# Patient Record
Sex: FEMALE | Race: WHITE | Employment: OTHER | URBAN - NONMETROPOLITAN AREA
[De-identification: names, ages, dates, MRNs, and addresses within clinical notes are randomized per-mention and may not be internally consistent; named-entity substitution may affect disease eponyms.]

---

## 2017-09-20 ENCOUNTER — HOSPITAL ENCOUNTER (OUTPATIENT)
Dept: MAMMOGRAPHY | Age: 62
Discharge: HOME | End: 2017-09-20

## 2017-09-20 DIAGNOSIS — Z12.31 VISIT FOR SCREENING MAMMOGRAM: ICD-10-CM

## 2018-01-23 ENCOUNTER — HOSPITAL ENCOUNTER (OUTPATIENT)
Dept: GENERAL RADIOLOGY | Age: 63
Discharge: HOME | End: 2018-01-23
Attending: INTERNAL MEDICINE | Admitting: INTERNAL MEDICINE

## 2018-01-23 ENCOUNTER — HOSPITAL ENCOUNTER (OUTPATIENT)
Dept: OTHER | Age: 63
Discharge: HOME | End: 2018-01-23
Attending: INTERNAL MEDICINE | Admitting: INTERNAL MEDICINE

## 2018-01-23 DIAGNOSIS — R05.9 COUGH: ICD-10-CM

## 2018-01-23 LAB
ALBUMIN SERPL-MCNC: 4.3 GM/DL (ref 3.4–5)
ALP BLD-CCNC: 85 IU/L (ref 40–129)
ALT SERPL-CCNC: 12 U/L (ref 10–40)
ANION GAP SERPL CALCULATED.3IONS-SCNC: 12 MMOL/L (ref 4–16)
AST SERPL-CCNC: 15 IU/L (ref 15–37)
BILIRUB SERPL-MCNC: 0.3 MG/DL (ref 0–1)
BUN BLDV-MCNC: 13 MG/DL (ref 6–23)
CALCIUM SERPL-MCNC: 9.8 MG/DL (ref 8.3–10.6)
CHLORIDE BLD-SCNC: 101 MMOL/L (ref 99–110)
CO2: 30 MMOL/L (ref 21–32)
CREAT SERPL-MCNC: 1 MG/DL (ref 0.6–1.1)
GFR AFRICAN AMERICAN: >60 ML/MIN/1.73M2
GFR NON-AFRICAN AMERICAN: 56 ML/MIN/1.73M2
GLUCOSE BLD-MCNC: 146 MG/DL (ref 70–99)
LACTATE DEHYDROGENASE: 85 IU/L (ref 120–246)
POTASSIUM SERPL-SCNC: 5.3 MMOL/L (ref 3.5–5.1)
SODIUM BLD-SCNC: 143 MMOL/L (ref 135–145)
TOTAL PROTEIN: 7.2 GM/DL (ref 6.4–8.2)

## 2018-12-05 ENCOUNTER — HOSPITAL ENCOUNTER (EMERGENCY)
Age: 63
Discharge: HOME | End: 2018-12-05
Attending: EMERGENCY MEDICINE
Payer: MEDICARE

## 2018-12-05 ENCOUNTER — APPOINTMENT (OUTPATIENT)
Dept: CT IMAGING | Age: 63
End: 2018-12-05
Payer: MEDICARE

## 2018-12-05 VITALS
SYSTOLIC BLOOD PRESSURE: 146 MMHG | HEIGHT: 63 IN | BODY MASS INDEX: 32.07 KG/M2 | TEMPERATURE: 98.2 F | HEART RATE: 104 BPM | DIASTOLIC BLOOD PRESSURE: 77 MMHG | RESPIRATION RATE: 14 BRPM | WEIGHT: 181 LBS | OXYGEN SATURATION: 99 %

## 2018-12-05 DIAGNOSIS — R51.9 ACUTE NONINTRACTABLE HEADACHE, UNSPECIFIED HEADACHE TYPE: Primary | ICD-10-CM

## 2018-12-05 PROCEDURE — 70450 CT HEAD/BRAIN W/O DYE: CPT

## 2018-12-05 PROCEDURE — 6370000000 HC RX 637 (ALT 250 FOR IP): Performed by: EMERGENCY MEDICINE

## 2018-12-05 PROCEDURE — 99284 EMERGENCY DEPT VISIT MOD MDM: CPT

## 2018-12-05 RX ORDER — BUTALBITAL, ACETAMINOPHEN AND CAFFEINE 50; 325; 40 MG/1; MG/1; MG/1
1 TABLET ORAL ONCE
Status: COMPLETED | OUTPATIENT
Start: 2018-12-05 | End: 2018-12-05

## 2018-12-05 RX ORDER — IBUPROFEN 800 MG/1
800 TABLET ORAL ONCE
Status: COMPLETED | OUTPATIENT
Start: 2018-12-05 | End: 2018-12-05

## 2018-12-05 RX ORDER — IBUPROFEN 800 MG/1
800 TABLET ORAL EVERY 6 HOURS PRN
Qty: 20 TABLET | Refills: 0 | Status: SHIPPED | OUTPATIENT
Start: 2018-12-05

## 2018-12-05 RX ORDER — BUTALBITAL, ACETAMINOPHEN AND CAFFEINE 50; 325; 40 MG/1; MG/1; MG/1
1 TABLET ORAL EVERY 4 HOURS PRN
Qty: 30 TABLET | Refills: 0 | Status: SHIPPED | OUTPATIENT
Start: 2018-12-05

## 2018-12-05 RX ADMIN — BUTALBITAL, ACETAMINOPHEN AND CAFFEINE 1 TABLET: 50; 325; 40 TABLET ORAL at 17:52

## 2018-12-05 RX ADMIN — IBUPROFEN 800 MG: 800 TABLET ORAL at 17:52

## 2018-12-05 ASSESSMENT — PAIN DESCRIPTION - LOCATION: LOCATION: HEAD

## 2018-12-05 ASSESSMENT — PAIN SCALES - GENERAL: PAINLEVEL_OUTOF10: 2

## 2018-12-05 ASSESSMENT — PAIN DESCRIPTION - PAIN TYPE: TYPE: ACUTE PAIN

## 2018-12-05 ASSESSMENT — PAIN DESCRIPTION - FREQUENCY: FREQUENCY: CONTINUOUS

## 2018-12-05 ASSESSMENT — PAIN DESCRIPTION - DESCRIPTORS: DESCRIPTORS: SHARP

## 2018-12-05 NOTE — ED PROVIDER NOTES
EXAMINATION:  CT OF THE HEAD WITHOUT CONTRAST  12/5/2018 6:03 pm    TECHNIQUE:  CT of the head was performed without the administration of intravenous  contrast. Dose modulation, iterative reconstruction, and/or weight based  adjustment of the mA/kV was utilized to reduce the radiation dose to as low  as reasonably achievable. COMPARISON:  None. HISTORY:  ORDERING SYSTEM PROVIDED HISTORY: HEADACHE  TECHNOLOGIST PROVIDED HISTORY:  Has a \"code stroke\" or \"stroke alert\" been called? ->No  Ordering Physician Provided Reason for Exam: headache  Acuity: Acute  Type of Exam: Initial  Additional signs and symptoms: Headache ( has had a headache off and on  for a week. States is in both of her temples and behind her eyes when  bending. States has been taking tylenol without relief. Denies fever or  chills, denies nausea or vomiting, denies blurred or double vision.)    FINDINGS:  BRAIN/VENTRICLES: There is no acute intracranial hemorrhage, mass effect or  midline shift.  No abnormal extra-axial fluid collection.  The gray-white  differentiation is maintained without evidence of an acute infarct.  There is  no evidence of hydrocephalus. ORBITS: The visualized portion of the orbits demonstrate no acute abnormality. SINUSES: The visualized paranasal sinuses and mastoid air cells demonstrate  no acute abnormality. SOFT TISSUES/SKULL:  No acute abnormality of the visualized skull or soft  tissues. [] Discussed with Radiologist:     [] The following radiograph was interpreted by myself in the absence of a radiologist:     EKG: (All EKG's are interpreted by myself in the absence of a cardiologist)      MDM:  Patient's vital signs are stable. Given a Fioricet and Motrin. Patient's vital signs are stable. Vital signs are stable. CT head does not show any acute findings. Nontoxic appearing. Feeling some improvement. We'll discharge with Fioricet and Motrin and follow-up PCP.     Clinical Impression:  1.  Acute nonintractable headache, unspecified headache type        Disposition Vitals:  [unfilled], [unfilled], [unfilled], [unfilled]    Disposition referral (if applicable):  Carolyn Ratliff, Mukesh Conley [facility] [address]  [phone]            Disposition medications (if applicable):  New Prescriptions    BUTALBITAL-ACETAMINOPHEN-CAFFEINE (FIORICET, ESGIC) -40 MG PER TABLET    Take 1 tablet by mouth every 4 hours as needed for Headaches    IBUPROFEN (IBU) 800 MG TABLET    Take 1 tablet by mouth every 6 hours as needed for Pain         (Please note that portions of this note may have been completed with a voice recognition program. Efforts were made to edit the dictations but occasionally words are mis-transcribed.)    MD Kimber Canales MD  12/05/18 0605

## 2019-02-18 ENCOUNTER — HOSPITAL ENCOUNTER (OUTPATIENT)
Age: 64
Setting detail: SPECIMEN
Discharge: HOME | End: 2019-02-18
Payer: MEDICARE

## 2019-02-18 LAB
ALBUMIN SERPL-MCNC: 4.1 GM/DL (ref 3.4–5)
ALP BLD-CCNC: 101 IU/L (ref 40–129)
ALT SERPL-CCNC: 12 U/L (ref 10–40)
ANION GAP SERPL CALCULATED.3IONS-SCNC: 12 MMOL/L (ref 4–16)
AST SERPL-CCNC: 13 IU/L (ref 15–37)
BILIRUB SERPL-MCNC: 0.1 MG/DL (ref 0–1)
BUN BLDV-MCNC: 15 MG/DL (ref 6–23)
CALCIUM SERPL-MCNC: 9.3 MG/DL (ref 8.3–10.6)
CHLORIDE BLD-SCNC: 101 MMOL/L (ref 99–110)
CO2: 29 MMOL/L (ref 21–32)
CREAT SERPL-MCNC: 0.8 MG/DL (ref 0.6–1.1)
GFR AFRICAN AMERICAN: >60 ML/MIN/1.73M2
GFR NON-AFRICAN AMERICAN: >60 ML/MIN/1.73M2
GLUCOSE BLD-MCNC: 165 MG/DL (ref 70–99)
LACTATE DEHYDROGENASE: 92 IU/L (ref 120–246)
POTASSIUM SERPL-SCNC: 4.9 MMOL/L (ref 3.5–5.1)
SODIUM BLD-SCNC: 142 MMOL/L (ref 135–145)
TOTAL PROTEIN: 6.9 GM/DL (ref 6.4–8.2)

## 2019-02-18 PROCEDURE — 83615 LACTATE (LD) (LDH) ENZYME: CPT

## 2019-02-18 PROCEDURE — 80053 COMPREHEN METABOLIC PANEL: CPT

## 2019-03-21 ENCOUNTER — HOSPITAL ENCOUNTER (OUTPATIENT)
Dept: MAMMOGRAPHY | Age: 64
Discharge: HOME | End: 2019-03-21
Payer: MEDICARE

## 2019-03-21 DIAGNOSIS — Z12.31 VISIT FOR SCREENING MAMMOGRAM: ICD-10-CM

## 2019-03-21 PROCEDURE — 77067 SCR MAMMO BI INCL CAD: CPT

## 2020-03-09 ENCOUNTER — HOSPITAL ENCOUNTER (OUTPATIENT)
Dept: INFUSION THERAPY | Age: 65
Discharge: HOME | End: 2020-03-09
Payer: MEDICARE

## 2020-03-09 LAB
ALBUMIN SERPL-MCNC: 4.4 GM/DL (ref 3.4–5)
ALP BLD-CCNC: 93 IU/L (ref 40–129)
ALT SERPL-CCNC: 11 U/L (ref 10–40)
ANION GAP SERPL CALCULATED.3IONS-SCNC: 15 MMOL/L (ref 4–16)
AST SERPL-CCNC: 14 IU/L (ref 15–37)
BASOPHILS ABSOLUTE: 0 K/CU MM
BASOPHILS RELATIVE PERCENT: 0.1 % (ref 0–1)
BILIRUB SERPL-MCNC: 0.2 MG/DL (ref 0–1)
BUN BLDV-MCNC: 8 MG/DL (ref 6–23)
CALCIUM SERPL-MCNC: 9.6 MG/DL (ref 8.3–10.6)
CHLORIDE BLD-SCNC: 97 MMOL/L (ref 99–110)
CO2: 27 MMOL/L (ref 21–32)
CREAT SERPL-MCNC: 0.8 MG/DL (ref 0.6–1.1)
DIFFERENTIAL TYPE: ABNORMAL
EOSINOPHILS ABSOLUTE: 0.1 K/CU MM
EOSINOPHILS RELATIVE PERCENT: 0.8 % (ref 0–3)
GFR AFRICAN AMERICAN: >60 ML/MIN/1.73M2
GFR NON-AFRICAN AMERICAN: >60 ML/MIN/1.73M2
GLUCOSE BLD-MCNC: 94 MG/DL (ref 70–99)
HCT VFR BLD CALC: 41.4 % (ref 37–47)
HEMOGLOBIN: 13.9 GM/DL (ref 12.5–16)
LACTATE DEHYDROGENASE: 126 IU/L (ref 120–246)
LYMPHOCYTES ABSOLUTE: 5.3 K/CU MM
LYMPHOCYTES RELATIVE PERCENT: 40 % (ref 24–44)
MCH RBC QN AUTO: 28.5 PG (ref 27–31)
MCHC RBC AUTO-ENTMCNC: 33.6 % (ref 32–36)
MCV RBC AUTO: 85 FL (ref 78–100)
MONOCYTES ABSOLUTE: 1.3 K/CU MM
MONOCYTES RELATIVE PERCENT: 9.5 % (ref 0–4)
PDW BLD-RTO: 14.1 % (ref 11.7–14.9)
PLATELET # BLD: 441 K/CU MM (ref 140–440)
PMV BLD AUTO: 10.9 FL (ref 7.5–11.1)
POTASSIUM SERPL-SCNC: 4.4 MMOL/L (ref 3.5–5.1)
RBC # BLD: 4.87 M/CU MM (ref 4.2–5.4)
SEGMENTED NEUTROPHILS ABSOLUTE COUNT: 6.6 K/CU MM
SEGMENTED NEUTROPHILS RELATIVE PERCENT: 49.6 % (ref 36–66)
SODIUM BLD-SCNC: 139 MMOL/L (ref 135–145)
TOTAL PROTEIN: 7.6 GM/DL (ref 6.4–8.2)
WBC # BLD: 13.2 K/CU MM (ref 4–10.5)

## 2020-03-09 PROCEDURE — 85025 COMPLETE CBC W/AUTO DIFF WBC: CPT

## 2020-03-09 PROCEDURE — 36415 COLL VENOUS BLD VENIPUNCTURE: CPT

## 2020-03-09 PROCEDURE — 83615 LACTATE (LD) (LDH) ENZYME: CPT

## 2020-03-09 PROCEDURE — 80053 COMPREHEN METABOLIC PANEL: CPT

## 2022-03-31 ENCOUNTER — HOSPITAL ENCOUNTER (OUTPATIENT)
Age: 67
Discharge: HOME | End: 2022-03-31
Payer: MEDICARE

## 2022-03-31 LAB — POTASSIUM SERPL-SCNC: 5.1 MMOL/L (ref 3.5–5.1)

## 2022-03-31 PROCEDURE — 84132 ASSAY OF SERUM POTASSIUM: CPT

## 2022-03-31 PROCEDURE — 36415 COLL VENOUS BLD VENIPUNCTURE: CPT

## 2022-11-07 NOTE — PROGRESS NOTES
Patient Name: Lu Edgar  Patient : 1955  Patient MRN: [id number]     Primary Oncologist: Ladonna Hoyt MD  Referring Provider: JAJA Campoverde     Date of Service: 2022      Chief Complaint:   Chief Complaint   Patient presents with    Follow-up     Patient Active Problem List:       Leukocytosis       Thrombocytosis    HPI:   Ms. Yogesh Smyth is a 60-year-old very pleasant female with medical history significant for hypertension, hyperlipidemia, diabetes mellitus, history of HPV infection, initially referred to me on 2014, for evaluation of mild leukocytosis. She was found to have mild leukocytosis on routine blood tests done on 2014, by her primary care physician. She is a chronic smoker and she smokes about 10 cigarettes a day since she was 15. Laboratory workup on 2014, showed slight elevation in white blood cell count (13). Her hemoglobin, hematocrit, platelet count and LDH were normal. There was no significant immunophenotypic abnormalities in flow cytometry. JAK2  and bcr/abl studies were negative. She has been followed periodically since then. She missed follow up with me since 2019 until 2022. On 2022, she was referred back to me since she was found to have new onset thrombocytosis. She has stable leukocytosis. She was noted to have thrombocytosis (platelet count 505) and leukocytosis (WBC 13.75) on 22. Repeat blood tests on 10/6/22 showed persistent thrombocytosis (platelet count 062) and leukocytosis (BKD76.24). Since she now presented with both leukocytosis and thrombocytosis, I recommend her to have repeat myeloproliferative neoplasm today, including flow cytometry, BCR-ABL1, JAK2 V617F, JAK2 exon 12-13, MPL and CALR. I will also request MARIKA, ESR and LDH. If she is found to have myeloproliferative neoplasm, I will consider to start cytoreductive therapy with hydroxyurea.  If above tests are negative, I will recommend for observation. In view of her smoking history, I offered screening lung CT scan before and she declined it at that time. Will discuss about it again on next office visit. She does not have any significant symptoms on todays visit. Past Medical History:  Significant for  1. Hypertension  2. Hyperlipidemia  3. Diabetes mellitus  4. History of positive vaginal HPV     Past Surgical History:  No pertinent past surgical history. Family History:  Significant for lung cancer in her father, throat cancer in her brother. No other family history of cancer disease. Social History:  She is a current smoker and she smokes about 10 cigarettes a day since she was 15. She denies alcohol drinking or illicit drug abuse. She is a  and currently living in [facility] and has four children. Allergies:  No known drug allergies. Oncology History    No history exists. Review of Systems: \"Per interval history; otherwise 10 point ROS is negative. \"  Her energy level is pretty good and her sleep is fine. She doesn't have fever, chills, night sweats, cough, shortness of breath, chest pain, hemoptysis or palpitations. Her bowels and bladder functions are normal. She doesn't have nausea, vomiting, abdominal pain, diarrhea, constipation, dysuria, loss of appetite or weight loss. She doesn't have neuropathy and she denies bleeding or clotting issues. She denies any pain in her body. No anxiety or depression. The rest of the systems are unremarkable.     Vital Signs:  /61 (Site: Right Upper Arm, Position: Sitting, Cuff Size: Large Adult)   Pulse 85   Temp 97.3 °F (36.3 °C) (Temporal)   Ht 5' 1\" (1.549 m)   Wt 177 lb (80.3 kg)   SpO2 96%   BMI 33.44 kg/m²     Physical Exam:  CONSTITUTIONAL: awake, alert, cooperative, no apparent distress   EYES: pupils equal, round and reactive to light, sclera clear, normal conjunctiva  ENT: Normocephalic, without obvious abnormality, atraumatic  NECK: supple, symmetrical, no jugular venous distension, no carotid bruits   HEMATOLOGIC/LYMPHATIC: no cervical, supraclavicular or axillary lymphadenopathy   LUNGS: VBS, no wheezes, no increased work of breathing, no rhonchi, clear to auscultation, no crackles,   CARDIOVASCULAR: regular rate and rhythm, normal S1 and S2, no murmur noted  ABDOMEN: normal bowel sounds x 4, soft, non-distended, non-tender, no masses palpated, no hepatosplenomegaly   MUSCULOSKELETAL: full range of motion noted, tone is normal  NEUROLOGIC: awake, alert, oriented to name, place and time. Motor skills grossly intact. SKIN: appears intact, normal skin color, normal texture, normal turgor, no jaundice.    EXTREMITIES: no LE edema, no leg swelling, no cyanosis, no clubbing,      Labs:  Hematology:  Lab Results   Component Value Date    WBC 16.1 (H) 11/08/2022    RBC 4.87 11/08/2022    HGB 13.6 11/08/2022    HCT 42.8 11/08/2022    MCV 87.9 11/08/2022    MCH 27.9 11/08/2022    MCHC 31.8 (L) 11/08/2022    RDW 15.1 (H) 11/08/2022     (H) 11/08/2022    MPV 10.1 11/08/2022    BANDSPCT 6 10/01/2014    SEGSPCT 44.8 11/08/2022    EOSRELPCT 1.5 11/08/2022    BASOPCT 0.1 11/08/2022    LYMPHOPCT 43.7 11/08/2022    MONOPCT 9.9 (H) 11/08/2022    BANDABS 0.78 10/01/2014    SEGSABS 7.2 11/08/2022    EOSABS 0.2 11/08/2022    BASOSABS 0.0 11/08/2022    LYMPHSABS 7.1 11/08/2022    MONOSABS 1.6 11/08/2022    DIFFTYPE AUTOMATED DIFFERENTIAL 11/08/2022    PLTM PLATELETS APPEAR NORMAL 10/01/2014     Lab Results   Component Value Date    ESR 16 11/08/2022     Chemistry:  Lab Results   Component Value Date     11/08/2022    K 5.2 (H) 11/08/2022     11/08/2022    CO2 29 11/08/2022    BUN 12 11/08/2022    CREATININE 0.8 11/08/2022    GLUCOSE 111 (H) 11/08/2022    CALCIUM 10.1 11/08/2022    PROT 6.7 11/08/2022    LABALBU 4.3 11/08/2022    BILITOT 0.2 11/08/2022    ALKPHOS 84 11/08/2022    AST 12 (L) 11/08/2022    ALT 10 11/08/2022 LABGLOM >60 11/08/2022    GFRAA >60 03/09/2020    POCGLU 200 (H) 09/26/2014     Lab Results   Component Value Date     (L) 11/08/2022     No components found for: LD  No results found for: TSHHS, T4FREE, FT3  Immunology:  Lab Results   Component Value Date    PROT 6.7 11/08/2022     No results found for: Georga Vivian, KLFLCR  No results found for: B2M  Coagulation Panel:  No results found for: PROTIME, INR, APTT, DDIMER  Anemia Panel:  No results found for: [id number], FOLATE  Tumor Markers:  No results found for: , CEA, , LABCA2, PSA  Observations:  PHQ-9 Total Score: 0 (11/8/2022  3:41 PM)      Assessment:  Leukocytosis   Thrombocytosis    Plan:   Ms. Umesh Iverson is a 60-year-old very pleasant female who has been followed for leukocytosis since October 1, 2014. Laboratory workup in October 1, 2014, including flow cytometry. JAK2  and bcr/abl studies were normal.     She has been followed periodically since then. She missed follow up with me since 2/18/2019 until 11/8/2022. On November 8, 2022, she was referred back to me since she was found to have new onset thrombocytosis. She has stable leukocytosis. She was noted to have thrombocytosis (platelet count 696) and leukocytosis (WBC 13.75) on 7/7/22. Repeat blood tests on 10/6/22 showed persistent thrombocytosis (platelet count 338) and leukocytosis (KOZ07.20). Since she now presented with both leukocytosis and thrombocytosis, I recommend her to have repeat myeloproliferative neoplasm today, including flow cytometry, BCR-ABL1, JAK2 V617F, JAK2 exon 12-13, MPL and CALR. I will also request MARIKA, ESR and LDH. If she is found to have myeloproliferative neoplasm, I will consider to start cytoreductive therapy with hydroxyurea. If above tests are negative, I will recommend for observation. In view of her smoking history, I offered screening lung CT scan before and she declined it at that time.  Will discuss about it again on next office visit. I answered all her questions and concerns for today. Thank you for allowing me to participate in the care of this very pleasant patient. Recent imaging and labs were reviewed and discussed with the patient.

## 2022-11-08 ENCOUNTER — OFFICE VISIT (OUTPATIENT)
Dept: ONCOLOGY | Age: 67
End: 2022-11-08
Payer: MEDICARE

## 2022-11-08 ENCOUNTER — HOSPITAL ENCOUNTER (OUTPATIENT)
Dept: INFUSION THERAPY | Age: 67
Discharge: HOME | End: 2022-11-08
Payer: MEDICARE

## 2022-11-08 VITALS
TEMPERATURE: 97.3 F | DIASTOLIC BLOOD PRESSURE: 61 MMHG | WEIGHT: 177 LBS | SYSTOLIC BLOOD PRESSURE: 130 MMHG | HEART RATE: 85 BPM | BODY MASS INDEX: 33.42 KG/M2 | OXYGEN SATURATION: 96 % | HEIGHT: 61 IN

## 2022-11-08 DIAGNOSIS — D72.828 OTHER ELEVATED WHITE BLOOD CELL (WBC) COUNT: ICD-10-CM

## 2022-11-08 DIAGNOSIS — D75.839 THROMBOCYTOSIS: ICD-10-CM

## 2022-11-08 PROBLEM — D72.829 LEUKOCYTOSIS: Status: ACTIVE | Noted: 2022-11-08

## 2022-11-08 LAB
ALBUMIN SERPL-MCNC: 4.3 GM/DL (ref 3.4–5)
ALP BLD-CCNC: 84 IU/L (ref 40–128)
ALT SERPL-CCNC: 10 U/L (ref 10–40)
ANION GAP SERPL CALCULATED.3IONS-SCNC: 11 MMOL/L (ref 4–16)
AST SERPL-CCNC: 12 IU/L (ref 15–37)
BASOPHILS ABSOLUTE: 0 K/CU MM
BASOPHILS RELATIVE PERCENT: 0.1 % (ref 0–1)
BILIRUB SERPL-MCNC: 0.2 MG/DL (ref 0–1)
BUN BLDV-MCNC: 12 MG/DL (ref 6–23)
CALCIUM SERPL-MCNC: 10.1 MG/DL (ref 8.3–10.6)
CHLORIDE BLD-SCNC: 100 MMOL/L (ref 99–110)
CO2: 29 MMOL/L (ref 21–32)
CREAT SERPL-MCNC: 0.8 MG/DL (ref 0.6–1.1)
DIFFERENTIAL TYPE: ABNORMAL
EOSINOPHILS ABSOLUTE: 0.2 K/CU MM
EOSINOPHILS RELATIVE PERCENT: 1.5 % (ref 0–3)
ERYTHROCYTE SEDIMENTATION RATE: 16 MM/HR (ref 0–30)
FERRITIN: 64 NG/ML (ref 15–150)
GFR SERPL CREATININE-BSD FRML MDRD: >60 ML/MIN/1.73M2
GLUCOSE BLD-MCNC: 111 MG/DL (ref 70–99)
HCT VFR BLD CALC: 42.8 % (ref 37–47)
HEMOGLOBIN: 13.6 GM/DL (ref 12.5–16)
LACTATE DEHYDROGENASE: 100 IU/L (ref 120–246)
LYMPHOCYTES ABSOLUTE: 7.1 K/CU MM
LYMPHOCYTES RELATIVE PERCENT: 43.7 % (ref 24–44)
MCH RBC QN AUTO: 27.9 PG (ref 27–31)
MCHC RBC AUTO-ENTMCNC: 31.8 % (ref 32–36)
MCV RBC AUTO: 87.9 FL (ref 78–100)
MONOCYTES ABSOLUTE: 1.6 K/CU MM
MONOCYTES RELATIVE PERCENT: 9.9 % (ref 0–4)
PDW BLD-RTO: 15.1 % (ref 11.7–14.9)
PLATELET # BLD: 651 K/CU MM (ref 140–440)
PMV BLD AUTO: 10.1 FL (ref 7.5–11.1)
POTASSIUM SERPL-SCNC: 5.2 MMOL/L (ref 3.5–5.1)
RBC # BLD: 4.87 M/CU MM (ref 4.2–5.4)
SEGMENTED NEUTROPHILS ABSOLUTE COUNT: 7.2 K/CU MM
SEGMENTED NEUTROPHILS RELATIVE PERCENT: 44.8 % (ref 36–66)
SODIUM BLD-SCNC: 140 MMOL/L (ref 135–145)
TOTAL PROTEIN: 6.7 GM/DL (ref 6.4–8.2)
WBC # BLD: 16.1 K/CU MM (ref 4–10.5)

## 2022-11-08 PROCEDURE — 80053 COMPREHEN METABOLIC PANEL: CPT

## 2022-11-08 PROCEDURE — 85652 RBC SED RATE AUTOMATED: CPT

## 2022-11-08 PROCEDURE — 85025 COMPLETE CBC W/AUTO DIFF WBC: CPT

## 2022-11-08 PROCEDURE — 36415 COLL VENOUS BLD VENIPUNCTURE: CPT

## 2022-11-08 PROCEDURE — 99204 OFFICE O/P NEW MOD 45 MIN: CPT | Performed by: INTERNAL MEDICINE

## 2022-11-08 PROCEDURE — 86038 ANTINUCLEAR ANTIBODIES: CPT

## 2022-11-08 PROCEDURE — 82728 ASSAY OF FERRITIN: CPT

## 2022-11-08 PROCEDURE — 83615 LACTATE (LD) (LDH) ENZYME: CPT

## 2022-11-08 PROCEDURE — 1123F ACP DISCUSS/DSCN MKR DOCD: CPT | Performed by: INTERNAL MEDICINE

## 2022-11-08 PROCEDURE — 99211 OFF/OP EST MAY X REQ PHY/QHP: CPT

## 2022-11-08 RX ORDER — ORAL SEMAGLUTIDE 3 MG/1
TABLET ORAL
COMMUNITY
Start: 2022-10-13

## 2022-11-08 RX ORDER — GLIMEPIRIDE 2 MG/1
TABLET ORAL
COMMUNITY
Start: 2022-10-25

## 2022-11-08 ASSESSMENT — PATIENT HEALTH QUESTIONNAIRE - PHQ9
SUM OF ALL RESPONSES TO PHQ QUESTIONS 1-9: 0
1. LITTLE INTEREST OR PLEASURE IN DOING THINGS: 0
SUM OF ALL RESPONSES TO PHQ9 QUESTIONS 1 & 2: 0
SUM OF ALL RESPONSES TO PHQ QUESTIONS 1-9: 0
2. FEELING DOWN, DEPRESSED OR HOPELESS: 0

## 2022-11-08 NOTE — PROGRESS NOTES
MA Rooming Questions  Patient: Marlene Bennett  MRN: [id number]    Date: 11/8/2022          5. Did the patient have a depression screening completed today?  Yes    PHQ-9 Total Score: 0 (11/8/2022  3:41 PM)       PHQ-9 Given to (if applicable):               PHQ-9 Score (if applicable):                     [] Positive     []  Negative              Does question #9 need addressed (if applicable)                     [] Yes    []  No               Calvin Sampson [facility]

## 2022-11-11 LAB — ANTI-NUCLEAR ANTIBODY (ANA): NORMAL

## 2022-12-11 PROBLEM — D72.829 LEUCOCYTOSIS: Status: ACTIVE | Noted: 2022-12-11

## 2022-12-11 NOTE — PROGRESS NOTES
Patient Name: Paresh Hernandez  Patient : 1955  Patient MRN: [id number]     Primary Oncologist: Gabrielle Reyes MD  Referring Provider: JAJA Luu     Date of Service: 2022      Chief Complaint:   Chief Complaint   Patient presents with    Follow-up    Results     LABS      Patient Active Problem List:       Leukocytosis       Thrombocytosis    HPI:   Ms. Puneet Diaz is a 59-year-old very pleasant female with medical history significant for hypertension, hyperlipidemia, diabetes mellitus, history of HPV infection, initially referred to me on 2014, for evaluation of mild leukocytosis. She was found to have mild leukocytosis on routine blood tests done on 2014, by her primary care physician. She is a chronic smoker and she smokes about 10 cigarettes a day since she was 15. Laboratory workup on 2014, showed slight elevation in white blood cell count (13). Her hemoglobin, hematocrit, platelet count and LDH were normal. There was no significant immunophenotypic abnormalities in flow cytometry. JAK2  and bcr/abl studies were negative. She has been followed periodically since then. She missed follow up with me since 2019 until 2022. Laboratory work ups done on 22 showed leukocytosis (WBC 16.1), thrombocytosis (platelet 419) and flow cytometry showed monoclonal B-cell population (~450 cells/cumm). The immunophenotype of the clonal cells is non-specific. DDX includes peripheral blood involvement by a B-cell lymphom and monoclonal B-cell lymphocytosis. The rests of the blood tests, including BCR-ABL1, JAK2 V617F, JAK2 exon 12-13, MPL, CALR, MARIKA, ESR and LDH, were within normal range. On 2022, she presented to me for follow up. She was referred back to me since she was found to have new onset thrombocytosis. She has stable leukocytosis.      Since she now presented with both leukocytosis and thrombocytosis, I repeated labs on 11/8/22. 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 Other labs were normal.     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 Further recommendations will be based on CT scan findings. In view of her smoking history, I offered screening lung CT scan before and she declined it at that time. She does not have any significant symptoms on todays visit. Past Medical History:  Significant for  1. Hypertension  2. Hyperlipidemia  3. Diabetes mellitus  4. History of positive vaginal HPV     Past Surgical History:  No pertinent past surgical history. Family History:  Significant for lung cancer in her father, throat cancer in her brother. No other family history of cancer disease. Social History:  She is a current smoker and she smokes about 10 cigarettes a day since she was 15. She denies alcohol drinking or illicit drug abuse. She is a  and currently living in [place] and has four children. Allergies:  No known drug allergies. Oncology History    No history exists. Review of Systems: \"Per interval history; otherwise 10 point ROS is negative. \"  Her energy level is okay and her sleep is fine. She doesn't have fever, chills, night sweats, cough, shortness of breath, chest pain, hemoptysis or palpitations. Her bowels and bladder functions are normal. She doesn't have nausea, vomiting, abdominal pain, diarrhea, constipation, dysuria, loss of appetite or weight loss. She doesn't have neuropathy and she denies bleeding or clotting issues. She doesn't have any pain in her body. No anxiety or depression. The rest of the systems are unremarkable.     Vital Signs:  /70 (Site: Right Upper Arm, Position: Sitting, Cuff Size: Large Adult)   Pulse 88   Temp 97.4 °F (36.3 °C) (Temporal)   Ht 5' 1\" (1.549 m)   Wt 177 lb 12.8 oz (80.6 kg)   SpO2 98%   BMI 33.60 kg/m²     Physical Exam:  CONSTITUTIONAL: awake, alert, cooperative, no apparent distress   EYES: pupils equal, round and reactive to light, sclera clear, normal conjunctiva  ENT: Normocephalic, without obvious abnormality, atraumatic  NECK: supple, symmetrical, no jugular venous distension, no carotid bruits   HEMATOLOGIC/LYMPHATIC: no cervical, supraclavicular or axillary lymphadenopathy   LUNGS: VBS, no wheezes, no increased work of breathing, no rhonchi, clear to auscultation, no crackles,   CARDIOVASCULAR: regular rate and rhythm, normal S1 and S2, no murmur noted  ABDOMEN: normal bowel sounds x 4, soft, non-distended, non-tender, no masses palpated, no hepatosplenomegaly   MUSCULOSKELETAL: full range of motion noted, tone is normal  NEUROLOGIC: awake, alert, oriented to name, place and time. Motor skills grossly intact. SKIN: appears intact, normal skin color, normal texture, normal turgor, no jaundice.    EXTREMITIES: no LE edema, no clubbing, no leg swelling, no cyanosis,      Labs:  Hematology:  Lab Results   Component Value Date    WBC 16.1 (H) 11/08/2022    RBC 4.87 11/08/2022    HGB 13.6 11/08/2022    HCT 42.8 11/08/2022    MCV 87.9 11/08/2022    MCH 27.9 11/08/2022    MCHC 31.8 (L) 11/08/2022    RDW 15.1 (H) 11/08/2022     (H) 11/08/2022    MPV 10.1 11/08/2022    BANDSPCT 6 10/01/2014    SEGSPCT 44.8 11/08/2022    EOSRELPCT 1.5 11/08/2022    BASOPCT 0.1 11/08/2022    LYMPHOPCT 43.7 11/08/2022    MONOPCT 9.9 (H) 11/08/2022    BANDABS 0.78 10/01/2014    SEGSABS 7.2 11/08/2022    EOSABS 0.2 11/08/2022    BASOSABS 0.0 11/08/2022    LYMPHSABS 7.1 11/08/2022    MONOSABS 1.6 11/08/2022    DIFFTYPE AUTOMATED DIFFERENTIAL 11/08/2022    PLTM PLATELETS APPEAR NORMAL 10/01/2014     Lab Results   Component Value Date    ESR 16 11/08/2022     Chemistry:  Lab Results   Component Value Date     11/08/2022    K 5.2 (H) 11/08/2022     11/08/2022    CO2 29 11/08/2022    BUN 12 11/08/2022    CREATININE 0.8 11/08/2022    GLUCOSE 111 (H) 11/08/2022    CALCIUM 10.1 11/08/2022    PROT 6.7 11/08/2022    LABALBU 4.3 11/08/2022    BILITOT 0.2 11/08/2022    ALKPHOS 84 11/08/2022    AST 12 (L) 11/08/2022    ALT 10 11/08/2022    LABGLOM >60 11/08/2022    GFRAA >60 03/09/2020    POCGLU 200 (H) 09/26/2014     Lab Results   Component Value Date     (L) 11/08/2022     No components found for: LD  No results found for: TSHHS, T4FREE, FT3  Immunology:  Lab Results   Component Value Date    PROT 6.7 11/08/2022     No results found for: Mark Kasper, KLFLCR  No results found for: B2M  Coagulation Panel:  No results found for: PROTIME, INR, APTT, DDIMER  Anemia Panel:  No results found for: [id number], FOLATE  Tumor Markers:  No results found for: , CEA, , LABCA2, PSA  Observations:  PHQ-9 Total Score: 0 (12/12/2022 11:19 AM)      Assessment:  Leukocytosis   Thrombocytosis    Plan:   Ms. Hilario Jaimes is a 26-year-old very pleasant female who has been followed for leukocytosis since October 1, 2014. Laboratory workup in October 1, 2014, including flow cytometry. JAK2  and bcr/abl studies were normal.     She has been followed periodically since then. She missed follow up with me since 2/18/2019 until 11/8/2022. Laboratory work ups done on 11/8/22 showed leukocytosis (WBC 16.1), thrombocytosis (platelet 697) and flow cytometry showed monoclonal B-cell population (~450 cells/cumm). The immunophenotype of the clonal cells is non-specific. DDX includes peripheral blood involvement by a B-cell lymphom and monoclonal B-cell lymphocytosis. The rests of the blood tests, including BCR-ABL1, JAK2 V617F, JAK2 exon 12-13, MPL, CALR, MARIKA, ESR and LDH, were within normal range. On December 12, 2022, she presented to me for follow up. She was referred back to me since she was found to have new onset thrombocytosis. She has stable leukocytosis. Since she now presented with both leukocytosis and thrombocytosis, I repeated labs on 11/8/22. 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 Other labs were normal.     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 Further recommendations will be based on CT scan findings. In view of her smoking history, I offered screening lung CT scan before and she declined it at that time. I answered all her questions and concerns for today. Recent imaging and labs were reviewed and discussed with the patient.

## 2022-12-12 ENCOUNTER — HOSPITAL ENCOUNTER (OUTPATIENT)
Dept: INFUSION THERAPY | Age: 67
Discharge: HOME | End: 2022-12-12
Payer: MEDICARE

## 2022-12-12 ENCOUNTER — OFFICE VISIT (OUTPATIENT)
Dept: ONCOLOGY | Age: 67
End: 2022-12-12
Payer: MEDICARE

## 2022-12-12 VITALS
DIASTOLIC BLOOD PRESSURE: 70 MMHG | BODY MASS INDEX: 33.57 KG/M2 | SYSTOLIC BLOOD PRESSURE: 119 MMHG | HEIGHT: 61 IN | HEART RATE: 88 BPM | TEMPERATURE: 97.4 F | WEIGHT: 177.8 LBS | OXYGEN SATURATION: 98 %

## 2022-12-12 DIAGNOSIS — D75.839 THROMBOCYTOSIS: Primary | ICD-10-CM

## 2022-12-12 DIAGNOSIS — D72.820 MONOCLONAL B-CELL LYMPHOCYTOSIS: ICD-10-CM

## 2022-12-12 DIAGNOSIS — D72.828 OTHER ELEVATED WHITE BLOOD CELL (WBC) COUNT: ICD-10-CM

## 2022-12-12 PROCEDURE — 99211 OFF/OP EST MAY X REQ PHY/QHP: CPT

## 2022-12-12 PROCEDURE — 1123F ACP DISCUSS/DSCN MKR DOCD: CPT | Performed by: INTERNAL MEDICINE

## 2022-12-12 PROCEDURE — 99214 OFFICE O/P EST MOD 30 MIN: CPT | Performed by: INTERNAL MEDICINE

## 2022-12-12 ASSESSMENT — PATIENT HEALTH QUESTIONNAIRE - PHQ9
SUM OF ALL RESPONSES TO PHQ QUESTIONS 1-9: 0
2. FEELING DOWN, DEPRESSED OR HOPELESS: 0
1. LITTLE INTEREST OR PLEASURE IN DOING THINGS: 0
SUM OF ALL RESPONSES TO PHQ QUESTIONS 1-9: 0
SUM OF ALL RESPONSES TO PHQ9 QUESTIONS 1 & 2: 0
SUM OF ALL RESPONSES TO PHQ QUESTIONS 1-9: 0
SUM OF ALL RESPONSES TO PHQ QUESTIONS 1-9: 0

## 2022-12-12 NOTE — PROGRESS NOTES
MA Rooming Questions  Patient: Jacobo Cline  MRN: [id number]    Date: 12/12/2022        1. Do you have any new issues?   no         2. Do you need any refills on medications?    no    3. Have you had any imaging done since your last visit? yes - LABS 11/8    4. Have you been hospitalized or seen in the emergency room since your last visit here?   no    5. Did the patient have a depression screening completed today?  Yes    PHQ-9 Total Score: 0 (12/12/2022 11:19 AM)       PHQ-9 Given to (if applicable):               PHQ-9 Score (if applicable):                     [] Positive     []  Negative              Does question #9 need addressed (if applicable)                     [] Yes    []  No               Olinda Payment, CMA

## 2022-12-13 ENCOUNTER — TELEPHONE (OUTPATIENT)
Dept: ONCOLOGY | Age: 67
End: 2022-12-13

## 2022-12-13 NOTE — TELEPHONE ENCOUNTER
Left message with CT scan time and prep to be done at [facility] on 12/27 arrival at 10 AM. Informed to call with any questions.

## 2022-12-26 NOTE — PROGRESS NOTES
Patient Name: Deep Hansen  Patient : 1955  Patient MRN: [id number]     Primary Oncologist: Adrien Hercules MD  Referring Provider: JAJA Mosquera     Date of Service: 1/3/2023      Chief Complaint:   Chief Complaint   Patient presents with    Follow-up       Patient Active Problem List:       Leukocytosis       Thrombocytosis    HPI:   Ms. Samy Mims is a 69-year-old very pleasant female with medical history significant for hypertension, hyperlipidemia, diabetes mellitus, history of HPV infection, initially referred to me on 2014, for evaluation of mild leukocytosis. She was found to have mild leukocytosis on routine blood tests done on 2014, by her primary care physician. She is a chronic smoker and she smokes about 10 cigarettes a day since she was 15. Laboratory workup on 2014, showed slight elevation in white blood cell count (13). Her hemoglobin, hematocrit, platelet count and LDH were normal. There was no significant immunophenotypic abnormalities in flow cytometry. JAK2  and bcr/abl studies were negative. She has been followed periodically since then. She missed follow up with me since 2019 until 2022. Laboratory work ups done on 22 showed leukocytosis (WBC 16.1), thrombocytosis (platelet 691) and flow cytometry showed monoclonal B-cell population (~450 cells/cumm). The immunophenotype of the clonal cells is non-specific. DDX includes peripheral blood involvement by a B-cell lymphom and monoclonal B-cell lymphocytosis. The rests of the blood tests, including BCR-ABL1, JAK2 V617F, JAK2 exon 12-13, MPL, CALR, MARIKA, ESR and LDH, were within normal range. CT scan of the neck, chest, abdomen and pelvis done on 2022 showed no significant pathology in her neck. No splenomegaly or overt lymphadenopathy. There are scattered mildly prominent left internal mammary, upper abdominal and left common iliac lymph nodes.   There is a 1.6 cm left upper lobe irregular cystic lesion with a peripheral 0.8 x 0.3 cm nodular component. Differential considerations include infectious/inflammatory process or neoplasm. Borderline endometrial thickening given postmenopausal status. Consider pelvic ultrasound for further evaluation. On January 3, 2022, she presented to me for follow up. She was referred back to me since she was found to have new onset thrombocytosis. She has stable leukocytosis. Since she now presented with both leukocytosis and thrombocytosis, I repeated labs on 11/8/22. 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 Other labs were normal.     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 CT scan was done on 12/27/22 and there is no overt lymphadenopathy or splenomegaly. I recommend for close observation of her monoclonal lymphocytosis. She doesn't have anemia. Also recommend to take aspirin 81 mg daily for thrombocytosis. For her left lung cystic lesion with solid component, I recommend to have repeat CT chest in 4 months. I will refer her to GYN for evaluation of her borderline endometrial thickening to r/o endometrial cancer. I will follow up with GYN's recommendations. I will plan to have repeat labs also in 4 months. She does not have any significant symptoms on todays visit. Past Medical History:  Significant for  1. Hypertension  2. Hyperlipidemia  3. Diabetes mellitus  4. History of positive vaginal HPV     Past Surgical History:  No pertinent past surgical history. Family History:  Significant for lung cancer in her father, throat cancer in her brother. No other family history of cancer disease. Social History:  She is a current smoker and she smokes about 10 cigarettes a day since she was 15.  She denies alcohol drinking or illicit drug abuse.  She is a  and currently living in [facility] and has four children. Allergies:  No known drug allergies. Oncology History    No history exists. Review of Systems: \"Per interval history; otherwise 10 point ROS is negative. \"  Her energy level is pretty good and her sleep is fine. She doesn't have fever, chills, night sweats, cough, shortness of breath, chest pain, hemoptysis or palpitations. Her bowels and bladder functions are normal. She doesn't have nausea, vomiting, abdominal pain, diarrhea, constipation, dysuria, loss of appetite or weight loss. She doesn't have neuropathy and she denies bleeding or clotting issues. She denies any pain in her body. No anxiety or depression. The rest of the systems are unremarkable. Vital Signs:  BP (!) 130/50 (Site: Right Lower Arm, Position: Sitting, Cuff Size: Medium Adult)   Pulse 86   Temp 96.9 °F (36.1 °C) (Infrared)   Ht 5' 1\" (1.549 m)   Wt 177 lb (80.3 kg)   SpO2 98%   BMI 33.44 kg/m²     Physical Exam:  CONSTITUTIONAL: awake, alert, cooperative, no apparent distress   EYES: pupils equal, round and reactive to light, sclera clear, normal conjunctiva  ENT: Normocephalic, without obvious abnormality, atraumatic  NECK: supple, symmetrical, no jugular venous distension, no carotid bruits   HEMATOLOGIC/LYMPHATIC: no cervical, supraclavicular or axillary lymphadenopathy   LUNGS: VBS, no wheezes, clear to auscultation, no crackles, no increased work of breathing, no rhonchi,   CARDIOVASCULAR: regular rate and rhythm, normal S1 and S2, no murmur noted  ABDOMEN: normal bowel sounds x 4, soft, non-distended, non-tender, no masses palpated, no hepatosplenomegaly   MUSCULOSKELETAL: full range of motion noted, tone is normal  NEUROLOGIC: awake, alert, oriented to name, place and time. Motor skills grossly intact. SKIN: appears intact, normal skin color, normal texture, normal turgor, no jaundice.    EXTREMITIES: no clubbing, no leg swelling, no LE edema, no cyanosis,      Labs:  Hematology:  Lab Results   Component Value Date    WBC 16.1 (H) 11/08/2022    RBC 4.87 11/08/2022    HGB 13.6 11/08/2022    HCT 42.8 11/08/2022    MCV 87.9 11/08/2022    MCH 27.9 11/08/2022    MCHC 31.8 (L) 11/08/2022    RDW 15.1 (H) 11/08/2022     (H) 11/08/2022    MPV 10.1 11/08/2022    BANDSPCT 6 10/01/2014    SEGSPCT 44.8 11/08/2022    EOSRELPCT 1.5 11/08/2022    BASOPCT 0.1 11/08/2022    LYMPHOPCT 43.7 11/08/2022    MONOPCT 9.9 (H) 11/08/2022    BANDABS 0.78 10/01/2014    SEGSABS 7.2 11/08/2022    EOSABS 0.2 11/08/2022    BASOSABS 0.0 11/08/2022    LYMPHSABS 7.1 11/08/2022    MONOSABS 1.6 11/08/2022    DIFFTYPE AUTOMATED DIFFERENTIAL 11/08/2022    PLTM PLATELETS APPEAR NORMAL 10/01/2014     Lab Results   Component Value Date    ESR 16 11/08/2022     Chemistry:  Lab Results   Component Value Date     11/08/2022    K 5.2 (H) 11/08/2022     11/08/2022    CO2 29 11/08/2022    BUN 12 11/08/2022    CREATININE 1.0 12/27/2022    GLUCOSE 111 (H) 11/08/2022    CALCIUM 10.1 11/08/2022    PROT 6.7 11/08/2022    LABALBU 4.3 11/08/2022    BILITOT 0.2 11/08/2022    ALKPHOS 84 11/08/2022    AST 12 (L) 11/08/2022    ALT 10 11/08/2022    LABGLOM >60 11/08/2022    GFRAA >60 03/09/2020    POCGLU 200 (H) 09/26/2014     Lab Results   Component Value Date     (L) 11/08/2022     No components found for: LD  No results found for: TSHHS, T4FREE, FT3  Immunology:  Lab Results   Component Value Date    PROT 6.7 11/08/2022     No results found for: AZAEL Aponte  No results found for: B2M  Coagulation Panel:  No results found for: PROTIME, INR, APTT, DDIMER  Anemia Panel:  No results found for: LELNIGFQ19, FOLATE  Tumor Markers:  No results found for: , CEA, , LABCA2, PSA  Observations:  No data recorded      Assessment:  Leukocytosis   Thrombocytosis    Plan:   Ms. Mattie Crowe is a 80-year-old very pleasant female who has been followed for leukocytosis since October 1, 2014. Laboratory workup in October 1, 2014, including flow cytometry. JAK2  and bcr/abl studies were normal.     She has been followed periodically since then. She missed follow up with me since 2/18/2019 until 11/8/2022. Laboratory work ups done on 11/8/22 showed leukocytosis (WBC 16.1), thrombocytosis (platelet 605) and flow cytometry showed monoclonal B-cell population (~450 cells/cumm). The immunophenotype of the clonal cells is non-specific. DDX includes peripheral blood involvement by a B-cell lymphom and monoclonal B-cell lymphocytosis. The rests of the blood tests, including BCR-ABL1, JAK2 V617F, JAK2 exon 12-13, MPL, CALR, MARIKA, ESR and LDH, were within normal range. CT scan of the neck, chest, abdomen and pelvis done on 12/27/2022 showed no significant pathology in her neck. No splenomegaly or overt lymphadenopathy. There are scattered mildly prominent left internal mammary, upper abdominal and left common iliac lymph nodes. There is a 1.6 cm left upper lobe irregular cystic lesion with a peripheral 0.8 x 0.3 cm nodular component. Differential considerations include infectious/inflammatory process or neoplasm. Borderline endometrial thickening given postmenopausal status. Consider pelvic ultrasound for further evaluation. On January 3, 2022, she presented to me for follow up. She was referred back to me since she was found to have new onset thrombocytosis. She has stable leukocytosis. Since she now presented with both leukocytosis and thrombocytosis, I repeated labs on 11/8/22. 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 Other labs were normal.     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 CT scan was done on 12/27/22 and there is no overt lymphadenopathy or splenomegaly.  I recommend for close observation of her monoclonal lymphocytosis. She doesn't have anemia. Also recommend to take aspirin 81 mg daily for thrombocytosis. For her left lung cystic lesion with solid component, I recommend to have repeat CT chest in 4 months. I will refer her to GYN for evaluation of her borderline endometrial thickening to r/o endometrial cancer. I will follow up with GYN's recommendations. I will plan to have repeat labs also in 4 months. I answered all her questions and concerns for today. Recent imaging and labs were reviewed and discussed with the patient.

## 2022-12-27 ENCOUNTER — HOSPITAL ENCOUNTER (OUTPATIENT)
Dept: CT IMAGING | Age: 67
Discharge: HOME | End: 2022-12-27
Payer: MEDICARE

## 2022-12-27 DIAGNOSIS — D72.820 MONOCLONAL B-CELL LYMPHOCYTOSIS: ICD-10-CM

## 2022-12-27 DIAGNOSIS — D75.839 THROMBOCYTOSIS: ICD-10-CM

## 2022-12-27 DIAGNOSIS — D72.828 OTHER ELEVATED WHITE BLOOD CELL (WBC) COUNT: ICD-10-CM

## 2022-12-27 LAB
EGFR, POC: 22 ML/MIN/1.73M2
EGFR, POC: >60 ML/MIN/1.73M2
POC CREATININE: 1 MG/DL (ref 0.6–1.1)
POC CREATININE: 2.4 MG/DL (ref 0.6–1.1)

## 2022-12-27 PROCEDURE — 71260 CT THORAX DX C+: CPT

## 2022-12-27 PROCEDURE — 70491 CT SOFT TISSUE NECK W/DYE: CPT

## 2022-12-27 PROCEDURE — 6360000004 HC RX CONTRAST MEDICATION: Performed by: INTERNAL MEDICINE

## 2022-12-27 PROCEDURE — 74177 CT ABD & PELVIS W/CONTRAST: CPT

## 2022-12-27 RX ADMIN — IOPAMIDOL 100 ML: 755 INJECTION, SOLUTION INTRAVENOUS at 11:33

## 2023-01-03 ENCOUNTER — OFFICE VISIT (OUTPATIENT)
Dept: ONCOLOGY | Age: 68
End: 2023-01-03
Payer: MEDICARE

## 2023-01-03 ENCOUNTER — HOSPITAL ENCOUNTER (OUTPATIENT)
Dept: INFUSION THERAPY | Age: 68
Discharge: HOME | End: 2023-01-03
Payer: MEDICAID

## 2023-01-03 VITALS
HEIGHT: 61 IN | HEART RATE: 86 BPM | WEIGHT: 177 LBS | SYSTOLIC BLOOD PRESSURE: 130 MMHG | TEMPERATURE: 96.9 F | BODY MASS INDEX: 33.42 KG/M2 | DIASTOLIC BLOOD PRESSURE: 50 MMHG | OXYGEN SATURATION: 98 %

## 2023-01-03 DIAGNOSIS — D72.828 OTHER ELEVATED WHITE BLOOD CELL (WBC) COUNT: Primary | ICD-10-CM

## 2023-01-03 DIAGNOSIS — R93.89 THICKENED ENDOMETRIUM: ICD-10-CM

## 2023-01-03 DIAGNOSIS — R91.1 LUNG NODULE, SOLITARY: ICD-10-CM

## 2023-01-03 PROCEDURE — 1123F ACP DISCUSS/DSCN MKR DOCD: CPT | Performed by: INTERNAL MEDICINE

## 2023-01-03 PROCEDURE — G8417 CALC BMI ABV UP PARAM F/U: HCPCS | Performed by: INTERNAL MEDICINE

## 2023-01-03 PROCEDURE — G8427 DOCREV CUR MEDS BY ELIG CLIN: HCPCS | Performed by: INTERNAL MEDICINE

## 2023-01-03 PROCEDURE — 4004F PT TOBACCO SCREEN RCVD TLK: CPT | Performed by: INTERNAL MEDICINE

## 2023-01-03 PROCEDURE — 3017F COLORECTAL CA SCREEN DOC REV: CPT | Performed by: INTERNAL MEDICINE

## 2023-01-03 PROCEDURE — 1090F PRES/ABSN URINE INCON ASSESS: CPT | Performed by: INTERNAL MEDICINE

## 2023-01-03 PROCEDURE — G8484 FLU IMMUNIZE NO ADMIN: HCPCS | Performed by: INTERNAL MEDICINE

## 2023-01-03 PROCEDURE — 99211 OFF/OP EST MAY X REQ PHY/QHP: CPT

## 2023-01-03 PROCEDURE — 99214 OFFICE O/P EST MOD 30 MIN: CPT | Performed by: INTERNAL MEDICINE

## 2023-01-03 PROCEDURE — G8400 PT W/DXA NO RESULTS DOC: HCPCS | Performed by: INTERNAL MEDICINE

## 2023-01-03 NOTE — PROGRESS NOTES
MA Rooming Questions  Patient: Alon Cruz  MRN: [id number]    Date: 1/3/2023        1. Do you have any new issues?   no         2. Do you need any refills on medications?    no    3. Have you had any imaging done since your last visit? Yes - CT scan 12/27    4. Have you been hospitalized or seen in the emergency room since your last visit here?   no    5. Did the patient have a depression screening completed today?  No    No data recorded     PHQ-9 Given to (if applicable):               PHQ-9 Score (if applicable):                     [] Positive     []  Negative              Does question #9 need addressed (if applicable)                     [] Yes    []  No               Saúl Caldwell CMA

## 2023-01-05 DIAGNOSIS — R93.89 THICKENED ENDOMETRIUM: Primary | ICD-10-CM

## 2023-01-05 DIAGNOSIS — D72.828 OTHER ELEVATED WHITE BLOOD CELL (WBC) COUNT: ICD-10-CM

## 2023-01-05 NOTE — PROGRESS NOTES
Claudia Richard MD does not accept patient's insurance. Patient agreeable to see Dr. Thompson Clifton. OBGYN referral placed per physician order.

## 2023-02-07 ENCOUNTER — INITIAL CONSULT (OUTPATIENT)
Dept: OBGYN | Age: 68
End: 2023-02-07
Payer: MEDICARE

## 2023-02-07 ENCOUNTER — HOSPITAL ENCOUNTER (OUTPATIENT)
Age: 68
Setting detail: SPECIMEN
Discharge: HOME | End: 2023-02-07
Payer: MEDICARE

## 2023-02-07 VITALS
BODY MASS INDEX: 33.23 KG/M2 | DIASTOLIC BLOOD PRESSURE: 73 MMHG | HEART RATE: 85 BPM | SYSTOLIC BLOOD PRESSURE: 123 MMHG | HEIGHT: 61 IN | WEIGHT: 176 LBS

## 2023-02-07 DIAGNOSIS — E66.9 OBESITY (BMI 30.0-34.9): ICD-10-CM

## 2023-02-07 DIAGNOSIS — Z13.820 ENCOUNTER FOR OSTEOPOROSIS SCREENING IN ASYMPTOMATIC POSTMENOPAUSAL PATIENT: ICD-10-CM

## 2023-02-07 DIAGNOSIS — Z78.0 ENCOUNTER FOR OSTEOPOROSIS SCREENING IN ASYMPTOMATIC POSTMENOPAUSAL PATIENT: ICD-10-CM

## 2023-02-07 DIAGNOSIS — R93.89 THICKENED ENDOMETRIUM: ICD-10-CM

## 2023-02-07 DIAGNOSIS — Z01.419 WOMEN'S ANNUAL ROUTINE GYNECOLOGICAL EXAMINATION: Primary | ICD-10-CM

## 2023-02-07 PROCEDURE — G8484 FLU IMMUNIZE NO ADMIN: HCPCS

## 2023-02-07 PROCEDURE — 87624 HPV HI-RISK TYP POOLED RSLT: CPT

## 2023-02-07 PROCEDURE — 88142 CYTOPATH C/V THIN LAYER: CPT

## 2023-02-07 PROCEDURE — 99387 INIT PM E/M NEW PAT 65+ YRS: CPT

## 2023-02-07 RX ORDER — FERROUS SULFATE 325(65) MG
325 TABLET ORAL
COMMUNITY

## 2023-02-07 RX ORDER — ASPIRIN 81 MG/1
81 TABLET ORAL DAILY
COMMUNITY

## 2023-02-07 SDOH — ECONOMIC STABILITY: FOOD INSECURITY: WITHIN THE PAST 12 MONTHS, YOU WORRIED THAT YOUR FOOD WOULD RUN OUT BEFORE YOU GOT MONEY TO BUY MORE.: NEVER TRUE

## 2023-02-07 SDOH — ECONOMIC STABILITY: FOOD INSECURITY: WITHIN THE PAST 12 MONTHS, THE FOOD YOU BOUGHT JUST DIDN'T LAST AND YOU DIDN'T HAVE MONEY TO GET MORE.: NEVER TRUE

## 2023-02-07 SDOH — ECONOMIC STABILITY: INCOME INSECURITY: HOW HARD IS IT FOR YOU TO PAY FOR THE VERY BASICS LIKE FOOD, HOUSING, MEDICAL CARE, AND HEATING?: NOT HARD AT ALL

## 2023-02-07 SDOH — ECONOMIC STABILITY: HOUSING INSECURITY
IN THE LAST 12 MONTHS, WAS THERE A TIME WHEN YOU DID NOT HAVE A STEADY PLACE TO SLEEP OR SLEPT IN A SHELTER (INCLUDING NOW)?: NO

## 2023-02-07 ASSESSMENT — PATIENT HEALTH QUESTIONNAIRE - PHQ9
SUM OF ALL RESPONSES TO PHQ QUESTIONS 1-9: 0
2. FEELING DOWN, DEPRESSED OR HOPELESS: 0
SUM OF ALL RESPONSES TO PHQ QUESTIONS 1-9: 0
SUM OF ALL RESPONSES TO PHQ QUESTIONS 1-9: 0
1. LITTLE INTEREST OR PLEASURE IN DOING THINGS: 0
SUM OF ALL RESPONSES TO PHQ QUESTIONS 1-9: 0
SUM OF ALL RESPONSES TO PHQ9 QUESTIONS 1 & 2: 0

## 2023-02-07 ASSESSMENT — ENCOUNTER SYMPTOMS
GASTROINTESTINAL NEGATIVE: 1
ABDOMINAL PAIN: 0
RESPIRATORY NEGATIVE: 1

## 2023-02-07 NOTE — PROGRESS NOTES
23    Claire Master  1955    Chief Complaint   Patient presents with    New Patient     Pt here for consult from Dr. Mireya Frias d/t thickened endometrium on c/t. Pt here for annual, is not sexually active, hrt-none, pap-unsure, mammo--neg, dexa-never, colonoscopy--normal.         The patient is a 79 y.o. female,  who presents for her annual exam.  She is menopausal.  She is not taking HRT. Maicol Pickering She is not sexually active. She reports no gynecological symptoms. Pt was referred due to thickened endometrium identified on CT, denies pelvic pain or bleeding    Pap smear history: Her last PAP smear was in unsure. Her results were normal.    Breast history: her most recent mammogram was in . The results were: Normal. Pt states she gets orders through PCP    Osteoporosis Status: she has not had a bone density scan    Colonoscopy Status: she had a colonoscopy in . The results were normal.    Past Medical History:   Diagnosis Date    Diabetes mellitus ([address]     Hyperlipidemia     Hypertension     Thickened endometrium     Type 2 diabetes mellitus without complication ([address]        No past surgical history on file.     Family History   Problem Relation Age of Onset    Cancer Father     Cancer Brother        Social History     Tobacco Use    Smoking status: Every Day     Packs/day: 1.00     Years: 40.00     Pack years: 40.00     Types: Cigarettes     Start date:     Smokeless tobacco: Never    Tobacco comments:     Down to half pack 22   Vaping Use    Vaping Use: Never used   Substance Use Topics    Alcohol use: Not Currently     Comment: yearly    Drug use: No       Current Outpatient Medications   Medication Sig Dispense Refill    ferrous sulfate (IRON 325) 325 (65 Fe) MG tablet Take 325 mg by mouth daily (with breakfast)      aspirin 81 MG EC tablet Take 81 mg by mouth daily      metFORMIN (GLUCOPHAGE) 1000 MG tablet       RYBELSUS 3 MG TABS       lisinopril (PRINIVIL;ZESTRIL) 2.5 MG tablet Take 2.5 mg by mouth daily. atorvastatin (LIPITOR) 20 MG tablet Take 20 mg by mouth daily. glipiZIDE (GLUCOTROL) 2.5 MG CR tablet Take 1 mg by mouth 2 times daily. (Patient not taking: Reported on 2023)       No current facility-administered medications for this visit. No Known Allergies        Immunization History   Administered Date(s) Administered    COVID-19, MODERNA BLUE border, Primary or Immunocompromised, (age 12y+), IM, 100 mcg/0.5mL 2021, 2021       Review of Systems   Constitutional: Negative. Negative for fatigue and fever. Respiratory: Negative. Gastrointestinal: Negative. Negative for abdominal pain. Endocrine: Negative. Genitourinary: Negative. Negative for dysuria, frequency, menstrual problem, pelvic pain, vaginal bleeding, vaginal discharge and vaginal pain. Musculoskeletal: Negative. Skin: Negative. Neurological: Negative. Negative for dizziness and headaches. Psychiatric/Behavioral: Negative. /73 (Site: Right Upper Arm, Position: Sitting, Cuff Size: Large Adult)   Pulse 85   Ht 5' 1\" (1.549 m)   Wt 176 lb (79.8 kg)   BMI 33.25 kg/m²     Physical Exam  Vitals and nursing note reviewed. Constitutional:       General: She is not in acute distress. Appearance: Normal appearance. She is obese. HENT:      Head: Normocephalic and atraumatic. Pulmonary:      Effort: Pulmonary effort is normal. No respiratory distress. Chest:   Breasts:     Right: Normal.      Left: Normal.   Abdominal:      Palpations: Abdomen is soft. Tenderness: There is no abdominal tenderness. Genitourinary:     General: Normal vulva. Exam position: Lithotomy position. Vagina: Normal.      Cervix: Normal.      Uterus: Normal.       Adnexa: Right adnexa normal and left adnexa normal.   Musculoskeletal:         General: Normal range of motion. Cervical back: Normal range of motion.    Lymphadenopathy:      Upper Body: Right upper body: No supraclavicular or axillary adenopathy. Left upper body: No supraclavicular or axillary adenopathy. Skin:     General: Skin is warm and dry. Findings: No rash. Neurological:      General: No focal deficit present. Mental Status: She is alert and oriented to person, place, and time. Psychiatric:         Mood and Affect: Mood normal.         Behavior: Behavior normal.         Thought Content: Thought content normal.     CT 12/12/23  1. No splenomegaly or overt lymphadenopathy. There are scattered mildly   prominent left internal mammary, upper abdominal, and left common iliac lymph   nodes as above. 2.  There is a 1.6 cm left upper lobe irregular cystic lesion with a   peripheral 0.8 x 0.3 cm nodular component. Differential considerations   include infectious/inflammatory process or neoplasm. Consider CT chest in   3-6 months to assess change. 3.  Borderline endometrial thickening given postmenopausal status. Consider   pelvic ultrasound for further evaluation. No results found for this visit on 02/07/23. Assessment and Plan   Diagnosis Orders   1. Women's annual routine gynecological examination  PAP SMEAR      2. Thickened endometrium  US NON OB TRANSVAGINAL      3. Encounter for osteoporosis screening in asymptomatic postmenopausal patient  DEXA BONE DENSITY AXIAL SKELETON      4. Obesity (BMI 30.0-34. 9)          Pap, dexa order, u/s next week with physician f/u for possible EMB    Return in about 1 week (around 2/14/2023) for u/s & physician follow-up, possible EMB.     David Marquez PA-C

## 2023-02-10 LAB
HPV HIGH RISK: NOT DETECTED
HPV, GENOTYPE 16: NOT DETECTED
HPV, GENOTYPE 18: NOT DETECTED

## 2023-02-23 ENCOUNTER — OFFICE VISIT (OUTPATIENT)
Dept: OBGYN | Age: 68
End: 2023-02-23
Payer: MEDICARE

## 2023-02-23 VITALS
DIASTOLIC BLOOD PRESSURE: 73 MMHG | HEIGHT: 61 IN | WEIGHT: 176 LBS | SYSTOLIC BLOOD PRESSURE: 124 MMHG | HEART RATE: 86 BPM | BODY MASS INDEX: 33.23 KG/M2

## 2023-02-23 DIAGNOSIS — R93.89 THICKENED ENDOMETRIUM: Primary | ICD-10-CM

## 2023-02-23 PROCEDURE — G8417 CALC BMI ABV UP PARAM F/U: HCPCS | Performed by: OBSTETRICS & GYNECOLOGY

## 2023-02-23 PROCEDURE — 3017F COLORECTAL CA SCREEN DOC REV: CPT | Performed by: OBSTETRICS & GYNECOLOGY

## 2023-02-23 PROCEDURE — 99203 OFFICE O/P NEW LOW 30 MIN: CPT | Performed by: OBSTETRICS & GYNECOLOGY

## 2023-02-23 PROCEDURE — 4004F PT TOBACCO SCREEN RCVD TLK: CPT | Performed by: OBSTETRICS & GYNECOLOGY

## 2023-02-23 PROCEDURE — 1123F ACP DISCUSS/DSCN MKR DOCD: CPT | Performed by: OBSTETRICS & GYNECOLOGY

## 2023-02-23 PROCEDURE — 1090F PRES/ABSN URINE INCON ASSESS: CPT | Performed by: OBSTETRICS & GYNECOLOGY

## 2023-02-23 PROCEDURE — G8484 FLU IMMUNIZE NO ADMIN: HCPCS | Performed by: OBSTETRICS & GYNECOLOGY

## 2023-02-23 PROCEDURE — G8427 DOCREV CUR MEDS BY ELIG CLIN: HCPCS | Performed by: OBSTETRICS & GYNECOLOGY

## 2023-02-23 PROCEDURE — G8400 PT W/DXA NO RESULTS DOC: HCPCS | Performed by: OBSTETRICS & GYNECOLOGY

## 2023-02-23 RX ORDER — MISOPROSTOL 200 UG/1
200 TABLET ORAL EVERY 6 HOURS
Qty: 4 TABLET | Refills: 0 | Status: SHIPPED | OUTPATIENT
Start: 2023-02-23 | End: 2023-02-24

## 2023-02-23 NOTE — PROGRESS NOTES
23    Diana Finney  1955    Chief Complaint   Patient presents with    Follow-up     Pt here to discuss u/s results. U/s per EP, possible EMB. Pt had ct 22 showing thickened endometrium order by Dr. Kandace Ashraf. No c/o today. Diana Finney is a 79 y.o. female who presents today for evaluation of endometrial thickening on ultrasound and CT scan. She denies any vaginal bleeding. See ct scan 23  See us 23  Pap 22 normal with negative HPV    Past Medical History:   Diagnosis Date    Diabetes mellitus ([facility] [address]     Hyperlipidemia     Hypertension     Thickened endometrium     Type 2 diabetes mellitus without complication ([facility] [address]        No past surgical history on file. Social History     Tobacco Use    Smoking status: Every Day     Packs/day: 1.00     Years: 40.00     Pack years: 40.00     Types: Cigarettes     Start date:     Smokeless tobacco: Never    Tobacco comments:     Down to half pack 22   Vaping Use    Vaping Use: Never used   Substance Use Topics    Alcohol use: Not Currently     Comment: yearly    Drug use: No       Family History   Problem Relation Age of Onset    Cancer Father     Cancer Brother        Current Outpatient Medications   Medication Sig Dispense Refill    miSOPROStol (CYTOTEC) 200 MCG tablet Take 1 tablet by mouth every 6 hours for 4 doses 4 tablet 0    ferrous sulfate (IRON 325) 325 (65 Fe) MG tablet Take 325 mg by mouth daily (with breakfast)      aspirin 81 MG EC tablet Take 81 mg by mouth daily      metFORMIN (GLUCOPHAGE) 1000 MG tablet       RYBELSUS 3 MG TABS       lisinopril (PRINIVIL;ZESTRIL) 2.5 MG tablet Take 2.5 mg by mouth daily. glipiZIDE (GLUCOTROL) 2.5 MG CR tablet Take 1 mg by mouth 2 times daily. (Patient not taking: Reported on 2023)      atorvastatin (LIPITOR) 20 MG tablet Take 20 mg by mouth daily. No current facility-administered medications for this visit.        No Known Allergies        Immunization History   Administered Date(s) Administered    COVID-19, MODERNA BLUE border, Primary or Immunocompromised, (age 12y+), IM, 100 mcg/0.5mL 03/31/2021, 04/28/2021       Review of Systems    /73 (Site: Right Upper Arm, Position: Sitting, Cuff Size: Large Adult)   Pulse 86   Ht 5' 1\" (1.549 m)   Wt 176 lb (79.8 kg)   BMI 33.25 kg/m²     Physical Exam    No results found for this visit on 02/23/23. ASSESSMENT AND PLAN   Diagnosis Orders   1. Thickened endometrium        See ct scan 12/27/23  See us 2/21/23  Pap 2/7/22 normal with negative HPV    Discussed thickened endometrium. Discussed that it is reassuring that she is not bleeding. We discussed three options:   Monitoring and evaluation if bleeding  Office EMB  Hysteroscopy and D&C  She desires hysteroscopy and D&C    Return in about 2 weeks (around 3/9/2023).     Paige Ye MD

## 2023-02-24 NOTE — PROGRESS NOTES
Uploaded documentation to [facility] for prior [facility] for [address]  Pending auth # [id number]

## 2023-03-10 ENCOUNTER — TELEPHONE (OUTPATIENT)
Dept: OBGYN | Age: 68
End: 2023-03-10

## 2023-03-13 ENCOUNTER — HOSPITAL ENCOUNTER (OUTPATIENT)
Dept: MAMMOGRAPHY | Age: 68
Discharge: HOME | End: 2023-03-13
Payer: MEDICARE

## 2023-03-13 DIAGNOSIS — Z78.0 ENCOUNTER FOR OSTEOPOROSIS SCREENING IN ASYMPTOMATIC POSTMENOPAUSAL PATIENT: ICD-10-CM

## 2023-03-13 DIAGNOSIS — Z13.820 ENCOUNTER FOR OSTEOPOROSIS SCREENING IN ASYMPTOMATIC POSTMENOPAUSAL PATIENT: ICD-10-CM

## 2023-03-13 PROCEDURE — 77080 DXA BONE DENSITY AXIAL: CPT

## 2023-03-21 ENCOUNTER — ANESTHESIA EVENT (OUTPATIENT)
Dept: OPERATING ROOM | Age: 68
End: 2023-03-21
Payer: MEDICARE

## 2023-03-21 ASSESSMENT — LIFESTYLE VARIABLES: SMOKING_STATUS: 1

## 2023-03-21 NOTE — ANESTHESIA PRE PROCEDURE
CO2 29 11/08/2022 03:58 PM    BUN 12 11/08/2022 03:58 PM    CREATININE 1.0 12/27/2022 11:15 AM    CREATININE 0.8 11/08/2022 03:58 PM    GFRAA >60 03/09/2020 02:58 PM    LABGLOM >60 11/08/2022 03:58 PM    GLUCOSE 111 11/08/2022 03:58 PM    PROT 6.7 11/08/2022 03:58 PM    CALCIUM 10.1 11/08/2022 03:58 PM    BILITOT 0.2 11/08/2022 03:58 PM    ALKPHOS 84 11/08/2022 03:58 PM    AST 12 11/08/2022 03:58 PM    ALT 10 11/08/2022 03:58 PM       POC Tests: No results for input(s): POCGLU, POCNA, POCK, POCCL, POCBUN, POCHEMO, POCHCT in the last 72 hours. Coags: No results found for: PROTIME, INR, APTT    HCG (If Applicable):   Lab Results   Component Value Date    PREGTESTUR NEGATIVE 10/20/2014        ABGs: No results found for: PHART, PO2ART, DDP2KRK, HUH3JCS, BEART, J0GWDMAC     Type & Screen (If Applicable):  No results found for: LABABO, LABRH    Drug/Infectious Status (If Applicable):  No results found for: HIV, HEPCAB    COVID-19 Screening (If Applicable): No results found for: COVID19        Anesthesia Evaluation  Patient summary reviewed  Airway: Mallampati: IV          Dental:    (+) edentulous      Pulmonary: breath sounds clear to auscultation  (+) current smoker                          ROS comment: Smoking Status: Every Day - 51 pack years   Cardiovascular:    (+) hypertension:, hyperlipidemia        Rhythm: regular             Beta Blocker:  Not on Beta Blocker         Neuro/Psych:               GI/Hepatic/Renal:   (+) morbid obesity          Endo/Other:    (+) DiabetesType II DM, , .                 Abdominal:   (+) obese,           Vascular: Other Findings:           Anesthesia Plan      general     ASA 3       Induction: intravenous. MIPS: Postoperative opioids intended. Anesthetic plan and risks discussed with patient. Plan discussed with CRNA.     Attending anesthesiologist reviewed and agrees with Preprocedure content                ARACELI Nair - CRNA   3/21/2023         Pre

## 2023-03-22 ENCOUNTER — ANESTHESIA (OUTPATIENT)
Dept: OPERATING ROOM | Age: 68
End: 2023-03-22
Payer: MEDICARE

## 2023-03-22 ENCOUNTER — HOSPITAL ENCOUNTER (OUTPATIENT)
Age: 68
Setting detail: OUTPATIENT SURGERY
Discharge: HOME | End: 2023-03-22
Attending: OBSTETRICS & GYNECOLOGY | Admitting: OBSTETRICS & GYNECOLOGY
Payer: MEDICARE

## 2023-03-22 VITALS
TEMPERATURE: 97 F | DIASTOLIC BLOOD PRESSURE: 48 MMHG | RESPIRATION RATE: 16 BRPM | HEART RATE: 74 BPM | BODY MASS INDEX: 32.66 KG/M2 | WEIGHT: 173 LBS | SYSTOLIC BLOOD PRESSURE: 109 MMHG | OXYGEN SATURATION: 94 % | HEIGHT: 61 IN

## 2023-03-22 DIAGNOSIS — G89.18 POST-OP PAIN: Primary | ICD-10-CM

## 2023-03-22 DIAGNOSIS — R93.89 THICKENED ENDOMETRIUM: ICD-10-CM

## 2023-03-22 PROBLEM — N95.0 PMB (POSTMENOPAUSAL BLEEDING): Status: ACTIVE | Noted: 2023-03-22

## 2023-03-22 LAB
BASOPHILS ABSOLUTE: 0.1 K/CU MM
BASOPHILS RELATIVE PERCENT: 0.6 % (ref 0–1)
DIFFERENTIAL TYPE: ABNORMAL
EOSINOPHILS ABSOLUTE: 0.2 K/CU MM
EOSINOPHILS RELATIVE PERCENT: 1.4 % (ref 0–3)
GLUCOSE BLD-MCNC: 112 MG/DL (ref 70–99)
HCT VFR BLD CALC: 40.7 % (ref 37–47)
HEMOGLOBIN: 13.4 GM/DL (ref 12.5–16)
IMMATURE NEUTROPHIL %: 0.4 % (ref 0–0.43)
LYMPHOCYTES ABSOLUTE: 5.7 K/CU MM
LYMPHOCYTES RELATIVE PERCENT: 38.5 % (ref 24–44)
MCH RBC QN AUTO: 28 PG (ref 27–31)
MCHC RBC AUTO-ENTMCNC: 32.9 % (ref 32–36)
MCV RBC AUTO: 85.1 FL (ref 78–100)
MONOCYTES ABSOLUTE: 1.3 K/CU MM
MONOCYTES RELATIVE PERCENT: 8.8 % (ref 0–4)
NUCLEATED RBC %: 0 %
PDW BLD-RTO: 13.3 % (ref 11.7–14.9)
PLATELET # BLD: 730 K/CU MM (ref 140–440)
PMV BLD AUTO: 10.4 FL (ref 7.5–11.1)
RBC # BLD: 4.78 M/CU MM (ref 4.2–5.4)
SEGMENTED NEUTROPHILS ABSOLUTE COUNT: 7.5 K/CU MM
SEGMENTED NEUTROPHILS RELATIVE PERCENT: 50.3 % (ref 36–66)
TOTAL IMMATURE NEUTOROPHIL: 0.06 K/CU MM
TOTAL NUCLEATED RBC: 0 K/CU MM
WBC # BLD: 14.8 K/CU MM (ref 4–10.5)

## 2023-03-22 PROCEDURE — 88305 TISSUE EXAM BY PATHOLOGIST: CPT | Performed by: PATHOLOGY

## 2023-03-22 PROCEDURE — 3700000001 HC ADD 15 MINUTES (ANESTHESIA): Performed by: OBSTETRICS & GYNECOLOGY

## 2023-03-22 PROCEDURE — 82962 GLUCOSE BLOOD TEST: CPT

## 2023-03-22 PROCEDURE — 85025 COMPLETE CBC W/AUTO DIFF WBC: CPT

## 2023-03-22 PROCEDURE — 7100000010 HC PHASE II RECOVERY - FIRST 15 MIN: Performed by: OBSTETRICS & GYNECOLOGY

## 2023-03-22 PROCEDURE — 7100000001 HC PACU RECOVERY - ADDTL 15 MIN: Performed by: OBSTETRICS & GYNECOLOGY

## 2023-03-22 PROCEDURE — 7100000011 HC PHASE II RECOVERY - ADDTL 15 MIN: Performed by: OBSTETRICS & GYNECOLOGY

## 2023-03-22 PROCEDURE — 3600000013 HC SURGERY LEVEL 3 ADDTL 15MIN: Performed by: OBSTETRICS & GYNECOLOGY

## 2023-03-22 PROCEDURE — 2500000003 HC RX 250 WO HCPCS: Performed by: NURSE ANESTHETIST, CERTIFIED REGISTERED

## 2023-03-22 PROCEDURE — 2580000003 HC RX 258: Performed by: ANESTHESIOLOGY

## 2023-03-22 PROCEDURE — 2709999900 HC NON-CHARGEABLE SUPPLY: Performed by: OBSTETRICS & GYNECOLOGY

## 2023-03-22 PROCEDURE — 3600000003 HC SURGERY LEVEL 3 BASE: Performed by: OBSTETRICS & GYNECOLOGY

## 2023-03-22 PROCEDURE — 3700000000 HC ANESTHESIA ATTENDED CARE: Performed by: OBSTETRICS & GYNECOLOGY

## 2023-03-22 PROCEDURE — 58558 HYSTEROSCOPY BIOPSY: CPT | Performed by: OBSTETRICS & GYNECOLOGY

## 2023-03-22 PROCEDURE — 7100000000 HC PACU RECOVERY - FIRST 15 MIN: Performed by: OBSTETRICS & GYNECOLOGY

## 2023-03-22 PROCEDURE — 6360000002 HC RX W HCPCS: Performed by: NURSE ANESTHETIST, CERTIFIED REGISTERED

## 2023-03-22 RX ORDER — LIDOCAINE HYDROCHLORIDE 20 MG/ML
INJECTION, SOLUTION EPIDURAL; INFILTRATION; INTRACAUDAL; PERINEURAL PRN
Status: DISCONTINUED | OUTPATIENT
Start: 2023-03-22 | End: 2023-03-22 | Stop reason: SDUPTHER

## 2023-03-22 RX ORDER — ONDANSETRON 2 MG/ML
INJECTION INTRAMUSCULAR; INTRAVENOUS PRN
Status: DISCONTINUED | OUTPATIENT
Start: 2023-03-22 | End: 2023-03-22 | Stop reason: SDUPTHER

## 2023-03-22 RX ORDER — DEXAMETHASONE SODIUM PHOSPHATE 4 MG/ML
INJECTION, SOLUTION INTRA-ARTICULAR; INTRALESIONAL; INTRAMUSCULAR; INTRAVENOUS; SOFT TISSUE PRN
Status: DISCONTINUED | OUTPATIENT
Start: 2023-03-22 | End: 2023-03-22 | Stop reason: SDUPTHER

## 2023-03-22 RX ORDER — PROPOFOL 10 MG/ML
INJECTION, EMULSION INTRAVENOUS PRN
Status: DISCONTINUED | OUTPATIENT
Start: 2023-03-22 | End: 2023-03-22 | Stop reason: SDUPTHER

## 2023-03-22 RX ORDER — SODIUM CHLORIDE, SODIUM LACTATE, POTASSIUM CHLORIDE, CALCIUM CHLORIDE 600; 310; 30; 20 MG/100ML; MG/100ML; MG/100ML; MG/100ML
INJECTION, SOLUTION INTRAVENOUS CONTINUOUS
Status: DISCONTINUED | OUTPATIENT
Start: 2023-03-22 | End: 2023-03-22 | Stop reason: HOSPADM

## 2023-03-22 RX ORDER — TRAMADOL HYDROCHLORIDE 50 MG/1
50 TABLET ORAL EVERY 6 HOURS PRN
Qty: 10 TABLET | Refills: 0 | Status: SHIPPED | OUTPATIENT
Start: 2023-03-22 | End: 2023-03-27

## 2023-03-22 RX ADMIN — ONDANSETRON 4 MG: 2 INJECTION INTRAMUSCULAR; INTRAVENOUS at 16:30

## 2023-03-22 RX ADMIN — PROPOFOL 140 MG: 10 INJECTION, EMULSION INTRAVENOUS at 16:30

## 2023-03-22 RX ADMIN — SODIUM CHLORIDE, POTASSIUM CHLORIDE, SODIUM LACTATE AND CALCIUM CHLORIDE: 600; 310; 30; 20 INJECTION, SOLUTION INTRAVENOUS at 14:47

## 2023-03-22 RX ADMIN — DEXAMETHASONE SODIUM PHOSPHATE 4 MG: 4 INJECTION, SOLUTION INTRAMUSCULAR; INTRAVENOUS at 16:30

## 2023-03-22 RX ADMIN — LIDOCAINE HYDROCHLORIDE 50 MG: 20 INJECTION, SOLUTION EPIDURAL; INFILTRATION; INTRACAUDAL; PERINEURAL at 16:30

## 2023-03-22 ASSESSMENT — PAIN DESCRIPTION - DESCRIPTORS: DESCRIPTORS: CRAMPING

## 2023-03-22 ASSESSMENT — PAIN - FUNCTIONAL ASSESSMENT: PAIN_FUNCTIONAL_ASSESSMENT: 0-10

## 2023-03-22 ASSESSMENT — PAIN SCALES - GENERAL
PAINLEVEL_OUTOF10: 2
PAINLEVEL_OUTOF10: 0

## 2023-03-22 ASSESSMENT — PAIN DESCRIPTION - LOCATION: LOCATION: ABDOMEN

## 2023-03-22 NOTE — DISCHARGE INSTRUCTIONS
doctor tells you that it is OK to do so. It is OK to walk as your body tolerates.     Medication:     You may resume all your usual medications after surgery, unless told otherwise. While you will be sent home with strong pain medications containing small amounts of narcotics, you may also take milder medications such as Naprosyn (Aleve), Acetaminophen (Tylenol), or Ibuprofen (Motrin) for your pain.   At first, take the narcotic pain medicines during the day, and use the milder medications to “take the edge off” in between.   Take your pain medication when you first begin to feel discomfort. Do not wait until your pain is intense to take your medications.    The narcotic pain medications may cause nausea or constipation. As you heal, you may find that you feel better without those medications. If Acetaminophen (Tylenol) or Ibuprofen (Motrin) relieves the pain, use these medications instead.       Vaginal Bleeding: It is normal to experience vaginal spotting and light discharge for up to a month after surgery, whether incision was abdominal or vaginal.       Return Visit: You will need to return to see your surgeon two weeks after the date of your surgery. At that time, your incision will be checked and any questions you may have will be answered (i.e. what more you can do physically; such as driving a car, exercise).     Returning to work:   This depends on the type of surgery you had and how quickly you recover. Your doctor will determine when it’s appropriate for you to return to work.   Leave surgical dressing/bandages on for 24 hours.   You may shower or bathe in the morning.   You may experience some shoulder pain (especially on the right) and chest pain. This is normal and caused by the gas injected into the abdomen. The gas is inserted to create a working and viewing space inside the abdomen during surgery.   No intercourse, douching or tampons for 7 days.   Expect some vaginal bleeding   Take pain medication  as directed. You may resume your normal daily activities as tolerated. Resume your normal diet. Try to avoid constipation by eating high fiber foods or adding a fiber supplement such as Metamucil, Fibercon, or Benefiber; especially while taking a narcotic pain medication.

## 2023-03-22 NOTE — OP NOTE
DATE OF PROCEDURE:                      3/22/2023    SURGEON:                                             Julianne Sr MD    PREOPERATIVE DIAGNOSIS:              Postmenopausal bleeding, thickened endometrial stripe    POSTOPERATIVE DIAGNOSIS:            Same    OPERATION:                                         Hysteroscopy and D&C. ANESTHESIA:                                        General.     ESTIMATED BLOOD LOSS:                 Minimal.     COMPLICATIONS:                                 None. SPECIMEN:         Endocervical curettings, endometrial curettings (minimal tissue consistent with hysteroscopic findings of atrophy)    INDICATION:        Postmenopausal bleeding    FINDINGS:         Normal ectocervix. Hysteroscopy reveals a normal endocervical canal.  Hysteroscopy reveals a normal endometrial cavity with atrophic appearing endometrium. There is no evidence of endometrial polyp and there is no evidence of submucosal myoma. There is no evidence of any type of an endometrial mass. PROCEDURE NOTE: With the patient in the supine position, general anesthesia was administered without any complications. The patient was then placed in dorsal lithotomy position. The perineum was scrubbed and draped in the usual fashion. A speculum was placed in the vagina. The anterior lip of the cervix was grabbed by single-tooth tenaculum. And endocervical curettage was performed. The cervical os was dilated with Rory dilators. A diagnostic hysteroscope was introduced into the uterine cavity, and using normal saline as distending medium, diagnostic hysteroscopy was carried out. The hysteroscope was then withdrawn. Next, sharp curettage of the endometrium was performed, and all tissues were submitted to pathology. The single-tooth tenaculum was removed from the patient's cervix. Inspection revealed excellent hemostasis. The procedure was then terminated. All instruments were removed.  The patient was placed in supine position, awakened from anesthesia and transferred to recovery room in good stable condition.      Torie Castellano MD  3/22/2023  5:02 PM

## 2023-03-22 NOTE — ANESTHESIA POSTPROCEDURE EVALUATION
Department of Anesthesiology  Postprocedure Note    Patient: Jumana Bull  MRN: [id number]  YOB: 1955  Date of evaluation: 3/22/2023      Procedure Summary     Date: 03/22/23 Room / Location: [address]    Anesthesia Start: 1629 Anesthesia Stop:     Procedure: DILATATION AND CURETTAGE HYSTEROSCOPY Diagnosis: Thickened endometrium      (Thickened endometrium [R93.89])    Surgeons: Ryan Andrade MD Responsible Provider: Eduardo Stevens MD    Anesthesia Type: general ASA Status: 3          Anesthesia Type: No value filed.     Hudson Phase I: Hudson Score: 10    Hudson Phase II:        Anesthesia Post Evaluation    Patient location during evaluation: PACU  Level of consciousness: awake  Pain score: 0  Airway patency: patent  Nausea & Vomiting: no nausea and no vomiting  Complications: no  Cardiovascular status: hemodynamically stable  Respiratory status: acceptable, nonlabored ventilation, room air and spontaneous ventilation  Hydration status: stable

## 2023-03-22 NOTE — PROGRESS NOTES
1654 Patient arrived to pacu, monitors placed and alarms on. Received report from joelne arciniega/gabbie CRNa. Patient awake and alert, denies any complaints. Consuelo pad in place, clean and dry. [address] to rest without complaints. 1 Transported to room and report given to Urmila Stokes at bedside.
1750:  Discharge instructions discussed with patient and spouse, both verbalized an understanding.
1805:  Pt discharged to home via wheelchair alert and oriented with 2/10 c/o abd cramping. Minimal bleeding present. Pt tolerating PO, VSS.
Patient notified of surgery time at [facility] 3/22/2023 1600 with arrival at 1400, understanding verbalized
Pt drank water prior to arrival at 12 and 1330, cream of wheat at 0645
Report given to jb LOUISE
picture ID,  insurance card, paperwork from the doctors office    (H & P, Consent, & card for implantable devices). 12. Take a shower with soap the morning of surgery. No clipping or shaving prior to surgery. Do not apply any make-up, deodorant, lotion, oil or powder after the morning shower. 15.  Enter thru the main entrance on the day of surgery.

## 2023-03-22 NOTE — H&P
true   Transportation Needs: Unknown    Lack of Transportation (Medical): Not on file    Lack of Transportation (Non-Medical): No   Physical Activity: Not on file   Stress: Not on file   Social Connections: Not on file   Intimate Partner Violence: Not on file   Housing Stability: Unknown    Unable to Pay for Housing in the Last Year: Not on file    Number of Places Lived in the Last Year: Not on file    Unstable Housing in the Last Year: No         MEDICATIONS:  Current Facility-Administered Medications   Medication Dose Route Frequency Provider Last Rate Last Admin    lactated ringers IV soln infusion   IntraVENous Continuous Jone Smith MD 50 mL/hr at 03/22/23 1447 New Bag at 03/22/23 1447           ALLERGIES:  Allergies as of 03/10/2023    (No Known Allergies)         REVIEW OF SYSTEMS:  General appearance:Appears healthy. Alert; in no acute distress. Pleasant. HEENT: Negative  CARDIOVASCULAR: Denies: chest pain, dyspnea on exertion, palpitations  RESPIRATORY: Denies: cough, shortness of breath, wheezing  GI: Denies: abdominal pain, flank pain, nausea, vomiting, diarrhea  : Denies: dysuria, frequency/urgency, hematuria, pelvic pain  MUSCULOSKELETAL: Denies: back pain, joint swelling, muscle pain  SKIN: Denies: rash, hives. HEMATOLOGIC: Denies Bleeding Disorder, Coagulopathy or Transfusion  NEUROLOGIC: Denies Migraines, Headaches, CVA, TIA  ENDOCRINE: Denies any Endocrinologic disorders      Blood pressure 133/60, pulse 88, temperature 97.2 °F (36.2 °C), temperature source Temporal, resp. rate 16, height 5' 1\" (1.549 m), weight 173 lb (78.5 kg), SpO2 99 %. General Appearance:  awake, alert, oriented, in no acute distress  Skin:  Skin color, texture, turgor normal. No rashes or lesions. Mouth/Throat:  Mucosa moist.  No lesions. Pharynx without erythema, edema or exudate. Neck:  neck- supple, no mass, non-tender  Lungs:  No chest wall tenderness.   Heart:  Heart regular rate and rhythm  Abdomen:  Soft, non-tender, normal bowel sounds.  No bruits, organomegaly or masses.  Extremities: Extremities warm to touch, pink, with no edema.  Musculoskeletal:  negative  Peripheral Pulses:  Normal  Neurologic:  negative      Diagnostics:  US NON OB TRANSVAGINAL    Result Date: 2/26/2023  See ultrasound report.  Images and report reviewed and I agree with comments and findings.  No changes or additions. Normal uterus and ovaries.   The endometrium does measure 9.6 mm and 9.2 mm.  This is thickened for a postmenopausal patient with vaginal bleeding.  The full ultrasound report is located in the media tab for further details. [id number].  Thickened endometrium      Labs:  Results for orders placed or performed during the hospital encounter of 03/22/23   CBC with Auto Differential   Result Value Ref Range    WBC 14.8 (H) 4.0 - 10.5 K/CU MM    RBC 4.78 4.2 - 5.4 M/CU MM    Hemoglobin 13.4 12.5 - 16.0 GM/DL    Hematocrit 40.7 37 - 47 %    MCV 85.1 78 - 100 FL    MCH 28.0 27 - 31 PG    MCHC 32.9 32.0 - 36.0 %    RDW 13.3 11.7 - 14.9 %    Platelets 730 (H) 140 - 440 K/CU MM    MPV 10.4 7.5 - 11.1 FL    Differential Type AUTOMATED DIFFERENTIAL     Segs Relative 50.3 36 - 66 %    Lymphocytes % 38.5 24 - 44 %    Monocytes % 8.8 (H) 0 - 4 %    Eosinophils % 1.4 0 - 3 %    Basophils % 0.6 0 - 1 %    Segs Absolute 7.5 K/CU MM    Lymphocytes Absolute 5.7 K/CU MM    Monocytes Absolute 1.3 K/CU MM    Eosinophils Absolute 0.2 K/CU MM    Basophils Absolute 0.1 K/CU MM    Nucleated RBC % 0.0 %    Total Nucleated RBC 0.0 K/CU MM    Total Immature Neutrophil 0.06 K/CU MM    Immature Neutrophil % 0.4 0 - 0.43 %   POCT Glucose   Result Value Ref Range    POC Glucose 112 (H) 70 - 99 MG/DL           ASSESSMENT:  Postmenopausal bleeding, thickened endometrium   Plan:  Hysteroscopy with dilation and curettage      Counseling:  The patient was counseled on all options both medical and surgical, conservative as well as definitive. She

## 2023-04-30 PROBLEM — R91.1 LUNG NODULE, SOLITARY: Status: ACTIVE | Noted: 2023-04-30

## 2023-05-03 ENCOUNTER — HOSPITAL ENCOUNTER (OUTPATIENT)
Dept: CT IMAGING | Age: 68
Discharge: HOME | End: 2023-05-03
Payer: MEDICARE

## 2023-05-03 DIAGNOSIS — D72.828 OTHER ELEVATED WHITE BLOOD CELL (WBC) COUNT: ICD-10-CM

## 2023-05-03 DIAGNOSIS — R91.1 LUNG NODULE, SOLITARY: ICD-10-CM

## 2023-05-03 LAB
EGFR, POC: >60 ML/MIN/1.73M2
POC CREATININE: 0.8 MG/DL (ref 0.6–1.1)

## 2023-05-03 PROCEDURE — 82565 ASSAY OF CREATININE: CPT

## 2023-05-03 PROCEDURE — 6360000004 HC RX CONTRAST MEDICATION: Performed by: INTERNAL MEDICINE

## 2023-05-03 PROCEDURE — 71260 CT THORAX DX C+: CPT

## 2023-05-03 RX ADMIN — IOPAMIDOL 100 ML: 755 INJECTION, SOLUTION INTRAVENOUS at 11:21

## 2023-05-04 ENCOUNTER — HOSPITAL ENCOUNTER (OUTPATIENT)
Dept: INFUSION THERAPY | Age: 68
Discharge: HOME | End: 2023-05-04
Payer: MEDICARE

## 2023-05-04 ENCOUNTER — OFFICE VISIT (OUTPATIENT)
Dept: ONCOLOGY | Age: 68
End: 2023-05-04
Payer: MEDICARE

## 2023-05-04 VITALS
HEIGHT: 61 IN | WEIGHT: 170.4 LBS | OXYGEN SATURATION: 96 % | DIASTOLIC BLOOD PRESSURE: 58 MMHG | SYSTOLIC BLOOD PRESSURE: 121 MMHG | BODY MASS INDEX: 32.17 KG/M2 | HEART RATE: 84 BPM | TEMPERATURE: 97.7 F | RESPIRATION RATE: 18 BRPM

## 2023-05-04 DIAGNOSIS — R93.89 THICKENED ENDOMETRIUM: ICD-10-CM

## 2023-05-04 DIAGNOSIS — D72.828 OTHER ELEVATED WHITE BLOOD CELL (WBC) COUNT: Primary | ICD-10-CM

## 2023-05-04 DIAGNOSIS — R91.1 LUNG NODULE, SOLITARY: ICD-10-CM

## 2023-05-04 DIAGNOSIS — D72.820 MONOCLONAL B-CELL LYMPHOCYTOSIS: ICD-10-CM

## 2023-05-04 DIAGNOSIS — D75.839 THROMBOCYTOSIS: ICD-10-CM

## 2023-05-04 PROCEDURE — 1123F ACP DISCUSS/DSCN MKR DOCD: CPT | Performed by: INTERNAL MEDICINE

## 2023-05-04 PROCEDURE — 99211 OFF/OP EST MAY X REQ PHY/QHP: CPT

## 2023-05-04 PROCEDURE — 4004F PT TOBACCO SCREEN RCVD TLK: CPT | Performed by: INTERNAL MEDICINE

## 2023-05-04 PROCEDURE — 1090F PRES/ABSN URINE INCON ASSESS: CPT | Performed by: INTERNAL MEDICINE

## 2023-05-04 PROCEDURE — G8399 PT W/DXA RESULTS DOCUMENT: HCPCS | Performed by: INTERNAL MEDICINE

## 2023-05-04 PROCEDURE — 3017F COLORECTAL CA SCREEN DOC REV: CPT | Performed by: INTERNAL MEDICINE

## 2023-05-04 PROCEDURE — G8417 CALC BMI ABV UP PARAM F/U: HCPCS | Performed by: INTERNAL MEDICINE

## 2023-05-04 PROCEDURE — G8427 DOCREV CUR MEDS BY ELIG CLIN: HCPCS | Performed by: INTERNAL MEDICINE

## 2023-05-04 PROCEDURE — 99213 OFFICE O/P EST LOW 20 MIN: CPT | Performed by: INTERNAL MEDICINE

## 2023-05-04 RX ORDER — EMPAGLIFLOZIN 10 MG/1
TABLET, FILM COATED ORAL
COMMUNITY
Start: 2023-04-27

## 2023-05-04 NOTE — PROGRESS NOTES
MA Rooming Questions  Patient: Jody Hu  MRN: [id number]    Date: 5/4/2023        1. Do you have any new issues?   no         2. Do you need any refills on medications?    no    3. Have you had any imaging done since your last visit? yes - CT    4. Have you been hospitalized or seen in the emergency room since your last visit here?   no    5. Did the patient have a depression screening completed today?  No    No data recorded     PHQ-9 Given to (if applicable):               PHQ-9 Score (if applicable):                     [] Positive     []  Negative              Does question #9 need addressed (if applicable)                     [] Yes    []  No               Aster Bernabe CMA

## 2023-07-11 ENCOUNTER — APPOINTMENT (OUTPATIENT)
Dept: CT IMAGING | Age: 68
End: 2023-07-11
Payer: MEDICARE

## 2023-07-11 ENCOUNTER — HOSPITAL ENCOUNTER (EMERGENCY)
Age: 68
Discharge: HOME | End: 2023-07-11
Attending: EMERGENCY MEDICINE
Payer: MEDICARE

## 2023-07-11 VITALS
DIASTOLIC BLOOD PRESSURE: 71 MMHG | HEART RATE: 73 BPM | BODY MASS INDEX: 32.31 KG/M2 | WEIGHT: 171 LBS | TEMPERATURE: 97.4 F | RESPIRATION RATE: 14 BRPM | OXYGEN SATURATION: 95 % | SYSTOLIC BLOOD PRESSURE: 146 MMHG

## 2023-07-11 DIAGNOSIS — R19.7 NAUSEA VOMITING AND DIARRHEA: Primary | ICD-10-CM

## 2023-07-11 DIAGNOSIS — R11.2 NAUSEA VOMITING AND DIARRHEA: Primary | ICD-10-CM

## 2023-07-11 LAB
ALBUMIN SERPL-MCNC: 4.5 GM/DL (ref 3.4–5)
ALP BLD-CCNC: 72 IU/L (ref 40–129)
ALT SERPL-CCNC: 9 U/L (ref 10–40)
ANION GAP SERPL CALCULATED.3IONS-SCNC: 13 MMOL/L (ref 4–16)
AST SERPL-CCNC: 14 IU/L (ref 15–37)
ATYPICAL LYMPHOCYTE ABSOLUTE COUNT: ABNORMAL
BILIRUB SERPL-MCNC: 0.2 MG/DL (ref 0–1)
BUN SERPL-MCNC: 16 MG/DL (ref 6–23)
CALCIUM SERPL-MCNC: 9.3 MG/DL (ref 8.3–10.6)
CHLORIDE BLD-SCNC: 104 MMOL/L (ref 99–110)
CO2: 24 MMOL/L (ref 21–32)
CREAT SERPL-MCNC: 0.8 MG/DL (ref 0.6–1.1)
DIFFERENTIAL TYPE: ABNORMAL
EOSINOPHILS ABSOLUTE: 0.4 K/CU MM
EOSINOPHILS RELATIVE PERCENT: 2 % (ref 0–3)
GFR SERPL CREATININE-BSD FRML MDRD: >60 ML/MIN/1.73M2
GLUCOSE SERPL-MCNC: 175 MG/DL (ref 70–99)
HCT VFR BLD CALC: 42.7 % (ref 37–47)
HEMOGLOBIN: 13.7 GM/DL (ref 12.5–16)
LIPASE: 24 IU/L (ref 13–60)
LYMPHOCYTES ABSOLUTE: 8.5 K/CU MM
LYMPHOCYTES RELATIVE PERCENT: 43 % (ref 24–44)
MCH RBC QN AUTO: 27.8 PG (ref 27–31)
MCHC RBC AUTO-ENTMCNC: 32.1 % (ref 32–36)
MCV RBC AUTO: 86.6 FL (ref 78–100)
MONOCYTES ABSOLUTE: 1.4 K/CU MM
MONOCYTES RELATIVE PERCENT: 7 % (ref 0–4)
PDW BLD-RTO: 14.2 % (ref 11.7–14.9)
PLATELET # BLD: 765 K/CU MM (ref 140–440)
PMV BLD AUTO: 10.2 FL (ref 7.5–11.1)
POTASSIUM SERPL-SCNC: 5.2 MMOL/L (ref 3.5–5.1)
RBC # BLD: 4.93 M/CU MM (ref 4.2–5.4)
RBC # BLD: ABNORMAL 10*6/UL
SEGMENTED NEUTROPHILS ABSOLUTE COUNT: 9.4 K/CU MM
SEGMENTED NEUTROPHILS RELATIVE PERCENT: 48 % (ref 36–66)
SODIUM BLD-SCNC: 141 MMOL/L (ref 135–145)
TARGET CELLS: ABNORMAL
TOTAL PROTEIN: 7.7 GM/DL (ref 6.4–8.2)
WBC # BLD: 19.7 K/CU MM (ref 4–10.5)

## 2023-07-11 PROCEDURE — 80053 COMPREHEN METABOLIC PANEL: CPT

## 2023-07-11 PROCEDURE — 6360000004 HC RX CONTRAST MEDICATION: Performed by: EMERGENCY MEDICINE

## 2023-07-11 PROCEDURE — 96375 TX/PRO/DX INJ NEW DRUG ADDON: CPT

## 2023-07-11 PROCEDURE — 96374 THER/PROPH/DIAG INJ IV PUSH: CPT

## 2023-07-11 PROCEDURE — 2580000003 HC RX 258: Performed by: EMERGENCY MEDICINE

## 2023-07-11 PROCEDURE — 85025 COMPLETE CBC W/AUTO DIFF WBC: CPT

## 2023-07-11 PROCEDURE — 6360000002 HC RX W HCPCS: Performed by: EMERGENCY MEDICINE

## 2023-07-11 PROCEDURE — 83690 ASSAY OF LIPASE: CPT

## 2023-07-11 PROCEDURE — 74177 CT ABD & PELVIS W/CONTRAST: CPT

## 2023-07-11 PROCEDURE — 99285 EMERGENCY DEPT VISIT HI MDM: CPT

## 2023-07-11 PROCEDURE — C9113 INJ PANTOPRAZOLE SODIUM, VIA: HCPCS | Performed by: EMERGENCY MEDICINE

## 2023-07-11 RX ORDER — DROPERIDOL 2.5 MG/ML
0.62 INJECTION, SOLUTION INTRAMUSCULAR; INTRAVENOUS ONCE
Status: COMPLETED | OUTPATIENT
Start: 2023-07-11 | End: 2023-07-11

## 2023-07-11 RX ORDER — PROMETHAZINE HYDROCHLORIDE 25 MG/1
25 TABLET ORAL EVERY 6 HOURS PRN
Qty: 12 TABLET | Refills: 0 | Status: SHIPPED | OUTPATIENT
Start: 2023-07-11 | End: 2023-07-14

## 2023-07-11 RX ORDER — PANTOPRAZOLE SODIUM 40 MG/10ML
40 INJECTION, POWDER, LYOPHILIZED, FOR SOLUTION INTRAVENOUS ONCE
Status: COMPLETED | OUTPATIENT
Start: 2023-07-11 | End: 2023-07-11

## 2023-07-11 RX ORDER — SODIUM CHLORIDE 9 MG/ML
INJECTION, SOLUTION INTRAVENOUS CONTINUOUS
Status: DISCONTINUED | OUTPATIENT
Start: 2023-07-11 | End: 2023-07-11 | Stop reason: HOSPADM

## 2023-07-11 RX ORDER — ONDANSETRON 4 MG/1
4 TABLET, ORALLY DISINTEGRATING ORAL 3 TIMES DAILY PRN
Qty: 21 TABLET | Refills: 0 | Status: SHIPPED | OUTPATIENT
Start: 2023-07-11

## 2023-07-11 RX ADMIN — DROPERIDOL 0.62 MG: 2.5 INJECTION, SOLUTION INTRAMUSCULAR; INTRAVENOUS at 01:46

## 2023-07-11 RX ADMIN — IOPAMIDOL 100 ML: 755 INJECTION, SOLUTION INTRAVENOUS at 02:44

## 2023-07-11 RX ADMIN — SODIUM CHLORIDE: 9 INJECTION, SOLUTION INTRAVENOUS at 02:09

## 2023-07-11 RX ADMIN — PANTOPRAZOLE SODIUM 40 MG: 40 INJECTION, POWDER, FOR SOLUTION INTRAVENOUS at 01:46

## 2023-07-11 ASSESSMENT — ENCOUNTER SYMPTOMS
DIARRHEA: 1
EYES NEGATIVE: 1
COUGH: 0
NAUSEA: 1
SORE THROAT: 0
ABDOMINAL PAIN: 1
RESPIRATORY NEGATIVE: 1

## 2023-07-11 ASSESSMENT — PAIN - FUNCTIONAL ASSESSMENT: PAIN_FUNCTIONAL_ASSESSMENT: NONE - DENIES PAIN

## 2023-07-11 NOTE — ED PROVIDER NOTES
Nausea & Vomiting  Severity:  Moderate  Duration:  2 hours  Timing:  Constant  Number of daily episodes:  5  Quality:  Stomach contents  Progression:  Unchanged  Chronicity:  New  Relieved by:  Nothing  Worsened by:  Food smell and ice chips  Ineffective treatments:  None tried  Associated symptoms: abdominal pain and diarrhea    Associated symptoms: no arthralgias, no chills, no cough, no fever, no headaches, no myalgias, no sore throat and no URI    Associated symptoms comment:  Patient awoke with waves of nausea. She had ate some [employer] pie that was 3days old. She was only one to have the pie. A few hours after eating it she had waves of nausea and cramping in her abdomen. Then she had vomiting and diarrhea  Abdominal pain:     Location:  Generalized    Quality: bloating and cramping    Diarrhea:     Quality:  Semi-solid    Severity:  Severe    Timing:  Constant    Progression:  Unchanged    Review of Systems   Constitutional: Negative. Negative for chills and fever. HENT: Negative. Negative for sore throat. Eyes: Negative. Respiratory: Negative. Negative for cough. Cardiovascular: Negative. Gastrointestinal:  Positive for abdominal pain, diarrhea and nausea. Genitourinary: Negative. Musculoskeletal: Negative. Negative for arthralgias and myalgias. Skin: Negative. Neurological: Negative. Negative for headaches. All other systems reviewed and are negative. Family History   Problem Relation Age of Onset    Cancer Father     Cancer Brother      Social History     Socioeconomic History    Marital status:       Spouse name: Not on file    Number of children: Not on file    Years of education: Not on file    Highest education level: Not on file   Occupational History    Not on file   Tobacco Use    Smoking status: Every Day     Packs/day: 1.00     Years: 51.00     Pack years: 51.00     Types: Cigarettes     Start date: 0    Smokeless tobacco: Never    Tobacco comments:

## 2023-07-11 NOTE — ED NOTES
Patient arrived per EMS, states she has been having nausea and vomiting with weakness, states was fine prior to bed.         Debara Rinne, RN  07/11/23 0942

## 2023-07-11 NOTE — ED NOTES
Pt discharged with instructions and pt stated understanding.   Pt walked out of the [address] April Conley RN  07/11/23 2823

## 2023-08-08 ENCOUNTER — HOSPITAL ENCOUNTER (OUTPATIENT)
Dept: INFUSION THERAPY | Age: 68
Discharge: HOME | End: 2023-08-08
Payer: MEDICARE

## 2023-08-08 ENCOUNTER — OFFICE VISIT (OUTPATIENT)
Dept: ONCOLOGY | Age: 68
End: 2023-08-08
Payer: MEDICARE

## 2023-08-08 VITALS
WEIGHT: 166 LBS | RESPIRATION RATE: 16 BRPM | HEIGHT: 61 IN | TEMPERATURE: 97.2 F | BODY MASS INDEX: 31.34 KG/M2 | DIASTOLIC BLOOD PRESSURE: 60 MMHG | SYSTOLIC BLOOD PRESSURE: 131 MMHG | HEART RATE: 97 BPM | OXYGEN SATURATION: 97 %

## 2023-08-08 DIAGNOSIS — D72.828 OTHER ELEVATED WHITE BLOOD CELL (WBC) COUNT: Primary | ICD-10-CM

## 2023-08-08 DIAGNOSIS — D75.839 THROMBOCYTOSIS: ICD-10-CM

## 2023-08-08 DIAGNOSIS — D72.820 MONOCLONAL B-CELL LYMPHOCYTOSIS: ICD-10-CM

## 2023-08-08 DIAGNOSIS — R93.89 THICKENED ENDOMETRIUM: ICD-10-CM

## 2023-08-08 DIAGNOSIS — R91.1 LUNG NODULE, SOLITARY: ICD-10-CM

## 2023-08-08 DIAGNOSIS — R71.8 OTHER ABNORMALITY OF RED BLOOD CELLS: ICD-10-CM

## 2023-08-08 PROCEDURE — 4004F PT TOBACCO SCREEN RCVD TLK: CPT | Performed by: INTERNAL MEDICINE

## 2023-08-08 PROCEDURE — G8427 DOCREV CUR MEDS BY ELIG CLIN: HCPCS | Performed by: INTERNAL MEDICINE

## 2023-08-08 PROCEDURE — G8417 CALC BMI ABV UP PARAM F/U: HCPCS | Performed by: INTERNAL MEDICINE

## 2023-08-08 PROCEDURE — 1090F PRES/ABSN URINE INCON ASSESS: CPT | Performed by: INTERNAL MEDICINE

## 2023-08-08 PROCEDURE — 1123F ACP DISCUSS/DSCN MKR DOCD: CPT | Performed by: INTERNAL MEDICINE

## 2023-08-08 PROCEDURE — G8399 PT W/DXA RESULTS DOCUMENT: HCPCS | Performed by: INTERNAL MEDICINE

## 2023-08-08 PROCEDURE — 99213 OFFICE O/P EST LOW 20 MIN: CPT | Performed by: INTERNAL MEDICINE

## 2023-08-08 PROCEDURE — 99211 OFF/OP EST MAY X REQ PHY/QHP: CPT

## 2023-08-08 PROCEDURE — 3017F COLORECTAL CA SCREEN DOC REV: CPT | Performed by: INTERNAL MEDICINE

## 2023-08-08 NOTE — PROGRESS NOTES
MA Rooming Questions  Patient: Stanly Goodell  MRN: [id number]    Date: 8/8/2023        1. Do you have any new issues?   no         2. Do you need any refills on medications?    no    3. Have you had any imaging done since your last visit? yes - ct abd/pelvis 7/11    4. Have you been hospitalized or seen in the emergency room since your last visit here?   yes - [address] [place] [address]    5. Did the patient have a depression screening completed today?  No    No data recorded     PHQ-9 Given to (if applicable):               PHQ-9 Score (if applicable):                     [] Positive     []  Negative              Does question #9 need addressed (if applicable)                     [] Yes    []  No               Abad Quintana MA
concerns for today. Recent imaging and labs were reviewed and discussed with the patient.

## 2023-08-09 ENCOUNTER — TELEPHONE (OUTPATIENT)
Dept: ONCOLOGY | Age: 68
End: 2023-08-09

## 2023-08-09 NOTE — TELEPHONE ENCOUNTER
Left message with CT scan time and prep for 9/5/23 [address] [place] [address] arrival at 9:30 AM. Informed to call with any questions.

## 2023-09-05 ENCOUNTER — HOSPITAL ENCOUNTER (OUTPATIENT)
Dept: CT IMAGING | Age: 68
Discharge: HOME | End: 2023-09-05
Attending: INTERNAL MEDICINE
Payer: MEDICARE

## 2023-09-05 ENCOUNTER — HOSPITAL ENCOUNTER (OUTPATIENT)
Age: 68
Discharge: HOME | End: 2023-09-05
Attending: INTERNAL MEDICINE
Payer: MEDICARE

## 2023-09-05 DIAGNOSIS — D72.820 MONOCLONAL B-CELL LYMPHOCYTOSIS: ICD-10-CM

## 2023-09-05 DIAGNOSIS — R93.89 THICKENED ENDOMETRIUM: ICD-10-CM

## 2023-09-05 DIAGNOSIS — R91.1 LUNG NODULE, SOLITARY: ICD-10-CM

## 2023-09-05 DIAGNOSIS — D72.828 OTHER ELEVATED WHITE BLOOD CELL (WBC) COUNT: ICD-10-CM

## 2023-09-05 DIAGNOSIS — D75.839 THROMBOCYTOSIS: ICD-10-CM

## 2023-09-05 LAB
ALBUMIN SERPL-MCNC: 3.9 GM/DL (ref 3.4–5)
ALP BLD-CCNC: 68 IU/L (ref 40–129)
ALT SERPL-CCNC: 7 U/L (ref 10–40)
ANION GAP SERPL CALCULATED.3IONS-SCNC: 9 MMOL/L (ref 4–16)
AST SERPL-CCNC: 8 IU/L (ref 15–37)
BILIRUB SERPL-MCNC: 0.2 MG/DL (ref 0–1)
BUN SERPL-MCNC: 23 MG/DL (ref 6–23)
CALCIUM SERPL-MCNC: 9.3 MG/DL (ref 8.3–10.6)
CHLORIDE BLD-SCNC: 103 MMOL/L (ref 99–110)
CO2: 30 MMOL/L (ref 21–32)
CREAT SERPL-MCNC: 0.9 MG/DL (ref 0.6–1.1)
DIFFERENTIAL TYPE: ABNORMAL
FERRITIN: 93 NG/ML (ref 15–150)
FOLATE SERPL-MCNC: 7.3 NG/ML (ref 3.1–17.5)
GFR SERPL CREATININE-BSD FRML MDRD: >60 ML/MIN/1.73M2
GLUCOSE SERPL-MCNC: 149 MG/DL (ref 70–99)
HCT VFR BLD CALC: 42.3 % (ref 37–47)
HEMOGLOBIN: 13.6 GM/DL (ref 12.5–16)
IRON: 69 UG/DL (ref 37–145)
LYMPHOCYTES ABSOLUTE: 8.6 K/CU MM
LYMPHOCYTES RELATIVE PERCENT: 41 % (ref 24–44)
MCH RBC QN AUTO: 28.7 PG (ref 27–31)
MCHC RBC AUTO-ENTMCNC: 32.2 % (ref 32–36)
MCV RBC AUTO: 89.2 FL (ref 78–100)
MONOCYTES ABSOLUTE: 0.6 K/CU MM
MONOCYTES RELATIVE PERCENT: 3 % (ref 0–4)
PCT TRANSFERRIN: 24 % (ref 10–44)
PDW BLD-RTO: 14.3 % (ref 11.7–14.9)
PLATELET # BLD: 797 K/CU MM (ref 140–440)
PLT MORPHOLOGY: ABNORMAL
PMV BLD AUTO: 10.5 FL (ref 7.5–11.1)
POTASSIUM SERPL-SCNC: 4.4 MMOL/L (ref 3.5–5.1)
RBC # BLD: 4.74 M/CU MM (ref 4.2–5.4)
RBC # BLD: ABNORMAL 10*6/UL
RETICULOCYTE COUNT PCT: 1.7 % (ref 0.2–2.2)
SEGMENTED NEUTROPHILS ABSOLUTE COUNT: 11.8 K/CU MM
SEGMENTED NEUTROPHILS RELATIVE PERCENT: 56 % (ref 36–66)
SODIUM BLD-SCNC: 142 MMOL/L (ref 135–145)
TOTAL IRON BINDING CAPACITY: 292 UG/DL (ref 250–450)
TOTAL PROTEIN: 6.9 GM/DL (ref 6.4–8.2)
UNSATURATED IRON BINDING CAPACITY: 223 UG/DL (ref 110–370)
VITAMIN B-12: 199.3 PG/ML (ref 211–911)
WBC # BLD: 21 K/CU MM (ref 4–10.5)

## 2023-09-05 PROCEDURE — 85007 BL SMEAR W/DIFF WBC COUNT: CPT

## 2023-09-05 PROCEDURE — 82607 VITAMIN B-12: CPT

## 2023-09-05 PROCEDURE — 80053 COMPREHEN METABOLIC PANEL: CPT

## 2023-09-05 PROCEDURE — 85045 AUTOMATED RETICULOCYTE COUNT: CPT

## 2023-09-05 PROCEDURE — 36415 COLL VENOUS BLD VENIPUNCTURE: CPT

## 2023-09-05 PROCEDURE — 71260 CT THORAX DX C+: CPT

## 2023-09-05 PROCEDURE — 85027 COMPLETE CBC AUTOMATED: CPT

## 2023-09-05 PROCEDURE — 6360000004 HC RX CONTRAST MEDICATION: Performed by: INTERNAL MEDICINE

## 2023-09-05 PROCEDURE — 83550 IRON BINDING TEST: CPT

## 2023-09-05 PROCEDURE — 82728 ASSAY OF FERRITIN: CPT

## 2023-09-05 PROCEDURE — 83540 ASSAY OF IRON: CPT

## 2023-09-05 PROCEDURE — 82746 ASSAY OF FOLIC ACID SERUM: CPT

## 2023-09-05 RX ADMIN — IOPAMIDOL 100 ML: 755 INJECTION, SOLUTION INTRAVENOUS at 10:59

## 2023-09-06 ENCOUNTER — CLINICAL DOCUMENTATION (OUTPATIENT)
Dept: ONCOLOGY | Age: 68
End: 2023-09-06

## 2023-09-06 RX ORDER — LANOLIN ALCOHOL/MO/W.PET/CERES
1000 CREAM (GRAM) TOPICAL DAILY
Qty: 30 TABLET | Refills: 3 | Status: SHIPPED | OUTPATIENT
Start: 2023-09-06

## 2023-09-06 NOTE — PROGRESS NOTES
Lab results from 09/05/2023 reviewed. Vit B12 low (199) and physician recommending that patient take Vit B12 1,000 mcg daily. RX e-scribed to [facility] in [facility] per physician order. Attempted to call patient @ [phone] to notify; however, no answer. VM left with this RN direct number should patient have any questions.

## 2023-09-12 ENCOUNTER — OFFICE VISIT (OUTPATIENT)
Dept: ONCOLOGY | Age: 68
End: 2023-09-12
Payer: MEDICARE

## 2023-09-12 ENCOUNTER — HOSPITAL ENCOUNTER (OUTPATIENT)
Dept: INFUSION THERAPY | Age: 68
Discharge: HOME | End: 2023-09-12
Payer: MEDICARE

## 2023-09-12 VITALS
BODY MASS INDEX: 32.13 KG/M2 | WEIGHT: 170.2 LBS | SYSTOLIC BLOOD PRESSURE: 113 MMHG | TEMPERATURE: 97.8 F | RESPIRATION RATE: 18 BRPM | HEART RATE: 72 BPM | DIASTOLIC BLOOD PRESSURE: 56 MMHG | OXYGEN SATURATION: 100 % | HEIGHT: 61 IN

## 2023-09-12 DIAGNOSIS — R91.1 LUNG NODULE, SOLITARY: Primary | ICD-10-CM

## 2023-09-12 PROCEDURE — 1090F PRES/ABSN URINE INCON ASSESS: CPT | Performed by: INTERNAL MEDICINE

## 2023-09-12 PROCEDURE — 99211 OFF/OP EST MAY X REQ PHY/QHP: CPT

## 2023-09-12 PROCEDURE — 99214 OFFICE O/P EST MOD 30 MIN: CPT | Performed by: INTERNAL MEDICINE

## 2023-09-12 PROCEDURE — 4004F PT TOBACCO SCREEN RCVD TLK: CPT | Performed by: INTERNAL MEDICINE

## 2023-09-12 PROCEDURE — 1123F ACP DISCUSS/DSCN MKR DOCD: CPT | Performed by: INTERNAL MEDICINE

## 2023-09-12 PROCEDURE — 3017F COLORECTAL CA SCREEN DOC REV: CPT | Performed by: INTERNAL MEDICINE

## 2023-09-12 PROCEDURE — G8427 DOCREV CUR MEDS BY ELIG CLIN: HCPCS | Performed by: INTERNAL MEDICINE

## 2023-09-12 PROCEDURE — G8399 PT W/DXA RESULTS DOCUMENT: HCPCS | Performed by: INTERNAL MEDICINE

## 2023-09-12 PROCEDURE — G8417 CALC BMI ABV UP PARAM F/U: HCPCS | Performed by: INTERNAL MEDICINE

## 2023-09-12 RX ORDER — PREDNISONE 10 MG/1
TABLET ORAL
COMMUNITY
Start: 2023-08-30

## 2023-09-12 RX ORDER — MECLIZINE HYDROCHLORIDE 25 MG/1
TABLET ORAL
COMMUNITY
Start: 2023-07-27

## 2023-09-12 RX ORDER — LORAZEPAM 0.5 MG/1
0.5 TABLET ORAL EVERY 8 HOURS PRN
Qty: 2 TABLET | Refills: 0 | Status: SHIPPED | OUTPATIENT
Start: 2023-09-12 | End: 2023-09-13

## 2023-09-12 ASSESSMENT — PATIENT HEALTH QUESTIONNAIRE - PHQ9
SUM OF ALL RESPONSES TO PHQ QUESTIONS 1-9: 0
2. FEELING DOWN, DEPRESSED OR HOPELESS: 0
SUM OF ALL RESPONSES TO PHQ QUESTIONS 1-9: 0
SUM OF ALL RESPONSES TO PHQ QUESTIONS 1-9: 0
SUM OF ALL RESPONSES TO PHQ9 QUESTIONS 1 & 2: 0
1. LITTLE INTEREST OR PLEASURE IN DOING THINGS: 0
SUM OF ALL RESPONSES TO PHQ QUESTIONS 1-9: 0

## 2023-09-12 NOTE — PROGRESS NOTES
Patient Name: Abner Enriquez  Patient : 1955  Patient MRN: [id number]     Primary Oncologist: Evan Hutchinson MD  Referring Provider: JAJA Mckoy     Date of Service: 2023      Chief Complaint:   Chief Complaint   Patient presents with    Follow-up     Patient Active Problem List:       Leukocytosis       Thrombocytosis    HPI:   Ms. Baldev Fuentes is a 59-year-old very pleasant female with medical history significant for hypertension, hyperlipidemia, diabetes mellitus, history of HPV infection, initially referred to me on 2014, for evaluation of mild leukocytosis. She was found to have mild leukocytosis on routine blood tests done on 2014, by her primary care physician. She is a chronic smoker and she smokes about 10 cigarettes a day since she was 15. Laboratory workup on 2014, showed slight elevation in white blood cell count (13). Her hemoglobin, hematocrit, platelet count and LDH were normal. There was no significant immunophenotypic abnormalities in flow cytometry. JAK2  and bcr/abl studies were negative. She has been followed periodically since then. She missed follow up with me since 2019 until 2022. Laboratory work ups done on 22 showed leukocytosis (WBC 16.1), thrombocytosis (platelet 317) and flow cytometry showed monoclonal B-cell population (~450 cells/cumm). The immunophenotype of the clonal cells is non-specific. DDX includes peripheral blood involvement by a B-cell lymphom and monoclonal B-cell lymphocytosis. The rests of the blood tests, including BCR-ABL1, JAK2 V617F, JAK2 exon 12-13, MPL, CALR, MARIKA, ESR and LDH, were within normal range. CT scan of the neck, chest, abdomen and pelvis done on 2022 showed no significant pathology in her neck. No splenomegaly or overt lymphadenopathy. There are scattered mildly prominent left internal mammary, upper abdominal and left common iliac lymph nodes.   There

## 2023-09-12 NOTE — PROGRESS NOTES
MA Rooming Questions  Patient: Ashok Montenegro  MRN: [id number]    Date: 9/12/2023        1. Do you have any new issues?   no         2. Do you need any refills on medications?    no    3. Have you had any imaging done since your last visit? yes - CT @ [facility] ,MRI- @[facility]-called and left VM requesting to be faxed @ 3:18PM.    4. Have you been hospitalized or seen in the emergency room since your last visit here?   no    5. Did the patient have a depression screening completed today?  Yes    No data recorded     PHQ-9 Given to (if applicable):               PHQ-9 Score (if applicable):                     [] Positive     []  Negative              Does question #9 need addressed (if applicable)                     [] Yes    []  No               Sharlene Lopez CMA

## 2023-09-22 ENCOUNTER — PROCEDURE VISIT (OUTPATIENT)
Dept: ONCOLOGY | Age: 68
End: 2023-09-22

## 2023-09-22 ENCOUNTER — HOSPITAL ENCOUNTER (OUTPATIENT)
Dept: INFUSION THERAPY | Age: 68
Discharge: HOME | End: 2023-09-22
Payer: MEDICARE

## 2023-09-22 VITALS
TEMPERATURE: 97.6 F | HEIGHT: 61 IN | OXYGEN SATURATION: 98 % | WEIGHT: 168.8 LBS | SYSTOLIC BLOOD PRESSURE: 133 MMHG | BODY MASS INDEX: 31.87 KG/M2 | HEART RATE: 80 BPM | RESPIRATION RATE: 18 BRPM | DIASTOLIC BLOOD PRESSURE: 67 MMHG

## 2023-09-22 DIAGNOSIS — D75.839 THROMBOCYTOSIS: ICD-10-CM

## 2023-09-22 DIAGNOSIS — D72.828 OTHER ELEVATED WHITE BLOOD CELL (WBC) COUNT: Primary | ICD-10-CM

## 2023-09-22 DIAGNOSIS — D72.820 MONOCLONAL B-CELL LYMPHOCYTOSIS: ICD-10-CM

## 2023-09-22 DIAGNOSIS — D72.828 OTHER ELEVATED WHITE BLOOD CELL (WBC) COUNT: ICD-10-CM

## 2023-09-22 LAB
BASOPHILS ABSOLUTE: 0 K/CU MM
BASOPHILS RELATIVE PERCENT: 0.2 % (ref 0–1)
DIFFERENTIAL TYPE: ABNORMAL
EOSINOPHILS ABSOLUTE: 0.2 K/CU MM
EOSINOPHILS RELATIVE PERCENT: 1.6 % (ref 0–3)
HCT VFR BLD CALC: 43.5 % (ref 37–47)
HEMOGLOBIN: 14.1 GM/DL (ref 12.5–16)
LYMPHOCYTES ABSOLUTE: 3.4 K/CU MM
LYMPHOCYTES RELATIVE PERCENT: 31.9 % (ref 24–44)
MCH RBC QN AUTO: 28.3 PG (ref 27–31)
MCHC RBC AUTO-ENTMCNC: 32.4 % (ref 32–36)
MCV RBC AUTO: 87.3 FL (ref 78–100)
MONOCYTES ABSOLUTE: 1.2 K/CU MM
MONOCYTES RELATIVE PERCENT: 11.3 % (ref 0–4)
PDW BLD-RTO: 15.2 % (ref 11.7–14.9)
PLATELET # BLD: 552 K/CU MM (ref 140–440)
PMV BLD AUTO: 10.8 FL (ref 7.5–11.1)
RBC # BLD: 4.98 M/CU MM (ref 4.2–5.4)
REASON FOR REJECTION: NORMAL
REJECTED TEST: NORMAL
RETICULOCYTE COUNT PCT: 1.7 % (ref 0.2–2.2)
SEGMENTED NEUTROPHILS ABSOLUTE COUNT: 5.8 K/CU MM
SEGMENTED NEUTROPHILS RELATIVE PERCENT: 55 % (ref 36–66)
WBC # BLD: 10.6 K/CU MM (ref 4–10.5)

## 2023-09-22 PROCEDURE — 36415 COLL VENOUS BLD VENIPUNCTURE: CPT

## 2023-09-22 PROCEDURE — 85045 AUTOMATED RETICULOCYTE COUNT: CPT

## 2023-09-22 PROCEDURE — 85025 COMPLETE CBC W/AUTO DIFF WBC: CPT

## 2023-09-22 NOTE — PROGRESS NOTES
Alexia Kothari  1955  [id number]      Primary Oncologist: Ellis Becerra MD  Referring Provider: Latoya Bui     9/22/2023     BONE MARROW BIOPSY & ASPIRATION    Consent:  Alexia Kothari voiced an understanding of the risks/benefits and the answers to their questions, then proceeded to sign and date the consent. A written consent was obtained from the patient and placed into the chart. Consent includes:  1. Explanation of the potential risks and benefits of this procedure, alternative methods of treatment, and risks despite precautions. 2. An understanding of the current diagnosis to make treatment decisions. 3. Administration of anesthetics as deemed advisable for the procedure. 4. An understanding that tissues and products may be removed and sent to pathology or disposed of by an appropriate source per practice standard. A time out was performed verifying the patient name, date of birth, procedure and location of the procedure. Procedure - BM Asp and Bx     Indication: Thrombocytosis     Anesthesia:   10 ml 1 % Lidocaine                           Patient position: Left lateral decubitus position     Bone Marrow Needle: Bone marrow aspiration needle and biopsy needle     Procedure Steps: The patient was placed in the left lateral decubitis position. The area was prepped and draped in a sterile fashion. Using an \"I\" needle a bone marrow aspirate was obtained from the right posterior superior iliac crest. Then using a bone marrow biopsy needle a bone marrow biopsy was obtained. A sterile bandage was applied. No significant bleeding. Sample sent for histology, flow cytometry, and cytogenetics. In addition sample was sent for [facility]. The patient is given the usual after care instructions. The patient will keep the area clearn and dry for 24 hours. Appropriate pain control instructions were discussed. The patient knows to call [phone] with any signs of bleeding, infection, or

## 2023-09-22 NOTE — PROGRESS NOTES
MA Bone Marrow Rooming Questions  Patient: Nathalie Allen  MRN: [id number]    Date: 9/22/2023        Time out procedure was completed prior to Bone Marrow Procedures. Patient verbalized understanding of post op instructions and copy of instructions sent home with patient. Patient was discharged home. 1. Do you have any new issues?   no         2. Do you need any refills on medications?    no    3. Have you had any imaging done since your last visit?   no    4. Have you been hospitalized or seen in the emergency room since your last visit here?   no    5. Did the patient have a depression screening completed today?  No    No data recorded     PHQ-9 Given to (if applicable):               PHQ-9 Score (if applicable):                     [] Positive     []  Negative              Does question #9 need addressed (if applicable)                     [] Yes    []  No               Jaylin Lara CMA

## 2023-10-05 ENCOUNTER — HOSPITAL ENCOUNTER (OUTPATIENT)
Dept: INFUSION THERAPY | Age: 68
Discharge: HOME | End: 2023-10-05
Payer: MEDICARE

## 2023-10-05 ENCOUNTER — OFFICE VISIT (OUTPATIENT)
Dept: ONCOLOGY | Age: 68
End: 2023-10-05
Payer: MEDICARE

## 2023-10-05 VITALS
WEIGHT: 169.2 LBS | RESPIRATION RATE: 18 BRPM | TEMPERATURE: 97.9 F | HEART RATE: 82 BPM | DIASTOLIC BLOOD PRESSURE: 58 MMHG | BODY MASS INDEX: 31.95 KG/M2 | HEIGHT: 61 IN | OXYGEN SATURATION: 97 % | SYSTOLIC BLOOD PRESSURE: 131 MMHG

## 2023-10-05 DIAGNOSIS — D75.839 THROMBOCYTOSIS: ICD-10-CM

## 2023-10-05 DIAGNOSIS — R91.1 LUNG NODULE, SOLITARY: ICD-10-CM

## 2023-10-05 DIAGNOSIS — D72.820 MONOCLONAL B-CELL LYMPHOCYTOSIS: ICD-10-CM

## 2023-10-05 DIAGNOSIS — D72.828 OTHER ELEVATED WHITE BLOOD CELL (WBC) COUNT: Primary | ICD-10-CM

## 2023-10-05 LAB
CALR MUTATION: NORMAL
CHROMOSOME, BONE MARROW: NORMAL
COMMENT: NORMAL
GDT REPLACEMENT: NORMAL
JAK2 EXONS 12-15 MUTATION: NORMAL
JAK2 P.V617F BLD/T QL: NORMAL
MPL 515 MUTATION: NORMAL

## 2023-10-05 PROCEDURE — 1123F ACP DISCUSS/DSCN MKR DOCD: CPT | Performed by: INTERNAL MEDICINE

## 2023-10-05 PROCEDURE — 99211 OFF/OP EST MAY X REQ PHY/QHP: CPT

## 2023-10-05 PROCEDURE — 99215 OFFICE O/P EST HI 40 MIN: CPT | Performed by: INTERNAL MEDICINE

## 2023-10-05 PROCEDURE — 1090F PRES/ABSN URINE INCON ASSESS: CPT | Performed by: INTERNAL MEDICINE

## 2023-10-05 PROCEDURE — G8417 CALC BMI ABV UP PARAM F/U: HCPCS | Performed by: INTERNAL MEDICINE

## 2023-10-05 PROCEDURE — G8399 PT W/DXA RESULTS DOCUMENT: HCPCS | Performed by: INTERNAL MEDICINE

## 2023-10-05 PROCEDURE — 3017F COLORECTAL CA SCREEN DOC REV: CPT | Performed by: INTERNAL MEDICINE

## 2023-10-05 PROCEDURE — G8484 FLU IMMUNIZE NO ADMIN: HCPCS | Performed by: INTERNAL MEDICINE

## 2023-10-05 PROCEDURE — 4004F PT TOBACCO SCREEN RCVD TLK: CPT | Performed by: INTERNAL MEDICINE

## 2023-10-05 PROCEDURE — G8427 DOCREV CUR MEDS BY ELIG CLIN: HCPCS | Performed by: INTERNAL MEDICINE

## 2023-10-05 RX ORDER — HYDROXYUREA 500 MG/1
500 CAPSULE ORAL DAILY
Qty: 30 CAPSULE | Refills: 3 | Status: SHIPPED | OUTPATIENT
Start: 2023-10-05 | End: 2023-11-04

## 2023-10-05 NOTE — PROGRESS NOTES
Patient Name: Joel Olivas  Patient : 1955  Patient MRN: [id number]     Primary Oncologist: Neo Baires MD  Referring Provider: Latoya Wells     Date of Service: 10/5/2023      Chief Complaint:   Chief Complaint   Patient presents with    Follow-up     Patient Active Problem List:       Leukocytosis       Thrombocytosis    HPI:   Ms. Porter Ho is a 79-year-old very pleasant female with medical history significant for hypertension, hyperlipidemia, diabetes mellitus, history of HPV infection, initially referred to me on 2014, for evaluation of mild leukocytosis. She was found to have mild leukocytosis on routine blood tests done on 2014, by her primary care physician. She is a chronic smoker and she smokes about 10 cigarettes a day since she was 15. Laboratory workup on 2014, showed slight elevation in white blood cell count (13). Her hemoglobin, hematocrit, platelet count and LDH were normal. There was no significant immunophenotypic abnormalities in flow cytometry. JAK2  and bcr/abl studies were negative. She has been followed periodically since then. She missed follow up with me since 2019 until 2022. Laboratory work ups done on 22 showed leukocytosis (WBC 16.1), thrombocytosis (platelet 091) and flow cytometry showed monoclonal B-cell population (~450 cells/cumm). The immunophenotype of the clonal cells is non-specific. DDX includes peripheral blood involvement by a B-cell lymphom and monoclonal B-cell lymphocytosis. The rests of the blood tests, including BCR-ABL1, JAK2 V617F, JAK2 exon 12-13, MPL, CALR, MARIKA, ESR and LDH, were within normal range. CT scan of the neck, chest, abdomen and pelvis done on 2022 showed no significant pathology in her neck. No splenomegaly or overt lymphadenopathy. There are scattered mildly prominent left internal mammary, upper abdominal and left common iliac lymph nodes.   There

## 2023-10-05 NOTE — PROGRESS NOTES
MA Rooming Questions  Patient: Nathalie Allen  MRN: [id number]    Date: 10/5/2023        1. Do you have any new issues?   no         2. Do you need any refills on medications?    no    3. Have you had any imaging done since your last visit?   no    4. Have you been hospitalized or seen in the emergency room since your last visit here?   no    5. Did the patient have a depression screening completed today?  No    No data recorded     PHQ-9 Given to (if applicable):               PHQ-9 Score (if applicable):                     [] Positive     []  Negative              Does question #9 need addressed (if applicable)                     [] Yes    []  No               Josefa Garcia MA

## 2023-11-28 RX ORDER — LANOLIN ALCOHOL/MO/W.PET/CERES
1000 CREAM (GRAM) TOPICAL DAILY
Qty: 30 TABLET | Refills: 3 | OUTPATIENT
Start: 2023-11-28

## 2023-11-30 ENCOUNTER — TELEPHONE (OUTPATIENT)
Dept: ONCOLOGY | Age: 68
End: 2023-11-30

## 2023-11-30 NOTE — TELEPHONE ENCOUNTER
Left message with CT scan time and prep to be done on 12/5/23 [address] [facility] arrival at 9 AM. Informed to all with any questions.

## 2023-12-05 ENCOUNTER — HOSPITAL ENCOUNTER (OUTPATIENT)
Dept: CT IMAGING | Age: 68
Discharge: HOME | End: 2023-12-05
Attending: INTERNAL MEDICINE
Payer: MEDICARE

## 2023-12-05 DIAGNOSIS — D72.820 MONOCLONAL B-CELL LYMPHOCYTOSIS: ICD-10-CM

## 2023-12-05 DIAGNOSIS — R91.1 LUNG NODULE, SOLITARY: ICD-10-CM

## 2023-12-05 DIAGNOSIS — D75.839 THROMBOCYTOSIS: ICD-10-CM

## 2023-12-05 DIAGNOSIS — D72.828 OTHER ELEVATED WHITE BLOOD CELL (WBC) COUNT: ICD-10-CM

## 2023-12-05 PROCEDURE — 71250 CT THORAX DX C-: CPT

## 2023-12-21 ENCOUNTER — HOSPITAL ENCOUNTER (OUTPATIENT)
Dept: INFUSION THERAPY | Age: 68
Discharge: HOME | End: 2023-12-21
Payer: MEDICARE

## 2023-12-21 DIAGNOSIS — R93.89 THICKENED ENDOMETRIUM: ICD-10-CM

## 2023-12-21 DIAGNOSIS — R71.8 OTHER ABNORMALITY OF RED BLOOD CELLS: ICD-10-CM

## 2023-12-21 DIAGNOSIS — R91.1 LUNG NODULE, SOLITARY: ICD-10-CM

## 2023-12-21 DIAGNOSIS — D72.828 OTHER ELEVATED WHITE BLOOD CELL (WBC) COUNT: ICD-10-CM

## 2023-12-21 DIAGNOSIS — D72.820 MONOCLONAL B-CELL LYMPHOCYTOSIS: ICD-10-CM

## 2023-12-21 DIAGNOSIS — D75.839 THROMBOCYTOSIS: ICD-10-CM

## 2023-12-21 LAB
ALBUMIN SERPL-MCNC: 4.4 GM/DL (ref 3.4–5)
ALP BLD-CCNC: 78 IU/L (ref 40–129)
ALT SERPL-CCNC: 7 U/L (ref 10–40)
ANION GAP SERPL CALCULATED.3IONS-SCNC: 16 MMOL/L (ref 7–16)
AST SERPL-CCNC: 12 IU/L (ref 15–37)
BASOPHILS ABSOLUTE: 0 K/CU MM
BASOPHILS RELATIVE PERCENT: 0.2 % (ref 0–1)
BILIRUB SERPL-MCNC: 0.3 MG/DL (ref 0–1)
BUN SERPL-MCNC: 12 MG/DL (ref 6–23)
CALCIUM SERPL-MCNC: 9.5 MG/DL (ref 8.3–10.6)
CHLORIDE BLD-SCNC: 98 MMOL/L (ref 99–110)
CO2: 24 MMOL/L (ref 21–32)
CREAT SERPL-MCNC: 0.8 MG/DL (ref 0.6–1.1)
DIFFERENTIAL TYPE: ABNORMAL
EOSINOPHILS ABSOLUTE: 0.1 K/CU MM
EOSINOPHILS RELATIVE PERCENT: 1.1 % (ref 0–3)
FERRITIN: 151 NG/ML (ref 15–150)
FOLATE SERPL-MCNC: 11.1 NG/ML (ref 3.1–17.5)
GFR SERPL CREATININE-BSD FRML MDRD: >60 ML/MIN/1.73M2
GLUCOSE SERPL-MCNC: 191 MG/DL (ref 70–99)
HCT VFR BLD CALC: 44 % (ref 37–47)
HEMOGLOBIN: 14.4 GM/DL (ref 12.5–16)
IRON: 81 UG/DL (ref 37–145)
LACTATE DEHYDROGENASE: 75 IU/L (ref 120–246)
LYMPHOCYTES ABSOLUTE: 4.4 K/CU MM
LYMPHOCYTES RELATIVE PERCENT: 49.7 % (ref 24–44)
MCH RBC QN AUTO: 32.4 PG (ref 27–31)
MCHC RBC AUTO-ENTMCNC: 32.7 % (ref 32–36)
MCV RBC AUTO: 98.9 FL (ref 78–100)
MONOCYTES ABSOLUTE: 0.7 K/CU MM
MONOCYTES RELATIVE PERCENT: 8.4 % (ref 0–4)
PCT TRANSFERRIN: 27 % (ref 10–44)
PDW BLD-RTO: 19.6 % (ref 11.7–14.9)
PLATELET # BLD: 362 K/CU MM (ref 140–440)
PMV BLD AUTO: 9.6 FL (ref 7.5–11.1)
POTASSIUM SERPL-SCNC: 4.5 MMOL/L (ref 3.5–5.1)
RBC # BLD: 4.45 M/CU MM (ref 4.2–5.4)
RETICULOCYTE COUNT PCT: 1.6 % (ref 0.2–2.2)
SEGMENTED NEUTROPHILS ABSOLUTE COUNT: 3.6 K/CU MM
SEGMENTED NEUTROPHILS RELATIVE PERCENT: 40.6 % (ref 36–66)
SODIUM BLD-SCNC: 138 MMOL/L (ref 135–145)
TOTAL IRON BINDING CAPACITY: 296 UG/DL (ref 250–450)
TOTAL PROTEIN: 7.3 GM/DL (ref 6.4–8.2)
UNSATURATED IRON BINDING CAPACITY: 215 UG/DL (ref 110–370)
VITAMIN B-12: 657.1 PG/ML (ref 211–911)
WBC # BLD: 8.8 K/CU MM (ref 4–10.5)

## 2023-12-21 PROCEDURE — 85025 COMPLETE CBC W/AUTO DIFF WBC: CPT

## 2023-12-21 PROCEDURE — 99211 OFF/OP EST MAY X REQ PHY/QHP: CPT

## 2023-12-21 PROCEDURE — 85045 AUTOMATED RETICULOCYTE COUNT: CPT

## 2023-12-21 PROCEDURE — 83540 ASSAY OF IRON: CPT

## 2023-12-21 PROCEDURE — 82728 ASSAY OF FERRITIN: CPT

## 2023-12-21 PROCEDURE — 36415 COLL VENOUS BLD VENIPUNCTURE: CPT

## 2023-12-21 PROCEDURE — 80053 COMPREHEN METABOLIC PANEL: CPT

## 2023-12-21 PROCEDURE — 82607 VITAMIN B-12: CPT

## 2023-12-21 PROCEDURE — 82746 ASSAY OF FOLIC ACID SERUM: CPT

## 2023-12-21 PROCEDURE — 83615 LACTATE (LD) (LDH) ENZYME: CPT

## 2023-12-21 PROCEDURE — 83550 IRON BINDING TEST: CPT

## 2023-12-28 ENCOUNTER — TELEPHONE (OUTPATIENT)
Dept: ONCOLOGY | Age: 68
End: 2023-12-28

## 2024-01-02 RX ORDER — LANOLIN ALCOHOL/MO/W.PET/CERES
1000 CREAM (GRAM) TOPICAL DAILY
Qty: 30 TABLET | Refills: 3 | Status: SHIPPED | OUTPATIENT
Start: 2024-01-02 | End: 2024-01-02 | Stop reason: SDUPTHER

## 2024-01-02 RX ORDER — LANOLIN ALCOHOL/MO/W.PET/CERES
1000 CREAM (GRAM) TOPICAL DAILY
Qty: 30 TABLET | Refills: 3 | Status: SHIPPED | OUTPATIENT
Start: 2024-01-02

## 2024-01-02 RX ORDER — HYDROXYUREA 500 MG/1
500 CAPSULE ORAL DAILY
Qty: 30 CAPSULE | Refills: 3 | Status: SHIPPED | OUTPATIENT
Start: 2024-01-02

## 2024-01-02 NOTE — TELEPHONE ENCOUNTER
Patient left message requesting a refill for b12  to be sent to pharmacy. Pending RX to Provider to be sent to pharmacy.

## 2024-01-13 ENCOUNTER — HOSPITAL ENCOUNTER (OUTPATIENT)
Dept: PET IMAGING | Age: 69
Discharge: HOME | End: 2024-01-13
Attending: INTERNAL MEDICINE
Payer: MEDICARE

## 2024-01-13 DIAGNOSIS — R91.1 LUNG NODULE, SOLITARY: ICD-10-CM

## 2024-01-13 DIAGNOSIS — D72.828 OTHER ELEVATED WHITE BLOOD CELL (WBC) COUNT: ICD-10-CM

## 2024-01-13 PROCEDURE — 2580000003 HC RX 258: Performed by: INTERNAL MEDICINE

## 2024-01-13 PROCEDURE — 3430000000 HC RX DIAGNOSTIC RADIOPHARMACEUTICAL: Performed by: INTERNAL MEDICINE

## 2024-01-13 PROCEDURE — 78815 PET IMAGE W/CT SKULL-THIGH: CPT

## 2024-01-13 PROCEDURE — A9609 HC RX DIAGNOSTIC RADIOPHARMACEUTICAL: HCPCS | Performed by: INTERNAL MEDICINE

## 2024-01-13 RX ORDER — FLUDEOXYGLUCOSE F 18 200 MCI/ML
15.26 INJECTION, SOLUTION INTRAVENOUS
Status: COMPLETED | OUTPATIENT
Start: 2024-01-13 | End: 2024-01-13

## 2024-01-13 RX ORDER — SODIUM CHLORIDE 0.9 % (FLUSH) 0.9 %
10 SYRINGE (ML) INJECTION PRN
Status: COMPLETED | OUTPATIENT
Start: 2024-01-13 | End: 2024-01-13

## 2024-01-13 RX ADMIN — FLUDEOXYGLUCOSE F 18 15.26 MILLICURIE: 200 INJECTION, SOLUTION INTRAVENOUS at 10:35

## 2024-01-13 RX ADMIN — SODIUM CHLORIDE, PRESERVATIVE FREE 10 ML: 5 INJECTION INTRAVENOUS at 10:35

## 2024-01-14 NOTE — PROGRESS NOTES
Patient Name: Lin Bates  Patient : 1955  Patient MRN: [id number]     Primary Oncologist: Burt Birch MD  Referring Provider: Dorothy Terrazas PA     Date of Service: 2024      Chief Complaint:   Chief Complaint   Patient presents with    Follow-up     Patient Active Problem List:       Leukocytosis       Thrombocytosis    HPI:   Ms. Bates is a 68-year-old very pleasant female with medical history significant for hypertension, hyperlipidemia, diabetes mellitus, history of HPV infection, initially referred to me on 2014, for evaluation of mild leukocytosis.       She was found to have mild leukocytosis on routine blood tests done on 2014, by her primary care physician. She is a chronic smoker and she smokes about 10 cigarettes a day since she was 14.     Laboratory workup on 2014, showed slight elevation in white blood cell count (13). Her hemoglobin, hematocrit, platelet count and LDH were normal. There was no significant immunophenotypic abnormalities in flow cytometry. JAK2  and bcr/abl studies were negative.     She has been followed periodically since then. She missed follow up with me since 2019 until 2022.     Laboratory work ups done on 22 showed leukocytosis (WBC 16.1), thrombocytosis (platelet 651) and flow cytometry showed monoclonal B-cell population (~450 cells/cumm). The immunophenotype of the clonal cells is non-specific. DDX includes peripheral blood involvement by a B-cell lymphoma and monoclonal B-cell lymphocytosis.     The rests of the blood tests, including BCR-ABL1, JAK2 V617F, JAK2 exon 12-13, MPL, CALR, MARIKA, ESR and LDH, were within normal range.      CT scan of the neck, chest, abdomen and pelvis done on 2022 showed no significant pathology in her neck.  No splenomegaly or overt lymphadenopathy.  There are scattered mildly prominent left internal mammary, upper abdominal and left common iliac lymph nodes.

## 2024-01-16 ENCOUNTER — OFFICE VISIT (OUTPATIENT)
Dept: ONCOLOGY | Age: 69
End: 2024-01-16
Payer: MEDICARE

## 2024-01-16 ENCOUNTER — HOSPITAL ENCOUNTER (OUTPATIENT)
Dept: INFUSION THERAPY | Age: 69
Discharge: HOME | End: 2024-01-16
Payer: MEDICARE

## 2024-01-16 VITALS
HEART RATE: 84 BPM | OXYGEN SATURATION: 94 % | SYSTOLIC BLOOD PRESSURE: 143 MMHG | TEMPERATURE: 97.2 F | RESPIRATION RATE: 18 BRPM | BODY MASS INDEX: 31.73 KG/M2 | HEIGHT: 60 IN | DIASTOLIC BLOOD PRESSURE: 60 MMHG | WEIGHT: 161.6 LBS

## 2024-01-16 DIAGNOSIS — D72.820 MONOCLONAL B-CELL LYMPHOCYTOSIS: ICD-10-CM

## 2024-01-16 DIAGNOSIS — R91.1 LUNG NODULE, SOLITARY: Primary | ICD-10-CM

## 2024-01-16 DIAGNOSIS — D75.839 THROMBOCYTOSIS: ICD-10-CM

## 2024-01-16 DIAGNOSIS — D72.828 OTHER ELEVATED WHITE BLOOD CELL (WBC) COUNT: ICD-10-CM

## 2024-01-16 PROCEDURE — 1090F PRES/ABSN URINE INCON ASSESS: CPT | Performed by: INTERNAL MEDICINE

## 2024-01-16 PROCEDURE — 99211 OFF/OP EST MAY X REQ PHY/QHP: CPT

## 2024-01-16 PROCEDURE — 3017F COLORECTAL CA SCREEN DOC REV: CPT | Performed by: INTERNAL MEDICINE

## 2024-01-16 PROCEDURE — G8399 PT W/DXA RESULTS DOCUMENT: HCPCS | Performed by: INTERNAL MEDICINE

## 2024-01-16 PROCEDURE — G8427 DOCREV CUR MEDS BY ELIG CLIN: HCPCS | Performed by: INTERNAL MEDICINE

## 2024-01-16 PROCEDURE — G8484 FLU IMMUNIZE NO ADMIN: HCPCS | Performed by: INTERNAL MEDICINE

## 2024-01-16 PROCEDURE — 1123F ACP DISCUSS/DSCN MKR DOCD: CPT | Performed by: INTERNAL MEDICINE

## 2024-01-16 PROCEDURE — 4004F PT TOBACCO SCREEN RCVD TLK: CPT | Performed by: INTERNAL MEDICINE

## 2024-01-16 PROCEDURE — G8417 CALC BMI ABV UP PARAM F/U: HCPCS | Performed by: INTERNAL MEDICINE

## 2024-01-16 PROCEDURE — 99214 OFFICE O/P EST MOD 30 MIN: CPT | Performed by: INTERNAL MEDICINE

## 2024-01-16 RX ORDER — LANOLIN ALCOHOL/MO/W.PET/CERES
1000 CREAM (GRAM) TOPICAL DAILY
Qty: 30 TABLET | Refills: 3 | Status: SHIPPED | OUTPATIENT
Start: 2024-01-16

## 2024-01-16 NOTE — PROGRESS NOTES
MA Rooming Questions  Patient: Lin Bates  MRN: [id number]    Date: 1/16/2024        1. Do you have any new issues?   no         2. Do you need any refills on medications?    yes - B 12    3. Have you had any imaging done since your last visit?   yes - pet scan 1/16    4. Have you been hospitalized or seen in the emergency room since your last visit here?   no    5. Did the patient have a depression screening completed today? No    No data recorded     PHQ-9 Given to (if applicable):               PHQ-9 Score (if applicable):                     [] Positive     []  Negative              Does question #9 need addressed (if applicable)                     [] Yes    []  No               Cristina Iglesias MA

## 2024-01-19 ENCOUNTER — TELEPHONE (OUTPATIENT)
Dept: ONCOLOGY | Age: 69
End: 2024-01-19

## 2024-01-19 NOTE — TELEPHONE ENCOUNTER
Patient called given time and prep for biopsy to be done on 1/30/24 [facility] arrival at 6:30 AM.

## 2024-01-22 NOTE — PROGRESS NOTES
IR Procedure at [facility]:  Spoke with patient and she will arrive at 0630 at [facility] on 1/30/2024 for her procedure at 0800. Also went over below instructions and told patient to take her medications as scheduled except for her aspirin, last dose will be on 1/24/2024.    NPO at Midnight    2.   Follow your directions as prescribed by the doctor for your procedure and medications.  3.   Consult your provider as to when to stop blood thinner  4.   Do not take any pain medication within 6 hours of your procedure  5.   Do not drink any alcoholic beverages or use any street drugs 24 hours before procedure.  6.   Please wear simple, loose fitting clothing to the hospital.  Do not bring valuables (money,             credit cards, checkbooks, etc.)     7.   If you  have a Living Will and Durable Power of  for Healthcare, please bring in a copy.  8.   Please bring picture ID,  insurance card, paperwork from the doctors office            (H & P, Consent,  & card for implantable devices).  9.   Report to the information desk on the ground floor.  10. Take a shower the night before or morning of your procedure, do not apply any lotion, oil or powder.  11. If you are going to be sedated for the procedure, you will need a responsible adult to drive you home.

## 2024-01-30 ENCOUNTER — HOSPITAL ENCOUNTER (OUTPATIENT)
Dept: CT IMAGING | Age: 69
Discharge: HOME | End: 2024-01-30
Payer: MEDICARE

## 2024-01-30 ENCOUNTER — HOSPITAL ENCOUNTER (OUTPATIENT)
Dept: GENERAL RADIOLOGY | Age: 69
Discharge: HOME | End: 2024-01-30
Payer: MEDICARE

## 2024-01-30 VITALS
DIASTOLIC BLOOD PRESSURE: 68 MMHG | TEMPERATURE: 98 F | HEART RATE: 79 BPM | RESPIRATION RATE: 19 BRPM | SYSTOLIC BLOOD PRESSURE: 118 MMHG | OXYGEN SATURATION: 97 %

## 2024-01-30 DIAGNOSIS — R91.1 LUNG NODULE: ICD-10-CM

## 2024-01-30 LAB
APTT: 30.6 SECONDS (ref 25.1–37.1)
HCT VFR BLD CALC: 41.1 % (ref 37–47)
HEMOGLOBIN: 13.6 GM/DL (ref 12.5–16)
INR BLD: 1 INDEX
MCH RBC QN AUTO: 34 PG (ref 27–31)
MCHC RBC AUTO-ENTMCNC: 33.1 % (ref 32–36)
MCV RBC AUTO: 102.8 FL (ref 78–100)
PDW BLD-RTO: 15.7 % (ref 11.7–14.9)
PLATELET # BLD: 414 K/CU MM (ref 140–440)
PMV BLD AUTO: 10.2 FL (ref 7.5–11.1)
PROTHROMBIN TIME: 12.8 SECONDS (ref 11.7–14.5)
RBC # BLD: 4 M/CU MM (ref 4.2–5.4)
WBC # BLD: 7.9 K/CU MM (ref 4–10.5)

## 2024-01-30 PROCEDURE — 85730 THROMBOPLASTIN TIME PARTIAL: CPT

## 2024-01-30 PROCEDURE — 88333 PATH CONSLTJ SURG CYTO XM 1: CPT

## 2024-01-30 PROCEDURE — 32408 CORE NDL BX LNG/MED PERQ: CPT

## 2024-01-30 PROCEDURE — 85610 PROTHROMBIN TIME: CPT

## 2024-01-30 PROCEDURE — 88305 TISSUE EXAM BY PATHOLOGIST: CPT

## 2024-01-30 PROCEDURE — 2500000003 HC RX 250 WO HCPCS: Performed by: RADIOLOGY

## 2024-01-30 PROCEDURE — 85027 COMPLETE CBC AUTOMATED: CPT

## 2024-01-30 PROCEDURE — 71045 X-RAY EXAM CHEST 1 VIEW: CPT

## 2024-01-30 PROCEDURE — 88342 IMHCHEM/IMCYTCHM 1ST ANTB: CPT

## 2024-01-30 PROCEDURE — 88341 IMHCHEM/IMCYTCHM EA ADD ANTB: CPT

## 2024-01-30 RX ORDER — LIDOCAINE HYDROCHLORIDE 10 MG/ML
INJECTION, SOLUTION EPIDURAL; INFILTRATION; INTRACAUDAL; PERINEURAL PRN
Status: COMPLETED | OUTPATIENT
Start: 2024-01-30 | End: 2024-01-30

## 2024-01-30 RX ORDER — SODIUM CHLORIDE 0.9 % (FLUSH) 0.9 %
10 SYRINGE (ML) INJECTION PRN
Status: DISCONTINUED | OUTPATIENT
Start: 2024-01-30 | End: 2024-01-31 | Stop reason: HOSPADM

## 2024-01-30 RX ADMIN — LIDOCAINE HYDROCHLORIDE 10 ML: 10 INJECTION, SOLUTION EPIDURAL; INFILTRATION; INTRACAUDAL; PERINEURAL at 09:09

## 2024-01-30 NOTE — BRIEF OP NOTE
Brief Postoperative Note      Patient: Lin Bates  YOB: 1955  MRN: [id number]    Date of Procedure: 1/30/2024    * No Diagnosis Codes entered *LEFT LUNG MASS    Post-Op Diagnosis: Same       * No procedures listed *CT GUIDED BIOPSY LEFT LUNG MASS    * No surgeons listed *ROBERT COUCH DO    Assistant:  * No surgical staff found *    Anesthesia: * No anesthesia type entered *LOCAL    Estimated Blood Loss (mL): Minimal    Complications: None    Specimens:   * No specimens in log *    Implants:  * No implants in log *      Drains: * No LDAs found *    Findings: 4, 20 GA CORES.  1, 19 GA ASPIRATION LEFT LUNG MASS COLLECTED AND SENT TO LAB ON SLIDES AND IN FORMALIN.  DRESSING APPLIED.        Electronically signed by Robert Couch DO on 1/30/2024 at 9:33 AM

## 2024-01-30 NOTE — PROGRESS NOTES
1040: Discharge instructions reviewed, verbalized understanding. Patient's uncle at bedside. X ray reviewed by Dr. Couch.  Ok to discharge.

## 2024-01-30 NOTE — PROGRESS NOTES
IR PREPROCEDURE NOTE    1/30/24    CT GUIDED BIOPSY OF LEFT LUNG MASS EXPLAINED TO PT INCLUDING RISKS, BENEFITS AND ALTERNATIVES AND PT ELECTS TO PROCEED WITH BIOPSY.    ALEXI JACINTO DO

## 2024-02-01 ENCOUNTER — HOSPITAL ENCOUNTER (OUTPATIENT)
Dept: RADIATION ONCOLOGY | Age: 69
Discharge: HOME | End: 2024-02-01
Payer: MEDICARE

## 2024-02-01 ENCOUNTER — HOSPITAL ENCOUNTER (OUTPATIENT)
Dept: INFUSION THERAPY | Age: 69
Discharge: HOME | End: 2024-02-01
Payer: MEDICARE

## 2024-02-01 ENCOUNTER — OFFICE VISIT (OUTPATIENT)
Dept: ONCOLOGY | Age: 69
End: 2024-02-01
Payer: MEDICARE

## 2024-02-01 ENCOUNTER — CLINICAL DOCUMENTATION (OUTPATIENT)
Dept: ONCOLOGY | Age: 69
End: 2024-02-01

## 2024-02-01 VITALS
BODY MASS INDEX: 30.98 KG/M2 | HEIGHT: 60 IN | TEMPERATURE: 97.1 F | SYSTOLIC BLOOD PRESSURE: 123 MMHG | OXYGEN SATURATION: 96 % | DIASTOLIC BLOOD PRESSURE: 58 MMHG | WEIGHT: 157.8 LBS | HEART RATE: 81 BPM

## 2024-02-01 DIAGNOSIS — R91.1 LUNG NODULE, SOLITARY: ICD-10-CM

## 2024-02-01 DIAGNOSIS — Z11.59 NEED FOR HEPATITIS B SCREENING TEST: ICD-10-CM

## 2024-02-01 DIAGNOSIS — Z11.59 NEED FOR HEPATITIS B SCREENING TEST: Primary | ICD-10-CM

## 2024-02-01 DIAGNOSIS — C34.32 SMALL CELL LUNG CANCER, LEFT LOWER LOBE (HCC): Primary | ICD-10-CM

## 2024-02-01 PROCEDURE — 1036F TOBACCO NON-USER: CPT | Performed by: INTERNAL MEDICINE

## 2024-02-01 PROCEDURE — G8484 FLU IMMUNIZE NO ADMIN: HCPCS | Performed by: INTERNAL MEDICINE

## 2024-02-01 PROCEDURE — 99215 OFFICE O/P EST HI 40 MIN: CPT | Performed by: INTERNAL MEDICINE

## 2024-02-01 PROCEDURE — G8427 DOCREV CUR MEDS BY ELIG CLIN: HCPCS | Performed by: INTERNAL MEDICINE

## 2024-02-01 PROCEDURE — 3017F COLORECTAL CA SCREEN DOC REV: CPT | Performed by: INTERNAL MEDICINE

## 2024-02-01 PROCEDURE — G8417 CALC BMI ABV UP PARAM F/U: HCPCS | Performed by: INTERNAL MEDICINE

## 2024-02-01 PROCEDURE — G8399 PT W/DXA RESULTS DOCUMENT: HCPCS | Performed by: INTERNAL MEDICINE

## 2024-02-01 PROCEDURE — 99213 OFFICE O/P EST LOW 20 MIN: CPT

## 2024-02-01 PROCEDURE — 1124F ACP DISCUSS-NO DSCNMKR DOCD: CPT | Performed by: INTERNAL MEDICINE

## 2024-02-01 PROCEDURE — 99205 OFFICE O/P NEW HI 60 MIN: CPT | Performed by: RADIOLOGY

## 2024-02-01 PROCEDURE — 1090F PRES/ABSN URINE INCON ASSESS: CPT | Performed by: INTERNAL MEDICINE

## 2024-02-01 RX ORDER — ONDANSETRON 2 MG/ML
8 INJECTION INTRAMUSCULAR; INTRAVENOUS
OUTPATIENT
Start: 2024-02-12

## 2024-02-01 RX ORDER — ACETAMINOPHEN 325 MG/1
650 TABLET ORAL
OUTPATIENT
Start: 2024-02-13

## 2024-02-01 RX ORDER — MEPERIDINE HYDROCHLORIDE 50 MG/ML
12.5 INJECTION INTRAMUSCULAR; INTRAVENOUS; SUBCUTANEOUS PRN
OUTPATIENT
Start: 2024-02-14

## 2024-02-01 RX ORDER — FAMOTIDINE 10 MG/ML
20 INJECTION, SOLUTION INTRAVENOUS
OUTPATIENT
Start: 2024-02-14

## 2024-02-01 RX ORDER — FAMOTIDINE 10 MG/ML
20 INJECTION, SOLUTION INTRAVENOUS
OUTPATIENT
Start: 2024-02-12

## 2024-02-01 RX ORDER — SODIUM CHLORIDE 9 MG/ML
5-250 INJECTION, SOLUTION INTRAVENOUS PRN
OUTPATIENT
Start: 2024-02-14

## 2024-02-01 RX ORDER — SODIUM CHLORIDE 9 MG/ML
5-250 INJECTION, SOLUTION INTRAVENOUS PRN
OUTPATIENT
Start: 2024-02-13

## 2024-02-01 RX ORDER — HEPARIN SODIUM (PORCINE) LOCK FLUSH IV SOLN 100 UNIT/ML 100 UNIT/ML
500 SOLUTION INTRAVENOUS PRN
OUTPATIENT
Start: 2024-02-13

## 2024-02-01 RX ORDER — SODIUM CHLORIDE 9 MG/ML
INJECTION, SOLUTION INTRAVENOUS CONTINUOUS
OUTPATIENT
Start: 2024-02-12

## 2024-02-01 RX ORDER — ALBUTEROL SULFATE 90 UG/1
4 AEROSOL, METERED RESPIRATORY (INHALATION) PRN
OUTPATIENT
Start: 2024-02-14

## 2024-02-01 RX ORDER — MEPERIDINE HYDROCHLORIDE 50 MG/ML
12.5 INJECTION INTRAMUSCULAR; INTRAVENOUS; SUBCUTANEOUS PRN
OUTPATIENT
Start: 2024-02-13

## 2024-02-01 RX ORDER — DIPHENHYDRAMINE HYDROCHLORIDE 50 MG/ML
50 INJECTION INTRAMUSCULAR; INTRAVENOUS
OUTPATIENT
Start: 2024-02-13

## 2024-02-01 RX ORDER — SODIUM CHLORIDE 9 MG/ML
5-250 INJECTION, SOLUTION INTRAVENOUS PRN
OUTPATIENT
Start: 2024-02-12

## 2024-02-01 RX ORDER — ALBUTEROL SULFATE 90 UG/1
4 AEROSOL, METERED RESPIRATORY (INHALATION) PRN
OUTPATIENT
Start: 2024-02-13

## 2024-02-01 RX ORDER — PALONOSETRON 0.05 MG/ML
0.25 INJECTION, SOLUTION INTRAVENOUS ONCE
OUTPATIENT
Start: 2024-02-12

## 2024-02-01 RX ORDER — HEPARIN SODIUM (PORCINE) LOCK FLUSH IV SOLN 100 UNIT/ML 100 UNIT/ML
500 SOLUTION INTRAVENOUS PRN
OUTPATIENT
Start: 2024-02-14

## 2024-02-01 RX ORDER — ACETAMINOPHEN 325 MG/1
650 TABLET ORAL
OUTPATIENT
Start: 2024-02-12

## 2024-02-01 RX ORDER — ALBUTEROL SULFATE 90 UG/1
4 AEROSOL, METERED RESPIRATORY (INHALATION) PRN
OUTPATIENT
Start: 2024-02-12

## 2024-02-01 RX ORDER — SODIUM CHLORIDE 0.9 % (FLUSH) 0.9 %
5-40 SYRINGE (ML) INJECTION PRN
OUTPATIENT
Start: 2024-02-14

## 2024-02-01 RX ORDER — SODIUM CHLORIDE 0.9 % (FLUSH) 0.9 %
5-40 SYRINGE (ML) INJECTION PRN
OUTPATIENT
Start: 2024-02-13

## 2024-02-01 RX ORDER — ONDANSETRON 2 MG/ML
8 INJECTION INTRAMUSCULAR; INTRAVENOUS
OUTPATIENT
Start: 2024-02-14

## 2024-02-01 RX ORDER — MEPERIDINE HYDROCHLORIDE 50 MG/ML
12.5 INJECTION INTRAMUSCULAR; INTRAVENOUS; SUBCUTANEOUS PRN
OUTPATIENT
Start: 2024-02-12

## 2024-02-01 RX ORDER — FAMOTIDINE 10 MG/ML
20 INJECTION, SOLUTION INTRAVENOUS
OUTPATIENT
Start: 2024-02-13

## 2024-02-01 RX ORDER — ACETAMINOPHEN 325 MG/1
650 TABLET ORAL
OUTPATIENT
Start: 2024-02-14

## 2024-02-01 RX ORDER — SODIUM CHLORIDE 9 MG/ML
INJECTION, SOLUTION INTRAVENOUS CONTINUOUS
OUTPATIENT
Start: 2024-02-14

## 2024-02-01 RX ORDER — DIPHENHYDRAMINE HYDROCHLORIDE 50 MG/ML
50 INJECTION INTRAMUSCULAR; INTRAVENOUS
OUTPATIENT
Start: 2024-02-14

## 2024-02-01 RX ORDER — EPINEPHRINE 1 MG/ML
0.3 INJECTION, SOLUTION, CONCENTRATE INTRAVENOUS PRN
OUTPATIENT
Start: 2024-02-13

## 2024-02-01 RX ORDER — HEPARIN SODIUM (PORCINE) LOCK FLUSH IV SOLN 100 UNIT/ML 100 UNIT/ML
500 SOLUTION INTRAVENOUS PRN
OUTPATIENT
Start: 2024-02-12

## 2024-02-01 RX ORDER — EPINEPHRINE 1 MG/ML
0.3 INJECTION, SOLUTION, CONCENTRATE INTRAVENOUS PRN
OUTPATIENT
Start: 2024-02-14

## 2024-02-01 RX ORDER — DIPHENHYDRAMINE HYDROCHLORIDE 50 MG/ML
50 INJECTION INTRAMUSCULAR; INTRAVENOUS
OUTPATIENT
Start: 2024-02-12

## 2024-02-01 RX ORDER — SODIUM CHLORIDE 0.9 % (FLUSH) 0.9 %
5-40 SYRINGE (ML) INJECTION PRN
OUTPATIENT
Start: 2024-02-12

## 2024-02-01 RX ORDER — SODIUM CHLORIDE 9 MG/ML
INJECTION, SOLUTION INTRAVENOUS CONTINUOUS
OUTPATIENT
Start: 2024-02-13

## 2024-02-01 RX ORDER — EPINEPHRINE 1 MG/ML
0.3 INJECTION, SOLUTION, CONCENTRATE INTRAVENOUS PRN
OUTPATIENT
Start: 2024-02-12

## 2024-02-01 RX ORDER — ONDANSETRON 2 MG/ML
8 INJECTION INTRAMUSCULAR; INTRAVENOUS
OUTPATIENT
Start: 2024-02-13

## 2024-02-01 NOTE — PROGRESS NOTES
Lin Bates  2/1/2024    CONSULT / LEFT LUNG    There were no vitals filed for this visit.          Wt Readings from Last 3 Encounters:   02/01/24 71.6 kg (157 lb 12.8 oz)   01/16/24 73.3 kg (161 lb 9.6 oz)   12/21/23 75.8 kg (167 lb)        Denies c/o pain  Denies Need for Intervention    Fall Risk:    Fall risk  Impaired/Unsteady Gait, Assist with Transfer/Ambulation, Provide Wheelchair PRN, Educate on Assistive Devices, and Re-Evaluate Weekly    Nutritional Alteration:  None  No Difficulties Swallowing  Nutritional Supplements Needed    Mediport: no    Pacemaker/ICD: no    Implants: no    Previous XRT: no    Assessment: Pt and spouse here to discuss radiation treatment options with Dr. Guo      Past Medical History:   Diagnosis Date    Hyperlipidemia     Hypertension     Thickened endometrium     Type 2 diabetes mellitus without complication (HCC)        Past Surgical History:   Procedure Laterality Date    CT NEEDLE BIOPSY LUNG PERCUTANEOUS  1/30/2024    CT NEEDLE BIOPSY LUNG PERCUTANEOUS 1/30/2024 [facility] CT SCAN    DENTAL SURGERY      1970's    DILATION AND CURETTAGE OF UTERUS N/A 3/22/2023    DILATATION AND CURETTAGE HYSTEROSCOPY performed by Zac Pretty MD at [facility] OR       No Known Allergies       Current Outpatient Medications:     vitamin B-12 (CYANOCOBALAMIN) 1000 MCG tablet, Take 1 tablet by mouth daily, Disp: 30 tablet, Rfl: 3    hydroxyurea (HYDREA) 500 MG chemo capsule, TAKE 1 CAPSULE BY MOUTH DAILY, Disp: 30 capsule, Rfl: 3    JARDIANCE 25 MG tablet, , Disp: , Rfl:     meclizine (ANTIVERT) 25 MG tablet, , Disp: , Rfl:     predniSONE (DELTASONE) 10 MG tablet, , Disp: , Rfl:     ferrous sulfate (IRON 325) 325 (65 Fe) MG tablet, Take 1 tablet by mouth daily (with breakfast), Disp: , Rfl:     aspirin 81 MG EC tablet, Take 1 tablet by mouth daily, Disp: , Rfl:     metFORMIN (GLUCOPHAGE) 1000 MG tablet, Take 1 tablet by mouth 2 times daily (with meals), Disp: , Rfl:     lisinopril

## 2024-02-01 NOTE — PROGRESS NOTES
MA Rooming Questions  Patient: Lin Bates  MRN: [id number]    Date: 2/1/2024        1. Do you have any new issues?   no         2. Do you need any refills on medications?    no    3. Have you had any imaging done since your last visit?   yes - biopsy    4. Have you been hospitalized or seen in the emergency room since your last visit here?   no    5. Did the patient have a depression screening completed today? No    No data recorded     PHQ-9 Given to (if applicable):               PHQ-9 Score (if applicable):                     [] Positive     []  Negative              Does question #9 need addressed (if applicable)                     [] Yes    []  No               Zara Glass CMA      
  EXTREMITIES: no clubbing, no leg swelling, no LE edema, no cyanosis,      Labs:  Hematology:  Lab Results   Component Value Date    WBC 7.9 01/30/2024    RBC 4.00 (L) 01/30/2024    HGB 13.6 01/30/2024    HCT 41.1 01/30/2024    .8 (H) 01/30/2024    MCH 34.0 (H) 01/30/2024    MCHC 33.1 01/30/2024    RDW 15.7 (H) 01/30/2024     01/30/2024    MPV 10.2 01/30/2024    BANDSPCT 6 10/01/2014    SEGSPCT 40.6 12/21/2023    EOSRELPCT 1.1 12/21/2023    BASOPCT 0.2 12/21/2023    LYMPHOPCT 49.7 (H) 12/21/2023    MONOPCT 8.4 (H) 12/21/2023    BANDABS 0.78 10/01/2014    SEGSABS 3.6 12/21/2023    EOSABS 0.1 12/21/2023    BASOSABS 0.0 12/21/2023    LYMPHSABS 4.4 12/21/2023    MONOSABS 0.7 12/21/2023    DIFFTYPE AUTOMATED DIFFERENTIAL 12/21/2023    PLTM LARGE PLATELETS OBSERVED 09/05/2023     Lab Results   Component Value Date    ESR 16 11/08/2022     Chemistry:  Lab Results   Component Value Date     12/21/2023    K 4.5 12/21/2023    CL 98 (L) 12/21/2023    CO2 24 12/21/2023    BUN 12 12/21/2023    CREATININE 0.8 12/21/2023    GLUCOSE 191 (H) 12/21/2023    CALCIUM 9.5 12/21/2023    PROT 7.3 12/21/2023    LABALBU 4.4 12/21/2023    BILITOT 0.3 12/21/2023    ALKPHOS 78 12/21/2023    AST 12 (L) 12/21/2023    ALT 7 (L) 12/21/2023    LABGLOM >60 12/21/2023    GFRAA >60 03/09/2020    POCGLU 112 (H) 03/22/2023     Lab Results   Component Value Date    LDH 75 (L) 12/21/2023     No results found for: \"LD\"  No results found for: \"TSHHS\", \"T4FREE\", \"FT3\"  Immunology:  Lab Results   Component Value Date    PROT 7.3 12/21/2023     No results found for: \"KAPPAUVOL\", \"LAMBDAUVOL\", \"KLFLCR\"  No results found for: \"B2M\"  Coagulation Panel:  Lab Results   Component Value Date    PROTIME 12.8 01/30/2024    INR 1.0 01/30/2024    APTT 30.6 01/30/2024     Anemia Panel:  Lab Results   Component Value Date    RTZBZSPZ64 657.1 12/21/2023    FOLATE 11.1 12/21/2023     Tumor Markers:  No results found for: \"\", \"CEA\", \"\", \"LABCA2\",

## 2024-02-01 NOTE — PLAN OF CARE
Radiation education and handouts given. Side effects and management reviewed with pt. All questions answered and pt voices understanding .     Pt scheduled for CT/SIM for radiation planning on Feb. 12, 2024 at 9:00 AM at [facility]. Pt to arrive at 8:45 AM and be NPO x 4 hours prior. Denies allergy to Iodine and no previous reaction to IV Dye. Pt's GFR pending lab draw for 1st cycle of chemo. Pt instructed and given written copy to hold Metformin x 48 hours after CT Scan- voices understanding.     Nursing Care Plan for Chest Radiation    Pain related to cancer diagnosis and treatment.    Interventions   Pain assessment.   Monitor pharmacological pain medication.   Teach relaxation and repositioning techniques.     Expected Outcome   Maintain patient's acceptable pain level or <5 on pain scale.       Knowledge deficit related to diagnosis and treatment plan.    Interventions   Assess patient's ability to comprehend diagnosis and treatment plan.   Provide educational materials and teaching regarding plan of care.   Provide emotional support and continued education.   Refer to psychosocial coordinator if further assistance needed.     Expected Outcome   Patient voices understanding of diagnosis and treatment plan.       Altered respiratory status related to diagnosis and treatment.    Interventions   Assess respiratory status, note changes.   Educate patient on symptoms to report to staff.   Refer to smoking cessation program if needed.     Expected Outcome   No respiratory complications, patient with SPO2 >90% or equal to baseline.       Altered skin integrity related to treatment.    Interventions   Evaluation of skin integrity at therapy site.   Advise patient on appropriate skin care.   Instruct patient on recommended lotions/ointments/creams/dressing changes to use on therapy site.     Expected Outcome   Minimize adverse skin reaction/infection at treatment site.       Altered nutritional status due to treatment

## 2024-02-01 NOTE — PROGRESS NOTES
Plan is to start chemo next week and add RT to C2 per med onc and rad onc physicians. Tx regimen will be Cisplatin (D1) + Etoposide (D1-3) Q21D. NN referral placed. Patient will be notified of appointment times for education/tx once approved. Lung Care Coordinator updated.

## 2024-02-01 NOTE — PROGRESS NOTES
Treatment plan for etoposide/cisplatin entered per Dr. Birch.    Shari Cummins, PharmD, [facility], BCPS  2/1/2024 4:04 PM

## 2024-02-01 NOTE — CONSULTS
CARDIAC: Regular rate and rhythm, without murmurs, clicks, or gallops   ABDOMEN: Normoactive bowels sounds are present.  Soft. Non-tender and non-distended.  No hepatosplenomegaly or masses are present.  EXTREMITIES: No cyanosis, clubbing or edema is present.  FROM  NEUROLOGIC: cn ii-xii intact grossly    IMPRESSION/PLAN:  Lin Bates is a 68 y.o. female with likely limited stage small cell lung cancer (MRI brain pending) who presents for discussion of radiotherapy treatment options.     As long as the MRI brain is clear I recommend definitive radiation therapy with concurrent chemotherapy.  We will likely start the radiation therapy on her second cycle of chemo.  The indications, risks, and benefits of treatment were discussed at length after all questions were answered she expressed understanding of her diagnosis, prognosis, and my recommendations.    The prescription will be 60Gy/30fx delivered via IMRT/VMAT with concurrent chemotherapy.     She signed consent, we will have her undergo CT simulation Mid February in order to have her ready to start treatment concurrent with her second cycle of chemotherapy. Discussed with Dr. Birch and will coordinate start date.     Electronically signed by Hunter Guo MD on 2/1/2024 at 4:08 PM

## 2024-02-02 DIAGNOSIS — C34.32 SMALL CELL LUNG CANCER, LEFT LOWER LOBE (HCC): Primary | ICD-10-CM

## 2024-02-07 ENCOUNTER — TELEPHONE (OUTPATIENT)
Dept: INFUSION THERAPY | Age: 69
End: 2024-02-07

## 2024-02-07 NOTE — TELEPHONE ENCOUNTER
Patient returned call regarding chemo ed and start date of tx, patient is scheduled for chemo ed on 2/12 @ 10am and tx on 2/13 @ 8:15am, advised them that 1 visitor allowed at time of education and day of their treatments, patient states understanding

## 2024-02-08 ENCOUNTER — TELEPHONE (OUTPATIENT)
Dept: RADIATION ONCOLOGY | Age: 69
End: 2024-02-08

## 2024-02-08 NOTE — TELEPHONE ENCOUNTER
Call placed to pt.  Pt to start radiation concurrent with 2nd cycle of chemo. 2nd cycle of chemo to begin on 3/5, pt's CT/SIM for radiation planning rescheduled to 2/22/2024 at 1:00 PM. Pt to arrive at 12:45 PM for IV contrast. Pt voices understanding of above, this RN's direct contact info given, advised to call with any questions or concerns.

## 2024-02-10 ENCOUNTER — HOSPITAL ENCOUNTER (OUTPATIENT)
Dept: MRI IMAGING | Age: 69
Discharge: HOME | End: 2024-02-10
Attending: INTERNAL MEDICINE
Payer: MEDICARE

## 2024-02-10 DIAGNOSIS — C34.32 SMALL CELL LUNG CANCER, LEFT LOWER LOBE (HCC): ICD-10-CM

## 2024-02-10 LAB
EGFR, POC: >60 ML/MIN/1.73M2
POC CREATININE: 0.5 MG/DL (ref 0.5–1.2)

## 2024-02-10 PROCEDURE — 82565 ASSAY OF CREATININE: CPT

## 2024-02-12 ENCOUNTER — HOSPITAL ENCOUNTER (OUTPATIENT)
Dept: INFUSION THERAPY | Age: 69
Discharge: HOME | End: 2024-02-12
Payer: MEDICARE

## 2024-02-12 ENCOUNTER — NURSE ONLY (OUTPATIENT)
Dept: ONCOLOGY | Age: 69
End: 2024-02-12
Payer: MEDICARE

## 2024-02-12 ENCOUNTER — APPOINTMENT (OUTPATIENT)
Dept: RADIATION ONCOLOGY | Age: 69
End: 2024-02-12
Payer: MEDICARE

## 2024-02-12 VITALS
HEIGHT: 60 IN | OXYGEN SATURATION: 98 % | WEIGHT: 158.6 LBS | SYSTOLIC BLOOD PRESSURE: 123 MMHG | DIASTOLIC BLOOD PRESSURE: 58 MMHG | BODY MASS INDEX: 31.14 KG/M2 | HEART RATE: 66 BPM | TEMPERATURE: 97.3 F

## 2024-02-12 DIAGNOSIS — Z11.59 NEED FOR HEPATITIS B SCREENING TEST: ICD-10-CM

## 2024-02-12 DIAGNOSIS — C34.32 SMALL CELL LUNG CANCER, LEFT LOWER LOBE (HCC): Primary | ICD-10-CM

## 2024-02-12 DIAGNOSIS — C34.32 SMALL CELL LUNG CANCER, LEFT LOWER LOBE (HCC): ICD-10-CM

## 2024-02-12 LAB
ALBUMIN SERPL-MCNC: 4.6 GM/DL (ref 3.4–5)
ALP BLD-CCNC: 65 IU/L (ref 40–129)
ALT SERPL-CCNC: 8 U/L (ref 10–40)
ANION GAP SERPL CALCULATED.3IONS-SCNC: 13 MMOL/L (ref 7–16)
AST SERPL-CCNC: 13 IU/L (ref 15–37)
BASOPHILS ABSOLUTE: 0 K/CU MM
BASOPHILS RELATIVE PERCENT: 0.3 % (ref 0–1)
BILIRUB SERPL-MCNC: 0.5 MG/DL (ref 0–1)
BUN SERPL-MCNC: 11 MG/DL (ref 6–23)
CALCIUM SERPL-MCNC: 9.9 MG/DL (ref 8.3–10.6)
CHLORIDE BLD-SCNC: 100 MMOL/L (ref 99–110)
CO2: 26 MMOL/L (ref 21–32)
CREAT SERPL-MCNC: 0.7 MG/DL (ref 0.6–1.1)
DIFFERENTIAL TYPE: ABNORMAL
EOSINOPHILS ABSOLUTE: 0 K/CU MM
EOSINOPHILS RELATIVE PERCENT: 0.7 % (ref 0–3)
GFR SERPL CREATININE-BSD FRML MDRD: >60 ML/MIN/1.73M2
GLUCOSE SERPL-MCNC: 144 MG/DL (ref 70–99)
HBV SURFACE AB SERPL IA-ACNC: <3.5 M[IU]/ML
HBV SURFACE AG SERPL QL IA: NON REACTIVE
HCT VFR BLD CALC: 44.3 % (ref 37–47)
HEMOGLOBIN: 14.4 GM/DL (ref 12.5–16)
LYMPHOCYTES ABSOLUTE: 2.4 K/CU MM
LYMPHOCYTES RELATIVE PERCENT: 40.5 % (ref 24–44)
MAGNESIUM: 2.2 MG/DL (ref 1.8–2.4)
MCH RBC QN AUTO: 34.6 PG (ref 27–31)
MCHC RBC AUTO-ENTMCNC: 32.5 % (ref 32–36)
MCV RBC AUTO: 106.5 FL (ref 78–100)
MONOCYTES ABSOLUTE: 0.7 K/CU MM
MONOCYTES RELATIVE PERCENT: 12.2 % (ref 0–4)
PDW BLD-RTO: 14.9 % (ref 11.7–14.9)
PHOSPHORUS: 3.6 MG/DL (ref 2.5–4.9)
PLATELET # BLD: 249 K/CU MM (ref 140–440)
PMV BLD AUTO: 9.9 FL (ref 7.5–11.1)
POTASSIUM SERPL-SCNC: 4.4 MMOL/L (ref 3.5–5.1)
RBC # BLD: 4.16 M/CU MM (ref 4.2–5.4)
SEGMENTED NEUTROPHILS ABSOLUTE COUNT: 2.7 K/CU MM
SEGMENTED NEUTROPHILS RELATIVE PERCENT: 46.3 % (ref 36–66)
SODIUM BLD-SCNC: 139 MMOL/L (ref 135–145)
TOTAL PROTEIN: 7.4 GM/DL (ref 6.4–8.2)
WBC # BLD: 5.9 K/CU MM (ref 4–10.5)

## 2024-02-12 PROCEDURE — 83735 ASSAY OF MAGNESIUM: CPT

## 2024-02-12 PROCEDURE — 99211 OFF/OP EST MAY X REQ PHY/QHP: CPT | Performed by: INTERNAL MEDICINE

## 2024-02-12 PROCEDURE — 85025 COMPLETE CBC W/AUTO DIFF WBC: CPT

## 2024-02-12 PROCEDURE — 86706 HEP B SURFACE ANTIBODY: CPT

## 2024-02-12 PROCEDURE — 84100 ASSAY OF PHOSPHORUS: CPT

## 2024-02-12 PROCEDURE — 87340 HEPATITIS B SURFACE AG IA: CPT

## 2024-02-12 PROCEDURE — 86704 HEP B CORE ANTIBODY TOTAL: CPT

## 2024-02-12 PROCEDURE — 99213 OFFICE O/P EST LOW 20 MIN: CPT

## 2024-02-12 PROCEDURE — 36415 COLL VENOUS BLD VENIPUNCTURE: CPT

## 2024-02-12 PROCEDURE — 80053 COMPREHEN METABOLIC PANEL: CPT

## 2024-02-12 RX ORDER — OLANZAPINE 5 MG/1
5 TABLET ORAL NIGHTLY
Qty: 16 TABLET | Refills: 0 | Status: SHIPPED | OUTPATIENT
Start: 2024-02-12

## 2024-02-12 RX ORDER — ONDANSETRON HYDROCHLORIDE 8 MG/1
8 TABLET, FILM COATED ORAL EVERY 8 HOURS PRN
Qty: 30 TABLET | Refills: 1 | Status: SHIPPED | OUTPATIENT
Start: 2024-02-12

## 2024-02-12 RX ORDER — DEXAMETHASONE 4 MG/1
4 TABLET ORAL SEE ADMIN INSTRUCTIONS
Qty: 4 TABLET | Refills: 0 | Status: SHIPPED | OUTPATIENT
Start: 2024-02-12

## 2024-02-12 NOTE — PROGRESS NOTES
Patient arrived with her  for treatment planning and chemotherapy education.  Discussed treatment plan, potential side effects, side effect prevention, and symptom management.  Reviewed chemotherapy education folder and drug monograph.  Patient's regimen will be Cisplatin and Etoposide, Cisplatin Day 1, Etoposide Day 1,2,3 Every 21 Days x4 Cycles.     Patient signed chemotherapy consent for Cisplatin and Etoposide  x 4 cycles. Copy of consent given to patient.    Reviewed chemotherapy schedule/calendar.     Rx for Zofran, Olanzapine and Dexamethasone sent to pharmacy by this RN as per Dr. Birch orders. Patient given calendar and written instructions for these medications and how/when to take.   Prescriptions given as follows:   Zofran 8 mg PRN for nausea  Olanzapine 5 mg  Dexamethasone- 4 mg PO daily for the day after the last day of  chemotherapy    Patient given on-call phone number for after office hours and weekends.     This RN direct contact information given to patient for patient to call with any questions/concerns..    CBC, CMP and other ordered pre-chemo labs drawn    Distress thermometer given to patient to fill out - this RN reviewed with patient. Patient deferred any referrals at this time.  Copy given to Psychosocial Coordinator.

## 2024-02-13 ENCOUNTER — HOSPITAL ENCOUNTER (OUTPATIENT)
Dept: INFUSION THERAPY | Age: 69
Discharge: HOME | End: 2024-02-13
Payer: MEDICARE

## 2024-02-13 VITALS
OXYGEN SATURATION: 96 % | DIASTOLIC BLOOD PRESSURE: 55 MMHG | SYSTOLIC BLOOD PRESSURE: 98 MMHG | WEIGHT: 159.6 LBS | TEMPERATURE: 97.3 F | HEART RATE: 74 BPM | BODY MASS INDEX: 31.17 KG/M2

## 2024-02-13 DIAGNOSIS — Z11.59 NEED FOR HEPATITIS B SCREENING TEST: Primary | ICD-10-CM

## 2024-02-13 DIAGNOSIS — C34.32 SMALL CELL LUNG CANCER, LEFT LOWER LOBE (HCC): ICD-10-CM

## 2024-02-13 PROCEDURE — 96413 CHEMO IV INFUSION 1 HR: CPT

## 2024-02-13 PROCEDURE — 6360000002 HC RX W HCPCS: Performed by: INTERNAL MEDICINE

## 2024-02-13 PROCEDURE — 96361 HYDRATE IV INFUSION ADD-ON: CPT

## 2024-02-13 PROCEDURE — 96367 TX/PROPH/DG ADDL SEQ IV INF: CPT

## 2024-02-13 PROCEDURE — 96375 TX/PRO/DX INJ NEW DRUG ADDON: CPT

## 2024-02-13 PROCEDURE — 96415 CHEMO IV INFUSION ADDL HR: CPT

## 2024-02-13 PROCEDURE — 2580000003 HC RX 258: Performed by: INTERNAL MEDICINE

## 2024-02-13 PROCEDURE — 96417 CHEMO IV INFUS EACH ADDL SEQ: CPT

## 2024-02-13 RX ORDER — PALONOSETRON 0.05 MG/ML
0.25 INJECTION, SOLUTION INTRAVENOUS ONCE
Status: COMPLETED | OUTPATIENT
Start: 2024-02-13 | End: 2024-02-13

## 2024-02-13 RX ORDER — SODIUM CHLORIDE 9 MG/ML
5-250 INJECTION, SOLUTION INTRAVENOUS PRN
Status: DISCONTINUED | OUTPATIENT
Start: 2024-02-13 | End: 2024-02-14 | Stop reason: HOSPADM

## 2024-02-13 RX ADMIN — POTASSIUM CHLORIDE: 2 INJECTION, SOLUTION, CONCENTRATE INTRAVENOUS at 08:40

## 2024-02-13 RX ADMIN — PALONOSETRON 0.25 MG: 0.25 INJECTION, SOLUTION INTRAVENOUS at 09:46

## 2024-02-13 RX ADMIN — POTASSIUM CHLORIDE: 2 INJECTION, SOLUTION, CONCENTRATE INTRAVENOUS at 12:04

## 2024-02-13 RX ADMIN — CISPLATIN 139 MG: 1 INJECTION INTRAVENOUS at 12:05

## 2024-02-13 RX ADMIN — SODIUM CHLORIDE 150 MG: 9 INJECTION, SOLUTION INTRAVENOUS at 09:53

## 2024-02-13 RX ADMIN — DEXAMETHASONE SODIUM PHOSPHATE 12 MG: 4 INJECTION INTRA-ARTICULAR; INTRALESIONAL; INTRAMUSCULAR; INTRAVENOUS; SOFT TISSUE at 10:31

## 2024-02-13 RX ADMIN — ETOPOSIDE 174 MG: 20 INJECTION INTRAVENOUS at 10:56

## 2024-02-13 NOTE — PROGRESS NOTES
02/13/24 1215   Encounter Summary   Encounter Overview/Reason  Initial Encounter   Service Provided For: Patient and family together   Referral/Consult From: [facility]   Support System Family members   Last Encounter  02/13/24  (PT doing first treatment of chemo, has lung cancer, PT has rodriguez in God, she has support, she wanted prayer for herself, friend and daughter. She was calm and coping well.)   Complexity of Encounter Low   Begin Time 1200   End Time  1217   Total Time Calculated 17 min   Spiritual/Emotional needs   Type Spiritual Support   Assessment/Intervention/Outcome   Assessment Calm;Concerns with suffering;Coping;Hopeful;Peaceful;Stress overload   Intervention Active listening;Discussed belief system/Islam practices/rodriguez;Discussed illness injury and it’s impact;Discussed meaning/purpose   Outcome Comfort;Coping;Encouraged;Expressed feelings, needs, and concerns;Expressed Gratitude;Peace   Plan and Referrals   Plan/Referrals Continue Support (comment)

## 2024-02-13 NOTE — PROGRESS NOTES
Pt here for new tx. With pre and post hydration. PIV placed with blood return noted. CBC CMP Mag Hepatitis panel reviewed from 2/12 and within defined limits. Pt has no concerns or issues to discuss at this time.     Pt instructed to inform us if she would develop any symptoms of SOB, dizziness, numbness tingling to bilateral feet and hands, falls, increased fatigue, decreased appetite, vision changes, rashes or any swelling. Pt verbalized understanding.     Education done by Sis PEOPLES on 2/12. Pt states she picked up prescriptions that was prescribed and understands how and when to take them.     Patient's status assessed and documented appropriately.  All labs and required results were also reviewed today.  Treatment parameters have been reviewed.  Today's treatment has been approved by the provider.      Treatment orders and medication sequencing (when applicable) was verified by 2 registered nurses.  The treatment plan was confirmed with the patient prior to administration, and the patient understands the need to report any treatment-related symptoms.    Prior to administration, when applicable, the following 8 elements of medication administration were reviewed with 2nd Registered Nurse prior to dosing: drug name, drug dose, infusion volume when prepared in a syringe, rate of administration, expiration dates and/or times, appearance and integrity of drug(s), and rate of pump for infusion.  The 5 rights of medication administration have been verified.      Pt tolerated tx without incident left ambulatory discharge instructions given

## 2024-02-14 ENCOUNTER — HOSPITAL ENCOUNTER (OUTPATIENT)
Dept: INFUSION THERAPY | Age: 69
Discharge: HOME | End: 2024-02-14
Payer: MEDICARE

## 2024-02-14 VITALS
HEART RATE: 73 BPM | OXYGEN SATURATION: 96 % | WEIGHT: 159 LBS | RESPIRATION RATE: 18 BRPM | BODY MASS INDEX: 31.22 KG/M2 | SYSTOLIC BLOOD PRESSURE: 123 MMHG | TEMPERATURE: 97.8 F | DIASTOLIC BLOOD PRESSURE: 60 MMHG | HEIGHT: 60 IN

## 2024-02-14 DIAGNOSIS — Z11.59 NEED FOR HEPATITIS B SCREENING TEST: Primary | ICD-10-CM

## 2024-02-14 DIAGNOSIS — C34.32 SMALL CELL LUNG CANCER, LEFT LOWER LOBE (HCC): ICD-10-CM

## 2024-02-14 LAB — HBV CORE AB SERPL QL IA: NEGATIVE

## 2024-02-14 PROCEDURE — 6360000002 HC RX W HCPCS: Performed by: INTERNAL MEDICINE

## 2024-02-14 PROCEDURE — 96375 TX/PRO/DX INJ NEW DRUG ADDON: CPT

## 2024-02-14 PROCEDURE — 2580000003 HC RX 258: Performed by: INTERNAL MEDICINE

## 2024-02-14 PROCEDURE — 96413 CHEMO IV INFUSION 1 HR: CPT

## 2024-02-14 RX ORDER — DEXAMETHASONE SODIUM PHOSPHATE 4 MG/ML
8 INJECTION, SOLUTION INTRA-ARTICULAR; INTRALESIONAL; INTRAMUSCULAR; INTRAVENOUS; SOFT TISSUE ONCE
Status: COMPLETED | OUTPATIENT
Start: 2024-02-14 | End: 2024-02-14

## 2024-02-14 RX ORDER — SODIUM CHLORIDE 0.9 % (FLUSH) 0.9 %
5-40 SYRINGE (ML) INJECTION PRN
Status: DISCONTINUED | OUTPATIENT
Start: 2024-02-14 | End: 2024-02-15 | Stop reason: HOSPADM

## 2024-02-14 RX ORDER — HEPARIN 100 UNIT/ML
500 SYRINGE INTRAVENOUS PRN
Status: DISCONTINUED | OUTPATIENT
Start: 2024-02-14 | End: 2024-02-15 | Stop reason: HOSPADM

## 2024-02-14 RX ORDER — SODIUM CHLORIDE 9 MG/ML
5-250 INJECTION, SOLUTION INTRAVENOUS PRN
Status: DISCONTINUED | OUTPATIENT
Start: 2024-02-14 | End: 2024-02-15 | Stop reason: HOSPADM

## 2024-02-14 RX ADMIN — ETOPOSIDE 174 MG: 20 INJECTION INTRAVENOUS at 11:46

## 2024-02-14 RX ADMIN — DEXAMETHASONE SODIUM PHOSPHATE 8 MG: 4 INJECTION, SOLUTION INTRAMUSCULAR; INTRAVENOUS at 11:32

## 2024-02-14 RX ADMIN — SODIUM CHLORIDE 20 ML/HR: 9 INJECTION, SOLUTION INTRAVENOUS at 11:32

## 2024-02-14 NOTE — PROGRESS NOTES
Pt here for day 2 Etoposide infusion. PIV placed with blood return noted. Pt states did well with first tx on 2/13. Pt has no concerns or issues to discuss at this time.     Patient's status assessed and documented appropriately.  All labs and required results were also reviewed today.  Treatment parameters have been reviewed.  Today's treatment has been approved by the provider.      Treatment orders and medication sequencing (when applicable) was verified by 2 registered nurses.  The treatment plan was confirmed with the patient prior to administration, and the patient understands the need to report any treatment-related symptoms.    Prior to administration, when applicable, the following 8 elements of medication administration were reviewed with 2nd Registered Nurse prior to dosing: drug name, drug dose, infusion volume when prepared in a syringe, rate of administration, expiration dates and/or times, appearance and integrity of drug(s), and rate of pump for infusion.  The 5 rights of medication administration have been verified.      Pt tolerated tx without incident left ambulatory discharge instructions given

## 2024-02-15 ENCOUNTER — HOSPITAL ENCOUNTER (OUTPATIENT)
Dept: INFUSION THERAPY | Age: 69
Discharge: HOME | End: 2024-02-15
Payer: MEDICARE

## 2024-02-15 VITALS
TEMPERATURE: 97.4 F | HEART RATE: 74 BPM | RESPIRATION RATE: 16 BRPM | HEIGHT: 60 IN | DIASTOLIC BLOOD PRESSURE: 67 MMHG | SYSTOLIC BLOOD PRESSURE: 153 MMHG | BODY MASS INDEX: 31.22 KG/M2 | WEIGHT: 159 LBS | OXYGEN SATURATION: 97 %

## 2024-02-15 DIAGNOSIS — C34.32 SMALL CELL LUNG CANCER, LEFT LOWER LOBE (HCC): ICD-10-CM

## 2024-02-15 DIAGNOSIS — Z11.59 NEED FOR HEPATITIS B SCREENING TEST: Primary | ICD-10-CM

## 2024-02-15 PROCEDURE — 96375 TX/PRO/DX INJ NEW DRUG ADDON: CPT

## 2024-02-15 PROCEDURE — 96413 CHEMO IV INFUSION 1 HR: CPT

## 2024-02-15 PROCEDURE — 6360000002 HC RX W HCPCS: Performed by: INTERNAL MEDICINE

## 2024-02-15 PROCEDURE — 2580000003 HC RX 258: Performed by: INTERNAL MEDICINE

## 2024-02-15 RX ORDER — DEXAMETHASONE SODIUM PHOSPHATE 4 MG/ML
8 INJECTION, SOLUTION INTRA-ARTICULAR; INTRALESIONAL; INTRAMUSCULAR; INTRAVENOUS; SOFT TISSUE ONCE
Status: COMPLETED | OUTPATIENT
Start: 2024-02-15 | End: 2024-02-15

## 2024-02-15 RX ORDER — SODIUM CHLORIDE 9 MG/ML
5-250 INJECTION, SOLUTION INTRAVENOUS PRN
Status: DISCONTINUED | OUTPATIENT
Start: 2024-02-15 | End: 2024-02-16 | Stop reason: HOSPADM

## 2024-02-15 RX ADMIN — SODIUM CHLORIDE 20 ML/HR: 9 INJECTION, SOLUTION INTRAVENOUS at 12:15

## 2024-02-15 RX ADMIN — DEXAMETHASONE SODIUM PHOSPHATE 8 MG: 4 INJECTION, SOLUTION INTRAMUSCULAR; INTRAVENOUS at 12:15

## 2024-02-15 RX ADMIN — ETOPOSIDE 174 MG: 20 INJECTION INTRAVENOUS at 12:19

## 2024-02-15 NOTE — PROGRESS NOTES
Pt here for day 3 Etoposide infusion. PIV placed by Mary LOUISE after multiple attempts by different RN's. Pt refusing to have port or PICC line placed. Pt instructed to drink plenty of water at home for hydration and prior to coming in for tx. Pt has no concerns or issues to discuss at this time.     Patient's status assessed and documented appropriately.  All labs and required results were also reviewed today.  Treatment parameters have been reviewed.  Today's treatment has been approved by the provider.        Treatment orders and medication sequencing (when applicable) was verified by 2 registered nurses.  The treatment plan was confirmed with the patient prior to administration, and the patient understands the need to report any treatment-related symptoms.    Prior to administration, when applicable, the following 8 elements of medication administration were reviewed with 2nd Registered Nurse prior to dosing: drug name, drug dose, infusion volume when prepared in a syringe, rate of administration, expiration dates and/or times, appearance and integrity of drug(s), and rate of pump for infusion.  The 5 rights of medication administration have been verified.      Pt tolerated tx without incident left ambulatory discharge instructions given

## 2024-02-19 NOTE — PROGRESS NOTES
Patient Name: Lin Bates  Patient : 1955  Patient MRN: [id number]     Primary Oncologist: Burt Birch MD  Referring Provider: Dorothy Terrazas PA     Date of Service: 2024      Chief Complaint:   Chief Complaint   Patient presents with    Follow-up     Patient Active Problem List:       Leukocytosis       Thrombocytosis    HPI:   Ms. Bates is a 68-year-old very pleasant female with medical history significant for hypertension, hyperlipidemia, diabetes mellitus, history of HPV infection, initially referred to me on 2014, for evaluation of mild leukocytosis.       She was found to have mild leukocytosis on routine blood tests done on 2014, by her primary care physician. She is a chronic smoker and she smokes about 10 cigarettes a day since she was 14.     Laboratory workup on 2014, showed slight elevation in white blood cell count (13). Her hemoglobin, hematocrit, platelet count and LDH were normal. There was no significant immunophenotypic abnormalities in flow cytometry. JAK2  and bcr/abl studies were negative.     She has been followed periodically since then. She missed follow up with me since 2019 until 2022.     Laboratory work ups done on 22 showed leukocytosis (WBC 16.1), thrombocytosis (platelet 651) and flow cytometry showed monoclonal B-cell population (~450 cells/cumm). The immunophenotype of the clonal cells is non-specific. DDX includes peripheral blood involvement by a B-cell lymphoma and monoclonal B-cell lymphocytosis.     The rests of the blood tests, including BCR-ABL1, JAK2 V617F, JAK2 exon 12-13, MPL, CALR, MARIKA, ESR and LDH, were within normal range.      CT scan of the neck, chest, abdomen and pelvis done on 2022 showed no significant pathology in her neck.  No splenomegaly or overt lymphadenopathy.  There are scattered mildly prominent left internal mammary, upper abdominal and left common iliac lymph nodes.

## 2024-02-22 ENCOUNTER — HOSPITAL ENCOUNTER (OUTPATIENT)
Dept: RADIATION ONCOLOGY | Age: 69
End: 2024-02-22
Payer: MEDICARE

## 2024-02-22 ENCOUNTER — OFFICE VISIT (OUTPATIENT)
Dept: ONCOLOGY | Age: 69
End: 2024-02-22
Payer: MEDICARE

## 2024-02-22 ENCOUNTER — HOSPITAL ENCOUNTER (OUTPATIENT)
Dept: INFUSION THERAPY | Age: 69
Discharge: HOME | End: 2024-02-22
Payer: MEDICARE

## 2024-02-22 VITALS
SYSTOLIC BLOOD PRESSURE: 129 MMHG | BODY MASS INDEX: 31.05 KG/M2 | TEMPERATURE: 97 F | DIASTOLIC BLOOD PRESSURE: 59 MMHG | HEIGHT: 60 IN | OXYGEN SATURATION: 98 % | HEART RATE: 76 BPM | RESPIRATION RATE: 16 BRPM

## 2024-02-22 DIAGNOSIS — C34.32 SMALL CELL LUNG CANCER, LEFT LOWER LOBE (HCC): Primary | ICD-10-CM

## 2024-02-22 DIAGNOSIS — C34.32 SMALL CELL LUNG CANCER, LEFT LOWER LOBE (HCC): ICD-10-CM

## 2024-02-22 LAB
ALBUMIN SERPL-MCNC: 4.2 GM/DL (ref 3.4–5)
ALP BLD-CCNC: 60 IU/L (ref 40–128)
ALT SERPL-CCNC: 11 U/L (ref 10–40)
ANION GAP SERPL CALCULATED.3IONS-SCNC: 14 MMOL/L (ref 7–16)
AST SERPL-CCNC: 13 IU/L (ref 15–37)
BASOPHILS ABSOLUTE: 0 K/CU MM
BASOPHILS RELATIVE PERCENT: 0 % (ref 0–1)
BILIRUB SERPL-MCNC: 0.6 MG/DL (ref 0–1)
BUN SERPL-MCNC: 14 MG/DL (ref 6–23)
CALCIUM SERPL-MCNC: 9.3 MG/DL (ref 8.3–10.6)
CHLORIDE BLD-SCNC: 99 MMOL/L (ref 99–110)
CO2: 23 MMOL/L (ref 21–32)
CREAT SERPL-MCNC: 0.5 MG/DL (ref 0.6–1.1)
DIFFERENTIAL TYPE: ABNORMAL
EOSINOPHILS ABSOLUTE: 0.1 K/CU MM
EOSINOPHILS RELATIVE PERCENT: 4.6 % (ref 0–3)
GFR SERPL CREATININE-BSD FRML MDRD: >60 ML/MIN/1.73M2
GLUCOSE SERPL-MCNC: 122 MG/DL (ref 70–99)
HCT VFR BLD CALC: 38.4 % (ref 37–47)
HEMOGLOBIN: 12.6 GM/DL (ref 12.5–16)
LYMPHOCYTES ABSOLUTE: 2.3 K/CU MM
LYMPHOCYTES RELATIVE PERCENT: 88.8 % (ref 24–44)
MCH RBC QN AUTO: 34.7 PG (ref 27–31)
MCHC RBC AUTO-ENTMCNC: 32.8 % (ref 32–36)
MCV RBC AUTO: 105.8 FL (ref 78–100)
MONOCYTES ABSOLUTE: 0 K/CU MM
MONOCYTES RELATIVE PERCENT: 0.8 % (ref 0–4)
PDW BLD-RTO: 13.4 % (ref 11.7–14.9)
PLATELET # BLD: ABNORMAL K/CU MM (ref 140–440)
PMV BLD AUTO: 11.4 FL (ref 7.5–11.1)
POTASSIUM SERPL-SCNC: 4.4 MMOL/L (ref 3.5–5.1)
RBC # BLD: 3.63 M/CU MM (ref 4.2–5.4)
SEGMENTED NEUTROPHILS ABSOLUTE COUNT: 0.2 K/CU MM
SEGMENTED NEUTROPHILS RELATIVE PERCENT: 5.8 % (ref 36–66)
SODIUM BLD-SCNC: 136 MMOL/L (ref 135–145)
TOTAL PROTEIN: 6.6 GM/DL (ref 6.4–8.2)
WBC # BLD: 2.6 K/CU MM (ref 4–10.5)

## 2024-02-22 PROCEDURE — 99214 OFFICE O/P EST MOD 30 MIN: CPT | Performed by: INTERNAL MEDICINE

## 2024-02-22 PROCEDURE — 6360000004 HC RX CONTRAST MEDICATION

## 2024-02-22 PROCEDURE — 77332 RADIATION TREATMENT AID(S): CPT | Performed by: RADIOLOGY

## 2024-02-22 PROCEDURE — G8417 CALC BMI ABV UP PARAM F/U: HCPCS | Performed by: INTERNAL MEDICINE

## 2024-02-22 PROCEDURE — 1124F ACP DISCUSS-NO DSCNMKR DOCD: CPT | Performed by: INTERNAL MEDICINE

## 2024-02-22 PROCEDURE — 36415 COLL VENOUS BLD VENIPUNCTURE: CPT

## 2024-02-22 PROCEDURE — G8427 DOCREV CUR MEDS BY ELIG CLIN: HCPCS | Performed by: INTERNAL MEDICINE

## 2024-02-22 PROCEDURE — 1090F PRES/ABSN URINE INCON ASSESS: CPT | Performed by: INTERNAL MEDICINE

## 2024-02-22 PROCEDURE — 77470 SPECIAL RADIATION TREATMENT: CPT | Performed by: RADIOLOGY

## 2024-02-22 PROCEDURE — 80053 COMPREHEN METABOLIC PANEL: CPT

## 2024-02-22 PROCEDURE — 3017F COLORECTAL CA SCREEN DOC REV: CPT | Performed by: INTERNAL MEDICINE

## 2024-02-22 PROCEDURE — 1036F TOBACCO NON-USER: CPT | Performed by: INTERNAL MEDICINE

## 2024-02-22 PROCEDURE — G8399 PT W/DXA RESULTS DOCUMENT: HCPCS | Performed by: INTERNAL MEDICINE

## 2024-02-22 PROCEDURE — 85025 COMPLETE CBC W/AUTO DIFF WBC: CPT

## 2024-02-22 PROCEDURE — 77334 RADIATION TREATMENT AID(S): CPT | Performed by: RADIOLOGY

## 2024-02-22 PROCEDURE — G8484 FLU IMMUNIZE NO ADMIN: HCPCS | Performed by: INTERNAL MEDICINE

## 2024-02-22 RX ORDER — SODIUM CHLORIDE 9 MG/ML
INJECTION, SOLUTION INTRAVENOUS ONCE
Status: DISCONTINUED | OUTPATIENT
Start: 2024-02-22 | End: 2024-02-23 | Stop reason: HOSPADM

## 2024-02-22 RX ORDER — SODIUM CHLORIDE 0.9 % (FLUSH) 0.9 %
5-40 SYRINGE (ML) INJECTION PRN
Status: DISCONTINUED | OUTPATIENT
Start: 2024-02-22 | End: 2024-02-23 | Stop reason: HOSPADM

## 2024-02-22 NOTE — PROGRESS NOTES
MA Rooming Questions  Patient: Lin Bates  MRN: [id number]    Date: 2/22/2024        1. Do you have any new issues?   no         2. Do you need any refills on medications?    no    3. Have you had any imaging done since your last visit?   no    4. Have you been hospitalized or seen in the emergency room since your last visit here?   no    5. Did the patient have a depression screening completed today? No    No data recorded     PHQ-9 Given to (if applicable):               PHQ-9 Score (if applicable):                     [] Positive     []  Negative              Does question #9 need addressed (if applicable)                     [] Yes    []  No               Cristina Iglesias MA

## 2024-03-01 DIAGNOSIS — C34.32 SMALL CELL LUNG CANCER, LEFT LOWER LOBE (HCC): ICD-10-CM

## 2024-03-01 DIAGNOSIS — Z11.59 NEED FOR HEPATITIS B SCREENING TEST: Primary | ICD-10-CM

## 2024-03-03 NOTE — PROGRESS NOTES
Patient Name: Lin Bates  Patient : 1955  Patient MRN: [id number]     Primary Oncologist: Burt Birch MD  Referring Provider: Dorothy Terrazas PA     Date of Service: 3/5/2024      Chief Complaint:   Chief Complaint   Patient presents with    Follow-up    Chemotherapy     Patient Active Problem List:       Leukocytosis       Thrombocytosis    HPI:   Ms. Bates is a 68-year-old very pleasant female with medical history significant for hypertension, hyperlipidemia, diabetes mellitus, history of HPV infection, initially referred to me on 2014, for evaluation of mild leukocytosis.       She was found to have mild leukocytosis on routine blood tests done on 2014, by her primary care physician. She is a chronic smoker and she smokes about 10 cigarettes a day since she was 14.     Laboratory workup on 2014, showed slight elevation in white blood cell count (13). Her hemoglobin, hematocrit, platelet count and LDH were normal. There was no significant immunophenotypic abnormalities in flow cytometry. JAK2  and bcr/abl studies were negative.     She has been followed periodically since then. She missed follow up with me since 2019 until 2022.     Laboratory work ups done on 22 showed leukocytosis (WBC 16.1), thrombocytosis (platelet 651) and flow cytometry showed monoclonal B-cell population (~450 cells/cumm). The immunophenotype of the clonal cells is non-specific. DDX includes peripheral blood involvement by a B-cell lymphoma and monoclonal B-cell lymphocytosis.     The rests of the blood tests, including BCR-ABL1, JAK2 V617F, JAK2 exon 12-13, MPL, CALR, MARIKA, ESR and LDH, were within normal range.      CT scan of the neck, chest, abdomen and pelvis done on 2022 showed no significant pathology in her neck.  No splenomegaly or overt lymphadenopathy.  There are scattered mildly prominent left internal mammary, upper abdominal and left common iliac

## 2024-03-04 ENCOUNTER — HOSPITAL ENCOUNTER (OUTPATIENT)
Dept: INFUSION THERAPY | Age: 69
Discharge: HOME | End: 2024-03-04
Payer: MEDICARE

## 2024-03-04 DIAGNOSIS — C34.32 SMALL CELL LUNG CANCER, LEFT LOWER LOBE (HCC): ICD-10-CM

## 2024-03-04 DIAGNOSIS — Z11.59 NEED FOR HEPATITIS B SCREENING TEST: ICD-10-CM

## 2024-03-04 LAB
ALBUMIN SERPL-MCNC: 4 GM/DL (ref 3.4–5)
ALP BLD-CCNC: 82 IU/L (ref 40–128)
ALT SERPL-CCNC: 11 U/L (ref 10–40)
ANION GAP SERPL CALCULATED.3IONS-SCNC: 12 MMOL/L (ref 7–16)
AST SERPL-CCNC: 11 IU/L (ref 15–37)
BASOPHILS ABSOLUTE: 0 K/CU MM
BASOPHILS RELATIVE PERCENT: 0.2 % (ref 0–1)
BILIRUB SERPL-MCNC: 0.2 MG/DL (ref 0–1)
BUN SERPL-MCNC: 14 MG/DL (ref 6–23)
CALCIUM SERPL-MCNC: 10 MG/DL (ref 8.3–10.6)
CHLORIDE BLD-SCNC: 98 MMOL/L (ref 99–110)
CO2: 27 MMOL/L (ref 21–32)
CREAT SERPL-MCNC: 0.7 MG/DL (ref 0.6–1.1)
DIFFERENTIAL TYPE: ABNORMAL
EOSINOPHILS ABSOLUTE: 0.1 K/CU MM
EOSINOPHILS RELATIVE PERCENT: 1 % (ref 0–3)
GFR SERPL CREATININE-BSD FRML MDRD: >60 ML/MIN/1.73M2
GLUCOSE SERPL-MCNC: 141 MG/DL (ref 70–99)
HCT VFR BLD CALC: 34.6 % (ref 37–47)
HEMOGLOBIN: 11.5 GM/DL (ref 12.5–16)
LYMPHOCYTES ABSOLUTE: 3.7 K/CU MM
LYMPHOCYTES RELATIVE PERCENT: 60.1 % (ref 24–44)
MAGNESIUM: 2.1 MG/DL (ref 1.8–2.4)
MCH RBC QN AUTO: 35.1 PG (ref 27–31)
MCHC RBC AUTO-ENTMCNC: 33.2 % (ref 32–36)
MCV RBC AUTO: 105.5 FL (ref 78–100)
MONOCYTES ABSOLUTE: 1.3 K/CU MM
MONOCYTES RELATIVE PERCENT: 21.3 % (ref 0–4)
PDW BLD-RTO: 12.6 % (ref 11.7–14.9)
PHOSPHORUS: 4 MG/DL (ref 2.5–4.9)
PLATELET # BLD: 348 K/CU MM (ref 140–440)
PMV BLD AUTO: 8.9 FL (ref 7.5–11.1)
POTASSIUM SERPL-SCNC: 5.2 MMOL/L (ref 3.5–5.1)
RBC # BLD: 3.28 M/CU MM (ref 4.2–5.4)
SEGMENTED NEUTROPHILS ABSOLUTE COUNT: 1.1 K/CU MM
SEGMENTED NEUTROPHILS RELATIVE PERCENT: 17.4 % (ref 36–66)
SODIUM BLD-SCNC: 137 MMOL/L (ref 135–145)
TOTAL PROTEIN: 7.3 GM/DL (ref 6.4–8.2)
WBC # BLD: 6.2 K/CU MM (ref 4–10.5)

## 2024-03-04 PROCEDURE — 80053 COMPREHEN METABOLIC PANEL: CPT

## 2024-03-04 PROCEDURE — 83735 ASSAY OF MAGNESIUM: CPT

## 2024-03-04 PROCEDURE — 36415 COLL VENOUS BLD VENIPUNCTURE: CPT

## 2024-03-04 PROCEDURE — 84100 ASSAY OF PHOSPHORUS: CPT

## 2024-03-04 PROCEDURE — 85025 COMPLETE CBC W/AUTO DIFF WBC: CPT

## 2024-03-05 ENCOUNTER — HOSPITAL ENCOUNTER (OUTPATIENT)
Dept: INFUSION THERAPY | Age: 69
Discharge: HOME | End: 2024-03-05
Payer: MEDICARE

## 2024-03-05 ENCOUNTER — OFFICE VISIT (OUTPATIENT)
Dept: ONCOLOGY | Age: 69
End: 2024-03-05
Payer: MEDICARE

## 2024-03-05 ENCOUNTER — HOSPITAL ENCOUNTER (OUTPATIENT)
Dept: RADIATION ONCOLOGY | Age: 69
Discharge: HOME | End: 2024-03-05
Payer: MEDICARE

## 2024-03-05 VITALS
HEART RATE: 70 BPM | TEMPERATURE: 97.3 F | OXYGEN SATURATION: 99 % | DIASTOLIC BLOOD PRESSURE: 60 MMHG | RESPIRATION RATE: 16 BRPM | HEIGHT: 60 IN | SYSTOLIC BLOOD PRESSURE: 133 MMHG | BODY MASS INDEX: 31.18 KG/M2 | WEIGHT: 158.8 LBS

## 2024-03-05 VITALS
RESPIRATION RATE: 16 BRPM | WEIGHT: 158.8 LBS | HEIGHT: 60 IN | DIASTOLIC BLOOD PRESSURE: 60 MMHG | TEMPERATURE: 97.3 F | BODY MASS INDEX: 31.18 KG/M2 | SYSTOLIC BLOOD PRESSURE: 133 MMHG | OXYGEN SATURATION: 99 % | HEART RATE: 70 BPM

## 2024-03-05 DIAGNOSIS — C34.32 SMALL CELL LUNG CANCER, LEFT LOWER LOBE (HCC): Primary | ICD-10-CM

## 2024-03-05 DIAGNOSIS — C34.32 SMALL CELL LUNG CANCER, LEFT LOWER LOBE (HCC): ICD-10-CM

## 2024-03-05 DIAGNOSIS — Z11.59 NEED FOR HEPATITIS B SCREENING TEST: Primary | ICD-10-CM

## 2024-03-05 LAB
BASOPHILS ABSOLUTE: 0 K/CU MM
BASOPHILS RELATIVE PERCENT: 0.2 % (ref 0–1)
DIFFERENTIAL TYPE: ABNORMAL
EOSINOPHILS ABSOLUTE: 0 K/CU MM
EOSINOPHILS RELATIVE PERCENT: 0.8 % (ref 0–3)
HCT VFR BLD CALC: 34.9 % (ref 37–47)
HEMOGLOBIN: 11.7 GM/DL (ref 12.5–16)
LYMPHOCYTES ABSOLUTE: 2.8 K/CU MM
LYMPHOCYTES RELATIVE PERCENT: 58.1 % (ref 24–44)
MCH RBC QN AUTO: 35 PG (ref 27–31)
MCHC RBC AUTO-ENTMCNC: 33.5 % (ref 32–36)
MCV RBC AUTO: 104.5 FL (ref 78–100)
MONOCYTES ABSOLUTE: 1.1 K/CU MM
MONOCYTES RELATIVE PERCENT: 23.3 % (ref 0–4)
PDW BLD-RTO: 12.8 % (ref 11.7–14.9)
PLATELET # BLD: 435 K/CU MM (ref 140–440)
PMV BLD AUTO: 9.1 FL (ref 7.5–11.1)
RBC # BLD: 3.34 M/CU MM (ref 4.2–5.4)
SEGMENTED NEUTROPHILS ABSOLUTE COUNT: 0.9 K/CU MM
SEGMENTED NEUTROPHILS RELATIVE PERCENT: 17.6 % (ref 36–66)
WBC # BLD: 4.9 K/CU MM (ref 4–10.5)

## 2024-03-05 PROCEDURE — 85025 COMPLETE CBC W/AUTO DIFF WBC: CPT

## 2024-03-05 PROCEDURE — G8399 PT W/DXA RESULTS DOCUMENT: HCPCS | Performed by: INTERNAL MEDICINE

## 2024-03-05 PROCEDURE — 3017F COLORECTAL CA SCREEN DOC REV: CPT | Performed by: INTERNAL MEDICINE

## 2024-03-05 PROCEDURE — G8417 CALC BMI ABV UP PARAM F/U: HCPCS | Performed by: INTERNAL MEDICINE

## 2024-03-05 PROCEDURE — 77386 HC NTSTY MODUL RAD TX DLVR CPLX: CPT | Performed by: RADIOLOGY

## 2024-03-05 PROCEDURE — 1124F ACP DISCUSS-NO DSCNMKR DOCD: CPT | Performed by: INTERNAL MEDICINE

## 2024-03-05 PROCEDURE — 1090F PRES/ABSN URINE INCON ASSESS: CPT | Performed by: INTERNAL MEDICINE

## 2024-03-05 PROCEDURE — 99214 OFFICE O/P EST MOD 30 MIN: CPT | Performed by: INTERNAL MEDICINE

## 2024-03-05 PROCEDURE — 1036F TOBACCO NON-USER: CPT | Performed by: INTERNAL MEDICINE

## 2024-03-05 PROCEDURE — G8427 DOCREV CUR MEDS BY ELIG CLIN: HCPCS | Performed by: INTERNAL MEDICINE

## 2024-03-05 PROCEDURE — G8484 FLU IMMUNIZE NO ADMIN: HCPCS | Performed by: INTERNAL MEDICINE

## 2024-03-05 RX ORDER — SODIUM CHLORIDE 9 MG/ML
INJECTION, SOLUTION INTRAVENOUS CONTINUOUS
OUTPATIENT
Start: 2024-03-14

## 2024-03-05 RX ORDER — ONDANSETRON 2 MG/ML
8 INJECTION INTRAMUSCULAR; INTRAVENOUS
OUTPATIENT
Start: 2024-03-12

## 2024-03-05 RX ORDER — FAMOTIDINE 10 MG/ML
20 INJECTION, SOLUTION INTRAVENOUS
OUTPATIENT
Start: 2024-03-13

## 2024-03-05 RX ORDER — DIPHENHYDRAMINE HYDROCHLORIDE 50 MG/ML
50 INJECTION INTRAMUSCULAR; INTRAVENOUS
OUTPATIENT
Start: 2024-03-12

## 2024-03-05 RX ORDER — SODIUM CHLORIDE 0.9 % (FLUSH) 0.9 %
5-40 SYRINGE (ML) INJECTION PRN
OUTPATIENT
Start: 2024-03-14

## 2024-03-05 RX ORDER — FAMOTIDINE 10 MG/ML
20 INJECTION, SOLUTION INTRAVENOUS
OUTPATIENT
Start: 2024-03-12

## 2024-03-05 RX ORDER — SODIUM CHLORIDE 9 MG/ML
5-250 INJECTION, SOLUTION INTRAVENOUS PRN
OUTPATIENT
Start: 2024-03-12

## 2024-03-05 RX ORDER — SODIUM CHLORIDE 0.9 % (FLUSH) 0.9 %
5-40 SYRINGE (ML) INJECTION PRN
OUTPATIENT
Start: 2024-03-12

## 2024-03-05 RX ORDER — SODIUM CHLORIDE 0.9 % (FLUSH) 0.9 %
5-40 SYRINGE (ML) INJECTION PRN
OUTPATIENT
Start: 2024-03-13

## 2024-03-05 RX ORDER — HEPARIN SODIUM (PORCINE) LOCK FLUSH IV SOLN 100 UNIT/ML 100 UNIT/ML
500 SOLUTION INTRAVENOUS PRN
OUTPATIENT
Start: 2024-03-13

## 2024-03-05 RX ORDER — SODIUM CHLORIDE 9 MG/ML
5-250 INJECTION, SOLUTION INTRAVENOUS PRN
OUTPATIENT
Start: 2024-03-14

## 2024-03-05 RX ORDER — DIPHENHYDRAMINE HYDROCHLORIDE 50 MG/ML
50 INJECTION INTRAMUSCULAR; INTRAVENOUS
OUTPATIENT
Start: 2024-03-13

## 2024-03-05 RX ORDER — EPINEPHRINE 1 MG/ML
0.3 INJECTION, SOLUTION, CONCENTRATE INTRAVENOUS PRN
OUTPATIENT
Start: 2024-03-14

## 2024-03-05 RX ORDER — SODIUM CHLORIDE 9 MG/ML
5-250 INJECTION, SOLUTION INTRAVENOUS PRN
OUTPATIENT
Start: 2024-03-13

## 2024-03-05 RX ORDER — ACETAMINOPHEN 325 MG/1
650 TABLET ORAL
OUTPATIENT
Start: 2024-03-14

## 2024-03-05 RX ORDER — EPINEPHRINE 1 MG/ML
0.3 INJECTION, SOLUTION, CONCENTRATE INTRAVENOUS PRN
OUTPATIENT
Start: 2024-03-12

## 2024-03-05 RX ORDER — HEPARIN SODIUM (PORCINE) LOCK FLUSH IV SOLN 100 UNIT/ML 100 UNIT/ML
500 SOLUTION INTRAVENOUS PRN
OUTPATIENT
Start: 2024-03-14

## 2024-03-05 RX ORDER — HEPARIN SODIUM (PORCINE) LOCK FLUSH IV SOLN 100 UNIT/ML 100 UNIT/ML
500 SOLUTION INTRAVENOUS PRN
OUTPATIENT
Start: 2024-03-12

## 2024-03-05 RX ORDER — ALBUTEROL SULFATE 90 UG/1
4 AEROSOL, METERED RESPIRATORY (INHALATION) PRN
OUTPATIENT
Start: 2024-03-14

## 2024-03-05 RX ORDER — ALBUTEROL SULFATE 90 UG/1
4 AEROSOL, METERED RESPIRATORY (INHALATION) PRN
OUTPATIENT
Start: 2024-03-13

## 2024-03-05 RX ORDER — SODIUM CHLORIDE 9 MG/ML
INJECTION, SOLUTION INTRAVENOUS CONTINUOUS
OUTPATIENT
Start: 2024-03-13

## 2024-03-05 RX ORDER — SODIUM CHLORIDE 9 MG/ML
INJECTION, SOLUTION INTRAVENOUS CONTINUOUS
OUTPATIENT
Start: 2024-03-12

## 2024-03-05 RX ORDER — ONDANSETRON 2 MG/ML
8 INJECTION INTRAMUSCULAR; INTRAVENOUS
OUTPATIENT
Start: 2024-03-13

## 2024-03-05 RX ORDER — EPINEPHRINE 1 MG/ML
0.3 INJECTION, SOLUTION, CONCENTRATE INTRAVENOUS PRN
OUTPATIENT
Start: 2024-03-13

## 2024-03-05 RX ORDER — MEPERIDINE HYDROCHLORIDE 50 MG/ML
12.5 INJECTION INTRAMUSCULAR; INTRAVENOUS; SUBCUTANEOUS PRN
OUTPATIENT
Start: 2024-03-13

## 2024-03-05 RX ORDER — MEPERIDINE HYDROCHLORIDE 50 MG/ML
12.5 INJECTION INTRAMUSCULAR; INTRAVENOUS; SUBCUTANEOUS PRN
OUTPATIENT
Start: 2024-03-12

## 2024-03-05 RX ORDER — ACETAMINOPHEN 325 MG/1
650 TABLET ORAL
OUTPATIENT
Start: 2024-03-12

## 2024-03-05 RX ORDER — FAMOTIDINE 10 MG/ML
20 INJECTION, SOLUTION INTRAVENOUS
OUTPATIENT
Start: 2024-03-14

## 2024-03-05 RX ORDER — DIPHENHYDRAMINE HYDROCHLORIDE 50 MG/ML
50 INJECTION INTRAMUSCULAR; INTRAVENOUS
OUTPATIENT
Start: 2024-03-14

## 2024-03-05 RX ORDER — ALBUTEROL SULFATE 90 UG/1
4 AEROSOL, METERED RESPIRATORY (INHALATION) PRN
OUTPATIENT
Start: 2024-03-12

## 2024-03-05 RX ORDER — ONDANSETRON 2 MG/ML
8 INJECTION INTRAMUSCULAR; INTRAVENOUS
OUTPATIENT
Start: 2024-03-14

## 2024-03-05 RX ORDER — MEPERIDINE HYDROCHLORIDE 50 MG/ML
12.5 INJECTION INTRAMUSCULAR; INTRAVENOUS; SUBCUTANEOUS PRN
OUTPATIENT
Start: 2024-03-14

## 2024-03-05 RX ORDER — ACETAMINOPHEN 325 MG/1
650 TABLET ORAL
OUTPATIENT
Start: 2024-03-13

## 2024-03-05 RX ORDER — PALONOSETRON 0.05 MG/ML
0.25 INJECTION, SOLUTION INTRAVENOUS ONCE
OUTPATIENT
Start: 2024-03-12

## 2024-03-05 NOTE — PROGRESS NOTES
MA Rooming Questions  Patient: Lin Bates  MRN: [id number]    Date: 3/5/2024        1. Do you have any new issues?   no         2. Do you need any refills on medications?    no    3. Have you had any imaging done since your last visit?   no    4. Have you been hospitalized or seen in the emergency room since your last visit here?   no    5. Did the patient have a depression screening completed today? No    No data recorded     PHQ-9 Given to (if applicable):               PHQ-9 Score (if applicable):                     [] Positive     []  Negative              Does question #9 need addressed (if applicable)                     [] Yes    []  No               Phuong Agudelo MA

## 2024-03-05 NOTE — PROGRESS NOTES
Arrived to treatment suite for OV and scheduled chemo infusion.  ANC 1.1, improved from last blood draw on 2/2/24.  PIV placed without difficulty, CBC redrawn.  WBC 4.9, ANC 0.9.  Pt denies recent cold, cough or fever.     Assessment complete, denies concerns or questions at this time.  To office visit with Dr. Birch. Returned from OV, plan to defer treatment today.  Will return next Tuesday for lab draw and treatment if parameters are met.  Potassium level addressed, no new orders received for that.  Plan to hold hydroxyurea until further notice.  Pt verbalized understanding.  PIV removed per protocol. Note provided to patient for handicap placard per request.  Pharmacy and [facility] notified of deferred treatment.  Mickie Guerrero RN

## 2024-03-05 NOTE — PROGRESS NOTES
Weekly Radiation Treatment Progress Note    DATE OF SERVICE: 3/5/2024     DIAGNOSIS:  small cell lung cancer limited stage    TREATMENT COURSE:   Oncology History   Small cell lung cancer, left lower lobe (HCC)   2/13/2024 -  Chemotherapy    OP CISplatin 80mg/m2 + etoposide 100mg/m2 Q21D  Plan Provider: Burt Birch MD  Treatment goal: Curative  Line of treatment: 1st Line           Site: L lung & LNs   Current Total Radiation Dose: 200 cGy  Fraction: 1/30    Pt doing well. Energy fairly good.  No skin itching/soreness. Breathing stable. No dysphagia/odynophagia  Just started    EXAM  Wt Readings from Last 3 Encounters:   03/05/24 72 kg (158 lb 12.8 oz)   03/05/24 72 kg (158 lb 12.8 oz)   02/15/24 72.1 kg (159 lb)     NAD      Setup images, chart, plan reviewed    A/P:   Tolerating RT well  Continue RT as planned      Electronically signed by Hunter Guo MD on 3/5/2024 at 2:51 PM

## 2024-03-06 ENCOUNTER — HOSPITAL ENCOUNTER (OUTPATIENT)
Dept: RADIATION ONCOLOGY | Age: 69
Discharge: HOME | End: 2024-03-06
Payer: MEDICARE

## 2024-03-06 PROCEDURE — 77386 HC NTSTY MODUL RAD TX DLVR CPLX: CPT | Performed by: RADIOLOGY

## 2024-03-07 ENCOUNTER — HOSPITAL ENCOUNTER (OUTPATIENT)
Dept: RADIATION ONCOLOGY | Age: 69
Discharge: HOME | End: 2024-03-07
Payer: MEDICARE

## 2024-03-07 PROCEDURE — 77386 HC NTSTY MODUL RAD TX DLVR CPLX: CPT | Performed by: RADIOLOGY

## 2024-03-08 ENCOUNTER — HOSPITAL ENCOUNTER (OUTPATIENT)
Dept: RADIATION ONCOLOGY | Age: 69
Discharge: HOME | End: 2024-03-08
Payer: MEDICARE

## 2024-03-08 PROCEDURE — 77386 HC NTSTY MODUL RAD TX DLVR CPLX: CPT | Performed by: RADIOLOGY

## 2024-03-11 ENCOUNTER — HOSPITAL ENCOUNTER (OUTPATIENT)
Dept: INFUSION THERAPY | Age: 69
Discharge: HOME | End: 2024-03-11
Payer: MEDICARE

## 2024-03-11 ENCOUNTER — HOSPITAL ENCOUNTER (OUTPATIENT)
Dept: RADIATION ONCOLOGY | Age: 69
Discharge: HOME | End: 2024-03-11
Payer: MEDICARE

## 2024-03-11 DIAGNOSIS — C34.32 SMALL CELL LUNG CANCER, LEFT LOWER LOBE (HCC): ICD-10-CM

## 2024-03-11 DIAGNOSIS — Z11.59 NEED FOR HEPATITIS B SCREENING TEST: ICD-10-CM

## 2024-03-11 LAB
ALBUMIN SERPL-MCNC: 3.8 GM/DL (ref 3.4–5)
ALP BLD-CCNC: 74 IU/L (ref 40–129)
ALT SERPL-CCNC: 7 U/L (ref 10–40)
ANION GAP SERPL CALCULATED.3IONS-SCNC: 12 MMOL/L (ref 7–16)
AST SERPL-CCNC: 11 IU/L (ref 15–37)
BASOPHILS ABSOLUTE: 0 K/CU MM
BASOPHILS RELATIVE PERCENT: 0.1 % (ref 0–1)
BILIRUB SERPL-MCNC: 0.1 MG/DL (ref 0–1)
BUN SERPL-MCNC: 12 MG/DL (ref 6–23)
CALCIUM SERPL-MCNC: 9 MG/DL (ref 8.3–10.6)
CHLORIDE BLD-SCNC: 102 MMOL/L (ref 99–110)
CO2: 25 MMOL/L (ref 21–32)
CREAT SERPL-MCNC: 0.7 MG/DL (ref 0.6–1.1)
DIFFERENTIAL TYPE: ABNORMAL
EOSINOPHILS ABSOLUTE: 0 K/CU MM
EOSINOPHILS RELATIVE PERCENT: 0.1 % (ref 0–3)
GFR SERPL CREATININE-BSD FRML MDRD: >60 ML/MIN/1.73M2
GLUCOSE SERPL-MCNC: 160 MG/DL (ref 70–99)
HCT VFR BLD CALC: 32.8 % (ref 37–47)
HEMOGLOBIN: 10.4 GM/DL (ref 12.5–16)
LYMPHOCYTES ABSOLUTE: 2.3 K/CU MM
LYMPHOCYTES RELATIVE PERCENT: 35.1 % (ref 24–44)
MAGNESIUM: 2 MG/DL (ref 1.8–2.4)
MCH RBC QN AUTO: 35.4 PG (ref 27–31)
MCHC RBC AUTO-ENTMCNC: 31.7 % (ref 32–36)
MCV RBC AUTO: 111.6 FL (ref 78–100)
MONOCYTES ABSOLUTE: 1.5 K/CU MM
MONOCYTES RELATIVE PERCENT: 22.5 % (ref 0–4)
PDW BLD-RTO: 14.2 % (ref 11.7–14.9)
PHOSPHORUS: 4.2 MG/DL (ref 2.5–4.9)
PLATELET # BLD: 412 K/CU MM (ref 140–440)
PMV BLD AUTO: 10.2 FL (ref 7.5–11.1)
POTASSIUM SERPL-SCNC: 5.1 MMOL/L (ref 3.5–5.1)
RBC # BLD: 2.94 M/CU MM (ref 4.2–5.4)
SEGMENTED NEUTROPHILS ABSOLUTE COUNT: 2.8 K/CU MM
SEGMENTED NEUTROPHILS RELATIVE PERCENT: 42.2 % (ref 36–66)
SODIUM BLD-SCNC: 139 MMOL/L (ref 135–145)
TOTAL PROTEIN: 6.5 GM/DL (ref 6.4–8.2)
WBC # BLD: 6.7 K/CU MM (ref 4–10.5)

## 2024-03-11 PROCEDURE — 77336 RADIATION PHYSICS CONSULT: CPT | Performed by: RADIOLOGY

## 2024-03-11 PROCEDURE — 77386 HC NTSTY MODUL RAD TX DLVR CPLX: CPT | Performed by: RADIOLOGY

## 2024-03-11 PROCEDURE — 83735 ASSAY OF MAGNESIUM: CPT

## 2024-03-11 PROCEDURE — 80053 COMPREHEN METABOLIC PANEL: CPT

## 2024-03-11 PROCEDURE — 85025 COMPLETE CBC W/AUTO DIFF WBC: CPT

## 2024-03-11 PROCEDURE — 84100 ASSAY OF PHOSPHORUS: CPT

## 2024-03-11 PROCEDURE — 36415 COLL VENOUS BLD VENIPUNCTURE: CPT

## 2024-03-12 ENCOUNTER — HOSPITAL ENCOUNTER (OUTPATIENT)
Dept: RADIATION ONCOLOGY | Age: 69
Discharge: HOME | End: 2024-03-12
Payer: MEDICARE

## 2024-03-12 ENCOUNTER — HOSPITAL ENCOUNTER (OUTPATIENT)
Dept: INFUSION THERAPY | Age: 69
Discharge: HOME | End: 2024-03-12
Payer: MEDICARE

## 2024-03-12 VITALS
OXYGEN SATURATION: 96 % | WEIGHT: 163 LBS | SYSTOLIC BLOOD PRESSURE: 114 MMHG | HEART RATE: 70 BPM | HEIGHT: 60 IN | DIASTOLIC BLOOD PRESSURE: 56 MMHG | BODY MASS INDEX: 32 KG/M2 | TEMPERATURE: 96.9 F

## 2024-03-12 VITALS — WEIGHT: 163 LBS | BODY MASS INDEX: 31.83 KG/M2

## 2024-03-12 DIAGNOSIS — C34.32 SMALL CELL LUNG CANCER, LEFT LOWER LOBE (HCC): ICD-10-CM

## 2024-03-12 DIAGNOSIS — Z11.59 NEED FOR HEPATITIS B SCREENING TEST: Primary | ICD-10-CM

## 2024-03-12 PROCEDURE — 77386 HC NTSTY MODUL RAD TX DLVR CPLX: CPT | Performed by: RADIOLOGY

## 2024-03-12 PROCEDURE — 96417 CHEMO IV INFUS EACH ADDL SEQ: CPT

## 2024-03-12 PROCEDURE — 96413 CHEMO IV INFUSION 1 HR: CPT

## 2024-03-12 PROCEDURE — 96367 TX/PROPH/DG ADDL SEQ IV INF: CPT

## 2024-03-12 PROCEDURE — 77014 CHG CT GUIDANCE RADIATION THERAPY FLDS PLACEMENT: CPT | Performed by: RADIOLOGY

## 2024-03-12 PROCEDURE — 96361 HYDRATE IV INFUSION ADD-ON: CPT

## 2024-03-12 PROCEDURE — 96415 CHEMO IV INFUSION ADDL HR: CPT

## 2024-03-12 PROCEDURE — 96375 TX/PRO/DX INJ NEW DRUG ADDON: CPT

## 2024-03-12 PROCEDURE — 2580000003 HC RX 258: Performed by: INTERNAL MEDICINE

## 2024-03-12 PROCEDURE — 6360000002 HC RX W HCPCS: Performed by: INTERNAL MEDICINE

## 2024-03-12 RX ORDER — PALONOSETRON 0.05 MG/ML
0.25 INJECTION, SOLUTION INTRAVENOUS ONCE
Status: COMPLETED | OUTPATIENT
Start: 2024-03-12 | End: 2024-03-12

## 2024-03-12 RX ORDER — SODIUM CHLORIDE 9 MG/ML
5-250 INJECTION, SOLUTION INTRAVENOUS PRN
Status: DISCONTINUED | OUTPATIENT
Start: 2024-03-12 | End: 2024-03-13 | Stop reason: HOSPADM

## 2024-03-12 RX ADMIN — POTASSIUM CHLORIDE: 2 INJECTION, SOLUTION, CONCENTRATE INTRAVENOUS at 09:22

## 2024-03-12 RX ADMIN — SODIUM CHLORIDE 150 MG: 9 INJECTION, SOLUTION INTRAVENOUS at 10:33

## 2024-03-12 RX ADMIN — ETOPOSIDE 174 MG: 20 INJECTION, SOLUTION INTRAVENOUS at 11:23

## 2024-03-12 RX ADMIN — PALONOSETRON 0.25 MG: 0.25 INJECTION, SOLUTION INTRAVENOUS at 10:29

## 2024-03-12 RX ADMIN — DEXAMETHASONE SODIUM PHOSPHATE 12 MG: 4 INJECTION INTRA-ARTICULAR; INTRALESIONAL; INTRAMUSCULAR; INTRAVENOUS; SOFT TISSUE at 10:59

## 2024-03-12 RX ADMIN — POTASSIUM CHLORIDE: 2 INJECTION, SOLUTION, CONCENTRATE INTRAVENOUS at 12:33

## 2024-03-12 RX ADMIN — CISPLATIN 139 MG: 1 INJECTION INTRAVENOUS at 12:35

## 2024-03-12 ASSESSMENT — PAIN SCALES - GENERAL: PAINLEVEL_OUTOF10: 0

## 2024-03-12 NOTE — PROGRESS NOTES
MOM CALLED ABOUT GREER LOOKING FOR ANY NOTES SHOWING HE HAS ADD SO HE CAN GET EXTRA TIME FOR TESTS AT [facility] PLEASE CALL MOM BACK   Pt here for tx.with pre and post hydration.  PIV placed with blood return noted. CBC CMP Mag reviewed from 3/11 and within defined limits. Pt has no concerns or issues to discuss at this time.     Patient's status assessed and documented appropriately.  All labs and required results were also reviewed today.  Treatment parameters have been reviewed.  Today's treatment has been approved by the provider.      Treatment orders and medication sequencing (when applicable) was verified by 2 registered nurses.  The treatment plan was confirmed with the patient prior to administration, and the patient understands the need to report any treatment-related symptoms.    Prior to administration, when applicable, the following 8 elements of medication administration were reviewed with 2nd Registered Nurse prior to dosing: drug name, drug dose, infusion volume when prepared in a syringe, rate of administration, expiration dates and/or times, appearance and integrity of drug(s), and rate of pump for infusion.  The 5 rights of medication administration have been verified.      Pt tolerated tx without incident left ambulatory discharge instructions given

## 2024-03-12 NOTE — PROGRESS NOTES
Weekly Radiation Treatment Progress Note    DATE OF SERVICE: 3/12/2024     DIAGNOSIS:  small cell lung cancer limited stage     TREATMENT COURSE:   Oncology History   Small cell lung cancer, left lower lobe (HCC)   2/13/2024 -  Chemotherapy    OP CISplatin 80mg/m2 + etoposide 100mg/m2 Q21D  Plan Provider: Burt Birch MD  Treatment goal: Curative  Line of treatment: 1st Line           Site: L lung   Current Total Radiation Dose: 1200 cGy  Fraction: 6/30    Pt doing well. Energy fairly good.  No skin itching/soreness. Breathing stable. No dysphagia/odynophagia  Chemo today. No new problems or issues.     EXAM  Wt Readings from Last 3 Encounters:   03/12/24 73.9 kg (163 lb)   03/05/24 72 kg (158 lb 12.8 oz)   03/05/24 72 kg (158 lb 12.8 oz)     NAD      Setup images, chart, plan reviewed    A/P:   Tolerating RT well  Continue RT as planned      Electronically signed by Hunter Guo MD on 3/12/2024 at 10:26 AM

## 2024-03-12 NOTE — PROGRESS NOTES
03/12/24 1209   Encounter Summary   Encounter Overview/Reason  Follow-up   Service Provided For: Patient and family together   Referral/Consult From: [facility]   Support System Family members   Last Encounter  03/12/24  (PT doing well, dealing with cancer and doing chemo, PT believes in God and wanted prayer, she has support with her boyfriend, calm and coping well.)   Complexity of Encounter Low   Begin Time 1200   End Time  1210   Total Time Calculated 10 min   Spiritual/Emotional needs   Type Spiritual Support   Assessment/Intervention/Outcome   Assessment Calm   Intervention Active listening;Discussed illness injury and it’s impact;Discussed meaning/purpose;Prayer (assurance of)/[place];Sustaining Presence/Ministry of presence   Outcome Acceptance;Coping;Encouraged;Expressed Gratitude;Peace   Plan and Referrals   Plan/Referrals Continue Support (comment)

## 2024-03-13 ENCOUNTER — HOSPITAL ENCOUNTER (OUTPATIENT)
Dept: INFUSION THERAPY | Age: 69
Discharge: HOME | End: 2024-03-13
Payer: MEDICARE

## 2024-03-13 ENCOUNTER — HOSPITAL ENCOUNTER (OUTPATIENT)
Dept: RADIATION ONCOLOGY | Age: 69
Discharge: HOME | End: 2024-03-13
Payer: MEDICARE

## 2024-03-13 VITALS
SYSTOLIC BLOOD PRESSURE: 111 MMHG | WEIGHT: 164.9 LBS | HEART RATE: 89 BPM | HEIGHT: 60 IN | OXYGEN SATURATION: 99 % | BODY MASS INDEX: 32.38 KG/M2 | DIASTOLIC BLOOD PRESSURE: 51 MMHG | TEMPERATURE: 97.8 F

## 2024-03-13 DIAGNOSIS — Z11.59 NEED FOR HEPATITIS B SCREENING TEST: Primary | ICD-10-CM

## 2024-03-13 DIAGNOSIS — C34.32 SMALL CELL LUNG CANCER, LEFT LOWER LOBE (HCC): ICD-10-CM

## 2024-03-13 PROCEDURE — 96413 CHEMO IV INFUSION 1 HR: CPT

## 2024-03-13 PROCEDURE — 96375 TX/PRO/DX INJ NEW DRUG ADDON: CPT

## 2024-03-13 PROCEDURE — 6360000002 HC RX W HCPCS: Performed by: INTERNAL MEDICINE

## 2024-03-13 PROCEDURE — 77386 HC NTSTY MODUL RAD TX DLVR CPLX: CPT | Performed by: RADIOLOGY

## 2024-03-13 PROCEDURE — 2580000003 HC RX 258: Performed by: INTERNAL MEDICINE

## 2024-03-13 RX ORDER — DEXAMETHASONE SODIUM PHOSPHATE 4 MG/ML
8 INJECTION, SOLUTION INTRA-ARTICULAR; INTRALESIONAL; INTRAMUSCULAR; INTRAVENOUS; SOFT TISSUE ONCE
Status: COMPLETED | OUTPATIENT
Start: 2024-03-13 | End: 2024-03-13

## 2024-03-13 RX ORDER — SODIUM CHLORIDE 9 MG/ML
5-250 INJECTION, SOLUTION INTRAVENOUS PRN
Status: DISCONTINUED | OUTPATIENT
Start: 2024-03-13 | End: 2024-03-14 | Stop reason: HOSPADM

## 2024-03-13 RX ADMIN — SODIUM CHLORIDE 174 MG: 0.9 INJECTION, SOLUTION INTRAVENOUS at 12:06

## 2024-03-13 RX ADMIN — SODIUM CHLORIDE 20 ML/HR: 9 INJECTION, SOLUTION INTRAVENOUS at 12:03

## 2024-03-13 RX ADMIN — DEXAMETHASONE SODIUM PHOSPHATE 8 MG: 4 INJECTION, SOLUTION INTRAMUSCULAR; INTRAVENOUS at 12:03

## 2024-03-13 NOTE — PROGRESS NOTES
Pt here for day 2 Etoposide infusion. PIV placed with blood return noted. Pt has no concerns or issues to discuss at this time.     Patient's status assessed and documented appropriately.  All labs and required results were also reviewed today.  Treatment parameters have been reviewed.  Today's treatment has been approved by the provider.        Treatment orders and medication sequencing (when applicable) was verified by 2 registered nurses.  The treatment plan was confirmed with the patient prior to administration, and the patient understands the need to report any treatment-related symptoms.    Prior to administration, when applicable, the following 8 elements of medication administration were reviewed with 2nd Registered Nurse prior to dosing: drug name, drug dose, infusion volume when prepared in a syringe, rate of administration, expiration dates and/or times, appearance and integrity of drug(s), and rate of pump for infusion.  The 5 rights of medication administration have been verified.      Pt tolerated tx without incident left ambulatory declined discharge instructions

## 2024-03-14 ENCOUNTER — HOSPITAL ENCOUNTER (OUTPATIENT)
Dept: INFUSION THERAPY | Age: 69
Discharge: HOME | End: 2024-03-14
Payer: MEDICARE

## 2024-03-14 ENCOUNTER — HOSPITAL ENCOUNTER (OUTPATIENT)
Dept: RADIATION ONCOLOGY | Age: 69
Discharge: HOME | End: 2024-03-14
Payer: MEDICARE

## 2024-03-14 VITALS
HEART RATE: 75 BPM | TEMPERATURE: 98.7 F | BODY MASS INDEX: 33.85 KG/M2 | DIASTOLIC BLOOD PRESSURE: 59 MMHG | OXYGEN SATURATION: 96 % | SYSTOLIC BLOOD PRESSURE: 132 MMHG | WEIGHT: 172.4 LBS | HEIGHT: 60 IN

## 2024-03-14 DIAGNOSIS — C34.32 SMALL CELL LUNG CANCER, LEFT LOWER LOBE (HCC): ICD-10-CM

## 2024-03-14 DIAGNOSIS — Z11.59 NEED FOR HEPATITIS B SCREENING TEST: Primary | ICD-10-CM

## 2024-03-14 PROCEDURE — 77386 HC NTSTY MODUL RAD TX DLVR CPLX: CPT | Performed by: RADIOLOGY

## 2024-03-14 PROCEDURE — 2580000003 HC RX 258: Performed by: INTERNAL MEDICINE

## 2024-03-14 PROCEDURE — 96375 TX/PRO/DX INJ NEW DRUG ADDON: CPT

## 2024-03-14 PROCEDURE — 96413 CHEMO IV INFUSION 1 HR: CPT

## 2024-03-14 PROCEDURE — 6360000002 HC RX W HCPCS: Performed by: INTERNAL MEDICINE

## 2024-03-14 RX ORDER — SODIUM CHLORIDE 9 MG/ML
5-250 INJECTION, SOLUTION INTRAVENOUS PRN
Status: DISCONTINUED | OUTPATIENT
Start: 2024-03-14 | End: 2024-03-15 | Stop reason: HOSPADM

## 2024-03-14 RX ORDER — DEXAMETHASONE SODIUM PHOSPHATE 4 MG/ML
8 INJECTION, SOLUTION INTRA-ARTICULAR; INTRALESIONAL; INTRAMUSCULAR; INTRAVENOUS; SOFT TISSUE ONCE
Status: COMPLETED | OUTPATIENT
Start: 2024-03-14 | End: 2024-03-14

## 2024-03-14 RX ADMIN — DEXAMETHASONE SODIUM PHOSPHATE 8 MG: 4 INJECTION, SOLUTION INTRAMUSCULAR; INTRAVENOUS at 11:47

## 2024-03-14 RX ADMIN — SODIUM CHLORIDE 20 ML/HR: 9 INJECTION, SOLUTION INTRAVENOUS at 11:47

## 2024-03-14 RX ADMIN — SODIUM CHLORIDE 174 MG: 0.9 INJECTION, SOLUTION INTRAVENOUS at 12:29

## 2024-03-14 NOTE — PROGRESS NOTES
Pt here for day 3 tx. PIV placed by Maria C LOUISE with blood return noted. Slight increase in weight pt states no SOB, no swelling noted by this RN to legs or abdomen. Pt has no concerns or issues to discuss at this time.     Patient's status assessed and documented appropriately.  All labs and required results were also reviewed today.  Treatment parameters have been reviewed.  Today's treatment has been approved by the provider.        Treatment orders and medication sequencing (when applicable) was verified by 2 registered nurses.  The treatment plan was confirmed with the patient prior to administration, and the patient understands the need to report any treatment-related symptoms.    Prior to administration, when applicable, the following 8 elements of medication administration were reviewed with 2nd Registered Nurse prior to dosing: drug name, drug dose, infusion volume when prepared in a syringe, rate of administration, expiration dates and/or times, appearance and integrity of drug(s), and rate of pump for infusion.  The 5 rights of medication administration have been verified.      Pt tolerated tx without incident left ambulatory discharge instructions given

## 2024-03-15 ENCOUNTER — HOSPITAL ENCOUNTER (OUTPATIENT)
Dept: RADIATION ONCOLOGY | Age: 69
Discharge: HOME | End: 2024-03-15
Payer: MEDICARE

## 2024-03-15 PROCEDURE — 77386 HC NTSTY MODUL RAD TX DLVR CPLX: CPT | Performed by: RADIOLOGY

## 2024-03-18 ENCOUNTER — HOSPITAL ENCOUNTER (OUTPATIENT)
Dept: RADIATION ONCOLOGY | Age: 69
Discharge: HOME | End: 2024-03-18
Payer: MEDICARE

## 2024-03-18 PROCEDURE — 77014 CHG CT GUIDANCE RADIATION THERAPY FLDS PLACEMENT: CPT | Performed by: RADIOLOGY

## 2024-03-18 PROCEDURE — 77386 HC NTSTY MODUL RAD TX DLVR CPLX: CPT | Performed by: RADIOLOGY

## 2024-03-18 PROCEDURE — 77336 RADIATION PHYSICS CONSULT: CPT | Performed by: RADIOLOGY

## 2024-03-19 ENCOUNTER — HOSPITAL ENCOUNTER (OUTPATIENT)
Dept: RADIATION ONCOLOGY | Age: 69
Discharge: HOME | End: 2024-03-19
Payer: MEDICARE

## 2024-03-19 VITALS — WEIGHT: 172 LBS | BODY MASS INDEX: 33.59 KG/M2

## 2024-03-19 PROCEDURE — 77014 CHG CT GUIDANCE RADIATION THERAPY FLDS PLACEMENT: CPT | Performed by: RADIOLOGY

## 2024-03-19 PROCEDURE — 77386 HC NTSTY MODUL RAD TX DLVR CPLX: CPT | Performed by: RADIOLOGY

## 2024-03-19 RX ORDER — SUCRALFATE ORAL 1 G/10ML
1 SUSPENSION ORAL
Qty: 414 ML | Refills: 3 | Status: SHIPPED | OUTPATIENT
Start: 2024-03-19

## 2024-03-19 RX ORDER — FAMOTIDINE 20 MG/1
20 TABLET, FILM COATED ORAL DAILY
Qty: 30 TABLET | Refills: 2 | Status: SHIPPED | OUTPATIENT
Start: 2024-03-19

## 2024-03-19 ASSESSMENT — PAIN SCALES - GENERAL: PAINLEVEL_OUTOF10: 0

## 2024-03-19 NOTE — PLAN OF CARE
Care plan reviewed.     _ Pt with c/o \"heartburn, frequent belching and trapping of gas- rx for carafate sent to pharmacy and pt advised to start OTC antiacid per Dr. Guo.    _ Pt with increased fatigue, tolerating ADLs with periods of rest.

## 2024-03-19 NOTE — PROGRESS NOTES
Weekly Radiation Treatment Progress Note    DATE OF SERVICE: 3/19/2024     DIAGNOSIS:   small cell lung cancer limited stage     TREATMENT COURSE:   Oncology History   Small cell lung cancer, left lower lobe (HCC)   2/13/2024 -  Chemotherapy    OP CISplatin 80mg/m2 + etoposide 100mg/m2 Q21D  Plan Provider: Burt Birch MD  Treatment goal: Curative  Line of treatment: 1st Line           Site: L lung   Current Total Radiation Dose: 2200 cGy  Fraction: 11/30    Pt doing well. Energy fairly good.  Some esophagititis. Tolerating chemo.       EXAM  Wt Readings from Last 3 Encounters:   03/14/24 78.2 kg (172 lb 6.4 oz)   03/13/24 74.8 kg (164 lb 14.4 oz)   03/12/24 73.9 kg (163 lb)     NAD    Setup images, chart, plan reviewed    A/P:   Tolerating RT well  Continue RT as planned  Rx pepcid and carafate      Electronically signed by Hunter Guo MD on 3/19/2024 at 2:46 PM

## 2024-03-20 ENCOUNTER — HOSPITAL ENCOUNTER (OUTPATIENT)
Dept: RADIATION ONCOLOGY | Age: 69
Discharge: HOME | End: 2024-03-20
Payer: MEDICARE

## 2024-03-20 PROCEDURE — 77386 HC NTSTY MODUL RAD TX DLVR CPLX: CPT | Performed by: RADIOLOGY

## 2024-03-21 ENCOUNTER — HOSPITAL ENCOUNTER (OUTPATIENT)
Dept: RADIATION ONCOLOGY | Age: 69
Discharge: HOME | End: 2024-03-21
Payer: MEDICARE

## 2024-03-21 PROCEDURE — 77386 HC NTSTY MODUL RAD TX DLVR CPLX: CPT | Performed by: RADIOLOGY

## 2024-03-22 ENCOUNTER — HOSPITAL ENCOUNTER (OUTPATIENT)
Dept: RADIATION ONCOLOGY | Age: 69
Discharge: HOME | End: 2024-03-22
Payer: MEDICARE

## 2024-03-22 PROCEDURE — 77386 HC NTSTY MODUL RAD TX DLVR CPLX: CPT | Performed by: RADIOLOGY

## 2024-03-25 ENCOUNTER — HOSPITAL ENCOUNTER (OUTPATIENT)
Dept: RADIATION ONCOLOGY | Age: 69
Discharge: HOME | End: 2024-03-25
Payer: MEDICARE

## 2024-03-25 PROCEDURE — 77014 CHG CT GUIDANCE RADIATION THERAPY FLDS PLACEMENT: CPT | Performed by: RADIOLOGY

## 2024-03-25 PROCEDURE — 77386 HC NTSTY MODUL RAD TX DLVR CPLX: CPT | Performed by: RADIOLOGY

## 2024-03-25 PROCEDURE — 77336 RADIATION PHYSICS CONSULT: CPT | Performed by: RADIOLOGY

## 2024-03-26 ENCOUNTER — OFFICE VISIT (OUTPATIENT)
Dept: ONCOLOGY | Age: 69
End: 2024-03-26
Payer: MEDICARE

## 2024-03-26 ENCOUNTER — HOSPITAL ENCOUNTER (OUTPATIENT)
Dept: INFUSION THERAPY | Age: 69
Discharge: HOME | End: 2024-03-26
Payer: MEDICARE

## 2024-03-26 ENCOUNTER — HOSPITAL ENCOUNTER (OUTPATIENT)
Dept: RADIATION ONCOLOGY | Age: 69
Discharge: HOME | End: 2024-03-26
Payer: MEDICARE

## 2024-03-26 VITALS
OXYGEN SATURATION: 96 % | BODY MASS INDEX: 33.59 KG/M2 | DIASTOLIC BLOOD PRESSURE: 51 MMHG | HEIGHT: 60 IN | HEART RATE: 100 BPM | RESPIRATION RATE: 16 BRPM | SYSTOLIC BLOOD PRESSURE: 104 MMHG | TEMPERATURE: 97.6 F

## 2024-03-26 VITALS — BODY MASS INDEX: 31.03 KG/M2 | WEIGHT: 158.9 LBS

## 2024-03-26 DIAGNOSIS — C34.32 SMALL CELL LUNG CANCER, LEFT LOWER LOBE (HCC): Primary | ICD-10-CM

## 2024-03-26 PROCEDURE — 99211 OFF/OP EST MAY X REQ PHY/QHP: CPT

## 2024-03-26 PROCEDURE — 77386 HC NTSTY MODUL RAD TX DLVR CPLX: CPT | Performed by: RADIOLOGY

## 2024-03-26 PROCEDURE — 3017F COLORECTAL CA SCREEN DOC REV: CPT | Performed by: INTERNAL MEDICINE

## 2024-03-26 PROCEDURE — 1124F ACP DISCUSS-NO DSCNMKR DOCD: CPT | Performed by: INTERNAL MEDICINE

## 2024-03-26 PROCEDURE — G8427 DOCREV CUR MEDS BY ELIG CLIN: HCPCS | Performed by: INTERNAL MEDICINE

## 2024-03-26 PROCEDURE — 1036F TOBACCO NON-USER: CPT | Performed by: INTERNAL MEDICINE

## 2024-03-26 PROCEDURE — 77014 CHG CT GUIDANCE RADIATION THERAPY FLDS PLACEMENT: CPT | Performed by: RADIOLOGY

## 2024-03-26 PROCEDURE — G8484 FLU IMMUNIZE NO ADMIN: HCPCS | Performed by: INTERNAL MEDICINE

## 2024-03-26 PROCEDURE — 1090F PRES/ABSN URINE INCON ASSESS: CPT | Performed by: INTERNAL MEDICINE

## 2024-03-26 PROCEDURE — 99214 OFFICE O/P EST MOD 30 MIN: CPT | Performed by: INTERNAL MEDICINE

## 2024-03-26 PROCEDURE — G8417 CALC BMI ABV UP PARAM F/U: HCPCS | Performed by: INTERNAL MEDICINE

## 2024-03-26 PROCEDURE — G8399 PT W/DXA RESULTS DOCUMENT: HCPCS | Performed by: INTERNAL MEDICINE

## 2024-03-26 ASSESSMENT — PAIN - FUNCTIONAL ASSESSMENT: PAIN_FUNCTIONAL_ASSESSMENT: PREVENTS OR INTERFERES SOME ACTIVE ACTIVITIES AND ADLS

## 2024-03-26 ASSESSMENT — PAIN DESCRIPTION - FREQUENCY: FREQUENCY: INTERMITTENT

## 2024-03-26 ASSESSMENT — PAIN SCALES - GENERAL: PAINLEVEL_OUTOF10: 4

## 2024-03-26 ASSESSMENT — PAIN DESCRIPTION - PAIN TYPE: TYPE: ACUTE PAIN

## 2024-03-26 ASSESSMENT — PAIN DESCRIPTION - DESCRIPTORS: DESCRIPTORS: BURNING;SORE

## 2024-03-26 NOTE — PROGRESS NOTES
Patient Name: Lin Bates  Patient : 1955  Patient MRN: [id number]     Primary Oncologist: Burt Birch MD  Referring Provider: Dorothy Terrazas PA     Date of Service: 3/26/2024      Chief Complaint:   Chief Complaint   Patient presents with    Follow-up     Patient Active Problem List:       Leukocytosis       Thrombocytosis    HPI:   Ms. Bates is a 68-year-old very pleasant female with medical history significant for hypertension, hyperlipidemia, diabetes mellitus, history of HPV infection, initially referred to me on 2014, for evaluation of mild leukocytosis.       She was found to have mild leukocytosis on routine blood tests done on 2014, by her primary care physician. She is a chronic smoker and she smokes about 10 cigarettes a day since she was 14.     Laboratory workup on 2014, showed slight elevation in white blood cell count (13). Her hemoglobin, hematocrit, platelet count and LDH were normal. There was no significant immunophenotypic abnormalities in flow cytometry. JAK2  and bcr/abl studies were negative.     She has been followed periodically since then. She missed follow up with me since 2019 until 2022.     Laboratory work ups done on 22 showed leukocytosis (WBC 16.1), thrombocytosis (platelet 651) and flow cytometry showed monoclonal B-cell population (~450 cells/cumm). The immunophenotype of the clonal cells is non-specific. DDX includes peripheral blood involvement by a B-cell lymphoma and monoclonal B-cell lymphocytosis.     The rests of the blood tests, including BCR-ABL1, JAK2 V617F, JAK2 exon 12-13, MPL, CALR, MARIKA, ESR and LDH, were within normal range.      CT scan of the neck, chest, abdomen and pelvis done on 2022 showed no significant pathology in her neck.  No splenomegaly or overt lymphadenopathy.  There are scattered mildly prominent left internal mammary, upper abdominal and left common iliac lymph nodes.

## 2024-03-26 NOTE — PLAN OF CARE
Care plan reviewed.      _ Pt with increased \"heartburn/indigestion\"- taking carafate and OTC anti acid but states not helping much. BMX sent to [facility] per Dr. Birch. Advised to eat smaller meals, stick with bland/softer foods and don't lay down right after eating. Weight down today, advised to supplement with nutritional shakes to increase calorie intake.     _ Pt with increased fatigue, tolerating ADLs with periods of rest.

## 2024-03-26 NOTE — PROGRESS NOTES
Weekly Radiation Treatment Progress Note    DATE OF SERVICE: 3/26/2024     DIAGNOSIS:  Cancer Staging   No matching staging information was found for the patient.     TREATMENT COURSE:   Oncology History   Small cell lung cancer, left lower lobe (HCC)   2/13/2024 -  Chemotherapy    OP CISplatin 80mg/m2 + etoposide 100mg/m2 Q21D  Plan Provider: Burt Birch MD  Treatment goal: Curative  Line of treatment: 1st Line           Site: Small Cell   Current Total Radiation Dose: *** cGy    Pt doing well. Energy fair.  No skin itching/soreness. Breathing stable. + dysphagia/odynophagia- on Carafate      EXAM  Wt Readings from Last 3 Encounters:   03/19/24 78 kg (172 lb)   03/14/24 78.2 kg (172 lb 6.4 oz)   03/13/24 74.8 kg (164 lb 14.4 oz)     NAD      Setup images, chart, plan reviewed    A/P:   Tolerating RT well  Continue RT as planned      Electronically signed by Josephine Carmen MD on 3/26/2024 at 3:04 PM

## 2024-03-26 NOTE — PROGRESS NOTES
Weekly Radiation Treatment Progress Note    DATE OF SERVICE: 3/26/2024     DIAGNOSIS:   Cancer Staging   No matching staging information was found for the patient.     TREATMENT COURSE:   Oncology History   Small cell lung cancer, left lower lobe (HCC)   2/13/2024 -  Chemotherapy    OP CISplatin 80mg/m2 + etoposide 100mg/m2 Q21D  Plan Provider: Burt Birch MD  Treatment goal: Curative  Line of treatment: 1st Line           Site: L Lung LNs  Current Total Radiation Dose: 32 Gy    Pt doing well. Energy fairly good.  No skin itching/soreness.  + Dysphagia/odynophagia, despite Carafate, she reports particularly in the lower esophagus.    EXAM  Wt Readings from Last 3 Encounters:   03/26/24 72.1 kg (158 lb 14.4 oz)   03/19/24 78 kg (172 lb)   03/14/24 78.2 kg (172 lb 6.4 oz)     NAD      Setup images, chart, plan reviewed    A/P:   Tolerating RT well  Continue RT as planned  Start BMX.  Increase p.o. intake.      Electronically signed by Josephine Carmen MD on 3/26/2024 at 3:38 PM

## 2024-03-26 NOTE — PROGRESS NOTES
MA Rooming Questions  Patient: Lin Bates  MRN: [id number]    Date: 3/26/2024        1. Do you have any new issues?   no         2. Do you need any refills on medications?    no    3. Have you had any imaging done since your last visit?   no    4. Have you been hospitalized or seen in the emergency room since your last visit here?   no    5. Did the patient have a depression screening completed today? No    No data recorded     PHQ-9 Given to (if applicable):               PHQ-9 Score (if applicable):                     [] Positive     []  Negative              Does question #9 need addressed (if applicable)                     [] Yes    []  No               Cristina Iglesias MA

## 2024-03-27 ENCOUNTER — HOSPITAL ENCOUNTER (OUTPATIENT)
Dept: RADIATION ONCOLOGY | Age: 69
Discharge: HOME | End: 2024-03-27
Payer: MEDICARE

## 2024-03-27 PROCEDURE — 77386 HC NTSTY MODUL RAD TX DLVR CPLX: CPT | Performed by: RADIOLOGY

## 2024-03-27 PROCEDURE — 77014 CHG CT GUIDANCE RADIATION THERAPY FLDS PLACEMENT: CPT | Performed by: RADIOLOGY

## 2024-03-28 ENCOUNTER — HOSPITAL ENCOUNTER (OUTPATIENT)
Dept: RADIATION ONCOLOGY | Age: 69
Discharge: HOME | End: 2024-03-28
Payer: MEDICARE

## 2024-03-28 PROCEDURE — 77014 CHG CT GUIDANCE RADIATION THERAPY FLDS PLACEMENT: CPT | Performed by: RADIOLOGY

## 2024-03-28 PROCEDURE — 77386 HC NTSTY MODUL RAD TX DLVR CPLX: CPT | Performed by: RADIOLOGY

## 2024-03-29 ENCOUNTER — HOSPITAL ENCOUNTER (OUTPATIENT)
Dept: RADIATION ONCOLOGY | Age: 69
Discharge: HOME | End: 2024-03-29
Payer: MEDICARE

## 2024-03-29 DIAGNOSIS — C34.32 SMALL CELL LUNG CANCER, LEFT LOWER LOBE (HCC): ICD-10-CM

## 2024-03-29 DIAGNOSIS — Z11.59 NEED FOR HEPATITIS B SCREENING TEST: Primary | ICD-10-CM

## 2024-04-01 ENCOUNTER — CLINICAL DOCUMENTATION (OUTPATIENT)
Dept: ONCOLOGY | Age: 69
End: 2024-04-01

## 2024-04-01 ENCOUNTER — HOSPITAL ENCOUNTER (OUTPATIENT)
Dept: INFUSION THERAPY | Age: 69
Discharge: HOME | End: 2024-04-01
Payer: MEDICARE

## 2024-04-01 ENCOUNTER — HOSPITAL ENCOUNTER (OUTPATIENT)
Dept: RADIATION ONCOLOGY | Age: 69
Discharge: HOME | End: 2024-04-01
Payer: MEDICARE

## 2024-04-01 DIAGNOSIS — Z11.59 NEED FOR HEPATITIS B SCREENING TEST: ICD-10-CM

## 2024-04-01 DIAGNOSIS — C34.32 SMALL CELL LUNG CANCER, LEFT LOWER LOBE (HCC): ICD-10-CM

## 2024-04-01 LAB
ALBUMIN SERPL-MCNC: 3.9 GM/DL (ref 3.4–5)
ALP BLD-CCNC: 69 IU/L (ref 40–128)
ALT SERPL-CCNC: 7 U/L (ref 10–40)
ANION GAP SERPL CALCULATED.3IONS-SCNC: 13 MMOL/L (ref 7–16)
AST SERPL-CCNC: 12 IU/L (ref 15–37)
BANDED NEUTROPHILS ABSOLUTE COUNT: 0.46 K/CU MM
BANDED NEUTROPHILS RELATIVE PERCENT: 7 % (ref 5–11)
BASOPHILS ABSOLUTE: 0.1 K/CU MM
BASOPHILS RELATIVE PERCENT: 1 % (ref 0–1)
BILIRUB SERPL-MCNC: 0.2 MG/DL (ref 0–1)
BUN SERPL-MCNC: 13 MG/DL (ref 6–23)
CALCIUM SERPL-MCNC: 9.2 MG/DL (ref 8.3–10.6)
CHLORIDE BLD-SCNC: 100 MMOL/L (ref 99–110)
CO2: 26 MMOL/L (ref 21–32)
CREAT SERPL-MCNC: 0.6 MG/DL (ref 0.6–1.1)
DIFFERENTIAL TYPE: ABNORMAL
EOSINOPHILS ABSOLUTE: 0.1 K/CU MM
EOSINOPHILS RELATIVE PERCENT: 1 % (ref 0–3)
GFR SERPL CREATININE-BSD FRML MDRD: >90 ML/MIN/1.73M2
GLUCOSE SERPL-MCNC: 127 MG/DL (ref 70–99)
HCT VFR BLD CALC: 26.3 % (ref 37–47)
HEMOGLOBIN: 9.3 GM/DL (ref 12.5–16)
LYMPHOCYTES ABSOLUTE: 0.8 K/CU MM
LYMPHOCYTES RELATIVE PERCENT: 13 % (ref 24–44)
MAGNESIUM: 1.8 MG/DL (ref 1.8–2.4)
MCH RBC QN AUTO: 39.6 PG (ref 27–31)
MCHC RBC AUTO-ENTMCNC: 35.4 % (ref 32–36)
MCV RBC AUTO: 111.9 FL (ref 78–100)
METAMYELOCYTES ABSOLUTE COUNT: 0.26 K/CU MM
METAMYELOCYTES PERCENT: 4 %
MONOCYTES ABSOLUTE: 0.5 K/CU MM
MONOCYTES RELATIVE PERCENT: 8 % (ref 0–4)
PDW BLD-RTO: 18.9 % (ref 11.7–14.9)
PHOSPHORUS: 3.6 MG/DL (ref 2.5–4.9)
PLATELET # BLD: 61 K/CU MM (ref 140–440)
PMV BLD AUTO: 10.4 FL (ref 7.5–11.1)
POTASSIUM SERPL-SCNC: 4.4 MMOL/L (ref 3.5–5.1)
RBC # BLD: 2.35 M/CU MM (ref 4.2–5.4)
RBC # BLD: ABNORMAL 10*6/UL
SEGMENTED NEUTROPHILS ABSOLUTE COUNT: 4.3 K/CU MM
SEGMENTED NEUTROPHILS RELATIVE PERCENT: 66 % (ref 36–66)
SODIUM BLD-SCNC: 139 MMOL/L (ref 135–145)
STOMATOCYTES: ABNORMAL
TOTAL PROTEIN: 7 GM/DL (ref 6.4–8.2)
WBC # BLD: 6.5 K/CU MM (ref 4–10.5)

## 2024-04-01 PROCEDURE — 77014 CHG CT GUIDANCE RADIATION THERAPY FLDS PLACEMENT: CPT | Performed by: RADIOLOGY

## 2024-04-01 PROCEDURE — 36415 COLL VENOUS BLD VENIPUNCTURE: CPT

## 2024-04-01 PROCEDURE — 77336 RADIATION PHYSICS CONSULT: CPT | Performed by: RADIOLOGY

## 2024-04-01 PROCEDURE — 85027 COMPLETE CBC AUTOMATED: CPT

## 2024-04-01 PROCEDURE — 84100 ASSAY OF PHOSPHORUS: CPT

## 2024-04-01 PROCEDURE — 80053 COMPREHEN METABOLIC PANEL: CPT

## 2024-04-01 PROCEDURE — 77386 HC NTSTY MODUL RAD TX DLVR CPLX: CPT | Performed by: RADIOLOGY

## 2024-04-01 PROCEDURE — 85007 BL SMEAR W/DIFF WBC COUNT: CPT

## 2024-04-01 PROCEDURE — 83735 ASSAY OF MAGNESIUM: CPT

## 2024-04-01 NOTE — PROGRESS NOTES
This nurse spoke to the pharmacist at [facility] and clarified the magic mouthwash prescription and the patient will need to swallow the medication instead of spitting it out. The pharmacist will update the prescription at this time.

## 2024-04-02 ENCOUNTER — HOSPITAL ENCOUNTER (OUTPATIENT)
Dept: RADIATION ONCOLOGY | Age: 69
Discharge: HOME | End: 2024-04-02
Payer: MEDICARE

## 2024-04-02 ENCOUNTER — HOSPITAL ENCOUNTER (OUTPATIENT)
Dept: INFUSION THERAPY | Age: 69
Discharge: HOME | End: 2024-04-02

## 2024-04-02 VITALS
BODY MASS INDEX: 31.18 KG/M2 | TEMPERATURE: 96.9 F | HEART RATE: 85 BPM | DIASTOLIC BLOOD PRESSURE: 57 MMHG | HEIGHT: 60 IN | OXYGEN SATURATION: 95 % | SYSTOLIC BLOOD PRESSURE: 118 MMHG | WEIGHT: 158.8 LBS

## 2024-04-02 DIAGNOSIS — C34.32 SMALL CELL LUNG CANCER, LEFT LOWER LOBE (HCC): Primary | ICD-10-CM

## 2024-04-02 DIAGNOSIS — Z11.59 NEED FOR HEPATITIS B SCREENING TEST: Primary | ICD-10-CM

## 2024-04-02 DIAGNOSIS — C34.32 SMALL CELL LUNG CANCER, LEFT LOWER LOBE (HCC): ICD-10-CM

## 2024-04-02 RX ORDER — SODIUM CHLORIDE 0.9 % (FLUSH) 0.9 %
5-40 SYRINGE (ML) INJECTION PRN
OUTPATIENT
Start: 2024-04-25

## 2024-04-02 RX ORDER — EPINEPHRINE 1 MG/ML
0.3 INJECTION, SOLUTION, CONCENTRATE INTRAVENOUS PRN
OUTPATIENT
Start: 2024-04-23

## 2024-04-02 RX ORDER — MEPERIDINE HYDROCHLORIDE 50 MG/ML
12.5 INJECTION INTRAMUSCULAR; INTRAVENOUS; SUBCUTANEOUS PRN
OUTPATIENT
Start: 2024-04-02

## 2024-04-02 RX ORDER — SODIUM CHLORIDE 9 MG/ML
5-250 INJECTION, SOLUTION INTRAVENOUS PRN
OUTPATIENT
Start: 2024-04-24

## 2024-04-02 RX ORDER — SODIUM CHLORIDE 9 MG/ML
5-250 INJECTION, SOLUTION INTRAVENOUS PRN
Status: CANCELLED | OUTPATIENT
Start: 2024-04-02

## 2024-04-02 RX ORDER — HEPARIN SODIUM (PORCINE) LOCK FLUSH IV SOLN 100 UNIT/ML 100 UNIT/ML
500 SOLUTION INTRAVENOUS PRN
OUTPATIENT
Start: 2024-04-04

## 2024-04-02 RX ORDER — SODIUM CHLORIDE 9 MG/ML
5-250 INJECTION, SOLUTION INTRAVENOUS PRN
OUTPATIENT
Start: 2024-04-23

## 2024-04-02 RX ORDER — ACETAMINOPHEN 325 MG/1
650 TABLET ORAL
OUTPATIENT
Start: 2024-04-04

## 2024-04-02 RX ORDER — ALBUTEROL SULFATE 90 UG/1
4 AEROSOL, METERED RESPIRATORY (INHALATION) PRN
OUTPATIENT
Start: 2024-04-24

## 2024-04-02 RX ORDER — SODIUM CHLORIDE 0.9 % (FLUSH) 0.9 %
5-40 SYRINGE (ML) INJECTION PRN
OUTPATIENT
Start: 2024-04-23

## 2024-04-02 RX ORDER — SODIUM CHLORIDE 0.9 % (FLUSH) 0.9 %
5-40 SYRINGE (ML) INJECTION PRN
Status: CANCELLED | OUTPATIENT
Start: 2024-04-02

## 2024-04-02 RX ORDER — SODIUM CHLORIDE 9 MG/ML
INJECTION, SOLUTION INTRAVENOUS CONTINUOUS
OUTPATIENT
Start: 2024-04-03

## 2024-04-02 RX ORDER — MEPERIDINE HYDROCHLORIDE 50 MG/ML
12.5 INJECTION INTRAMUSCULAR; INTRAVENOUS; SUBCUTANEOUS PRN
OUTPATIENT
Start: 2024-04-25

## 2024-04-02 RX ORDER — ALBUTEROL SULFATE 90 UG/1
4 AEROSOL, METERED RESPIRATORY (INHALATION) PRN
OUTPATIENT
Start: 2024-04-02

## 2024-04-02 RX ORDER — ONDANSETRON 2 MG/ML
8 INJECTION INTRAMUSCULAR; INTRAVENOUS
OUTPATIENT
Start: 2024-04-02

## 2024-04-02 RX ORDER — FAMOTIDINE 10 MG/ML
20 INJECTION, SOLUTION INTRAVENOUS
OUTPATIENT
Start: 2024-04-25

## 2024-04-02 RX ORDER — SODIUM CHLORIDE 9 MG/ML
5-250 INJECTION, SOLUTION INTRAVENOUS PRN
Status: DISCONTINUED | OUTPATIENT
Start: 2024-04-02 | End: 2024-04-03 | Stop reason: HOSPADM

## 2024-04-02 RX ORDER — HEPARIN SODIUM (PORCINE) LOCK FLUSH IV SOLN 100 UNIT/ML 100 UNIT/ML
500 SOLUTION INTRAVENOUS PRN
OUTPATIENT
Start: 2024-04-23

## 2024-04-02 RX ORDER — SODIUM CHLORIDE 9 MG/ML
5-250 INJECTION, SOLUTION INTRAVENOUS PRN
OUTPATIENT
Start: 2024-04-02

## 2024-04-02 RX ORDER — ACETAMINOPHEN 325 MG/1
650 TABLET ORAL
OUTPATIENT
Start: 2024-04-24

## 2024-04-02 RX ORDER — SODIUM CHLORIDE 0.9 % (FLUSH) 0.9 %
5-40 SYRINGE (ML) INJECTION PRN
OUTPATIENT
Start: 2024-04-03

## 2024-04-02 RX ORDER — ALBUTEROL SULFATE 90 UG/1
4 AEROSOL, METERED RESPIRATORY (INHALATION) PRN
OUTPATIENT
Start: 2024-04-25

## 2024-04-02 RX ORDER — HEPARIN SODIUM (PORCINE) LOCK FLUSH IV SOLN 100 UNIT/ML 100 UNIT/ML
500 SOLUTION INTRAVENOUS PRN
OUTPATIENT
Start: 2024-04-24

## 2024-04-02 RX ORDER — ONDANSETRON 2 MG/ML
8 INJECTION INTRAMUSCULAR; INTRAVENOUS
OUTPATIENT
Start: 2024-04-25

## 2024-04-02 RX ORDER — FAMOTIDINE 10 MG/ML
20 INJECTION, SOLUTION INTRAVENOUS
OUTPATIENT
Start: 2024-04-02

## 2024-04-02 RX ORDER — DIPHENHYDRAMINE HYDROCHLORIDE 50 MG/ML
50 INJECTION INTRAMUSCULAR; INTRAVENOUS
OUTPATIENT
Start: 2024-04-24

## 2024-04-02 RX ORDER — EPINEPHRINE 1 MG/ML
0.3 INJECTION, SOLUTION, CONCENTRATE INTRAVENOUS PRN
OUTPATIENT
Start: 2024-04-03

## 2024-04-02 RX ORDER — SODIUM CHLORIDE 9 MG/ML
INJECTION, SOLUTION INTRAVENOUS CONTINUOUS
OUTPATIENT
Start: 2024-04-04

## 2024-04-02 RX ORDER — MEPERIDINE HYDROCHLORIDE 50 MG/ML
12.5 INJECTION INTRAMUSCULAR; INTRAVENOUS; SUBCUTANEOUS PRN
OUTPATIENT
Start: 2024-04-23

## 2024-04-02 RX ORDER — FAMOTIDINE 10 MG/ML
20 INJECTION, SOLUTION INTRAVENOUS
OUTPATIENT
Start: 2024-04-03

## 2024-04-02 RX ORDER — FAMOTIDINE 10 MG/ML
20 INJECTION, SOLUTION INTRAVENOUS
OUTPATIENT
Start: 2024-04-23

## 2024-04-02 RX ORDER — SODIUM CHLORIDE 9 MG/ML
INJECTION, SOLUTION INTRAVENOUS CONTINUOUS
OUTPATIENT
Start: 2024-04-02

## 2024-04-02 RX ORDER — ALBUTEROL SULFATE 90 UG/1
4 AEROSOL, METERED RESPIRATORY (INHALATION) PRN
OUTPATIENT
Start: 2024-04-03

## 2024-04-02 RX ORDER — DIPHENHYDRAMINE HYDROCHLORIDE 50 MG/ML
50 INJECTION INTRAMUSCULAR; INTRAVENOUS
OUTPATIENT
Start: 2024-04-25

## 2024-04-02 RX ORDER — MEPERIDINE HYDROCHLORIDE 50 MG/ML
12.5 INJECTION INTRAMUSCULAR; INTRAVENOUS; SUBCUTANEOUS PRN
OUTPATIENT
Start: 2024-04-03

## 2024-04-02 RX ORDER — SODIUM CHLORIDE 9 MG/ML
5-250 INJECTION, SOLUTION INTRAVENOUS PRN
OUTPATIENT
Start: 2024-04-03

## 2024-04-02 RX ORDER — ACETAMINOPHEN 325 MG/1
650 TABLET ORAL
OUTPATIENT
Start: 2024-04-23

## 2024-04-02 RX ORDER — SODIUM CHLORIDE 9 MG/ML
5-250 INJECTION, SOLUTION INTRAVENOUS PRN
OUTPATIENT
Start: 2024-04-04

## 2024-04-02 RX ORDER — FAMOTIDINE 10 MG/ML
20 INJECTION, SOLUTION INTRAVENOUS
OUTPATIENT
Start: 2024-04-04

## 2024-04-02 RX ORDER — ALBUTEROL SULFATE 90 UG/1
4 AEROSOL, METERED RESPIRATORY (INHALATION) PRN
OUTPATIENT
Start: 2024-04-04

## 2024-04-02 RX ORDER — PALONOSETRON 0.05 MG/ML
0.25 INJECTION, SOLUTION INTRAVENOUS ONCE
Status: CANCELLED | OUTPATIENT
Start: 2024-04-02

## 2024-04-02 RX ORDER — HEPARIN SODIUM (PORCINE) LOCK FLUSH IV SOLN 100 UNIT/ML 100 UNIT/ML
500 SOLUTION INTRAVENOUS PRN
OUTPATIENT
Start: 2024-04-02

## 2024-04-02 RX ORDER — SODIUM CHLORIDE 0.9 % (FLUSH) 0.9 %
5-40 SYRINGE (ML) INJECTION PRN
OUTPATIENT
Start: 2024-04-24

## 2024-04-02 RX ORDER — MEPERIDINE HYDROCHLORIDE 50 MG/ML
12.5 INJECTION INTRAMUSCULAR; INTRAVENOUS; SUBCUTANEOUS PRN
OUTPATIENT
Start: 2024-04-24

## 2024-04-02 RX ORDER — SODIUM CHLORIDE 9 MG/ML
5-250 INJECTION, SOLUTION INTRAVENOUS PRN
OUTPATIENT
Start: 2024-04-25

## 2024-04-02 RX ORDER — DIPHENHYDRAMINE HYDROCHLORIDE 50 MG/ML
50 INJECTION INTRAMUSCULAR; INTRAVENOUS
OUTPATIENT
Start: 2024-04-03

## 2024-04-02 RX ORDER — ALBUTEROL SULFATE 90 UG/1
4 AEROSOL, METERED RESPIRATORY (INHALATION) PRN
OUTPATIENT
Start: 2024-04-23

## 2024-04-02 RX ORDER — SODIUM CHLORIDE 9 MG/ML
INJECTION, SOLUTION INTRAVENOUS CONTINUOUS
OUTPATIENT
Start: 2024-04-24

## 2024-04-02 RX ORDER — DIPHENHYDRAMINE HYDROCHLORIDE 50 MG/ML
50 INJECTION INTRAMUSCULAR; INTRAVENOUS
OUTPATIENT
Start: 2024-04-04

## 2024-04-02 RX ORDER — ACETAMINOPHEN 325 MG/1
650 TABLET ORAL
OUTPATIENT
Start: 2024-04-25

## 2024-04-02 RX ORDER — SODIUM CHLORIDE 0.9 % (FLUSH) 0.9 %
5-40 SYRINGE (ML) INJECTION PRN
Status: DISCONTINUED | OUTPATIENT
Start: 2024-04-02 | End: 2024-04-03 | Stop reason: HOSPADM

## 2024-04-02 RX ORDER — PALONOSETRON 0.05 MG/ML
0.25 INJECTION, SOLUTION INTRAVENOUS ONCE
OUTPATIENT
Start: 2024-04-23

## 2024-04-02 RX ORDER — EPINEPHRINE 1 MG/ML
0.3 INJECTION, SOLUTION, CONCENTRATE INTRAVENOUS PRN
OUTPATIENT
Start: 2024-04-04

## 2024-04-02 RX ORDER — PALONOSETRON 0.05 MG/ML
0.25 INJECTION, SOLUTION INTRAVENOUS ONCE
Status: DISCONTINUED | OUTPATIENT
Start: 2024-04-02 | End: 2024-04-02 | Stop reason: ALTCHOICE

## 2024-04-02 RX ORDER — ACETAMINOPHEN 325 MG/1
650 TABLET ORAL
OUTPATIENT
Start: 2024-04-02

## 2024-04-02 RX ORDER — ONDANSETRON 2 MG/ML
8 INJECTION INTRAMUSCULAR; INTRAVENOUS
OUTPATIENT
Start: 2024-04-03

## 2024-04-02 RX ORDER — EPINEPHRINE 1 MG/ML
0.3 INJECTION, SOLUTION, CONCENTRATE INTRAVENOUS PRN
OUTPATIENT
Start: 2024-04-02

## 2024-04-02 RX ORDER — DIPHENHYDRAMINE HYDROCHLORIDE 50 MG/ML
50 INJECTION INTRAMUSCULAR; INTRAVENOUS
OUTPATIENT
Start: 2024-04-23

## 2024-04-02 RX ORDER — ACETAMINOPHEN 325 MG/1
650 TABLET ORAL
OUTPATIENT
Start: 2024-04-03

## 2024-04-02 RX ORDER — FAMOTIDINE 10 MG/ML
20 INJECTION, SOLUTION INTRAVENOUS
OUTPATIENT
Start: 2024-04-24

## 2024-04-02 RX ORDER — HEPARIN SODIUM (PORCINE) LOCK FLUSH IV SOLN 100 UNIT/ML 100 UNIT/ML
500 SOLUTION INTRAVENOUS PRN
OUTPATIENT
Start: 2024-04-25

## 2024-04-02 RX ORDER — ONDANSETRON 2 MG/ML
8 INJECTION INTRAMUSCULAR; INTRAVENOUS
OUTPATIENT
Start: 2024-04-23

## 2024-04-02 RX ORDER — ONDANSETRON 2 MG/ML
8 INJECTION INTRAMUSCULAR; INTRAVENOUS
OUTPATIENT
Start: 2024-04-24

## 2024-04-02 RX ORDER — MEPERIDINE HYDROCHLORIDE 50 MG/ML
12.5 INJECTION INTRAMUSCULAR; INTRAVENOUS; SUBCUTANEOUS PRN
OUTPATIENT
Start: 2024-04-04

## 2024-04-02 RX ORDER — EPINEPHRINE 1 MG/ML
0.3 INJECTION, SOLUTION, CONCENTRATE INTRAVENOUS PRN
OUTPATIENT
Start: 2024-04-25

## 2024-04-02 RX ORDER — DIPHENHYDRAMINE HYDROCHLORIDE 50 MG/ML
50 INJECTION INTRAMUSCULAR; INTRAVENOUS
OUTPATIENT
Start: 2024-04-02

## 2024-04-02 RX ORDER — SODIUM CHLORIDE 9 MG/ML
INJECTION, SOLUTION INTRAVENOUS CONTINUOUS
OUTPATIENT
Start: 2024-04-23

## 2024-04-02 RX ORDER — EPINEPHRINE 1 MG/ML
0.3 INJECTION, SOLUTION, CONCENTRATE INTRAVENOUS PRN
OUTPATIENT
Start: 2024-04-24

## 2024-04-02 RX ORDER — HEPARIN SODIUM (PORCINE) LOCK FLUSH IV SOLN 100 UNIT/ML 100 UNIT/ML
500 SOLUTION INTRAVENOUS PRN
OUTPATIENT
Start: 2024-04-03

## 2024-04-02 RX ORDER — ONDANSETRON 2 MG/ML
8 INJECTION INTRAMUSCULAR; INTRAVENOUS
OUTPATIENT
Start: 2024-04-04

## 2024-04-02 RX ORDER — SODIUM CHLORIDE 9 MG/ML
INJECTION, SOLUTION INTRAVENOUS CONTINUOUS
OUTPATIENT
Start: 2024-04-25

## 2024-04-02 RX ORDER — SODIUM CHLORIDE 0.9 % (FLUSH) 0.9 %
5-40 SYRINGE (ML) INJECTION PRN
OUTPATIENT
Start: 2024-04-04

## 2024-04-02 NOTE — PROGRESS NOTES
Pt was delayed a week due to platelets at 61.   Pt has also not been eating well to due esophageal issues, which was discusses with radiation and medications ordered.  DANI Hernandez and Dr. Guo came to chairside to discuss medication that were ordered and how to take at home to help throat.

## 2024-04-03 ENCOUNTER — HOSPITAL ENCOUNTER (OUTPATIENT)
Dept: RADIATION ONCOLOGY | Age: 69
Discharge: HOME | End: 2024-04-03
Payer: MEDICARE

## 2024-04-03 PROCEDURE — 77386 HC NTSTY MODUL RAD TX DLVR CPLX: CPT | Performed by: RADIOLOGY

## 2024-04-04 ENCOUNTER — TELEPHONE (OUTPATIENT)
Dept: BARIATRICS/WEIGHT MGMT | Age: 69
End: 2024-04-04

## 2024-04-04 ENCOUNTER — HOSPITAL ENCOUNTER (OUTPATIENT)
Dept: RADIATION ONCOLOGY | Age: 69
Discharge: HOME | End: 2024-04-04
Payer: MEDICARE

## 2024-04-04 ENCOUNTER — OFFICE VISIT (OUTPATIENT)
Dept: BARIATRICS/WEIGHT MGMT | Age: 69
End: 2024-04-04
Payer: MEDICARE

## 2024-04-04 VITALS
HEIGHT: 60 IN | BODY MASS INDEX: 31.37 KG/M2 | DIASTOLIC BLOOD PRESSURE: 80 MMHG | WEIGHT: 159.8 LBS | HEART RATE: 74 BPM | SYSTOLIC BLOOD PRESSURE: 120 MMHG | OXYGEN SATURATION: 99 %

## 2024-04-04 DIAGNOSIS — C34.12 SMALL CELL LUNG CANCER, LEFT UPPER LOBE (HCC): Primary | ICD-10-CM

## 2024-04-04 PROCEDURE — 1090F PRES/ABSN URINE INCON ASSESS: CPT | Performed by: SURGERY

## 2024-04-04 PROCEDURE — 1124F ACP DISCUSS-NO DSCNMKR DOCD: CPT | Performed by: SURGERY

## 2024-04-04 PROCEDURE — G8417 CALC BMI ABV UP PARAM F/U: HCPCS | Performed by: SURGERY

## 2024-04-04 PROCEDURE — G8427 DOCREV CUR MEDS BY ELIG CLIN: HCPCS | Performed by: SURGERY

## 2024-04-04 PROCEDURE — 3017F COLORECTAL CA SCREEN DOC REV: CPT | Performed by: SURGERY

## 2024-04-04 PROCEDURE — 1036F TOBACCO NON-USER: CPT | Performed by: SURGERY

## 2024-04-04 PROCEDURE — G8399 PT W/DXA RESULTS DOCUMENT: HCPCS | Performed by: SURGERY

## 2024-04-04 PROCEDURE — 77386 HC NTSTY MODUL RAD TX DLVR CPLX: CPT | Performed by: RADIOLOGY

## 2024-04-04 PROCEDURE — 99203 OFFICE O/P NEW LOW 30 MIN: CPT | Performed by: SURGERY

## 2024-04-04 RX ORDER — SODIUM CHLORIDE, SODIUM LACTATE, POTASSIUM CHLORIDE, CALCIUM CHLORIDE 600; 310; 30; 20 MG/100ML; MG/100ML; MG/100ML; MG/100ML
INJECTION, SOLUTION INTRAVENOUS CONTINUOUS
OUTPATIENT
Start: 2024-04-04

## 2024-04-04 RX ORDER — SODIUM CHLORIDE 0.9 % (FLUSH) 0.9 %
5-40 SYRINGE (ML) INJECTION PRN
OUTPATIENT
Start: 2024-04-04

## 2024-04-04 RX ORDER — SODIUM CHLORIDE 9 MG/ML
INJECTION, SOLUTION INTRAVENOUS PRN
OUTPATIENT
Start: 2024-04-04

## 2024-04-04 RX ORDER — SODIUM CHLORIDE 0.9 % (FLUSH) 0.9 %
5-40 SYRINGE (ML) INJECTION EVERY 12 HOURS SCHEDULED
OUTPATIENT
Start: 2024-04-04

## 2024-04-04 ASSESSMENT — ENCOUNTER SYMPTOMS: COUGH: 1

## 2024-04-04 NOTE — TELEPHONE ENCOUNTER
SPOKE TO Lin Bates REGARDING SURGERY (Mediport Placement) SCHEDULED @ [facility]. NOTIFIED OF DATES, TIMES AND LOCATION    PAT - PHONE ASSESSMENT  SURGERY - 4/10/24 @ 09:30 AM, 08:00 AM arrival  P/O -  4/22/24 at 2:00 PM with [place] OCONNELL AFTER MIDNIGHT  Prep: N/A  HOLD BLOOD THINNERS - Not on thinners  Gave dates/times in person after today's office visit

## 2024-04-04 NOTE — PROGRESS NOTES
Chief Complaint   Patient presents with    Surgical Consult     Port placement          SUBJECTIVE:  HPI: Patient is here with complaints of:    Referral from Dr. Birch for mediport placement.     Pt with LORI lung small cell CA.    Pt undergoing chemo and RT.       I have reviewed the patient's(pertinent information to this visit) medical history, family history(scanned in  the [employer] under \"patient questioner\"), social history and review of systems with the patient today in the office.            Past Surgical History:   Procedure Laterality Date    CT NEEDLE BIOPSY LUNG PERCUTANEOUS  2024    CT NEEDLE BIOPSY LUNG PERCUTANEOUS 2024 [facility] CT SCAN    DENTAL SURGERY      's    DILATION AND CURETTAGE OF UTERUS N/A 3/22/2023    DILATATION AND CURETTAGE HYSTEROSCOPY performed by Zac Pretty MD at [facility] OR     Past Medical History:   Diagnosis Date    Hyperlipidemia     Hypertension     Thickened endometrium     Type 2 diabetes mellitus without complication (HCC)      Family History   Problem Relation Age of Onset    Cancer Father     Cancer Brother      Social History     Socioeconomic History    Marital status:      Spouse name: Not on file    Number of children: Not on file    Years of education: Not on file    Highest education level: Not on file   Occupational History    Not on file   Tobacco Use    Smoking status: Former     Current packs/day: 0.00     Average packs/day: 1 pack/day for 52.1 years (52.1 ttl pk-yrs)     Types: Cigarettes     Start date:      Quit date: 2024     Years since quittin.1    Smokeless tobacco: Never    Tobacco comments:     Down to half pack 22   Vaping Use    Vaping Use: Never used   Substance and Sexual Activity    Alcohol use: Not Currently     Comment: yearly    Drug use: No    Sexual activity: Not Currently   Other Topics Concern    Not on file   Social History Narrative    Not on file     Social Determinants of Health     Financial

## 2024-04-05 ENCOUNTER — HOSPITAL ENCOUNTER (OUTPATIENT)
Dept: RADIATION ONCOLOGY | Age: 69
Discharge: HOME | End: 2024-04-05
Payer: MEDICARE

## 2024-04-05 PROCEDURE — 77386 HC NTSTY MODUL RAD TX DLVR CPLX: CPT | Performed by: RADIOLOGY

## 2024-04-05 NOTE — PROGRESS NOTES
.Surgery @ [facility] on 4/10/24 you will be called 4/9/24 with times    NOTHING TO EAT OR DRINK AFTER MIDNIGHT DAY OF SURGERY    1. Enter thru the hospital main entrance on day of surgery, check in at the Information Desk. If you arrive prior to 6:00am, enter thru the ER entrance.    2. Follow the directions as prescribed by the doctor for your procedure and medications.         Morning of surgery take: no medications          Hold Glipizide, Jardiance, Metformin 4/9/2024         Stop vitamins, supplements and NSAIDS:  today     3. Check with your Doctor regarding stopping blood thinners and follow their instructions.    4. Do not smoke, vape or use chewing tobacco morning of surgery. Do not drink any alcoholic beverages 24 hours prior to surgery.       This includes NA Beer. No street drugs 7 days prior to surgery.    5. If you have dentures, contacts of glasses they will be removed before going to the OR; please bring a case.    6. Please bring picture ID, insurance card, paperwork from the doctor’s office (H & P, Consent, & card for implantable devices).    7. Take a shower with an antibacterial soap the night before surgery and the morning of surgery. Do not put anything on your skin      After your morning shower.    8. You will need a responsible adult to drive you home and check on you after surgery.

## 2024-04-08 ENCOUNTER — APPOINTMENT (OUTPATIENT)
Dept: RADIATION ONCOLOGY | Age: 69
End: 2024-04-08
Payer: MEDICARE

## 2024-04-08 ENCOUNTER — HOSPITAL ENCOUNTER (OUTPATIENT)
Dept: INFUSION THERAPY | Age: 69
Discharge: HOME | End: 2024-04-08
Payer: MEDICARE

## 2024-04-08 DIAGNOSIS — C34.32 SMALL CELL LUNG CANCER, LEFT LOWER LOBE (HCC): Primary | ICD-10-CM

## 2024-04-08 DIAGNOSIS — C34.32 SMALL CELL LUNG CANCER, LEFT LOWER LOBE (HCC): ICD-10-CM

## 2024-04-08 LAB
ALBUMIN SERPL-MCNC: 4 GM/DL (ref 3.4–5)
ALP BLD-CCNC: 59 IU/L (ref 40–128)
ALT SERPL-CCNC: 8 U/L (ref 10–40)
ANION GAP SERPL CALCULATED.3IONS-SCNC: 13 MMOL/L (ref 7–16)
AST SERPL-CCNC: 12 IU/L (ref 15–37)
BASOPHILS ABSOLUTE: 0 K/CU MM
BASOPHILS RELATIVE PERCENT: 0.2 % (ref 0–1)
BILIRUB SERPL-MCNC: 0.3 MG/DL (ref 0–1)
BUN SERPL-MCNC: 7 MG/DL (ref 6–23)
CALCIUM SERPL-MCNC: 9.2 MG/DL (ref 8.3–10.6)
CHLORIDE BLD-SCNC: 99 MMOL/L (ref 99–110)
CO2: 27 MMOL/L (ref 21–32)
CREAT SERPL-MCNC: 0.7 MG/DL (ref 0.6–1.1)
DIFFERENTIAL TYPE: ABNORMAL
EOSINOPHILS ABSOLUTE: 0.1 K/CU MM
EOSINOPHILS RELATIVE PERCENT: 1.1 % (ref 0–3)
GFR SERPL CREATININE-BSD FRML MDRD: >90 ML/MIN/1.73M2
GLUCOSE SERPL-MCNC: 139 MG/DL (ref 70–99)
HCT VFR BLD CALC: 31.8 % (ref 37–47)
HEMOGLOBIN: 10.1 GM/DL (ref 12.5–16)
LYMPHOCYTES ABSOLUTE: 0.7 K/CU MM
LYMPHOCYTES RELATIVE PERCENT: 15.8 % (ref 24–44)
MAGNESIUM: 2.1 MG/DL (ref 1.8–2.4)
MCH RBC QN AUTO: 34.8 PG (ref 27–31)
MCHC RBC AUTO-ENTMCNC: 31.8 % (ref 32–36)
MCV RBC AUTO: 109.7 FL (ref 78–100)
MONOCYTES ABSOLUTE: 1.1 K/CU MM
MONOCYTES RELATIVE PERCENT: 22.7 % (ref 0–4)
PDW BLD-RTO: 14.5 % (ref 11.7–14.9)
PHOSPHORUS: 3.9 MG/DL (ref 2.5–4.9)
PLATELET # BLD: 581 K/CU MM (ref 140–440)
PMV BLD AUTO: 9.5 FL (ref 7.5–11.1)
POTASSIUM SERPL-SCNC: 4.5 MMOL/L (ref 3.5–5.1)
RBC # BLD: 2.9 M/CU MM (ref 4.2–5.4)
SEGMENTED NEUTROPHILS ABSOLUTE COUNT: 2.8 K/CU MM
SEGMENTED NEUTROPHILS RELATIVE PERCENT: 60.2 % (ref 36–66)
SODIUM BLD-SCNC: 139 MMOL/L (ref 135–145)
TOTAL PROTEIN: 7.3 GM/DL (ref 6.4–8.2)
WBC # BLD: 4.6 K/CU MM (ref 4–10.5)

## 2024-04-08 PROCEDURE — 80053 COMPREHEN METABOLIC PANEL: CPT

## 2024-04-08 PROCEDURE — 84100 ASSAY OF PHOSPHORUS: CPT

## 2024-04-08 PROCEDURE — 85025 COMPLETE CBC W/AUTO DIFF WBC: CPT

## 2024-04-08 PROCEDURE — 83735 ASSAY OF MAGNESIUM: CPT

## 2024-04-08 PROCEDURE — 36415 COLL VENOUS BLD VENIPUNCTURE: CPT

## 2024-04-09 ENCOUNTER — ANESTHESIA EVENT (OUTPATIENT)
Dept: OPERATING ROOM | Age: 69
End: 2024-04-09
Payer: MEDICARE

## 2024-04-09 ENCOUNTER — HOSPITAL ENCOUNTER (OUTPATIENT)
Dept: RADIATION ONCOLOGY | Age: 69
Discharge: HOME | End: 2024-04-09
Payer: MEDICARE

## 2024-04-09 ENCOUNTER — HOSPITAL ENCOUNTER (OUTPATIENT)
Dept: INFUSION THERAPY | Age: 69
Discharge: HOME | End: 2024-04-09
Payer: MEDICARE

## 2024-04-09 VITALS
HEIGHT: 60 IN | HEART RATE: 88 BPM | TEMPERATURE: 97.9 F | OXYGEN SATURATION: 98 % | SYSTOLIC BLOOD PRESSURE: 125 MMHG | DIASTOLIC BLOOD PRESSURE: 57 MMHG | BODY MASS INDEX: 30.94 KG/M2 | WEIGHT: 157.6 LBS

## 2024-04-09 DIAGNOSIS — C34.32 SMALL CELL LUNG CANCER, LEFT LOWER LOBE (HCC): ICD-10-CM

## 2024-04-09 DIAGNOSIS — Z11.59 NEED FOR HEPATITIS B SCREENING TEST: Primary | ICD-10-CM

## 2024-04-09 PROCEDURE — 96415 CHEMO IV INFUSION ADDL HR: CPT

## 2024-04-09 PROCEDURE — 96361 HYDRATE IV INFUSION ADD-ON: CPT

## 2024-04-09 PROCEDURE — 96375 TX/PRO/DX INJ NEW DRUG ADDON: CPT

## 2024-04-09 PROCEDURE — 6360000002 HC RX W HCPCS: Performed by: INTERNAL MEDICINE

## 2024-04-09 PROCEDURE — 96367 TX/PROPH/DG ADDL SEQ IV INF: CPT

## 2024-04-09 PROCEDURE — 2580000003 HC RX 258: Performed by: INTERNAL MEDICINE

## 2024-04-09 PROCEDURE — 96360 HYDRATION IV INFUSION INIT: CPT

## 2024-04-09 PROCEDURE — 96413 CHEMO IV INFUSION 1 HR: CPT

## 2024-04-09 RX ORDER — SODIUM CHLORIDE 9 MG/ML
5-250 INJECTION, SOLUTION INTRAVENOUS PRN
Status: DISCONTINUED | OUTPATIENT
Start: 2024-04-09 | End: 2024-04-10 | Stop reason: HOSPADM

## 2024-04-09 RX ORDER — SODIUM CHLORIDE 0.9 % (FLUSH) 0.9 %
5-40 SYRINGE (ML) INJECTION PRN
Status: CANCELLED | OUTPATIENT
Start: 2024-04-09

## 2024-04-09 RX ORDER — PALONOSETRON 0.05 MG/ML
0.25 INJECTION, SOLUTION INTRAVENOUS ONCE
Status: COMPLETED | OUTPATIENT
Start: 2024-04-09 | End: 2024-04-09

## 2024-04-09 RX ADMIN — ETOPOSIDE 174 MG: 20 INJECTION, SOLUTION INTRAVENOUS at 11:58

## 2024-04-09 RX ADMIN — SODIUM CHLORIDE 150 MG: 9 INJECTION, SOLUTION INTRAVENOUS at 11:27

## 2024-04-09 RX ADMIN — DEXAMETHASONE SODIUM PHOSPHATE 12 MG: 4 INJECTION, SOLUTION INTRAMUSCULAR; INTRAVENOUS at 10:54

## 2024-04-09 RX ADMIN — CISPLATIN 139 MG: 1 INJECTION INTRAVENOUS at 13:10

## 2024-04-09 RX ADMIN — POTASSIUM CHLORIDE: 2 INJECTION, SOLUTION, CONCENTRATE INTRAVENOUS at 09:43

## 2024-04-09 RX ADMIN — POTASSIUM CHLORIDE: 2 INJECTION, SOLUTION, CONCENTRATE INTRAVENOUS at 13:11

## 2024-04-09 RX ADMIN — PALONOSETRON 0.25 MG: 0.25 INJECTION, SOLUTION INTRAVENOUS at 11:27

## 2024-04-09 RX ADMIN — SODIUM CHLORIDE 20 ML/HR: 9 INJECTION, SOLUTION INTRAVENOUS at 10:53

## 2024-04-09 ASSESSMENT — LIFESTYLE VARIABLES: SMOKING_STATUS: 0

## 2024-04-09 NOTE — PROGRESS NOTES
Pt here for tx.with pre and post hydration.  PIV placed with blood return noted. CBC, CMP, Mag reviewed from 4/9/24,  within defined limits. Pt has no concerns or issues to discuss at this time.     Mediport accessed per protocol.  Positive blood return noted.  Flushed and locked with normal saline.  Treatment plan approved, released and completed as ordered.  Pt tolerated well.  PIV removed per protocol. Pt is scheduled for port placement tomorrow morning at hospital.  Pt will return after placement for chemo infusion tomorrow and Thursday. AVS provided.  Discharge ambulatory in stable condition. Mickie Guerrero RN    Patient's status assessed and documented appropriately.  All labs and required results were also reviewed today.  Treatment parameters have been reviewed.  Today's treatment has been approved by the provider.       Treatment orders and medication sequencing (when applicable) was verified by 2 registered nurses.  The treatment plan was confirmed with the patient prior to administration, and the patient understands the need to report any treatment-related symptoms.     Prior to administration, when applicable, the following 8 elements of medication administration were reviewed with 2nd Registered Nurse prior to dosing: drug name, drug dose, infusion volume when prepared in a syringe, rate of administration, expiration dates and/or times, appearance and integrity of drug(s), and rate of pump for infusion.  The 5 rights of medication administration have been verified.       Pt tolerated tx without incident left ambulatory discharge instructions given

## 2024-04-09 NOTE — PROGRESS NOTES
Left VM notifying patient surgery time at [facility] 4/10/2024 0930 arrival 0730, NPO instructions and DOS meds reviewed

## 2024-04-10 ENCOUNTER — ANESTHESIA (OUTPATIENT)
Dept: OPERATING ROOM | Age: 69
End: 2024-04-10
Payer: MEDICARE

## 2024-04-10 ENCOUNTER — APPOINTMENT (OUTPATIENT)
Dept: GENERAL RADIOLOGY | Age: 69
End: 2024-04-10
Attending: SURGERY
Payer: MEDICARE

## 2024-04-10 ENCOUNTER — HOSPITAL ENCOUNTER (OUTPATIENT)
Dept: INFUSION THERAPY | Age: 69
Discharge: HOME | End: 2024-04-10
Payer: MEDICARE

## 2024-04-10 ENCOUNTER — HOSPITAL ENCOUNTER (OUTPATIENT)
Age: 69
Setting detail: OUTPATIENT SURGERY
Discharge: HOME | End: 2024-04-10
Attending: SURGERY | Admitting: SURGERY
Payer: MEDICARE

## 2024-04-10 ENCOUNTER — HOSPITAL ENCOUNTER (OUTPATIENT)
Dept: RADIATION ONCOLOGY | Age: 69
Discharge: HOME | End: 2024-04-10
Payer: MEDICARE

## 2024-04-10 VITALS
RESPIRATION RATE: 16 BRPM | DIASTOLIC BLOOD PRESSURE: 66 MMHG | HEIGHT: 60 IN | HEART RATE: 73 BPM | WEIGHT: 159 LBS | TEMPERATURE: 97.6 F | SYSTOLIC BLOOD PRESSURE: 111 MMHG | BODY MASS INDEX: 31.22 KG/M2 | OXYGEN SATURATION: 95 %

## 2024-04-10 VITALS
DIASTOLIC BLOOD PRESSURE: 58 MMHG | TEMPERATURE: 97.8 F | OXYGEN SATURATION: 97 % | HEART RATE: 72 BPM | SYSTOLIC BLOOD PRESSURE: 117 MMHG

## 2024-04-10 DIAGNOSIS — C34.32 SMALL CELL LUNG CANCER, LEFT LOWER LOBE (HCC): ICD-10-CM

## 2024-04-10 DIAGNOSIS — Z11.59 NEED FOR HEPATITIS B SCREENING TEST: Primary | ICD-10-CM

## 2024-04-10 DIAGNOSIS — C34.32 SMALL CELL LUNG CANCER, LEFT LOWER LOBE (HCC): Primary | ICD-10-CM

## 2024-04-10 LAB — GLUCOSE BLD-MCNC: 190 MG/DL (ref 70–99)

## 2024-04-10 PROCEDURE — 2580000003 HC RX 258: Performed by: INTERNAL MEDICINE

## 2024-04-10 PROCEDURE — C1788 PORT, INDWELLING, IMP: HCPCS | Performed by: SURGERY

## 2024-04-10 PROCEDURE — 7100000011 HC PHASE II RECOVERY - ADDTL 15 MIN: Performed by: SURGERY

## 2024-04-10 PROCEDURE — 6360000002 HC RX W HCPCS: Performed by: SURGERY

## 2024-04-10 PROCEDURE — 3700000001 HC ADD 15 MINUTES (ANESTHESIA): Performed by: SURGERY

## 2024-04-10 PROCEDURE — 3600000003 HC SURGERY LEVEL 3 BASE: Performed by: SURGERY

## 2024-04-10 PROCEDURE — 2500000003 HC RX 250 WO HCPCS: Performed by: SURGERY

## 2024-04-10 PROCEDURE — 76000 FLUOROSCOPY <1 HR PHYS/QHP: CPT

## 2024-04-10 PROCEDURE — 6360000002 HC RX W HCPCS: Performed by: NURSE ANESTHETIST, CERTIFIED REGISTERED

## 2024-04-10 PROCEDURE — 77386 HC NTSTY MODUL RAD TX DLVR CPLX: CPT | Performed by: RADIOLOGY

## 2024-04-10 PROCEDURE — 77014 CHG CT GUIDANCE RADIATION THERAPY FLDS PLACEMENT: CPT | Performed by: RADIOLOGY

## 2024-04-10 PROCEDURE — 96375 TX/PRO/DX INJ NEW DRUG ADDON: CPT

## 2024-04-10 PROCEDURE — 2709999900 HC NON-CHARGEABLE SUPPLY: Performed by: SURGERY

## 2024-04-10 PROCEDURE — 96365 THER/PROPH/DIAG IV INF INIT: CPT

## 2024-04-10 PROCEDURE — 36561 INSERT TUNNELED CV CATH: CPT | Performed by: SURGERY

## 2024-04-10 PROCEDURE — 2580000003 HC RX 258: Performed by: SURGERY

## 2024-04-10 PROCEDURE — 7100000010 HC PHASE II RECOVERY - FIRST 15 MIN: Performed by: SURGERY

## 2024-04-10 PROCEDURE — 71045 X-RAY EXAM CHEST 1 VIEW: CPT

## 2024-04-10 PROCEDURE — 77336 RADIATION PHYSICS CONSULT: CPT | Performed by: RADIOLOGY

## 2024-04-10 PROCEDURE — 3700000000 HC ANESTHESIA ATTENDED CARE: Performed by: SURGERY

## 2024-04-10 PROCEDURE — 96413 CHEMO IV INFUSION 1 HR: CPT

## 2024-04-10 PROCEDURE — 6360000002 HC RX W HCPCS: Performed by: INTERNAL MEDICINE

## 2024-04-10 PROCEDURE — 3600000013 HC SURGERY LEVEL 3 ADDTL 15MIN: Performed by: SURGERY

## 2024-04-10 PROCEDURE — 82962 GLUCOSE BLOOD TEST: CPT

## 2024-04-10 DEVICE — PORT INFUS SLIM W/ 8FR ATTCH POLYUR SGL LUMN VEN CATH LO: Type: IMPLANTABLE DEVICE | Site: CHEST | Status: FUNCTIONAL

## 2024-04-10 RX ORDER — AMOXICILLIN 250 MG
2 CAPSULE ORAL DAILY
Qty: 28 TABLET | Refills: 0 | Status: ON HOLD | OUTPATIENT
Start: 2024-04-10 | End: 2024-04-24

## 2024-04-10 RX ORDER — FENTANYL CITRATE 50 UG/ML
INJECTION, SOLUTION INTRAMUSCULAR; INTRAVENOUS PRN
Status: DISCONTINUED | OUTPATIENT
Start: 2024-04-10 | End: 2024-04-10 | Stop reason: SDUPTHER

## 2024-04-10 RX ORDER — DEXAMETHASONE SODIUM PHOSPHATE 4 MG/ML
8 INJECTION, SOLUTION INTRA-ARTICULAR; INTRALESIONAL; INTRAMUSCULAR; INTRAVENOUS; SOFT TISSUE ONCE
Status: COMPLETED | OUTPATIENT
Start: 2024-04-10 | End: 2024-04-10

## 2024-04-10 RX ORDER — PROPOFOL 10 MG/ML
INJECTION, EMULSION INTRAVENOUS PRN
Status: DISCONTINUED | OUTPATIENT
Start: 2024-04-10 | End: 2024-04-10 | Stop reason: SDUPTHER

## 2024-04-10 RX ORDER — ONDANSETRON 4 MG/1
4 TABLET, FILM COATED ORAL DAILY PRN
Qty: 20 TABLET | Refills: 3 | Status: ON HOLD | OUTPATIENT
Start: 2024-04-10

## 2024-04-10 RX ORDER — MIDAZOLAM HYDROCHLORIDE 1 MG/ML
INJECTION INTRAMUSCULAR; INTRAVENOUS PRN
Status: DISCONTINUED | OUTPATIENT
Start: 2024-04-10 | End: 2024-04-10 | Stop reason: SDUPTHER

## 2024-04-10 RX ORDER — LIDOCAINE HYDROCHLORIDE 10 MG/ML
INJECTION, SOLUTION INFILTRATION; PERINEURAL
Status: COMPLETED | OUTPATIENT
Start: 2024-04-10 | End: 2024-04-10

## 2024-04-10 RX ORDER — LIDOCAINE HYDROCHLORIDE 20 MG/ML
INJECTION, SOLUTION INTRAVENOUS PRN
Status: DISCONTINUED | OUTPATIENT
Start: 2024-04-10 | End: 2024-04-10 | Stop reason: SDUPTHER

## 2024-04-10 RX ORDER — SODIUM CHLORIDE 9 MG/ML
INJECTION, SOLUTION INTRAVENOUS PRN
Status: DISCONTINUED | OUTPATIENT
Start: 2024-04-10 | End: 2024-04-10 | Stop reason: HOSPADM

## 2024-04-10 RX ORDER — ONDANSETRON 2 MG/ML
INJECTION INTRAMUSCULAR; INTRAVENOUS PRN
Status: DISCONTINUED | OUTPATIENT
Start: 2024-04-10 | End: 2024-04-10 | Stop reason: SDUPTHER

## 2024-04-10 RX ORDER — SODIUM CHLORIDE 0.9 % (FLUSH) 0.9 %
5-40 SYRINGE (ML) INJECTION PRN
Status: DISCONTINUED | OUTPATIENT
Start: 2024-04-10 | End: 2024-04-10 | Stop reason: HOSPADM

## 2024-04-10 RX ORDER — HYDROCODONE BITARTRATE AND ACETAMINOPHEN 5; 325 MG/1; MG/1
1 TABLET ORAL EVERY 6 HOURS PRN
Qty: 5 TABLET | Refills: 0 | Status: SHIPPED | OUTPATIENT
Start: 2024-04-10 | End: 2024-04-13

## 2024-04-10 RX ORDER — SODIUM CHLORIDE 0.9 % (FLUSH) 0.9 %
5-40 SYRINGE (ML) INJECTION PRN
Status: DISCONTINUED | OUTPATIENT
Start: 2024-04-10 | End: 2024-04-11 | Stop reason: HOSPADM

## 2024-04-10 RX ORDER — SODIUM CHLORIDE 0.9 % (FLUSH) 0.9 %
5-40 SYRINGE (ML) INJECTION EVERY 12 HOURS SCHEDULED
Status: DISCONTINUED | OUTPATIENT
Start: 2024-04-10 | End: 2024-04-10 | Stop reason: HOSPADM

## 2024-04-10 RX ORDER — SODIUM CHLORIDE 9 MG/ML
5-250 INJECTION, SOLUTION INTRAVENOUS PRN
Status: DISCONTINUED | OUTPATIENT
Start: 2024-04-10 | End: 2024-04-11 | Stop reason: HOSPADM

## 2024-04-10 RX ORDER — PHENYLEPHRINE HCL IN 0.9% NACL 1 MG/10 ML
SYRINGE (ML) INTRAVENOUS PRN
Status: DISCONTINUED | OUTPATIENT
Start: 2024-04-10 | End: 2024-04-10 | Stop reason: SDUPTHER

## 2024-04-10 RX ORDER — DEXAMETHASONE SODIUM PHOSPHATE 4 MG/ML
INJECTION, SOLUTION INTRA-ARTICULAR; INTRALESIONAL; INTRAMUSCULAR; INTRAVENOUS; SOFT TISSUE PRN
Status: DISCONTINUED | OUTPATIENT
Start: 2024-04-10 | End: 2024-04-10 | Stop reason: SDUPTHER

## 2024-04-10 RX ORDER — SODIUM CHLORIDE, SODIUM LACTATE, POTASSIUM CHLORIDE, CALCIUM CHLORIDE 600; 310; 30; 20 MG/100ML; MG/100ML; MG/100ML; MG/100ML
INJECTION, SOLUTION INTRAVENOUS CONTINUOUS
Status: DISCONTINUED | OUTPATIENT
Start: 2024-04-10 | End: 2024-04-10 | Stop reason: HOSPADM

## 2024-04-10 RX ADMIN — DEXAMETHASONE SODIUM PHOSPHATE 8 MG: 4 INJECTION, SOLUTION INTRAMUSCULAR; INTRAVENOUS at 13:38

## 2024-04-10 RX ADMIN — DEXAMETHASONE SODIUM PHOSPHATE 4 MG: 4 INJECTION, SOLUTION INTRAMUSCULAR; INTRAVENOUS at 10:15

## 2024-04-10 RX ADMIN — CEFAZOLIN 2000 MG: 2 INJECTION, POWDER, FOR SOLUTION INTRAMUSCULAR; INTRAVENOUS at 10:07

## 2024-04-10 RX ADMIN — MIDAZOLAM 2 MG: 1 INJECTION INTRAMUSCULAR; INTRAVENOUS at 10:07

## 2024-04-10 RX ADMIN — ETOPOSIDE 174 MG: 20 INJECTION, SOLUTION INTRAVENOUS at 13:47

## 2024-04-10 RX ADMIN — Medication 100 MCG: at 10:35

## 2024-04-10 RX ADMIN — ONDANSETRON 4 MG: 2 INJECTION INTRAMUSCULAR; INTRAVENOUS at 10:15

## 2024-04-10 RX ADMIN — PROPOFOL 25 MG: 10 INJECTION, EMULSION INTRAVENOUS at 10:15

## 2024-04-10 RX ADMIN — PROPOFOL 25 MG: 10 INJECTION, EMULSION INTRAVENOUS at 10:22

## 2024-04-10 RX ADMIN — SODIUM CHLORIDE 20 ML/HR: 9 INJECTION, SOLUTION INTRAVENOUS at 13:34

## 2024-04-10 RX ADMIN — Medication 100 MCG: at 10:29

## 2024-04-10 RX ADMIN — SODIUM CHLORIDE, POTASSIUM CHLORIDE, SODIUM LACTATE AND CALCIUM CHLORIDE: 600; 310; 30; 20 INJECTION, SOLUTION INTRAVENOUS at 07:50

## 2024-04-10 RX ADMIN — LIDOCAINE HYDROCHLORIDE 50 MG: 20 INJECTION, SOLUTION INTRAVENOUS at 10:09

## 2024-04-10 RX ADMIN — FENTANYL CITRATE 100 MCG: 50 INJECTION, SOLUTION INTRAMUSCULAR; INTRAVENOUS at 10:09

## 2024-04-10 ASSESSMENT — PAIN - FUNCTIONAL ASSESSMENT: PAIN_FUNCTIONAL_ASSESSMENT: 0-10

## 2024-04-10 ASSESSMENT — LIFESTYLE VARIABLES: SMOKING_STATUS: 0

## 2024-04-10 ASSESSMENT — PAIN SCALES - GENERAL
PAINLEVEL_OUTOF10: 0
PAINLEVEL_OUTOF10: 0

## 2024-04-10 NOTE — OP NOTE
Procedure Note:    Patient ID:  Lin Bates  [id number]  68 y.o.  1955    Pre-operative Diagnosis: Need for mediport for planned chemotherapy    Post-operative Diagnosis: Same    Procedure: Mediport placement under C-arm guidance in right subclavian vein    Surgeon:  Brian Humphries II, MD    Assistant: Zuly Rosa CNP. The skilled assistance of the CNP was necessary for the successful completion of this case. She was essential for the proper positioning, manipulation of instruments, proper exposure, manipulation of tissue, and wound closure.    Anesthesia:  MAC/ Local    Estimated Blood Loss:  Less than 10 mL           Total IV Fluids: Per anesthesia mL           Complications:  None; patient tolerated the procedure well.           Disposition: PACU - hemodynamically stable.           Condition: stable    Indications: As above    Procedure Details : The patient was seen again in the pre-operative area. The risks, benefits, complications, treatment options, and expected outcomes were discussed with the patient. The patient and/or family concurred with the proposed plan, giving informed consent.      The patient was transported to the operating room and transferred onto the operating room table in supine position with a shoulder roll placed underneath the back. The patient was secured to the operating room table in various locations with all pressure points well-padded.    The chest was prepped and draped in the usual sterile fashion.     A time-out was held with all members of the operating room team present and in agreement.     Appropriate IV antibiotics were administered prior to the start of the case.      The patient was placed in trendelenburg and 1% lidocaine was infiltrated in the infraclavicular region.    Using a 16-gauge needle, the right subclavian vein was accessed easily. A guidewire was threaded and the wire was visualized with use of C-arm. It was directed appropriately toward the heart.

## 2024-04-10 NOTE — ANESTHESIA PRE PROCEDURE
Department of Anesthesiology  Preprocedure Note       Name:  Lin Bates   Age:  68 y.o.  :  1955                                          MRN:  [id number]         Date:  4/10/2024      Surgeon: Surgeon(s):  Brian Humphries II, MD    Procedure: Procedure(s):  RIGHT SUBCLAVIAN MEDIPORT INSERTION    Medications prior to admission:   Prior to Admission medications    Medication Sig Start Date End Date Taking? Authorizing Provider   ondansetron (ZOFRAN) 4 MG tablet Take 1 tablet by mouth daily as needed for Nausea or Vomiting 4/10/24  Yes Brian Humphries II, MD   senna-docusate (PERICOLACE) 8.6-50 MG per tablet Take 2 tablets by mouth daily for 14 days 4/10/24 4/24/24 Yes Brian Humphries II, MD   HYDROcodone-acetaminophen (NORCO) 5-325 MG per tablet Take 1 tablet by mouth every 6 hours as needed for Pain for up to 3 days. Intended supply: 3 days. Take lowest dose possible to manage pain Max Daily Amount: 4 tablets 4/10/24 4/13/24 Yes Brian Humphries II, MD   Magic Mouthwash (MIRACLE MOUTHWASH) Equal parts of Benadryl, Maalox, 2% Viscous Xylocaine and Dexamethasone.  Take 5 mL 4 times daily.  Patient not taking: Reported on 2024 3/26/24   Burt Birch MD   famotidine (PEPCID) 20 MG tablet Take 1 tablet by mouth Daily  Patient not taking: Reported on 2024 3/19/24   Hunter Guo MD   sucralfate (CARAFATE) 1 GM/10ML suspension Take 10 mLs by mouth 4 times daily (before meals and nightly)  Patient not taking: Reported on 2024 3/19/24   Hunter Guo MD   dexAMETHasone (DECADRON) 4 MG tablet Take 1 tablet by mouth See Admin Instructions for 4 doses Take one tablet in the AM  the day after the last day of chemotherapy each cycle. 24   Burt Birch MD   OLANZapine (ZYPREXA) 5 MG tablet Take 1 tablet by mouth nightly Take one tablet at bedtime for four nights starting the night before chemotherapy. 24   Eyal, Burt Min, MD   ondansetron (ZOFRAN) 8 MG tablet Take 1 tablet by mouth every 8 hours 
)                          Glenn Vogt, APRN - CRNA   4/9/2024

## 2024-04-10 NOTE — H&P
History and Physical Update    Original H&P done in office on 4/4/24 (less than 30 days ago).    Pt reports the following changes in health since being seen last:    None    Vitals:    04/10/24 0735   BP: (!) 143/62   Pulse: (!) 106   Resp: 18   Temp: 98.3 °F (36.8 °C)   SpO2: 96%       Alert and oriented x 3, no apparent distress at rest  Atraumatic, normocephalic.  EOMI.  Breathing unlabored.  RRR.  Soft, non-tender, non-distended.  Moves all extremities.   Warm, dry.         69 y/o F with need for mediport    -Consent obtained in office.  -Abx ordered in office.  -Reviewed expected pre-operative, operative, and post-operative courses.  -Answered questions to patient's satisfaction.   -Reviewed risks, benefits, alternative to procedure.   -Proceed as scheduled.        Brian Humphries II, MD

## 2024-04-10 NOTE — PROGRESS NOTES
Arrived to treatment suite from hospital for day 2 of scheduled Etoposide infusion.  Mediport placed this morning, ready to be accessed.  Xray confirmed placement.      Mediport accessed per protocol.  Positive blood return noted.  Treatment plan approved, released and completed as ordered.  Pt tolerated well.  Mediport remained accessed for treatment tomorrow.  AVS declined.  Discharge ambulatory in stable condition. Mickie Guerrero RN

## 2024-04-10 NOTE — ANESTHESIA POSTPROCEDURE EVALUATION
Department of Anesthesiology  Postprocedure Note    Patient: Lin Bates  MRN: [id number]  YOB: 1955  Date of evaluation: 4/10/2024    Procedure Summary       Date: 04/10/24 Room / Location: [address]    Anesthesia Start: 1007 Anesthesia Stop: 1053    Procedure: RIGHT SUBCLAVIAN MEDIPORT INSERTION (Chest) Diagnosis:       Small cell lung cancer, left lower lobe (HCC)      (Small cell lung cancer, left lower lobe (HCC) [C34.32])    Surgeons: Brian Humphries II, MD Responsible Provider: Salo Edwards MD    Anesthesia Type: MAC ASA Status: 3            Anesthesia Type: No value filed.    Hudson Phase I: Hudson Score: 10    Hudson Phase II:      Anesthesia Post Evaluation    Patient location during evaluation: bedside  Patient participation: complete - patient participated  Level of consciousness: awake and alert  Pain score: 0  Airway patency: patent  Nausea & Vomiting: no nausea and no vomiting  Cardiovascular status: blood pressure returned to baseline  Respiratory status: acceptable  Hydration status: euvolemic  Pain management: adequate    No notable events documented.

## 2024-04-10 NOTE — DISCHARGE INSTRUCTIONS
will be made in the skin to insert the port.   The port can be used after it has been inserted.   INCISION SITE CARE   The incision site may have small adhesive strips on it. This helps keep the incision site closed. Sometimes, no adhesive strips are placed. Instead of adhesive strips, a special kind of surgical glue is used to keep the incision closed.   If adhesive strips were placed on the incision sites, do not take them off. They will fall off on their own.   The incision site may be sore for 1 to 2 days. Pain medicine can help.   Do not get the incision site wet. Bathe or shower as directed by your caregiver.   The incision site should heal in 5 to 7 days. A small scar may form after the incision has healed.   ACCESSING THE PORT   Special steps must be taken to access the port:   Before the port is accessed, a numbing cream can be placed on the skin. This helps numb the skin over the port site.   A sterile technique is used to access the port.   The port is accessed with a needle. Only \"non-coring\" port needles should be used to access the port. Once the port is accessed, a blood return should be checked. This helps ensure the port is in the vein and is not clogged (clotted).   If your caregiver believes your port should remain accessed, a clear (transparent) bandage will be placed over the needle site. The bandage and needle will need to be changed every week or as directed by your caregiver.   Keep the bandage covering the needle clean and dry. Do not get it wet. Follow your caregiver's instructions on how to take a shower or bath when the port is accessed.   If your port does not need to stay accessed, no bandage is needed over the port.   FLUSHING THE PORT   Flushing the port keeps it from getting clogged. How often the port is flushed depends on:   If a constant infusion is running. If a constant infusion is running, the port may not need to be flushed.   If intermittent medicines are given.   If the port  is not being used.   For intermittent medicines:   The port will need to be flushed:   After medicines have been given.   After blood has been drawn.   As part of routine maintenance.   A port is normally flushed with:   Normal saline.   Heparin.   Follow your caregiver's advice on how often, how much, and the type of flush to use on your port.   IMPORTANT PORT INFORMATION   Tell your caregiver if you are allergic to heparin.   After your port is placed, you will get a 's information card. The card has information about your port. Keep this card with you at all times.   There are many types of ports available. Know what kind of port you have.   In case of an emergency, it may be helpful to wear a medical alert bracelet. This can help alert health care workers that you have a port.   The port can stay in for as long as your caregiver believes it is necessary.   When it is time for the port to come out, surgery will be done to remove it. The surgery will be similar to how the port was put in.   If you are in the hospital or clinic:   Your port will be taken care of and flushed by a nurse.   If you are at home:   A home health care nurse may give medicines and take care of the port.   You or a family member can get special training and directions for giving medicine and taking care of the port at home.   SEEK IMMEDIATE MEDICAL CARE IF:   Your port does not flush or you are unable to get a blood return.   New drainage or pus is coming from the incision.   A bad smell is coming from the incision site.   You develop swelling or increased redness at the incision site.   You develop increased swelling or pain at the port site.   You develop swelling or pain in the surrounding skin near the port.   You have an oral temperature above 102° F (38.9° C), not controlled by medicine.   MAKE SURE YOU:   Understand these instructions.   Will watch your condition.   Will get help right away if you are not doing well or

## 2024-04-10 NOTE — PROGRESS NOTES
1050 - received patient from or, report received from Angela LOUISE and Maye HAIRSTON, patient A&O, denies pain or nausea, call light within reach, family at bedside, given pepsi.  1107 - Xray at bedside  1130 - discharge instructions given to patient and family, understanding voiced without any further questions at this time  1140 - VSS, paint denies pain or nausea, incision dry\  1143 - patient dressing  1206 - xray called, waiting for results  1215 - report given to monica Jasso

## 2024-04-11 ENCOUNTER — HOSPITAL ENCOUNTER (OUTPATIENT)
Dept: INFUSION THERAPY | Age: 69
Discharge: HOME | End: 2024-04-11
Payer: MEDICARE

## 2024-04-11 ENCOUNTER — HOSPITAL ENCOUNTER (OUTPATIENT)
Dept: RADIATION ONCOLOGY | Age: 69
Discharge: HOME | End: 2024-04-11
Payer: MEDICARE

## 2024-04-11 VITALS
HEART RATE: 75 BPM | OXYGEN SATURATION: 98 % | SYSTOLIC BLOOD PRESSURE: 116 MMHG | TEMPERATURE: 98.1 F | DIASTOLIC BLOOD PRESSURE: 57 MMHG

## 2024-04-11 DIAGNOSIS — Z11.59 NEED FOR HEPATITIS B SCREENING TEST: Primary | ICD-10-CM

## 2024-04-11 DIAGNOSIS — C34.32 SMALL CELL LUNG CANCER, LEFT LOWER LOBE (HCC): ICD-10-CM

## 2024-04-11 PROCEDURE — 6360000002 HC RX W HCPCS

## 2024-04-11 PROCEDURE — 6360000002 HC RX W HCPCS: Performed by: INTERNAL MEDICINE

## 2024-04-11 PROCEDURE — 96413 CHEMO IV INFUSION 1 HR: CPT

## 2024-04-11 PROCEDURE — 2580000003 HC RX 258: Performed by: INTERNAL MEDICINE

## 2024-04-11 PROCEDURE — 77386 HC NTSTY MODUL RAD TX DLVR CPLX: CPT | Performed by: RADIOLOGY

## 2024-04-11 PROCEDURE — 96375 TX/PRO/DX INJ NEW DRUG ADDON: CPT

## 2024-04-11 RX ORDER — SODIUM CHLORIDE 0.9 % (FLUSH) 0.9 %
5-40 SYRINGE (ML) INJECTION PRN
Status: DISCONTINUED | OUTPATIENT
Start: 2024-04-11 | End: 2024-04-12 | Stop reason: HOSPADM

## 2024-04-11 RX ORDER — HEPARIN 100 UNIT/ML
SYRINGE INTRAVENOUS
Status: COMPLETED
Start: 2024-04-11 | End: 2024-04-11

## 2024-04-11 RX ORDER — SODIUM CHLORIDE 9 MG/ML
5-250 INJECTION, SOLUTION INTRAVENOUS PRN
Status: DISCONTINUED | OUTPATIENT
Start: 2024-04-11 | End: 2024-04-12 | Stop reason: HOSPADM

## 2024-04-11 RX ORDER — DEXAMETHASONE SODIUM PHOSPHATE 4 MG/ML
8 INJECTION, SOLUTION INTRA-ARTICULAR; INTRALESIONAL; INTRAMUSCULAR; INTRAVENOUS; SOFT TISSUE ONCE
Status: COMPLETED | OUTPATIENT
Start: 2024-04-11 | End: 2024-04-11

## 2024-04-11 RX ADMIN — SODIUM CHLORIDE, PRESERVATIVE FREE 10 ML: 5 INJECTION INTRAVENOUS at 15:09

## 2024-04-11 RX ADMIN — SODIUM CHLORIDE 20 ML/HR: 9 INJECTION, SOLUTION INTRAVENOUS at 13:53

## 2024-04-11 RX ADMIN — ETOPOSIDE 174 MG: 20 INJECTION, SOLUTION INTRAVENOUS at 14:00

## 2024-04-11 RX ADMIN — DEXAMETHASONE SODIUM PHOSPHATE 8 MG: 4 INJECTION, SOLUTION INTRAMUSCULAR; INTRAVENOUS at 15:07

## 2024-04-11 RX ADMIN — HEPARIN 500 UNITS: 100 SYRINGE at 15:09

## 2024-04-11 NOTE — PROGRESS NOTES
Arrived to treatment suite for scheduled Etoposide infusion.  Pt arrived accessed from yesterday's treatment.  Positive blood return noted.  Treatment plan approved, released and completed as ordered.  Pt tolerated well.  Mediport flushed with normal saline and heparin.  De-accessed per protocol. Return to center on 4/30/24 for next scheduled chemo infusion.  Mickie Guerrero RN

## 2024-04-12 ENCOUNTER — OFFICE VISIT (OUTPATIENT)
Dept: ONCOLOGY | Age: 69
End: 2024-04-12
Payer: MEDICARE

## 2024-04-12 ENCOUNTER — HOSPITAL ENCOUNTER (OUTPATIENT)
Dept: RADIATION ONCOLOGY | Age: 69
Discharge: HOME | End: 2024-04-12
Payer: MEDICARE

## 2024-04-12 ENCOUNTER — HOSPITAL ENCOUNTER (OUTPATIENT)
Dept: INFUSION THERAPY | Age: 69
Discharge: HOME | End: 2024-04-12
Payer: MEDICARE

## 2024-04-12 VITALS
OXYGEN SATURATION: 99 % | RESPIRATION RATE: 18 BRPM | HEART RATE: 88 BPM | BODY MASS INDEX: 31.88 KG/M2 | SYSTOLIC BLOOD PRESSURE: 157 MMHG | DIASTOLIC BLOOD PRESSURE: 65 MMHG | HEIGHT: 60 IN | WEIGHT: 162.4 LBS | TEMPERATURE: 97.5 F

## 2024-04-12 DIAGNOSIS — C34.32 SMALL CELL LUNG CANCER, LEFT LOWER LOBE (HCC): Primary | ICD-10-CM

## 2024-04-12 PROCEDURE — 99214 OFFICE O/P EST MOD 30 MIN: CPT | Performed by: INTERNAL MEDICINE

## 2024-04-12 PROCEDURE — G8399 PT W/DXA RESULTS DOCUMENT: HCPCS | Performed by: INTERNAL MEDICINE

## 2024-04-12 PROCEDURE — G8427 DOCREV CUR MEDS BY ELIG CLIN: HCPCS | Performed by: INTERNAL MEDICINE

## 2024-04-12 PROCEDURE — G8417 CALC BMI ABV UP PARAM F/U: HCPCS | Performed by: INTERNAL MEDICINE

## 2024-04-12 PROCEDURE — 99211 OFF/OP EST MAY X REQ PHY/QHP: CPT

## 2024-04-12 PROCEDURE — 1036F TOBACCO NON-USER: CPT | Performed by: INTERNAL MEDICINE

## 2024-04-12 PROCEDURE — 3017F COLORECTAL CA SCREEN DOC REV: CPT | Performed by: INTERNAL MEDICINE

## 2024-04-12 PROCEDURE — 1124F ACP DISCUSS-NO DSCNMKR DOCD: CPT | Performed by: INTERNAL MEDICINE

## 2024-04-12 PROCEDURE — 1090F PRES/ABSN URINE INCON ASSESS: CPT | Performed by: INTERNAL MEDICINE

## 2024-04-12 NOTE — PROGRESS NOTES
Patient Name: Lin Bates  Patient : 1955  Patient MRN: [id number]     Primary Oncologist: Burt Birch MD  Referring Provider: Dorothy Terrazas PA     Date of Service: 2024      Chief Complaint:   Chief Complaint   Patient presents with    Follow-up     Patient Active Problem List:       Leukocytosis       Thrombocytosis    HPI:   Ms. Bates is a 68-year-old very pleasant female with medical history significant for hypertension, hyperlipidemia, diabetes mellitus, history of HPV infection, initially referred to me on 2014, for evaluation of mild leukocytosis.       She was found to have mild leukocytosis on routine blood tests done on 2014, by her primary care physician. She is a chronic smoker and she smokes about 10 cigarettes a day since she was 14.     Laboratory workup on 2014, showed slight elevation in white blood cell count (13). Her hemoglobin, hematocrit, platelet count and LDH were normal. There was no significant immunophenotypic abnormalities in flow cytometry. JAK2  and bcr/abl studies were negative.     She has been followed periodically since then. She missed follow up with me since 2019 until 2022.     Laboratory work ups done on 22 showed leukocytosis (WBC 16.1), thrombocytosis (platelet 651) and flow cytometry showed monoclonal B-cell population (~450 cells/cumm). The immunophenotype of the clonal cells is non-specific. DDX includes peripheral blood involvement by a B-cell lymphoma and monoclonal B-cell lymphocytosis.     The rests of the blood tests, including BCR-ABL1, JAK2 V617F, JAK2 exon 12-13, MPL, CALR, MARIKA, ESR and LDH, were within normal range.      CT scan of the neck, chest, abdomen and pelvis done on 2022 showed no significant pathology in her neck.  No splenomegaly or overt lymphadenopathy.  There are scattered mildly prominent left internal mammary, upper abdominal and left common iliac lymph nodes.

## 2024-04-12 NOTE — PROGRESS NOTES
MA Rooming Questions  Patient: Lin Bates  MRN: [id number]    Date: 4/12/2024        1. Do you have any new issues?   no         2. Do you need any refills on medications?    no    3. Have you had any imaging done since your last visit?   no    4. Have you been hospitalized or seen in the emergency room since your last visit here?   no    5. Did the patient have a depression screening completed today? No    No data recorded     PHQ-9 Given to (if applicable):               PHQ-9 Score (if applicable):                     [] Positive     []  Negative              Does question #9 need addressed (if applicable)                     [] Yes    []  No               Cristina Iglesias MA

## 2024-04-15 ENCOUNTER — HOSPITAL ENCOUNTER (OUTPATIENT)
Dept: RADIATION ONCOLOGY | Age: 69
Discharge: HOME | End: 2024-04-15
Payer: MEDICARE

## 2024-04-15 PROCEDURE — 77014 CHG CT GUIDANCE RADIATION THERAPY FLDS PLACEMENT: CPT | Performed by: RADIOLOGY

## 2024-04-15 PROCEDURE — 77386 HC NTSTY MODUL RAD TX DLVR CPLX: CPT | Performed by: RADIOLOGY

## 2024-04-16 ENCOUNTER — HOSPITAL ENCOUNTER (OUTPATIENT)
Dept: RADIATION ONCOLOGY | Age: 69
Discharge: HOME | End: 2024-04-16
Payer: MEDICARE

## 2024-04-16 PROCEDURE — 77336 RADIATION PHYSICS CONSULT: CPT | Performed by: RADIOLOGY

## 2024-04-16 PROCEDURE — 77386 HC NTSTY MODUL RAD TX DLVR CPLX: CPT | Performed by: RADIOLOGY

## 2024-04-16 PROCEDURE — 77014 CHG CT GUIDANCE RADIATION THERAPY FLDS PLACEMENT: CPT | Performed by: RADIOLOGY

## 2024-04-16 NOTE — PROGRESS NOTES
Radiation Oncology  Treatment Completion Summary  Encounter Date: 2024 2:05 PM    Ms. Lin Bates is a 68 y.o. female  : 1955  MRN: [id number]  Acct Number: [id number]      FOLLOW UP PHYSICIANS:   Primary Care: Dorothy Terrazas PA   Medical Oncologist: Dr. Birch      DIAGNOSIS: limited stage small cell lung cancer     TREATMENT COURSE:   Oncology History   Small cell lung cancer, left lower lobe (HCC)   2024 -  Chemotherapy    OP CISplatin 80mg/m2 + etoposide 100mg/m2 Q21D  Plan Provider: Burt Birch MD  Treatment goal: Curative  Line of treatment: 1st Line         HISTORY:  Lin Bates is a 68 y.o. female with the above referenced diagnosis.  Complete details on history of present illness please see my initial consultation note.  The patient has recently completed a course of  radiation therapy and what follows is a description of the treatments received:    ANATOMIC SITE: L lung   BEAM ORIENTATION: VMAT IMRT dual arc  ENERGY: 6MV photons  DOSE PER FRACTION: 200cGy  TOTAL DOSE: 6000cGy; 30/30 fractions    ELAPSED DAYS: 3/5/24 - 24    TREATMENT TOLERANCE:   Overall, the patient tolerated radiation therapy quite well.  The patient's energy level was fairly well-maintained throughout the course of treatments. Esophagitis towards the end treated with carafate/bmx. Last chemo end of this month.       FOLLOW-UP PLANS:   The patient is to return to see me in 2-3 weeks. Will discuss imaging and PCI.       Electronically signed by Hunter Guo MD on 2024 at 2:05 PM

## 2024-04-16 NOTE — PROGRESS NOTES
Weekly Radiation Treatment Progress Note    DATE OF SERVICE: 4/16/2024     DIAGNOSIS:  limited stage small cell lung cancer     TREATMENT COURSE:   Oncology History   Small cell lung cancer, left lower lobe (HCC)   2/13/2024 -  Chemotherapy    OP CISplatin 80mg/m2 + etoposide 100mg/m2 Q21D  Plan Provider: Burt Birch MD  Treatment goal: Curative  Line of treatment: 1st Line           Site: L lung   Current Total Radiation Dose: 6000 cGy  Fraction: 30/30    Pt doing well. Energy fairly good.  Esophagitis towards the end of treatment. Still doing bmx/carafate. Last chemo end of this month.       EXAM  Wt Readings from Last 3 Encounters:   04/12/24 73.7 kg (162 lb 6.4 oz)   04/05/24 72.1 kg (159 lb)   04/09/24 71.5 kg (157 lb 9.6 oz)     NAD      Setup images, chart, plan reviewed    A/P:   Tolerating RT well  Finished today. Will see back when gets next chemo cycle. Will discuss imaging with Dr. Birch after last chemo end of this month.       Electronically signed by Hunter Guo MD on 4/16/2024 at 2:04 PM

## 2024-04-21 ENCOUNTER — HOSPITAL ENCOUNTER (INPATIENT)
Age: 69
LOS: 3 days | Discharge: HOME | DRG: 809 | End: 2024-04-24
Attending: STUDENT IN AN ORGANIZED HEALTH CARE EDUCATION/TRAINING PROGRAM | Admitting: STUDENT IN AN ORGANIZED HEALTH CARE EDUCATION/TRAINING PROGRAM
Payer: MEDICARE

## 2024-04-21 ENCOUNTER — HOSPITAL ENCOUNTER (EMERGENCY)
Age: 69
Discharge: ANOTHER HOSPITAL | End: 2024-04-21
Attending: EMERGENCY MEDICINE
Payer: MEDICARE

## 2024-04-21 ENCOUNTER — APPOINTMENT (OUTPATIENT)
Dept: GENERAL RADIOLOGY | Age: 69
End: 2024-04-21
Attending: EMERGENCY MEDICINE
Payer: MEDICARE

## 2024-04-21 VITALS
SYSTOLIC BLOOD PRESSURE: 139 MMHG | RESPIRATION RATE: 20 BRPM | BODY MASS INDEX: 31.8 KG/M2 | OXYGEN SATURATION: 94 % | TEMPERATURE: 97.9 F | HEART RATE: 115 BPM | HEIGHT: 60 IN | DIASTOLIC BLOOD PRESSURE: 63 MMHG | WEIGHT: 162 LBS

## 2024-04-21 DIAGNOSIS — D64.9 SEVERE ANEMIA: Primary | ICD-10-CM

## 2024-04-21 DIAGNOSIS — R63.8 POOR FLUID INTAKE: ICD-10-CM

## 2024-04-21 DIAGNOSIS — D61.818 PANCYTOPENIA (HCC): ICD-10-CM

## 2024-04-21 DIAGNOSIS — D61.818 PANCYTOPENIA (HCC): Primary | ICD-10-CM

## 2024-04-21 DIAGNOSIS — C34.32 SMALL CELL LUNG CANCER, LEFT LOWER LOBE (HCC): ICD-10-CM

## 2024-04-21 LAB
ALBUMIN SERPL-MCNC: 3.6 GM/DL (ref 3.4–5)
ALP BLD-CCNC: 54 IU/L (ref 40–129)
ALT SERPL-CCNC: 6 U/L (ref 10–40)
ANION GAP SERPL CALCULATED.3IONS-SCNC: 23 MMOL/L (ref 7–16)
ANISOCYTOSIS: ABNORMAL
AST SERPL-CCNC: 8 IU/L (ref 15–37)
BASOPHILS ABSOLUTE: 0 K/CU MM
BASOPHILS RELATIVE PERCENT: 0 % (ref 0–1)
BILIRUB SERPL-MCNC: 0.4 MG/DL (ref 0–1)
BUN SERPL-MCNC: 16 MG/DL (ref 6–23)
CALCIUM SERPL-MCNC: 9.2 MG/DL (ref 8.3–10.6)
CHLORIDE BLD-SCNC: 98 MMOL/L (ref 99–110)
CO2: 19 MMOL/L (ref 21–32)
CREAT SERPL-MCNC: 0.7 MG/DL (ref 0.6–1.1)
DIFFERENTIAL TYPE: ABNORMAL
EOSINOPHILS ABSOLUTE: 0 K/CU MM
EOSINOPHILS RELATIVE PERCENT: 6.3 % (ref 0–3)
GFR SERPL CREATININE-BSD FRML MDRD: >90 ML/MIN/1.73M2
GLUCOSE SERPL-MCNC: 155 MG/DL (ref 70–99)
HCT VFR BLD CALC: 20 % (ref 37–47)
HEMOGLOBIN: 6.6 GM/DL (ref 12.5–16)
IMMATURE NEUTROPHIL %: 0 % (ref 0–0.43)
LIPASE: 5 IU/L (ref 13–60)
LYMPHOCYTES ABSOLUTE: 0.2 K/CU MM
LYMPHOCYTES RELATIVE PERCENT: 46.9 % (ref 24–44)
MCH RBC QN AUTO: 34.7 PG (ref 27–31)
MCHC RBC AUTO-ENTMCNC: 33 % (ref 32–36)
MCV RBC AUTO: 105.3 FL (ref 78–100)
MONOCYTES ABSOLUTE: 0.1 K/CU MM
MONOCYTES RELATIVE PERCENT: 43.8 % (ref 0–4)
NEUTROPHILS RELATIVE PERCENT: 3 % (ref 36–66)
PDW BLD-RTO: 12.7 % (ref 11.7–14.9)
PLATELET # BLD: 35 K/CU MM (ref 140–440)
PLT MORPHOLOGY: ADEQUATE
PMV BLD AUTO: 9.6 FL (ref 7.5–11.1)
POTASSIUM SERPL-SCNC: 3.6 MMOL/L (ref 3.5–5.1)
RBC # BLD: 1.9 M/CU MM (ref 4.2–5.4)
SEGMENTED NEUTROPHILS ABSOLUTE COUNT: 0 K/CU MM
SODIUM BLD-SCNC: 140 MMOL/L (ref 135–145)
TOTAL IMMATURE NEUTOROPHIL: 0 K/CU MM
TOTAL PROTEIN: 7.1 GM/DL (ref 6.4–8.2)
TROPONIN, HIGH SENSITIVITY: 10 NG/L (ref 0–14)
WBC # BLD: 0.3 K/CU MM (ref 4–10.5)

## 2024-04-21 PROCEDURE — 99285 EMERGENCY DEPT VISIT HI MDM: CPT

## 2024-04-21 PROCEDURE — P9016 RBC LEUKOCYTES REDUCED: HCPCS

## 2024-04-21 PROCEDURE — 71045 X-RAY EXAM CHEST 1 VIEW: CPT

## 2024-04-21 PROCEDURE — 86922 COMPATIBILITY TEST ANTIGLOB: CPT

## 2024-04-21 PROCEDURE — 30233N1 TRANSFUSION OF NONAUTOLOGOUS RED BLOOD CELLS INTO PERIPHERAL VEIN, PERCUTANEOUS APPROACH: ICD-10-PCS | Performed by: STUDENT IN AN ORGANIZED HEALTH CARE EDUCATION/TRAINING PROGRAM

## 2024-04-21 PROCEDURE — 86901 BLOOD TYPING SEROLOGIC RH(D): CPT

## 2024-04-21 PROCEDURE — 2580000003 HC RX 258: Performed by: STUDENT IN AN ORGANIZED HEALTH CARE EDUCATION/TRAINING PROGRAM

## 2024-04-21 PROCEDURE — 86900 BLOOD TYPING SEROLOGIC ABO: CPT

## 2024-04-21 PROCEDURE — 86850 RBC ANTIBODY SCREEN: CPT

## 2024-04-21 PROCEDURE — 1200000000 HC SEMI PRIVATE

## 2024-04-21 PROCEDURE — 2580000003 HC RX 258: Performed by: EMERGENCY MEDICINE

## 2024-04-21 PROCEDURE — 6360000002 HC RX W HCPCS: Performed by: STUDENT IN AN ORGANIZED HEALTH CARE EDUCATION/TRAINING PROGRAM

## 2024-04-21 PROCEDURE — 83690 ASSAY OF LIPASE: CPT

## 2024-04-21 PROCEDURE — 84484 ASSAY OF TROPONIN QUANT: CPT

## 2024-04-21 PROCEDURE — 36430 TRANSFUSION BLD/BLD COMPNT: CPT

## 2024-04-21 PROCEDURE — 80053 COMPREHEN METABOLIC PANEL: CPT

## 2024-04-21 PROCEDURE — 85025 COMPLETE CBC W/AUTO DIFF WBC: CPT

## 2024-04-21 RX ORDER — LEVOFLOXACIN 5 MG/ML
500 INJECTION, SOLUTION INTRAVENOUS EVERY 24 HOURS
Status: DISCONTINUED | OUTPATIENT
Start: 2024-04-21 | End: 2024-04-24 | Stop reason: HOSPADM

## 2024-04-21 RX ORDER — ACETAMINOPHEN 650 MG/1
650 SUPPOSITORY RECTAL EVERY 6 HOURS PRN
Status: DISCONTINUED | OUTPATIENT
Start: 2024-04-21 | End: 2024-04-24 | Stop reason: HOSPADM

## 2024-04-21 RX ORDER — GLUCAGON 1 MG/ML
1 KIT INJECTION PRN
Status: DISCONTINUED | OUTPATIENT
Start: 2024-04-21 | End: 2024-04-24 | Stop reason: HOSPADM

## 2024-04-21 RX ORDER — FERROUS SULFATE 325(65) MG
325 TABLET ORAL
Status: DISCONTINUED | OUTPATIENT
Start: 2024-04-22 | End: 2024-04-24 | Stop reason: HOSPADM

## 2024-04-21 RX ORDER — SODIUM CHLORIDE 9 MG/ML
INJECTION, SOLUTION INTRAVENOUS PRN
Status: DISCONTINUED | OUTPATIENT
Start: 2024-04-21 | End: 2024-04-24 | Stop reason: HOSPADM

## 2024-04-21 RX ORDER — ATORVASTATIN CALCIUM 10 MG/1
20 TABLET, FILM COATED ORAL DAILY
Status: DISCONTINUED | OUTPATIENT
Start: 2024-04-22 | End: 2024-04-24 | Stop reason: HOSPADM

## 2024-04-21 RX ORDER — LISINOPRIL 5 MG/1
2.5 TABLET ORAL DAILY
Status: DISCONTINUED | OUTPATIENT
Start: 2024-04-22 | End: 2024-04-24 | Stop reason: HOSPADM

## 2024-04-21 RX ORDER — POLYETHYLENE GLYCOL 3350 17 G/17G
17 POWDER, FOR SOLUTION ORAL DAILY PRN
Status: DISCONTINUED | OUTPATIENT
Start: 2024-04-21 | End: 2024-04-24 | Stop reason: HOSPADM

## 2024-04-21 RX ORDER — SODIUM CHLORIDE 0.9 % (FLUSH) 0.9 %
5-40 SYRINGE (ML) INJECTION EVERY 12 HOURS SCHEDULED
Status: DISCONTINUED | OUTPATIENT
Start: 2024-04-21 | End: 2024-04-24 | Stop reason: HOSPADM

## 2024-04-21 RX ORDER — ONDANSETRON 2 MG/ML
4 INJECTION INTRAMUSCULAR; INTRAVENOUS EVERY 6 HOURS PRN
Status: DISCONTINUED | OUTPATIENT
Start: 2024-04-21 | End: 2024-04-24 | Stop reason: HOSPADM

## 2024-04-21 RX ORDER — DEXTROSE MONOHYDRATE 100 MG/ML
INJECTION, SOLUTION INTRAVENOUS CONTINUOUS PRN
Status: DISCONTINUED | OUTPATIENT
Start: 2024-04-21 | End: 2024-04-24 | Stop reason: HOSPADM

## 2024-04-21 RX ORDER — MAGNESIUM SULFATE IN WATER 40 MG/ML
2000 INJECTION, SOLUTION INTRAVENOUS PRN
Status: DISCONTINUED | OUTPATIENT
Start: 2024-04-21 | End: 2024-04-24 | Stop reason: HOSPADM

## 2024-04-21 RX ORDER — INSULIN LISPRO 100 [IU]/ML
0-4 INJECTION, SOLUTION INTRAVENOUS; SUBCUTANEOUS NIGHTLY
Status: DISCONTINUED | OUTPATIENT
Start: 2024-04-21 | End: 2024-04-24 | Stop reason: HOSPADM

## 2024-04-21 RX ORDER — FAMOTIDINE 20 MG/1
20 TABLET, FILM COATED ORAL DAILY
Status: DISCONTINUED | OUTPATIENT
Start: 2024-04-22 | End: 2024-04-24 | Stop reason: HOSPADM

## 2024-04-21 RX ORDER — SODIUM CHLORIDE 9 MG/ML
INJECTION, SOLUTION INTRAVENOUS CONTINUOUS
Status: DISCONTINUED | OUTPATIENT
Start: 2024-04-21 | End: 2024-04-24 | Stop reason: HOSPADM

## 2024-04-21 RX ORDER — ONDANSETRON 4 MG/1
4 TABLET, ORALLY DISINTEGRATING ORAL EVERY 8 HOURS PRN
Status: DISCONTINUED | OUTPATIENT
Start: 2024-04-21 | End: 2024-04-24 | Stop reason: HOSPADM

## 2024-04-21 RX ORDER — INSULIN LISPRO 100 [IU]/ML
0-4 INJECTION, SOLUTION INTRAVENOUS; SUBCUTANEOUS
Status: DISCONTINUED | OUTPATIENT
Start: 2024-04-21 | End: 2024-04-24 | Stop reason: HOSPADM

## 2024-04-21 RX ORDER — DIPHENHYDRAMINE HYDROCHLORIDE AND LIDOCAINE HYDROCHLORIDE AND ALUMINUM HYDROXIDE AND MAGNESIUM HYDRO
10 KIT 4 TIMES DAILY PRN
Status: DISCONTINUED | OUTPATIENT
Start: 2024-04-21 | End: 2024-04-22

## 2024-04-21 RX ORDER — POTASSIUM CHLORIDE 20 MEQ/1
40 TABLET, EXTENDED RELEASE ORAL PRN
Status: DISCONTINUED | OUTPATIENT
Start: 2024-04-21 | End: 2024-04-24 | Stop reason: HOSPADM

## 2024-04-21 RX ORDER — POTASSIUM CHLORIDE 7.45 MG/ML
10 INJECTION INTRAVENOUS PRN
Status: DISCONTINUED | OUTPATIENT
Start: 2024-04-21 | End: 2024-04-24 | Stop reason: HOSPADM

## 2024-04-21 RX ORDER — 0.9 % SODIUM CHLORIDE 0.9 %
1000 INTRAVENOUS SOLUTION INTRAVENOUS ONCE
Status: COMPLETED | OUTPATIENT
Start: 2024-04-21 | End: 2024-04-21

## 2024-04-21 RX ORDER — SODIUM CHLORIDE 0.9 % (FLUSH) 0.9 %
5-40 SYRINGE (ML) INJECTION PRN
Status: DISCONTINUED | OUTPATIENT
Start: 2024-04-21 | End: 2024-04-24 | Stop reason: HOSPADM

## 2024-04-21 RX ORDER — ACETAMINOPHEN 325 MG/1
650 TABLET ORAL EVERY 6 HOURS PRN
Status: DISCONTINUED | OUTPATIENT
Start: 2024-04-21 | End: 2024-04-24 | Stop reason: HOSPADM

## 2024-04-21 RX ADMIN — SODIUM CHLORIDE 1000 ML: 9 INJECTION, SOLUTION INTRAVENOUS at 13:09

## 2024-04-21 RX ADMIN — LEVOFLOXACIN 500 MG: 5 INJECTION, SOLUTION INTRAVENOUS at 23:16

## 2024-04-21 RX ADMIN — SODIUM CHLORIDE: 9 INJECTION, SOLUTION INTRAVENOUS at 23:15

## 2024-04-21 ASSESSMENT — LIFESTYLE VARIABLES
HOW MANY STANDARD DRINKS CONTAINING ALCOHOL DO YOU HAVE ON A TYPICAL DAY: PATIENT DOES NOT DRINK
HOW OFTEN DO YOU HAVE A DRINK CONTAINING ALCOHOL: NEVER

## 2024-04-21 NOTE — ED NOTES
Call from [facility], s/w harjinder. Gave bed assignment [facility] [id number], report #[id number]. She will work on transport

## 2024-04-21 NOTE — CONSENT
Informed Consent for Blood Component Transfusion Note    I have discussed with the patient the rationale for blood component transfusion; its benefits in treating or preventing fatigue, organ damage, or death; and its risk which includes mild transfusion reactions, rare risk of blood borne infection, or more serious but rare reactions. I have discussed the alternatives to transfusion, including the risk and consequences of not receiving transfusion. The patient had an opportunity to ask questions and had agreed to proceed with transfusion of blood components.    Electronically signed by Denis Ford MD on 4/21/24 at 6:03 PM EDT

## 2024-04-21 NOTE — ED PROVIDER NOTES
Emergency Department Encounter    Patient: Lin Bates  MRN: [id number]  : 1955  Date of Evaluation: 2024  ED Provider:  Carlos Jenkins MD    Triage Chief Complaint:   Chest Pain (While eating or drinking. For several weeks. Pt receives radiation treatment for lung ca)    [place]:  Lin Bates is a 68 y.o. female history of lung cancer, hypertension and type 2 diabetes who has been on radiation therapy 5 times a week in the past several weeks that presents to the emergency department complaints of pain in the epigastric area that she experienced when she tries to swallow for many weeks that her oncologist thinks is likely side effect of the radiation therapy.  Patient feels the symptoms are getting worse.  She states as a result of her having pain when she tries to swallow after she swallows she has not been drinking or eating much and she feels she might be dehydrated.  She does not actually admit to chest pain or shortness of breath or fever or chills.  She denies diarrhea or vomiting.  Patient states her oncologist and radiation therapy specialist are aware of the symptoms and she was told the symptoms would subside with time.    ROS - see HPI, below listed is current ROS at time of my eval:  General:  No fevers, no chills, no weakness  Eyes:  No recent vison changes, no discharge  ENT:  No sore throat, no nasal congestion, no hearing changes  Cardiovascular:  No chest pain, no palpitations  Respiratory:  No shortness of breath, no cough, no wheezing  Gastrointestinal: Epigastric abdominal pain with poor appetite  Musculoskeletal:  No muscle pain, no joint pain  Skin:  No rash, no pruritis, no easy bruising  Neurologic:  No speech problems, no headache, no extremity numbness, no extremity tingling, no extremity weakness  Extremities:  no edema, no pain    Past Medical History:   Diagnosis Date    Hyperlipidemia     Hypertension     Thickened endometrium     Type 2 diabetes mellitus without

## 2024-04-21 NOTE — H&P
BIOPSY LUNG PERCUTANEOUS (2024); and Port Surgery (N/A, 4/10/2024).  Allergies: No Known Allergies  Fam HX:  family history includes Cancer in her brother and father.  Soc HX:   Social History     Socioeconomic History    Marital status:    Tobacco Use    Smoking status: Former     Current packs/day: 0.00     Average packs/day: 1 pack/day for 52.1 years (52.1 ttl pk-yrs)     Types: Cigarettes     Start date:      Quit date: 2024     Years since quittin.2    Smokeless tobacco: Never    Tobacco comments:     Down to half pack 22   Vaping Use    Vaping Use: Never used   Substance and Sexual Activity    Alcohol use: Not Currently     Comment: yearly    Drug use: No    Sexual activity: Not Currently     Social Determinants of Health     Financial Resource Strain: Low Risk  (2023)    Overall Financial Resource Strain (CARDIA)     Difficulty of Paying Living Expenses: Not hard at all   Transportation Needs: Unknown (2023)    PRAPARE - Transportation     Lack of Transportation (Non-Medical): No   Housing Stability: Unknown (2023)    Housing Stability Vital Sign     Unstable Housing in the Last Year: No       Medications:   Medications:    [START ON 2024] atorvastatin  20 mg Oral Daily    [START ON 2024] famotidine  20 mg Oral Daily    [START ON 2024] ferrous sulfate  325 mg Oral Daily with breakfast    [START ON 2024] lisinopril  2.5 mg Oral Daily    sodium chloride flush  5-40 mL IntraVENous 2 times per day    insulin lispro  0-4 Units SubCUTAneous TID WC    insulin lispro  0-4 Units SubCUTAneous Nightly    levofloxacin  500 mg IntraVENous Q24H      Infusions:    sodium chloride      sodium chloride      sodium chloride      dextrose       PRN Meds: sodium chloride flush, 5-40 mL, PRN  sodium chloride, , PRN  potassium chloride, 40 mEq, PRN   Or  potassium alternative oral replacement, 40 mEq, PRN   Or  potassium chloride, 10 mEq, PRN  magnesium sulfate, 2,000

## 2024-04-22 ENCOUNTER — APPOINTMENT (OUTPATIENT)
Dept: INFUSION THERAPY | Age: 69
End: 2024-04-22
Payer: MEDICARE

## 2024-04-22 LAB
ABO/RH: NORMAL
ANION GAP SERPL CALCULATED.3IONS-SCNC: 18 MMOL/L (ref 7–16)
ANISOCYTOSIS: ABNORMAL
ANTIBODY SCREEN: NEGATIVE
BUN SERPL-MCNC: 14 MG/DL (ref 6–23)
CALCIUM SERPL-MCNC: 7.9 MG/DL (ref 8.3–10.6)
CELLS COUNTED: 50
CHLORIDE BLD-SCNC: 104 MMOL/L (ref 99–110)
CO2: 18 MMOL/L (ref 21–32)
COMPONENT: NORMAL
CREAT SERPL-MCNC: 0.6 MG/DL (ref 0.6–1.1)
CROSSMATCH RESULT: NORMAL
DIFFERENTIAL TYPE: ABNORMAL
GFR SERPL CREATININE-BSD FRML MDRD: >90 ML/MIN/1.73M2
GLUCOSE BLD-MCNC: 125 MG/DL (ref 70–99)
GLUCOSE BLD-MCNC: 130 MG/DL (ref 70–99)
GLUCOSE BLD-MCNC: 148 MG/DL (ref 70–99)
GLUCOSE BLD-MCNC: 155 MG/DL (ref 70–99)
GLUCOSE SERPL-MCNC: 104 MG/DL (ref 70–99)
HCT VFR BLD CALC: 25 % (ref 37–47)
HCT VFR BLD CALC: 25.5 % (ref 37–47)
HEMOGLOBIN: 8.5 GM/DL (ref 12.5–16)
HEMOGLOBIN: 8.5 GM/DL (ref 12.5–16)
LYMPHOCYTES ABSOLUTE: 0.4 K/CU MM
LYMPHOCYTES RELATIVE PERCENT: 56 % (ref 24–44)
MCH RBC QN AUTO: 33.5 PG (ref 27–31)
MCHC RBC AUTO-ENTMCNC: 34 % (ref 32–36)
MCV RBC AUTO: 98.4 FL (ref 78–100)
MONOCYTES ABSOLUTE: 0.2 K/CU MM
MONOCYTES RELATIVE PERCENT: 40 % (ref 0–4)
NEUTROPHILS RELATIVE PERCENT: 4 % (ref 36–66)
NUCLEATED RED BLOOD CELLS: 4
PDW BLD-RTO: 17 % (ref 11.7–14.9)
PLATELET # BLD: 34 K/CU MM (ref 140–440)
PMV BLD AUTO: 10.1 FL (ref 7.5–11.1)
POLYCHROMASIA: ABNORMAL
POTASSIUM SERPL-SCNC: 3.9 MMOL/L (ref 3.5–5.1)
RBC # BLD: 2.54 M/CU MM (ref 4.2–5.4)
SEGMENTED NEUTROPHILS ABSOLUTE COUNT: 0 K/CU MM
SMEAR REVIEW: NORMAL
SODIUM BLD-SCNC: 140 MMOL/L (ref 135–145)
STATUS: NORMAL
TRANSFUSION STATUS: NORMAL
UNIT DIVISION: 0
UNIT NUMBER: NORMAL
WBC # BLD: 0.6 K/CU MM (ref 4–10.5)

## 2024-04-22 PROCEDURE — 1200000000 HC SEMI PRIVATE

## 2024-04-22 PROCEDURE — 6370000000 HC RX 637 (ALT 250 FOR IP): Performed by: STUDENT IN AN ORGANIZED HEALTH CARE EDUCATION/TRAINING PROGRAM

## 2024-04-22 PROCEDURE — 80048 BASIC METABOLIC PNL TOTAL CA: CPT

## 2024-04-22 PROCEDURE — 2580000003 HC RX 258: Performed by: STUDENT IN AN ORGANIZED HEALTH CARE EDUCATION/TRAINING PROGRAM

## 2024-04-22 PROCEDURE — 85007 BL SMEAR W/DIFF WBC COUNT: CPT

## 2024-04-22 PROCEDURE — APPNB60 APP NON BILLABLE TIME 46-60 MINS: Performed by: PHYSICIAN ASSISTANT

## 2024-04-22 PROCEDURE — 85018 HEMOGLOBIN: CPT

## 2024-04-22 PROCEDURE — 82962 GLUCOSE BLOOD TEST: CPT

## 2024-04-22 PROCEDURE — 6360000002 HC RX W HCPCS: Performed by: STUDENT IN AN ORGANIZED HEALTH CARE EDUCATION/TRAINING PROGRAM

## 2024-04-22 PROCEDURE — 36591 DRAW BLOOD OFF VENOUS DEVICE: CPT

## 2024-04-22 PROCEDURE — 85014 HEMATOCRIT: CPT

## 2024-04-22 PROCEDURE — 99223 1ST HOSP IP/OBS HIGH 75: CPT | Performed by: INTERNAL MEDICINE

## 2024-04-22 PROCEDURE — 85027 COMPLETE CBC AUTOMATED: CPT

## 2024-04-22 PROCEDURE — 94761 N-INVAS EAR/PLS OXIMETRY MLT: CPT

## 2024-04-22 RX ORDER — OXYCODONE HYDROCHLORIDE AND ACETAMINOPHEN 5; 325 MG/1; MG/1
1 TABLET ORAL EVERY 4 HOURS PRN
Status: DISCONTINUED | OUTPATIENT
Start: 2024-04-22 | End: 2024-04-24 | Stop reason: HOSPADM

## 2024-04-22 RX ORDER — LIDOCAINE HYDROCHLORIDE 20 MG/ML
15 SOLUTION OROPHARYNGEAL
Status: DISCONTINUED | OUTPATIENT
Start: 2024-04-22 | End: 2024-04-24 | Stop reason: HOSPADM

## 2024-04-22 RX ORDER — HYDROMORPHONE HYDROCHLORIDE 1 MG/ML
0.25 INJECTION, SOLUTION INTRAMUSCULAR; INTRAVENOUS; SUBCUTANEOUS EVERY 4 HOURS PRN
Status: DISCONTINUED | OUTPATIENT
Start: 2024-04-22 | End: 2024-04-24 | Stop reason: HOSPADM

## 2024-04-22 RX ADMIN — FERROUS SULFATE TAB 325 MG (65 MG ELEMENTAL FE) 325 MG: 325 (65 FE) TAB at 11:37

## 2024-04-22 RX ADMIN — LEVOFLOXACIN 500 MG: 5 INJECTION, SOLUTION INTRAVENOUS at 22:03

## 2024-04-22 RX ADMIN — LISINOPRIL 2.5 MG: 5 TABLET ORAL at 11:37

## 2024-04-22 RX ADMIN — SODIUM CHLORIDE, PRESERVATIVE FREE 10 ML: 5 INJECTION INTRAVENOUS at 11:38

## 2024-04-22 RX ADMIN — LIDOCAINE HYDROCHLORIDE 15 ML: 20 SOLUTION ORAL at 13:31

## 2024-04-22 RX ADMIN — LIDOCAINE HYDROCHLORIDE 15 ML: 20 SOLUTION ORAL at 21:02

## 2024-04-22 RX ADMIN — LIDOCAINE HYDROCHLORIDE 15 ML: 20 SOLUTION ORAL at 17:38

## 2024-04-22 RX ADMIN — ATORVASTATIN CALCIUM 20 MG: 10 TABLET, FILM COATED ORAL at 11:37

## 2024-04-22 RX ADMIN — SODIUM CHLORIDE, PRESERVATIVE FREE 10 ML: 5 INJECTION INTRAVENOUS at 22:05

## 2024-04-22 RX ADMIN — LIDOCAINE HYDROCHLORIDE 15 ML: 20 SOLUTION ORAL at 10:38

## 2024-04-22 RX ADMIN — ONDANSETRON 4 MG: 2 INJECTION INTRAMUSCULAR; INTRAVENOUS at 15:30

## 2024-04-22 RX ADMIN — SODIUM CHLORIDE: 9 INJECTION, SOLUTION INTRAVENOUS at 11:53

## 2024-04-22 RX ADMIN — HYDROMORPHONE HYDROCHLORIDE 0.25 MG: 1 INJECTION, SOLUTION INTRAMUSCULAR; INTRAVENOUS; SUBCUTANEOUS at 11:36

## 2024-04-22 RX ADMIN — FAMOTIDINE 20 MG: 20 TABLET ORAL at 05:49

## 2024-04-22 ASSESSMENT — PAIN DESCRIPTION - LOCATION
LOCATION: THROAT
LOCATION: OTHER (COMMENT)
LOCATION: THROAT

## 2024-04-22 ASSESSMENT — PAIN - FUNCTIONAL ASSESSMENT
PAIN_FUNCTIONAL_ASSESSMENT: PREVENTS OR INTERFERES SOME ACTIVE ACTIVITIES AND ADLS

## 2024-04-22 ASSESSMENT — PAIN SCALES - GENERAL
PAINLEVEL_OUTOF10: 9
PAINLEVEL_OUTOF10: 9
PAINLEVEL_OUTOF10: 5
PAINLEVEL_OUTOF10: 1
PAINLEVEL_OUTOF10: 1
PAINLEVEL_OUTOF10: 0

## 2024-04-22 ASSESSMENT — PAIN DESCRIPTION - ORIENTATION: ORIENTATION: LOWER

## 2024-04-22 ASSESSMENT — PAIN DESCRIPTION - FREQUENCY: FREQUENCY: CONTINUOUS

## 2024-04-22 ASSESSMENT — PAIN DESCRIPTION - DESCRIPTORS
DESCRIPTORS: BURNING;SORE
DESCRIPTORS: BURNING
DESCRIPTORS: BURNING;SORE

## 2024-04-22 ASSESSMENT — PAIN DESCRIPTION - ONSET: ONSET: ON-GOING

## 2024-04-22 ASSESSMENT — PAIN DESCRIPTION - PAIN TYPE: TYPE: ACUTE PAIN

## 2024-04-22 NOTE — CARE COORDINATION
CM spoke with Nani from [facility] per pt choice of DME suppliers regarding the following:    Lin Bates requires the use of a tub transfer bench to successfully and safely perform bathing and personal care.  Patient is unable to transfer in and out of tub/shower combo safely without the use of this assistive device due to the diagnosis of small cell carcinoma of the lung, unsteady gait and weakness. Patient has been instructed on the proper use of this equipment and in agreement to utilize.      5:03 pm Nani from [facility] returned call and said that shower bench is ordered.

## 2024-04-22 NOTE — CONSULTS
Hematology Oncology Inpatient Consult    Patient Name:  Lin Bates  Patient :  1955  Patient MRN:  [id number]  Room: [address] [place], [place] [address]   Admitted: 2024  5:43 PM   Hospital Attending Provider: Divina Pop*    Primary Oncologist: Burt Birch MD  PCP:  Dorothy Terrazas PA     Date of Service: 24       Reason for Consult: SCLC, known pt, pancytopenia     Chief Complaint:  No chief complaint on file.    HPI:   Lin Bates is a 68 y.o. 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  She is currently undergoing definitive chemoradiation.  She last received C1D1 cisplatin although had had cytopenias./etoposide on 2024.  She had previously been tolerating chemotherapy very well.  She completed 30/30 fractions of radiation to the left lung on 2024.  Toward the end of her course she did develop esophagitis which was treated with Carafate and Magic mouthwash.  She presented to the [facility] ER complaining of worsening epigastric pain and difficulty swallowing.  She had very poor intake has been eating and drinking very little.  On admission she was found to have worsening pancytopenia with WBC 0.3 with undetectable ANC, hemoglobin 6.6, , MCHC 33, platelet 35 K.  Renal and liver function are grossly normal.  She received 1 unit PRBC with better than expected hemoglobin response to 8.5 where it has remained stable.    On exam she feels fatigued.  Biggest complaint is lower epigastric pain which is persistent even when not swallowing.  Still eating very little.  Denies fever/chills.  Denies cough or shortness of breath.  Denies nausea or vomiting.  No change in bowel or bladder habits.  No new headaches or dizziness.    Past Medical History:   Diagnosis Date    Cancer (HCC)     Hyperlipidemia     Hypertension     Thickened endometrium     Type 2 diabetes mellitus without complication

## 2024-04-22 NOTE — CARE COORDINATION
CM introduced self and the role of case management. CM in to see pt to initiate discharge planning. Pt is with her Fiance, Matt Castillo. Pt gave CM permission to speak freely. Pt is cooperative with assessment. Pt michell provides transportation to needed appointments. Currently pt refuses HHC or SNF placement at discharge.     Current plan for discharge is to return home with michell. Denies any other needs. Notify CM if any needs arise.     04/22/24 1338   Service Assessment   Patient Orientation Alert and Oriented   Cognition Alert   History Provided By Patient;Medical Record   Primary Caregiver Self   Support Systems Spouse/Significant Other   Patient's Healthcare Decision Maker is: Patient Declined (Legal Next of Kin Remains as Decision Maker)   PCP Verified by CM Yes   Last Visit to PCP Within last 6 months   Prior Functional Level Independent in ADLs/IADLs;Other (see comment)  (Pt says she is independent but also states that she is weak and is unsteady.)   Current Functional Level Assistance with the following:;Bathing;Toileting;Mobility   Can patient return to prior living arrangement Yes   Ability to make needs known: Good   Family able to assist with home care needs: Yes   Would you like for me to discuss the discharge plan with any other family members/significant others, and if so, who? Yes  (Krissnoble Gutierrez Perlita)   Financial Resources Medicaid;Medicare   Community Resources None   Social/Functional History   Lives With Significant other   Type of Home Apartment   Home Layout One level   Home Access Level entry   Bathroom Shower/Tub Tub/Shower unit   Bathroom Toilet Standard   Bathroom Equipment None   Home Equipment None   Receives Help From Family;Friend(s)   ADL Assistance Independent   Homemaking Assistance Needs assistance   Vacuuming Maximal   Cleaning Maximal   Driving Maximal   Homemaking Responsibilities No   Ambulation Assistance Independent  (unsteady gait and weakness)   Transfer Assistance

## 2024-04-23 LAB
ANION GAP SERPL CALCULATED.3IONS-SCNC: 20 MMOL/L (ref 7–16)
ANISOCYTOSIS: ABNORMAL
BANDED NEUTROPHILS ABSOLUTE COUNT: 0.1 K/CU MM
BANDED NEUTROPHILS RELATIVE PERCENT: 8 % (ref 5–11)
BUN SERPL-MCNC: 12 MG/DL (ref 6–23)
CALCIUM SERPL-MCNC: 8.3 MG/DL (ref 8.3–10.6)
CHLORIDE BLD-SCNC: 105 MMOL/L (ref 99–110)
CO2: 14 MMOL/L (ref 21–32)
CREAT SERPL-MCNC: 0.6 MG/DL (ref 0.6–1.1)
DIFFERENTIAL TYPE: ABNORMAL
GFR SERPL CREATININE-BSD FRML MDRD: >90 ML/MIN/1.73M2
GLUCOSE BLD-MCNC: 112 MG/DL (ref 70–99)
GLUCOSE BLD-MCNC: 116 MG/DL (ref 70–99)
GLUCOSE BLD-MCNC: 125 MG/DL (ref 70–99)
GLUCOSE BLD-MCNC: 131 MG/DL (ref 70–99)
GLUCOSE SERPL-MCNC: 113 MG/DL (ref 70–99)
HCT VFR BLD CALC: 27.8 % (ref 37–47)
HEMOGLOBIN: 9.3 GM/DL (ref 12.5–16)
LYMPHOCYTES ABSOLUTE: 0.2 K/CU MM
LYMPHOCYTES RELATIVE PERCENT: 19 % (ref 24–44)
MCH RBC QN AUTO: 33 PG (ref 27–31)
MCHC RBC AUTO-ENTMCNC: 33.5 % (ref 32–36)
MCV RBC AUTO: 98.6 FL (ref 78–100)
METAMYELOCYTES ABSOLUTE COUNT: 0.01 K/CU MM
METAMYELOCYTES PERCENT: 1 %
MONOCYTES ABSOLUTE: 0.6 K/CU MM
MONOCYTES RELATIVE PERCENT: 50 % (ref 0–4)
MYELOCYTE PERCENT: 1 %
MYELOCYTES ABSOLUTE COUNT: 0.01 K/CU MM
NEUTROPHILS RELATIVE PERCENT: 20 % (ref 36–66)
OTHER CELL MORPHOLOGY: ABNORMAL
PDW BLD-RTO: 16.3 % (ref 11.7–14.9)
PLATELET # BLD: 48 K/CU MM (ref 140–440)
PMV BLD AUTO: 9.8 FL (ref 7.5–11.1)
POTASSIUM SERPL-SCNC: 3.9 MMOL/L (ref 3.5–5.1)
RBC # BLD: 2.82 M/CU MM (ref 4.2–5.4)
RBC # BLD: ABNORMAL 10*6/UL
SEGMENTED NEUTROPHILS ABSOLUTE COUNT: 0.2 K/CU MM
SODIUM BLD-SCNC: 139 MMOL/L (ref 135–145)
UNDIFFERENTIATED CELL: 1 %
WBC # BLD: 1.2 K/CU MM (ref 4–10.5)

## 2024-04-23 PROCEDURE — 2580000003 HC RX 258: Performed by: STUDENT IN AN ORGANIZED HEALTH CARE EDUCATION/TRAINING PROGRAM

## 2024-04-23 PROCEDURE — 94761 N-INVAS EAR/PLS OXIMETRY MLT: CPT

## 2024-04-23 PROCEDURE — 6360000002 HC RX W HCPCS: Performed by: STUDENT IN AN ORGANIZED HEALTH CARE EDUCATION/TRAINING PROGRAM

## 2024-04-23 PROCEDURE — 6370000000 HC RX 637 (ALT 250 FOR IP): Performed by: STUDENT IN AN ORGANIZED HEALTH CARE EDUCATION/TRAINING PROGRAM

## 2024-04-23 PROCEDURE — 99232 SBSQ HOSP IP/OBS MODERATE 35: CPT | Performed by: PHYSICIAN ASSISTANT

## 2024-04-23 PROCEDURE — 1200000000 HC SEMI PRIVATE

## 2024-04-23 PROCEDURE — 80048 BASIC METABOLIC PNL TOTAL CA: CPT

## 2024-04-23 PROCEDURE — 85027 COMPLETE CBC AUTOMATED: CPT

## 2024-04-23 PROCEDURE — 82962 GLUCOSE BLOOD TEST: CPT

## 2024-04-23 PROCEDURE — 85007 BL SMEAR W/DIFF WBC COUNT: CPT

## 2024-04-23 RX ORDER — DIPHENHYDRAMINE HYDROCHLORIDE AND LIDOCAINE HYDROCHLORIDE AND ALUMINUM HYDROXIDE AND MAGNESIUM HYDRO
5 KIT 4 TIMES DAILY
Status: DISCONTINUED | OUTPATIENT
Start: 2024-04-23 | End: 2024-04-24 | Stop reason: HOSPADM

## 2024-04-23 RX ADMIN — LIDOCAINE HYDROCHLORIDE 15 ML: 20 SOLUTION ORAL at 16:30

## 2024-04-23 RX ADMIN — LIDOCAINE HYDROCHLORIDE 15 ML: 20 SOLUTION ORAL at 08:39

## 2024-04-23 RX ADMIN — LEVOFLOXACIN 500 MG: 5 INJECTION, SOLUTION INTRAVENOUS at 20:53

## 2024-04-23 RX ADMIN — LIDOCAINE HYDROCHLORIDE 15 ML: 20 SOLUTION ORAL at 13:01

## 2024-04-23 RX ADMIN — SODIUM CHLORIDE: 9 INJECTION, SOLUTION INTRAVENOUS at 14:30

## 2024-04-23 RX ADMIN — LIDOCAINE HYDROCHLORIDE 15 ML: 20 SOLUTION ORAL at 21:02

## 2024-04-23 RX ADMIN — LIDOCAINE HYDROCHLORIDE 15 ML: 20 SOLUTION ORAL at 00:51

## 2024-04-23 RX ADMIN — HYDROMORPHONE HYDROCHLORIDE 0.25 MG: 1 INJECTION, SOLUTION INTRAMUSCULAR; INTRAVENOUS; SUBCUTANEOUS at 19:00

## 2024-04-23 RX ADMIN — FAMOTIDINE 20 MG: 20 TABLET ORAL at 05:02

## 2024-04-23 RX ADMIN — LIDOCAINE HYDROCHLORIDE 15 ML: 20 SOLUTION ORAL at 05:01

## 2024-04-23 RX ADMIN — SODIUM CHLORIDE: 9 INJECTION, SOLUTION INTRAVENOUS at 00:54

## 2024-04-23 ASSESSMENT — PAIN SCALES - GENERAL
PAINLEVEL_OUTOF10: 2
PAINLEVEL_OUTOF10: 2
PAINLEVEL_OUTOF10: 7
PAINLEVEL_OUTOF10: 6

## 2024-04-23 ASSESSMENT — PAIN DESCRIPTION - ORIENTATION: ORIENTATION: MID

## 2024-04-23 ASSESSMENT — PAIN DESCRIPTION - LOCATION: LOCATION: OTHER (COMMENT)

## 2024-04-23 ASSESSMENT — PAIN - FUNCTIONAL ASSESSMENT: PAIN_FUNCTIONAL_ASSESSMENT: PREVENTS OR INTERFERES SOME ACTIVE ACTIVITIES AND ADLS

## 2024-04-23 ASSESSMENT — PAIN DESCRIPTION - DESCRIPTORS: DESCRIPTORS: BURNING;DISCOMFORT

## 2024-04-23 NOTE — CARE COORDINATION
Lin Paulo requires the use of a tub transfer bench to successfully and safely perform bathing and personal care.  Patient is unable to transfer in and out of tub/shower combo safely without the use of this assistive device due to the diagnosis of weakness, unsteady gait and Ca lung Patient has been instructed on the proper use of this equipment and in agreement to utilize.

## 2024-04-23 NOTE — CARE COORDINATION
Spoke with pt/michell Gutierrez and sister about bath tub bench vs tub seat.  Pt's Bathroom not big enough for a tub bench , they prefer Tub seat with back and possibly arms.  Message sent to Nani to change plan and hope they will still deliver to pt's room in am.  CM will f/u in am.

## 2024-04-24 VITALS
RESPIRATION RATE: 17 BRPM | SYSTOLIC BLOOD PRESSURE: 132 MMHG | HEART RATE: 104 BPM | TEMPERATURE: 98 F | BODY MASS INDEX: 28.85 KG/M2 | OXYGEN SATURATION: 96 % | WEIGHT: 147.71 LBS | DIASTOLIC BLOOD PRESSURE: 70 MMHG

## 2024-04-24 DIAGNOSIS — C34.32 SMALL CELL LUNG CANCER, LEFT LOWER LOBE (HCC): ICD-10-CM

## 2024-04-24 DIAGNOSIS — Z11.59 NEED FOR HEPATITIS B SCREENING TEST: Primary | ICD-10-CM

## 2024-04-24 LAB
ANION GAP SERPL CALCULATED.3IONS-SCNC: 17 MMOL/L (ref 7–16)
ANISOCYTOSIS: ABNORMAL
BANDED NEUTROPHILS ABSOLUTE COUNT: 0.1 K/CU MM
BANDED NEUTROPHILS RELATIVE PERCENT: 5 % (ref 5–11)
BUN SERPL-MCNC: 12 MG/DL (ref 6–23)
CALCIUM SERPL-MCNC: 8.2 MG/DL (ref 8.3–10.6)
CHLORIDE BLD-SCNC: 105 MMOL/L (ref 99–110)
CO2: 16 MMOL/L (ref 21–32)
CREAT SERPL-MCNC: 0.6 MG/DL (ref 0.6–1.1)
DIFFERENTIAL TYPE: ABNORMAL
EOSINOPHILS ABSOLUTE: 0 K/CU MM
EOSINOPHILS RELATIVE PERCENT: 1 % (ref 0–3)
GFR SERPL CREATININE-BSD FRML MDRD: >90 ML/MIN/1.73M2
GLUCOSE BLD-MCNC: 107 MG/DL (ref 70–99)
GLUCOSE BLD-MCNC: 137 MG/DL (ref 70–99)
GLUCOSE SERPL-MCNC: 100 MG/DL (ref 70–99)
HCT VFR BLD CALC: 28.4 % (ref 37–47)
HEMOGLOBIN: 9.5 GM/DL (ref 12.5–16)
LYMPHOCYTES ABSOLUTE: 0.4 K/CU MM
LYMPHOCYTES RELATIVE PERCENT: 19 % (ref 24–44)
MCH RBC QN AUTO: 33 PG (ref 27–31)
MCHC RBC AUTO-ENTMCNC: 33.5 % (ref 32–36)
MCV RBC AUTO: 98.6 FL (ref 78–100)
METAMYELOCYTES ABSOLUTE COUNT: 0.04 K/CU MM
METAMYELOCYTES PERCENT: 2 %
MONOCYTES ABSOLUTE: 0.8 K/CU MM
MONOCYTES RELATIVE PERCENT: 44 % (ref 0–4)
NEUTROPHILS RELATIVE PERCENT: 29 % (ref 36–66)
PDW BLD-RTO: 15.5 % (ref 11.7–14.9)
PLATELET # BLD: 65 K/CU MM (ref 140–440)
PMV BLD AUTO: 9.4 FL (ref 7.5–11.1)
POTASSIUM SERPL-SCNC: 3.6 MMOL/L (ref 3.5–5.1)
RBC # BLD: 2.88 M/CU MM (ref 4.2–5.4)
RBC # BLD: ABNORMAL 10*6/UL
SEGMENTED NEUTROPHILS ABSOLUTE COUNT: 0.6 K/CU MM
SODIUM BLD-SCNC: 138 MMOL/L (ref 135–145)
WBC # BLD: 1.9 K/CU MM (ref 4–10.5)

## 2024-04-24 PROCEDURE — 6360000002 HC RX W HCPCS: Performed by: STUDENT IN AN ORGANIZED HEALTH CARE EDUCATION/TRAINING PROGRAM

## 2024-04-24 PROCEDURE — 85027 COMPLETE CBC AUTOMATED: CPT

## 2024-04-24 PROCEDURE — 6370000000 HC RX 637 (ALT 250 FOR IP): Performed by: STUDENT IN AN ORGANIZED HEALTH CARE EDUCATION/TRAINING PROGRAM

## 2024-04-24 PROCEDURE — 85007 BL SMEAR W/DIFF WBC COUNT: CPT

## 2024-04-24 PROCEDURE — 80048 BASIC METABOLIC PNL TOTAL CA: CPT

## 2024-04-24 PROCEDURE — 82962 GLUCOSE BLOOD TEST: CPT

## 2024-04-24 PROCEDURE — 99232 SBSQ HOSP IP/OBS MODERATE 35: CPT | Performed by: PHYSICIAN ASSISTANT

## 2024-04-24 RX ORDER — HEPARIN 100 UNIT/ML
500 SYRINGE INTRAVENOUS PRN
Status: DISCONTINUED | OUTPATIENT
Start: 2024-04-24 | End: 2024-04-24 | Stop reason: HOSPADM

## 2024-04-24 RX ORDER — LEVOFLOXACIN 5 MG/ML
500 INJECTION, SOLUTION INTRAVENOUS EVERY 24 HOURS
Qty: 500 ML | Refills: 0 | Status: SHIPPED | OUTPATIENT
Start: 2024-04-24 | End: 2024-04-24 | Stop reason: HOSPADM

## 2024-04-24 RX ORDER — LEVOFLOXACIN 750 MG/1
750 TABLET, FILM COATED ORAL DAILY
Qty: 4 TABLET | Refills: 0 | Status: SHIPPED | OUTPATIENT
Start: 2024-04-24 | End: 2024-04-28

## 2024-04-24 RX ORDER — LIDOCAINE HYDROCHLORIDE 20 MG/ML
15 SOLUTION OROPHARYNGEAL
Qty: 1 EACH | Refills: 0 | Status: SHIPPED | OUTPATIENT
Start: 2024-04-24 | End: 2024-04-26 | Stop reason: SDUPTHER

## 2024-04-24 RX ADMIN — HYDROMORPHONE HYDROCHLORIDE 0.25 MG: 1 INJECTION, SOLUTION INTRAMUSCULAR; INTRAVENOUS; SUBCUTANEOUS at 01:42

## 2024-04-24 RX ADMIN — HYDROMORPHONE HYDROCHLORIDE 0.25 MG: 1 INJECTION, SOLUTION INTRAMUSCULAR; INTRAVENOUS; SUBCUTANEOUS at 08:06

## 2024-04-24 RX ADMIN — LIDOCAINE HYDROCHLORIDE 15 ML: 20 SOLUTION ORAL at 11:47

## 2024-04-24 RX ADMIN — Medication 500 UNITS: at 14:52

## 2024-04-24 ASSESSMENT — PAIN - FUNCTIONAL ASSESSMENT
PAIN_FUNCTIONAL_ASSESSMENT: ACTIVITIES ARE NOT PREVENTED
PAIN_FUNCTIONAL_ASSESSMENT: ACTIVITIES ARE NOT PREVENTED

## 2024-04-24 ASSESSMENT — PAIN DESCRIPTION - DESCRIPTORS
DESCRIPTORS: ACHING;DISCOMFORT;BURNING
DESCRIPTORS: ACHING;SORE;TENDER

## 2024-04-24 ASSESSMENT — PAIN SCALES - GENERAL
PAINLEVEL_OUTOF10: 5
PAINLEVEL_OUTOF10: 7
PAINLEVEL_OUTOF10: 2

## 2024-04-24 ASSESSMENT — PAIN DESCRIPTION - ORIENTATION: ORIENTATION: UPPER;MID

## 2024-04-24 ASSESSMENT — PAIN DESCRIPTION - LOCATION
LOCATION: ABDOMEN
LOCATION: OTHER (COMMENT)

## 2024-04-24 NOTE — PLAN OF CARE
Problem: Discharge Planning  Goal: Discharge to home or other facility with appropriate resources  4/24/2024 1356 by Adair Meier RN  Outcome: Completed  4/24/2024 0745 by Adair Meier RN  Outcome: Progressing     Problem: Safety - Adult  Goal: Free from fall injury  4/24/2024 1356 by Adair Meier RN  Outcome: Completed  4/24/2024 0745 by Adair Meier RN  Outcome: Progressing  Flowsheets (Taken 4/24/2024 0744)  Free From Fall Injury:   Instruct family/caregiver on patient safety   Based on caregiver fall risk screen, instruct family/caregiver to ask for assistance with transferring infant if caregiver noted to have fall risk factors     Problem: ABCDS Injury Assessment  Goal: Absence of physical injury  4/24/2024 1356 by Adair Meier RN  Outcome: Completed  4/24/2024 0745 by Adair Meier RN  Outcome: Progressing     Problem: Pain  Goal: Verbalizes/displays adequate comfort level or baseline comfort level  4/24/2024 1356 by Adair Meier RN  Outcome: Completed  4/24/2024 0745 by Adair Meier RN  Outcome: Progressing     Problem: Skin/Tissue Integrity  Goal: Absence of new skin breakdown  Description: 1.  Monitor for areas of redness and/or skin breakdown  2.  Assess vascular access sites hourly  3.  Every 4-6 hours minimum:  Change oxygen saturation probe site  4.  Every 4-6 hours:  If on nasal continuous positive airway pressure, respiratory therapy assess nares and determine need for appliance change or resting period.  4/24/2024 1356 by Adair Meier RN  Outcome: Completed  4/24/2024 0745 by Adair Meier RN  Outcome: Progressing     Problem: Chronic Conditions and Co-morbidities  Goal: Patient's chronic conditions and co-morbidity symptoms are monitored and maintained or improved  4/24/2024 1356 by Adair Meier RN  Outcome: Completed  4/24/2024 0745 by Adair Meier RN  Outcome: Progressing

## 2024-04-24 NOTE — DISCHARGE SUMMARY
V2.0  Discharge Summary    Name:  Lin Bates /Age/Sex: 1955 (68 y.o. female)   Admit Date: 2024  Discharge Date: 24    MRN & CSN:  [id number] & [id number] Encounter Date and Time 24 6:16 PM EDT    Attending:  No att. providers found Discharging Provider: Francine Barroso MD       Hospital Course:     Brief HPI: Lin Bates is a 68 y.o. female who presented with pancytopenia.    Brief Problem Based Course:     Pancytopenia  Small cell carcinoma of the lung currently undergoing chemo and radiation  Patient's last treatment was 24  --Counts improved with supportive care  --Continue follow-up with hematology and repeat blood tests outpatient  --Complete course of Levaquin     Odynophagia 2/2 above  Post irradiation, suspect improvement within 2 weeks  --Prescribed viscous lidocaine which did help pt tolerate some PO intake  --Continue high calorie/high protein supplements  --Discussed with oncology who will continue to follow pt outpatient to ensure improvement     Type 2 diabetes mellitus  --Continue home regimen     Hypertension  --Continue home dose of 2.5mg of lisinopril      The patient expressed appropriate understanding of, and agreement with the discharge recommendations, medications, and plan.     Consults this admission:  IP CONSULT TO ONCOLOGY    Discharge Diagnosis:   Pancytopenia (HCC)    Discharge Instruction:   Follow up appointments: PCP,   Primary care physician: Dorothy Terrazas PA within 2 weeks  Diet: regular diet with nutritional oral supplements  Activity: activity as tolerated with fall precautions  Disposition: Discharged to:   [x]Home, [][facility], []SNF, []Acute Rehab, []Hospice   Condition on discharge: Stable  Labs and Tests to be Followed up as an outpatient by PCP or Specialist:     Discharge Medications:        Medication List        START taking these medications      levoFLOXacin 750 MG tablet  Commonly known as: Levaquin  Take 1 tablet by

## 2024-04-24 NOTE — PROGRESS NOTES
V2.0    [facility] Progress Note      Name:  Lin Bates /Age/Sex: 1955  (68 y.o. female)   MRN & CSN:  [id number] & [id number] Encounter Date/Time: 2024 7:44 AM EDT   Location:  [address] [place] [address]-A PCP: Dorothy Terrazas PA     Attending:Francine Barroso MD       Hospital Day: 3    Assessment and Recommendations   Lin Bates is a 68 y.o. female with pmh of SCC undergoing radiation and chemo, hypertension, and T2DM who presents with Pancytopenia (HCC)      Plan:   Pancytopenia  Small cell carcinoma of the lung currently undergoing chemo and radiation  Patient's last treatment was 24  --HemeOnc on board, hold on GCSF at this time as they believe she has nadired and will begin to trend up.  --Continue to monitor CBC and maintain neutropenic precautions  --Analgesics as needed    Odynophagia 2/2 above  Post irradiation, suspect improvement within 2 weeks  --Patient was on magic mouthwash, reports this is not working  --Continue viscous lidocaine  --Ensure high calorie/high protein supplements    Type 2 diabetes mellitus  --Continue accucheks  --Cover with SSI and hypoglycemia protocol    Hypertension  --continue home dose of 2.5mg of lisinopril  --Monitor trends and adjust regimen as appropriate    Diet ADULT DIET; Full Liquid  ADULT ORAL NUTRITION SUPPLEMENT; Breakfast, Lunch, Dinner; Standard High Calorie/High Protein Oral Supplement   DVT Prophylaxis [] Lovenox, []  Heparin, [] SCDs, [] Ambulation,  [] Eliquis, [] Xarelto  [] Coumadin   Code Status Full Code   Disposition From: Home  Expected Disposition: Home  Estimated Date of Discharge: 1-2 days  Patient requires continued admission due to neutropenia and pancytopenia. Odynophagia   Surrogate Decision Maker/ DANIEL Bhat     Personally reviewed Lab Studies and Imaging     Discussed management of the case with oncology who recommended monitoring patient at this time, and plan to give GCSF only if patient is 
4 Eyes Skin Assessment     NAME:  Lin Bates  YOB: 1955  MEDICAL RECORD NUMBER:  [id number]    The patient is being assessed for  Admission    I agree that at least one RN has performed a thorough Head to Toe Skin Assessment on the patient. ALL assessment sites listed below have been assessed.      Areas assessed by both nurses:    Head, Face, Ears, Shoulders, Back, Chest, Arms, Elbows, Hands, Sacrum. Buttock, Coccyx, Ischium, and Legs. Feet and Heels        Does the Patient have a Wound? No noted wound(s)       Giuseppe Prevention initiated by RN: Yes  Wound Care Orders initiated by RN: No    Pressure Injury (Stage 3,4, Unstageable, DTI, NWPT, and Complex wounds) if present, place Wound referral order by RN under : No    New Ostomies, if present place, Ostomy referral order under : No     Nurse 1 eSignature: Electronically signed by Sophia Son RN on 4/21/24 at 7:03 PM EDT    **SHARE this note so that the co-signing nurse can place an eSignature**    Nurse 2 eSignature: Electronically signed by Afia King LPN on 4/21/24 at 7:05 PM EDT   
Gave patient ensure milkshake, patient unable to tolerate it, provided notified.   
Outpatient Pharmacy Progress Note for Meds-to-Beds    Total number of Prescriptions Filled: 2    Additional Documentation:  Patient's family member picked-up the medication(s) in the OP Pharmacy      Thank you for letting us serve your patients.  [address] [address]    Phone: [phone]    Fax: [phone]        
supplements, agreeable.  Recommended to try ~3 times daily. She also requests whole milk, reviewed that we encourage milk intake for protein and calories and this is a good choice if PO supplementation is not tolerable. Remains afebrile and HDS. Viscous lidocaine helpful.    AM labs reviewed.  WBC 1.9 with . Hgb 9.5. Plt 65k.     Past Medical History:   Diagnosis Date    Cancer (HCC)     Hyperlipidemia     Hypertension     Thickened endometrium     Type 2 diabetes mellitus without complication (HCC)      Past Surgical History:   Procedure Laterality Date    CT NEEDLE BIOPSY LUNG PERCUTANEOUS  2024    CT NEEDLE BIOPSY LUNG PERCUTANEOUS 2024 [facility] CT SCAN    DENTAL SURGERY      's    DILATION AND CURETTAGE OF UTERUS N/A 3/22/2023    DILATATION AND CURETTAGE HYSTEROSCOPY performed by Zac Pretty MD at [facility] OR    PORT SURGERY N/A 4/10/2024    RIGHT SUBCLAVIAN MEDIPORT INSERTION performed by Brian Humphries II, MD at [facility] OR                                                                            Social History     Socioeconomic History    Marital status:      Spouse name: Not on file    Number of children: Not on file    Years of education: Not on file    Highest education level: Not on file   Occupational History    Not on file   Tobacco Use    Smoking status: Former     Current packs/day: 0.00     Average packs/day: 1 pack/day for 52.1 years (52.1 ttl pk-yrs)     Types: Cigarettes     Start date:      Quit date: 2024     Years since quittin.2    Smokeless tobacco: Never    Tobacco comments:     Down to half pack 22   Vaping Use    Vaping Use: Never used   Substance and Sexual Activity    Alcohol use: Not Currently     Comment: yearly    Drug use: No    Sexual activity: Not Currently   Other Topics Concern    Not on file   Social History Narrative    Not on file     Social Determinants of Health     Financial Resource Strain: Low Risk  (2023)    Overall 
    Worried About Running Out of Food in the Last Year: Never true     Ran Out of Food in the Last Year: Never true   Transportation Needs: Patient Declined (4/22/2024)    PRAPARE - Transportation     Lack of Transportation (Medical): Patient declined     Lack of Transportation (Non-Medical): Patient declined   Physical Activity: Not on file   Stress: Not on file   Social Connections: Not on file   Intimate Partner Violence: Not on file   Housing Stability: Patient Declined (4/22/2024)    Housing Stability Vital Sign     Unable to Pay for Housing in the Last Year: Patient declined     Number of Places Lived in the Last Year: 1     Unstable Housing in the Last Year: Patient declined                                                                                  Family History   Problem Relation Age of Onset    Cancer Father     Cancer Brother                                                                                              No Known Allergies    No current facility-administered medications on file prior to encounter.     Current Outpatient Medications on File Prior to Encounter   Medication Sig Dispense Refill    ondansetron (ZOFRAN) 4 MG tablet Take 1 tablet by mouth daily as needed for Nausea or Vomiting 20 tablet 3    senna-docusate (PERICOLACE) 8.6-50 MG per tablet Take 2 tablets by mouth daily for 14 days 28 tablet 0    Magic Mouthwash (MIRACLE MOUTHWASH) Equal parts of Benadryl, Maalox, 2% Viscous Xylocaine and Dexamethasone.  Take 5 mL 4 times daily. 400 mL 2    famotidine (PEPCID) 20 MG tablet Take 1 tablet by mouth Daily 30 tablet 2    sucralfate (CARAFATE) 1 GM/10ML suspension Take 10 mLs by mouth 4 times daily (before meals and nightly) 414 mL 3    dexAMETHasone (DECADRON) 4 MG tablet Take 1 tablet by mouth See Admin Instructions for 4 doses Take one tablet in the AM  the day after the last day of chemotherapy each cycle. 4 tablet 0    OLANZapine (ZYPREXA) 5 MG tablet Take 1 tablet by mouth 
4/22/2024 at 7:44 AM

## 2024-04-24 NOTE — PLAN OF CARE
Problem: Discharge Planning  Goal: Discharge to home or other facility with appropriate resources  4/24/2024 0745 by Adair Meier RN  Outcome: Progressing  4/23/2024 2220 by Karla Hussein RN  Outcome: Progressing     Problem: Safety - Adult  Goal: Free from fall injury  4/24/2024 0745 by Adair Meier RN  Outcome: Progressing  Flowsheets (Taken 4/24/2024 0744)  Free From Fall Injury:   Instruct family/caregiver on patient safety   Based on caregiver fall risk screen, instruct family/caregiver to ask for assistance with transferring infant if caregiver noted to have fall risk factors  4/23/2024 2220 by Karla Hussein RN  Outcome: Progressing     Problem: ABCDS Injury Assessment  Goal: Absence of physical injury  4/24/2024 0745 by Adair Meier RN  Outcome: Progressing  4/23/2024 2220 by Karla Hussein RN  Outcome: Progressing     Problem: Pain  Goal: Verbalizes/displays adequate comfort level or baseline comfort level  4/24/2024 0745 by Adair Meier RN  Outcome: Progressing  4/23/2024 2220 by Karla Hussein RN  Outcome: Progressing     Problem: Skin/Tissue Integrity  Goal: Absence of new skin breakdown  Description: 1.  Monitor for areas of redness and/or skin breakdown  2.  Assess vascular access sites hourly  3.  Every 4-6 hours minimum:  Change oxygen saturation probe site  4.  Every 4-6 hours:  If on nasal continuous positive airway pressure, respiratory therapy assess nares and determine need for appliance change or resting period.  4/24/2024 0745 by Adair Meier RN  Outcome: Progressing  4/23/2024 2220 by Karla Hussein RN  Outcome: Progressing     Problem: Chronic Conditions and Co-morbidities  Goal: Patient's chronic conditions and co-morbidity symptoms are monitored and maintained or improved  4/24/2024 0745 by Adair Meier RN  Outcome: Progressing  4/23/2024 2220 by Karla Hussein RN  Outcome: Progressing

## 2024-04-24 NOTE — CARE COORDINATION
Reviewed chart, pt discharged home. Spoke with pt and significant other Matt, shower chair in room and what they wanted. They still deny any other needs. CM available as needed.

## 2024-04-26 ENCOUNTER — OFFICE VISIT (OUTPATIENT)
Dept: ONCOLOGY | Age: 69
End: 2024-04-26
Payer: MEDICARE

## 2024-04-26 ENCOUNTER — HOSPITAL ENCOUNTER (OUTPATIENT)
Dept: RADIATION ONCOLOGY | Age: 69
Discharge: HOME | End: 2024-04-26
Payer: MEDICARE

## 2024-04-26 ENCOUNTER — HOSPITAL ENCOUNTER (OUTPATIENT)
Dept: INFUSION THERAPY | Age: 69
Discharge: HOME | End: 2024-04-26
Payer: MEDICARE

## 2024-04-26 VITALS
TEMPERATURE: 97.4 F | RESPIRATION RATE: 16 BRPM | HEART RATE: 111 BPM | HEIGHT: 60 IN | DIASTOLIC BLOOD PRESSURE: 54 MMHG | OXYGEN SATURATION: 98 % | BODY MASS INDEX: 28.86 KG/M2 | WEIGHT: 147 LBS | SYSTOLIC BLOOD PRESSURE: 115 MMHG

## 2024-04-26 VITALS
SYSTOLIC BLOOD PRESSURE: 115 MMHG | OXYGEN SATURATION: 98 % | DIASTOLIC BLOOD PRESSURE: 54 MMHG | HEART RATE: 111 BPM | TEMPERATURE: 97.4 F | RESPIRATION RATE: 16 BRPM

## 2024-04-26 VITALS
HEART RATE: 111 BPM | RESPIRATION RATE: 16 BRPM | SYSTOLIC BLOOD PRESSURE: 115 MMHG | HEIGHT: 60 IN | OXYGEN SATURATION: 98 % | TEMPERATURE: 97.4 F | BODY MASS INDEX: 28.85 KG/M2 | DIASTOLIC BLOOD PRESSURE: 54 MMHG

## 2024-04-26 DIAGNOSIS — C34.32 SMALL CELL LUNG CANCER, LEFT LOWER LOBE (HCC): Primary | ICD-10-CM

## 2024-04-26 DIAGNOSIS — D75.839 THROMBOCYTOSIS: ICD-10-CM

## 2024-04-26 PROCEDURE — 96375 TX/PRO/DX INJ NEW DRUG ADDON: CPT

## 2024-04-26 PROCEDURE — 1124F ACP DISCUSS-NO DSCNMKR DOCD: CPT | Performed by: INTERNAL MEDICINE

## 2024-04-26 PROCEDURE — 96361 HYDRATE IV INFUSION ADD-ON: CPT

## 2024-04-26 PROCEDURE — 1090F PRES/ABSN URINE INCON ASSESS: CPT | Performed by: INTERNAL MEDICINE

## 2024-04-26 PROCEDURE — 2580000003 HC RX 258: Performed by: INTERNAL MEDICINE

## 2024-04-26 PROCEDURE — 96374 THER/PROPH/DIAG INJ IV PUSH: CPT

## 2024-04-26 PROCEDURE — G8399 PT W/DXA RESULTS DOCUMENT: HCPCS | Performed by: INTERNAL MEDICINE

## 2024-04-26 PROCEDURE — G8427 DOCREV CUR MEDS BY ELIG CLIN: HCPCS | Performed by: INTERNAL MEDICINE

## 2024-04-26 PROCEDURE — 1036F TOBACCO NON-USER: CPT | Performed by: INTERNAL MEDICINE

## 2024-04-26 PROCEDURE — G8417 CALC BMI ABV UP PARAM F/U: HCPCS | Performed by: INTERNAL MEDICINE

## 2024-04-26 PROCEDURE — 99214 OFFICE O/P EST MOD 30 MIN: CPT | Performed by: INTERNAL MEDICINE

## 2024-04-26 PROCEDURE — 2500000003 HC RX 250 WO HCPCS: Performed by: INTERNAL MEDICINE

## 2024-04-26 PROCEDURE — 6360000002 HC RX W HCPCS

## 2024-04-26 PROCEDURE — 1111F DSCHRG MED/CURRENT MED MERGE: CPT | Performed by: INTERNAL MEDICINE

## 2024-04-26 PROCEDURE — 3017F COLORECTAL CA SCREEN DOC REV: CPT | Performed by: INTERNAL MEDICINE

## 2024-04-26 PROCEDURE — 6360000002 HC RX W HCPCS: Performed by: INTERNAL MEDICINE

## 2024-04-26 RX ORDER — ONDANSETRON 2 MG/ML
8 INJECTION INTRAMUSCULAR; INTRAVENOUS
OUTPATIENT
Start: 2024-04-26

## 2024-04-26 RX ORDER — 0.9 % SODIUM CHLORIDE 0.9 %
1000 INTRAVENOUS SOLUTION INTRAVENOUS ONCE
Status: COMPLETED | OUTPATIENT
Start: 2024-04-26 | End: 2024-04-26

## 2024-04-26 RX ORDER — HEPARIN 100 UNIT/ML
500 SYRINGE INTRAVENOUS PRN
Status: DISCONTINUED | OUTPATIENT
Start: 2024-04-26 | End: 2024-04-27 | Stop reason: HOSPADM

## 2024-04-26 RX ORDER — 0.9 % SODIUM CHLORIDE 0.9 %
1000 INTRAVENOUS SOLUTION INTRAVENOUS ONCE
Status: CANCELLED | OUTPATIENT
Start: 2024-04-26 | End: 2024-04-26

## 2024-04-26 RX ORDER — ALBUTEROL SULFATE 90 UG/1
4 AEROSOL, METERED RESPIRATORY (INHALATION) PRN
OUTPATIENT
Start: 2024-04-26

## 2024-04-26 RX ORDER — SODIUM CHLORIDE 0.9 % (FLUSH) 0.9 %
5-40 SYRINGE (ML) INJECTION PRN
OUTPATIENT
Start: 2024-04-26

## 2024-04-26 RX ORDER — DIPHENHYDRAMINE HYDROCHLORIDE 50 MG/ML
50 INJECTION INTRAMUSCULAR; INTRAVENOUS
OUTPATIENT
Start: 2024-04-26

## 2024-04-26 RX ORDER — HEPARIN 100 UNIT/ML
500 SYRINGE INTRAVENOUS PRN
OUTPATIENT
Start: 2024-04-26

## 2024-04-26 RX ORDER — FAMOTIDINE 10 MG/ML
20 INJECTION, SOLUTION INTRAVENOUS
OUTPATIENT
Start: 2024-04-26

## 2024-04-26 RX ORDER — SODIUM CHLORIDE 9 MG/ML
5-250 INJECTION, SOLUTION INTRAVENOUS PRN
OUTPATIENT
Start: 2024-04-26

## 2024-04-26 RX ORDER — EPINEPHRINE 1 MG/ML
0.3 INJECTION, SOLUTION, CONCENTRATE INTRAVENOUS PRN
OUTPATIENT
Start: 2024-04-26

## 2024-04-26 RX ORDER — LIDOCAINE HYDROCHLORIDE 20 MG/ML
15 SOLUTION OROPHARYNGEAL
Qty: 7 EACH | Refills: 0 | Status: SHIPPED | OUTPATIENT
Start: 2024-04-26

## 2024-04-26 RX ORDER — SODIUM CHLORIDE 9 MG/ML
INJECTION, SOLUTION INTRAVENOUS CONTINUOUS
OUTPATIENT
Start: 2024-04-26

## 2024-04-26 RX ORDER — HEPARIN 100 UNIT/ML
SYRINGE INTRAVENOUS
Status: DISCONTINUED
Start: 2024-04-26 | End: 2024-04-26 | Stop reason: HOSPADM

## 2024-04-26 RX ORDER — DEXAMETHASONE SODIUM PHOSPHATE 4 MG/ML
8 INJECTION, SOLUTION INTRA-ARTICULAR; INTRALESIONAL; INTRAMUSCULAR; INTRAVENOUS; SOFT TISSUE ONCE
Start: 2024-04-26 | End: 2024-04-26

## 2024-04-26 RX ORDER — ACETAMINOPHEN 325 MG/1
650 TABLET ORAL
OUTPATIENT
Start: 2024-04-26

## 2024-04-26 RX ORDER — FAMOTIDINE 10 MG/ML
20 INJECTION, SOLUTION INTRAVENOUS ONCE
Start: 2024-04-26 | End: 2024-04-26

## 2024-04-26 RX ORDER — FAMOTIDINE 10 MG/ML
20 INJECTION, SOLUTION INTRAVENOUS ONCE
Status: COMPLETED | OUTPATIENT
Start: 2024-04-26 | End: 2024-04-26

## 2024-04-26 RX ORDER — DEXAMETHASONE SODIUM PHOSPHATE 4 MG/ML
8 INJECTION, SOLUTION INTRA-ARTICULAR; INTRALESIONAL; INTRAMUSCULAR; INTRAVENOUS; SOFT TISSUE ONCE
Status: COMPLETED | OUTPATIENT
Start: 2024-04-26 | End: 2024-04-26

## 2024-04-26 RX ADMIN — FAMOTIDINE 20 MG: 10 INJECTION, SOLUTION INTRAVENOUS at 08:50

## 2024-04-26 RX ADMIN — SODIUM CHLORIDE 1000 ML: 9 INJECTION, SOLUTION INTRAVENOUS at 08:54

## 2024-04-26 RX ADMIN — DEXAMETHASONE SODIUM PHOSPHATE 8 MG: 4 INJECTION, SOLUTION INTRAMUSCULAR; INTRAVENOUS at 08:50

## 2024-04-26 NOTE — PROGRESS NOTES
[facility]   Radiation Oncology Center  [address] [place] [address]  Phone: [phone]  Fax: [phone]    RADIATION ONCOLOGY FOLLOW UP REPORT    PATIENT NAME:  Lin Bates              : 1955  MEDICAL RECORD NO: [id number]    [facility] NO: [id number]        PROVIDER: Hunter Guo MD      DATE OF SERVICE: 2024     FOLLOW UP PHYSICIANS:  Dr. Birch    DIAGNOSIS:  limited stage small cell lung cancer      TREATMENT COURSE: sp definitive chemoRT 60Gy/30fx completed 24     HPI:   Lin Bates is a 68 y.o. female who has a history as above, who returns today for a routine follow-up visit.    The patient completed treatment 24 and was having some swallowing issues at that time.    She's had severe esophagitis since completing treatment. She hasn't been able to drink or eat much for the past few days. Did go to hospital and had low blood counts as well. Seeing Dr. Birch today. Using lidocaine which helps some but has significant pain when swallowing, hard even for pills.     RADIOLOGIC STUDIES:  XR CHEST PORTABLE    Result Date: 2024  EXAM: XR CHEST PORTABLE INDICATION: Epigastric pain COMPARISON: April 10, 2024 FINDINGS: Medical Devices: Right subclavian PowerPort catheter tip projects over the lower superior vena cava. Lungs: Clear. Heart and Mediastinum: Normal. Other: N/A.     No acute cardiopulmonary disease. Electronically signed by Hao Reese MD    XR CHEST PORTABLE    Result Date: 4/10/2024  PORTABLE CHEST INDICATION: s/p right subclavian mediport, post op film COMPARISON: 2024 FINDINGS: Status post insertion of right subclavian PowerPort. The tip terminates at the cavoatrial junction. There is focal kinking of the catheter as it passes under the clavicle. No discontinuity. No focal consolidation, pleural effusion or pneumothorax.     1.  Right subclavian PowerPort place tip at the caval atrial junction. Focal kinking of the catheter as it

## 2024-04-26 NOTE — PROGRESS NOTES
Escorted to infusion area after office visit. Here for IVF dehydration. Patient unable to eat or drink due to radiation.     IV hydration administered as ordered. Call light within reach. Tolerated infusion without incident. AVS provided. Discharged in stable condition.

## 2024-04-26 NOTE — PROGRESS NOTES
for evaluation of her borderline endometrial thickening. She was seen by Dr. Pretty and underwent hysteroscopy and D & C on 3/22/23. Pathology only showed benign changes. Recommend to continue to f/u with Dr. Pretty regularly.     I answered all her questions and concerns for today. Recent imaging and labs were reviewed and discussed with the patient.

## 2024-04-29 ENCOUNTER — TELEPHONE (OUTPATIENT)
Dept: ONCOLOGY | Age: 69
End: 2024-04-29

## 2024-04-29 NOTE — TELEPHONE ENCOUNTER
Laverne with [facility] stated that the nystatin for the magic mouth wash is on backorder. Laverne would like to know if the MMW script can be changed to benadryl, Maalox, and lidocaine and a separate script for the nystatin portion.

## 2024-04-30 ENCOUNTER — APPOINTMENT (OUTPATIENT)
Dept: RADIATION ONCOLOGY | Age: 69
End: 2024-04-30
Payer: MEDICARE

## 2024-04-30 NOTE — PROGRESS NOTES
This nurse called the [facility] and advised that Dr Birch is agreeable to the Nystatin being changed to Clotrimazole jose alejandro.

## 2024-05-06 ENCOUNTER — HOSPITAL ENCOUNTER (OUTPATIENT)
Dept: INFUSION THERAPY | Age: 69
Discharge: HOME | End: 2024-05-06
Payer: MEDICARE

## 2024-05-06 DIAGNOSIS — C34.32 SMALL CELL LUNG CANCER, LEFT LOWER LOBE (HCC): ICD-10-CM

## 2024-05-06 DIAGNOSIS — Z11.59 NEED FOR HEPATITIS B SCREENING TEST: ICD-10-CM

## 2024-05-06 LAB
ALBUMIN SERPL-MCNC: 3.8 GM/DL (ref 3.4–5)
ALP BLD-CCNC: 58 IU/L (ref 40–128)
ALT SERPL-CCNC: 7 U/L (ref 10–40)
ANION GAP SERPL CALCULATED.3IONS-SCNC: 13 MMOL/L (ref 7–16)
AST SERPL-CCNC: 17 IU/L (ref 15–37)
BANDED NEUTROPHILS ABSOLUTE COUNT: 0.04 K/CU MM
BANDED NEUTROPHILS RELATIVE PERCENT: 1 % (ref 5–11)
BILIRUB SERPL-MCNC: 0.2 MG/DL (ref 0–1)
BUN SERPL-MCNC: 9 MG/DL (ref 6–23)
CALCIUM SERPL-MCNC: 9.1 MG/DL (ref 8.3–10.6)
CHLORIDE BLD-SCNC: 95 MMOL/L (ref 99–110)
CO2: 30 MMOL/L (ref 21–32)
CREAT SERPL-MCNC: 0.6 MG/DL (ref 0.6–1.1)
DIFFERENTIAL TYPE: ABNORMAL
EOSINOPHILS ABSOLUTE: 0 K/CU MM
EOSINOPHILS RELATIVE PERCENT: 1 % (ref 0–3)
GFR, ESTIMATED: >90 ML/MIN/1.73M2
GLUCOSE SERPL-MCNC: 131 MG/DL (ref 70–99)
HCT VFR BLD CALC: 33.2 % (ref 37–47)
HEMOGLOBIN: 10.6 GM/DL (ref 12.5–16)
LYMPHOCYTES ABSOLUTE: 0.4 K/CU MM
LYMPHOCYTES RELATIVE PERCENT: 9 % (ref 24–44)
MAGNESIUM: 1.7 MG/DL (ref 1.8–2.4)
MCH RBC QN AUTO: 33.1 PG (ref 27–31)
MCHC RBC AUTO-ENTMCNC: 31.9 % (ref 32–36)
MCV RBC AUTO: 103.8 FL (ref 78–100)
METAMYELOCYTES ABSOLUTE COUNT: 0.04 K/CU MM
METAMYELOCYTES PERCENT: 1 %
MONOCYTES ABSOLUTE: 0.9 K/CU MM
MONOCYTES RELATIVE PERCENT: 20 % (ref 0–4)
NEUTROPHILS ABSOLUTE: 2.9 K/CU MM
NEUTROPHILS RELATIVE PERCENT: 68 % (ref 36–66)
PDW BLD-RTO: 17.1 % (ref 11.7–14.9)
PHOSPHORUS: 2.5 MG/DL (ref 2.5–4.9)
PLATELET # BLD: 430 K/CU MM (ref 140–440)
PMV BLD AUTO: 9.4 FL (ref 7.5–11.1)
POLYCHROMASIA: ABNORMAL
POTASSIUM SERPL-SCNC: 3.8 MMOL/L (ref 3.5–5.1)
RBC # BLD: 3.2 M/CU MM (ref 4.2–5.4)
SODIUM BLD-SCNC: 138 MMOL/L (ref 135–145)
TOTAL PROTEIN: 6.5 GM/DL (ref 6.4–8.2)
WBC # BLD: 4.3 K/CU MM (ref 4–10.5)

## 2024-05-06 PROCEDURE — 80053 COMPREHEN METABOLIC PANEL: CPT

## 2024-05-06 PROCEDURE — 36415 COLL VENOUS BLD VENIPUNCTURE: CPT

## 2024-05-06 PROCEDURE — 84100 ASSAY OF PHOSPHORUS: CPT

## 2024-05-06 PROCEDURE — 85027 COMPLETE CBC AUTOMATED: CPT

## 2024-05-06 PROCEDURE — 83735 ASSAY OF MAGNESIUM: CPT

## 2024-05-06 PROCEDURE — 85007 BL SMEAR W/DIFF WBC COUNT: CPT

## 2024-05-06 NOTE — PROGRESS NOTES
Patient Name: Lin Bates  Patient : 1955  Patient MRN: [id number]     Primary Oncologist: Burt Birch MD  Referring Provider: Dorothy Terrazas PA     Date of Service: 2024      Chief Complaint:   Chief Complaint   Patient presents with    Follow-up     Patient Active Problem List:       Leukocytosis       Thrombocytosis    HPI:   Ms. Bates is a 68-year-old very pleasant female with medical history significant for hypertension, hyperlipidemia, diabetes mellitus, history of HPV infection, initially referred to me on 2014, for evaluation of mild leukocytosis.       She was found to have mild leukocytosis on routine blood tests done on 2014, by her primary care physician. She is a chronic smoker and she smokes about 10 cigarettes a day since she was 14.     Laboratory workup on 2014, showed slight elevation in white blood cell count (13). Her hemoglobin, hematocrit, platelet count and LDH were normal. There was no significant immunophenotypic abnormalities in flow cytometry. JAK2  and bcr/abl studies were negative.     She has been followed periodically since then. She missed follow up with me since 2019 until 2022.     Laboratory work ups done on 22 showed leukocytosis (WBC 16.1), thrombocytosis (platelet 651) and flow cytometry showed monoclonal B-cell population (~450 cells/cumm). The immunophenotype of the clonal cells is non-specific. DDX includes peripheral blood involvement by a B-cell lymphoma and monoclonal B-cell lymphocytosis.     The rests of the blood tests, including BCR-ABL1, JAK2 V617F, JAK2 exon 12-13, MPL, CALR, MARIKA, ESR and LDH, were within normal range.      CT scan of the neck, chest, abdomen and pelvis done on 2022 showed no significant pathology in her neck.  No splenomegaly or overt lymphadenopathy.  There are scattered mildly prominent left internal mammary, upper abdominal and left common iliac lymph nodes.  There

## 2024-05-07 ENCOUNTER — HOSPITAL ENCOUNTER (OUTPATIENT)
Dept: RADIATION ONCOLOGY | Age: 69
Discharge: HOME | End: 2024-05-07

## 2024-05-07 ENCOUNTER — OFFICE VISIT (OUTPATIENT)
Dept: ONCOLOGY | Age: 69
End: 2024-05-07
Payer: MEDICARE

## 2024-05-07 ENCOUNTER — HOSPITAL ENCOUNTER (OUTPATIENT)
Dept: INFUSION THERAPY | Age: 69
Discharge: HOME | End: 2024-05-07
Payer: MEDICARE

## 2024-05-07 VITALS
WEIGHT: 149.8 LBS | SYSTOLIC BLOOD PRESSURE: 96 MMHG | BODY MASS INDEX: 29.41 KG/M2 | HEIGHT: 60 IN | RESPIRATION RATE: 18 BRPM | HEART RATE: 90 BPM | TEMPERATURE: 97.1 F | OXYGEN SATURATION: 100 % | DIASTOLIC BLOOD PRESSURE: 52 MMHG

## 2024-05-07 VITALS
HEART RATE: 90 BPM | DIASTOLIC BLOOD PRESSURE: 52 MMHG | BODY MASS INDEX: 29.41 KG/M2 | RESPIRATION RATE: 18 BRPM | HEIGHT: 60 IN | SYSTOLIC BLOOD PRESSURE: 96 MMHG | WEIGHT: 149.8 LBS | OXYGEN SATURATION: 100 % | TEMPERATURE: 97.1 F

## 2024-05-07 DIAGNOSIS — C34.32 SMALL CELL LUNG CANCER, LEFT LOWER LOBE (HCC): Primary | ICD-10-CM

## 2024-05-07 DIAGNOSIS — Z11.59 NEED FOR HEPATITIS B SCREENING TEST: Primary | ICD-10-CM

## 2024-05-07 DIAGNOSIS — C34.32 SMALL CELL LUNG CANCER, LEFT LOWER LOBE (HCC): ICD-10-CM

## 2024-05-07 PROCEDURE — 96417 CHEMO IV INFUS EACH ADDL SEQ: CPT

## 2024-05-07 PROCEDURE — 1090F PRES/ABSN URINE INCON ASSESS: CPT | Performed by: INTERNAL MEDICINE

## 2024-05-07 PROCEDURE — 1036F TOBACCO NON-USER: CPT | Performed by: INTERNAL MEDICINE

## 2024-05-07 PROCEDURE — 99214 OFFICE O/P EST MOD 30 MIN: CPT | Performed by: INTERNAL MEDICINE

## 2024-05-07 PROCEDURE — 96367 TX/PROPH/DG ADDL SEQ IV INF: CPT

## 2024-05-07 PROCEDURE — 96415 CHEMO IV INFUSION ADDL HR: CPT

## 2024-05-07 PROCEDURE — 96413 CHEMO IV INFUSION 1 HR: CPT

## 2024-05-07 PROCEDURE — 96361 HYDRATE IV INFUSION ADD-ON: CPT

## 2024-05-07 PROCEDURE — 96375 TX/PRO/DX INJ NEW DRUG ADDON: CPT

## 2024-05-07 PROCEDURE — G8417 CALC BMI ABV UP PARAM F/U: HCPCS | Performed by: INTERNAL MEDICINE

## 2024-05-07 PROCEDURE — G8399 PT W/DXA RESULTS DOCUMENT: HCPCS | Performed by: INTERNAL MEDICINE

## 2024-05-07 PROCEDURE — 6360000002 HC RX W HCPCS: Performed by: INTERNAL MEDICINE

## 2024-05-07 PROCEDURE — 1124F ACP DISCUSS-NO DSCNMKR DOCD: CPT | Performed by: INTERNAL MEDICINE

## 2024-05-07 PROCEDURE — 1111F DSCHRG MED/CURRENT MED MERGE: CPT | Performed by: INTERNAL MEDICINE

## 2024-05-07 PROCEDURE — G8427 DOCREV CUR MEDS BY ELIG CLIN: HCPCS | Performed by: INTERNAL MEDICINE

## 2024-05-07 PROCEDURE — 3017F COLORECTAL CA SCREEN DOC REV: CPT | Performed by: INTERNAL MEDICINE

## 2024-05-07 PROCEDURE — 2580000003 HC RX 258: Performed by: INTERNAL MEDICINE

## 2024-05-07 RX ORDER — SODIUM CHLORIDE 0.9 % (FLUSH) 0.9 %
5-40 SYRINGE (ML) INJECTION PRN
Status: DISCONTINUED | OUTPATIENT
Start: 2024-05-07 | End: 2024-05-08 | Stop reason: HOSPADM

## 2024-05-07 RX ORDER — PALONOSETRON 0.05 MG/ML
0.25 INJECTION, SOLUTION INTRAVENOUS ONCE
Status: COMPLETED | OUTPATIENT
Start: 2024-05-07 | End: 2024-05-07

## 2024-05-07 RX ORDER — SODIUM CHLORIDE 9 MG/ML
5-250 INJECTION, SOLUTION INTRAVENOUS PRN
Status: DISCONTINUED | OUTPATIENT
Start: 2024-05-07 | End: 2024-05-08 | Stop reason: HOSPADM

## 2024-05-07 RX ADMIN — ETOPOSIDE 148 MG: 20 INJECTION, SOLUTION INTRAVENOUS at 10:58

## 2024-05-07 RX ADMIN — CISPLATIN 118 MG: 1 INJECTION INTRAVENOUS at 12:16

## 2024-05-07 RX ADMIN — SODIUM CHLORIDE 150 MG: 9 INJECTION, SOLUTION INTRAVENOUS at 10:07

## 2024-05-07 RX ADMIN — POTASSIUM CHLORIDE: 2 INJECTION, SOLUTION, CONCENTRATE INTRAVENOUS at 12:15

## 2024-05-07 RX ADMIN — POTASSIUM CHLORIDE: 2 INJECTION, SOLUTION, CONCENTRATE INTRAVENOUS at 08:59

## 2024-05-07 RX ADMIN — PALONOSETRON 0.25 MG: 0.25 INJECTION, SOLUTION INTRAVENOUS at 10:06

## 2024-05-07 RX ADMIN — DEXAMETHASONE SODIUM PHOSPHATE 12 MG: 4 INJECTION, SOLUTION INTRAMUSCULAR; INTRAVENOUS at 10:37

## 2024-05-07 RX ADMIN — SODIUM CHLORIDE 20 ML/HR: 9 INJECTION, SOLUTION INTRAVENOUS at 10:06

## 2024-05-07 RX ADMIN — SODIUM CHLORIDE, PRESERVATIVE FREE 20 ML: 5 INJECTION INTRAVENOUS at 15:11

## 2024-05-07 ASSESSMENT — PAIN SCALES - GENERAL: PAINLEVEL_OUTOF10: 0

## 2024-05-07 NOTE — PROGRESS NOTES
Pt here for tx after OV. With pre and post hydration.  Port accessed with blood return noted. CBC CMP Mag  reviewed from 5/6 and within defined limits. Pt has no concerns or issues to discuss at this time. Magnesium 1.7 pt received pre and post hydration with magnesium.    Patient's status assessed and documented appropriately.  All labs and required results were also reviewed today.  Treatment parameters have been reviewed.  Today's treatment has been approved by the provider.      Treatment orders and medication sequencing (when applicable) was verified by 2 registered nurses.  The treatment plan was confirmed with the patient prior to administration, and the patient understands the need to report any treatment-related symptoms.    Prior to administration, when applicable, the following 8 elements of medication administration were reviewed with 2nd Registered Nurse prior to dosing: drug name, drug dose, infusion volume when prepared in a syringe, rate of administration, expiration dates and/or times, appearance and integrity of drug(s), and rate of pump for infusion.  The 5 rights of medication administration have been verified.      Pt tolerated tx without incident left ambulatory discharge instructions given

## 2024-05-07 NOTE — PROGRESS NOTES
[facility]   Radiation Oncology Center  [address] [place] [address]  Phone: [phone]  Fax: [phone]    RADIATION ONCOLOGY FOLLOW UP REPORT    PATIENT NAME:  Lin Bates              : 1955  MEDICAL RECORD NO: [id number]    [facility] NO: [id number]        PROVIDER: Hunter Guo MD      DATE OF SERVICE: 2024     FOLLOW UP PHYSICIANS:  Dr. Birch    DIAGNOSIS:  limited stage small cell lung cancer       TREATMENT COURSE: sp definitive chemoRT 60Gy/30fx completed 24     HPI:   Lin Bates is a 68 y.o. female who has a history as above, who returns today for a routine follow-up visit.    The patient was last seen in clinic 24 as she was having problems swallowing.     Swallowing is better.  She is able to eat now.  She still using Magic mouthwash as needed.  Seeing Dr. Birch today as well. Feeling better than she was two weeks ago.     RADIOLOGIC STUDIES:    LABORATORY STUDIES:   CBC:   Lab Results   Component Value Date/Time    WBC 4.3 2024 11:35 AM    RBC 3.20 2024 11:35 AM    HGB 10.6 2024 11:35 AM    HCT 33.2 2024 11:35 AM    .8 2024 11:35 AM    MCH 33.1 2024 11:35 AM    MCHC 31.9 2024 11:35 AM    RDW 17.1 2024 11:35 AM     2024 11:35 AM    MPV 9.4 2024 11:35 AM     CMP:  Lab Results   Component Value Date/Time     2024 11:35 AM    K 3.8 2024 11:35 AM    CL 95 2024 11:35 AM    CO2 30 2024 11:35 AM    BUN 9 2024 11:35 AM    CREATININE 0.6 2024 11:35 AM    GFRAA >60 2020 02:58 PM    LABGLOM >90 2024 11:35 AM    LABGLOM >90 2024 05:21 AM    GLUCOSE 131 2024 11:35 AM    CALCIUM 9.1 2024 11:35 AM    BILITOT 0.2 2024 11:35 AM    ALKPHOS 58 2024 11:35 AM    AST 17 2024 11:35 AM    ALT 7 2024 11:35 AM     Onc labs:   Lab Results   Component Value Date/Time    LDH 75 2023 03:16 PM

## 2024-05-07 NOTE — PROGRESS NOTES
MA Rooming Questions  Patient: Lin Bates  MRN: [id number]    Date: 5/7/2024        1. Do you have any new issues?   no         2. Do you need any refills on medications?    no    3. Have you had any imaging done since your last visit?   no    4. Have you been hospitalized or seen in the emergency room since your last visit here?   no    5. Did the patient have a depression screening completed today? No    No data recorded     PHQ-9 Given to (if applicable):               PHQ-9 Score (if applicable):                     [] Positive     []  Negative              Does question #9 need addressed (if applicable)                     [] Yes    []  No               Saumya Barroso CMA

## 2024-05-08 ENCOUNTER — HOSPITAL ENCOUNTER (OUTPATIENT)
Dept: INFUSION THERAPY | Age: 69
Discharge: HOME | End: 2024-05-08
Payer: MEDICARE

## 2024-05-08 VITALS
OXYGEN SATURATION: 97 % | TEMPERATURE: 96.9 F | HEART RATE: 96 BPM | DIASTOLIC BLOOD PRESSURE: 58 MMHG | SYSTOLIC BLOOD PRESSURE: 117 MMHG | RESPIRATION RATE: 14 BRPM | HEIGHT: 60 IN | BODY MASS INDEX: 29.26 KG/M2

## 2024-05-08 DIAGNOSIS — C34.32 SMALL CELL LUNG CANCER, LEFT LOWER LOBE (HCC): ICD-10-CM

## 2024-05-08 DIAGNOSIS — Z11.59 NEED FOR HEPATITIS B SCREENING TEST: Primary | ICD-10-CM

## 2024-05-08 PROCEDURE — 96413 CHEMO IV INFUSION 1 HR: CPT

## 2024-05-08 PROCEDURE — 96375 TX/PRO/DX INJ NEW DRUG ADDON: CPT

## 2024-05-08 PROCEDURE — 6360000002 HC RX W HCPCS: Performed by: INTERNAL MEDICINE

## 2024-05-08 PROCEDURE — 2580000003 HC RX 258: Performed by: INTERNAL MEDICINE

## 2024-05-08 RX ORDER — SODIUM CHLORIDE 0.9 % (FLUSH) 0.9 %
5-40 SYRINGE (ML) INJECTION PRN
Status: DISCONTINUED | OUTPATIENT
Start: 2024-05-08 | End: 2024-05-09 | Stop reason: HOSPADM

## 2024-05-08 RX ORDER — DEXAMETHASONE SODIUM PHOSPHATE 4 MG/ML
8 INJECTION, SOLUTION INTRA-ARTICULAR; INTRALESIONAL; INTRAMUSCULAR; INTRAVENOUS; SOFT TISSUE ONCE
Status: COMPLETED | OUTPATIENT
Start: 2024-05-08 | End: 2024-05-08

## 2024-05-08 RX ORDER — SODIUM CHLORIDE 9 MG/ML
5-250 INJECTION, SOLUTION INTRAVENOUS PRN
Status: DISCONTINUED | OUTPATIENT
Start: 2024-05-08 | End: 2024-05-09 | Stop reason: HOSPADM

## 2024-05-08 RX ADMIN — ETOPOSIDE 148 MG: 20 INJECTION, SOLUTION INTRAVENOUS at 14:09

## 2024-05-08 RX ADMIN — SODIUM CHLORIDE 20 ML/HR: 9 INJECTION, SOLUTION INTRAVENOUS at 13:48

## 2024-05-08 RX ADMIN — DEXAMETHASONE SODIUM PHOSPHATE 8 MG: 4 INJECTION, SOLUTION INTRAMUSCULAR; INTRAVENOUS at 13:48

## 2024-05-08 RX ADMIN — SODIUM CHLORIDE, PRESERVATIVE FREE 20 ML: 5 INJECTION INTRAVENOUS at 15:15

## 2024-05-08 NOTE — PROGRESS NOTES
Pt here for day 2 tx. Port accessed with blood return noted. Pt has no concerns or issues to discuss at this time.     Patient's status assessed and documented appropriately.  All labs and required results were also reviewed today.  Treatment parameters have been reviewed.  Today's treatment has been approved by the provider.      Treatment orders and medication sequencing (when applicable) was verified by 2 registered nurses.  The treatment plan was confirmed with the patient prior to administration, and the patient understands the need to report any treatment-related symptoms.    Prior to administration, when applicable, the following 8 elements of medication administration were reviewed with 2nd Registered Nurse prior to dosing: drug name, drug dose, infusion volume when prepared in a syringe, rate of administration, expiration dates and/or times, appearance and integrity of drug(s), and rate of pump for infusion.  The 5 rights of medication administration have been verified.      Pt tolerated tx without incident left via wheelchair declined discharge instructions

## 2024-05-09 ENCOUNTER — HOSPITAL ENCOUNTER (OUTPATIENT)
Dept: INFUSION THERAPY | Age: 69
Discharge: HOME | End: 2024-05-09
Payer: MEDICARE

## 2024-05-09 VITALS
OXYGEN SATURATION: 99 % | DIASTOLIC BLOOD PRESSURE: 47 MMHG | TEMPERATURE: 97.5 F | RESPIRATION RATE: 16 BRPM | SYSTOLIC BLOOD PRESSURE: 95 MMHG | HEART RATE: 78 BPM

## 2024-05-09 DIAGNOSIS — Z11.59 NEED FOR HEPATITIS B SCREENING TEST: Primary | ICD-10-CM

## 2024-05-09 DIAGNOSIS — C34.32 SMALL CELL LUNG CANCER, LEFT LOWER LOBE (HCC): ICD-10-CM

## 2024-05-09 PROCEDURE — 96375 TX/PRO/DX INJ NEW DRUG ADDON: CPT

## 2024-05-09 PROCEDURE — 6360000002 HC RX W HCPCS: Performed by: INTERNAL MEDICINE

## 2024-05-09 PROCEDURE — 2580000003 HC RX 258: Performed by: INTERNAL MEDICINE

## 2024-05-09 PROCEDURE — 96413 CHEMO IV INFUSION 1 HR: CPT

## 2024-05-09 RX ORDER — SODIUM CHLORIDE 0.9 % (FLUSH) 0.9 %
5-40 SYRINGE (ML) INJECTION PRN
Status: DISCONTINUED | OUTPATIENT
Start: 2024-05-09 | End: 2024-05-10 | Stop reason: HOSPADM

## 2024-05-09 RX ORDER — FAMOTIDINE 20 MG/1
20 TABLET, FILM COATED ORAL 2 TIMES DAILY
Qty: 60 TABLET | Refills: 0 | Status: SHIPPED | OUTPATIENT
Start: 2024-05-09

## 2024-05-09 RX ORDER — FAMOTIDINE 20 MG/1
20 TABLET, FILM COATED ORAL 2 TIMES DAILY
Qty: 60 TABLET | Refills: 0 | Status: CANCELLED | OUTPATIENT
Start: 2024-05-09

## 2024-05-09 RX ORDER — DEXAMETHASONE SODIUM PHOSPHATE 4 MG/ML
8 INJECTION, SOLUTION INTRA-ARTICULAR; INTRALESIONAL; INTRAMUSCULAR; INTRAVENOUS; SOFT TISSUE ONCE
Status: COMPLETED | OUTPATIENT
Start: 2024-05-09 | End: 2024-05-09

## 2024-05-09 RX ORDER — SODIUM CHLORIDE 9 MG/ML
5-250 INJECTION, SOLUTION INTRAVENOUS PRN
Status: DISCONTINUED | OUTPATIENT
Start: 2024-05-09 | End: 2024-05-10 | Stop reason: HOSPADM

## 2024-05-09 RX ADMIN — DEXAMETHASONE SODIUM PHOSPHATE 8 MG: 4 INJECTION, SOLUTION INTRAMUSCULAR; INTRAVENOUS at 14:01

## 2024-05-09 RX ADMIN — SODIUM CHLORIDE 20 ML/HR: 9 INJECTION, SOLUTION INTRAVENOUS at 14:01

## 2024-05-09 RX ADMIN — ETOPOSIDE 148 MG: 20 INJECTION, SOLUTION INTRAVENOUS at 14:19

## 2024-05-09 RX ADMIN — SODIUM CHLORIDE, PRESERVATIVE FREE 20 ML: 5 INJECTION INTRAVENOUS at 15:33

## 2024-05-09 NOTE — PROGRESS NOTES
Pt here for last tx. Day 3 Etoposide. Port accessed with blood return noted. Pt states when she swallows it feels like a bubble in her throat and when she takes a drink she burps and the bubble feeling goes away. Pt has no mouth sores, no thrush, is not choking while eating and it is not hard to swallow. Melisa PA informed and per her pt to be prescribed Pepcid 20 mg BID. Order placed by Cristina CROCKETT and routed to Melisa for approval.     Patient's status assessed and documented appropriately.  All labs and required results were also reviewed today.  Treatment parameters have been reviewed.  Today's treatment has been approved by the provider.      Treatment orders and medication sequencing (when applicable) was verified by 2 registered nurses.  The treatment plan was confirmed with the patient prior to administration, and the patient understands the need to report any treatment-related symptoms.    Prior to administration, when applicable, the following 8 elements of medication administration were reviewed with 2nd Registered Nurse prior to dosing: drug name, drug dose, infusion volume when prepared in a syringe, rate of administration, expiration dates and/or times, appearance and integrity of drug(s), and rate of pump for infusion.  The 5 rights of medication administration have been verified.      Pt tolerated tx without incident left ambulatory discharge instructions given

## 2024-05-10 ENCOUNTER — TELEPHONE (OUTPATIENT)
Dept: ONCOLOGY | Age: 69
End: 2024-05-10

## 2024-05-10 NOTE — TELEPHONE ENCOUNTER
This RN returned call to patient, patient states she has not been taking Carafate as directed. Patient encouraged to make sure she is taking the lidocaine and the Carafate as directed and continue to take Pepcid as directed. Patient state she will. Patient instructed to call back Monday if she is taking all mouthwash and medication as directed and still does not obtain relief. Patient verbalizes understanding. Dr. Guo and Erasto WILKINSON aware and agree with POC

## 2024-05-10 NOTE — TELEPHONE ENCOUNTER
Patient called and said she took the Pepcid yesterday and today and she still can't eat.  She has burning and pain in the esophagus.  Please advise

## 2024-05-15 ENCOUNTER — TELEPHONE (OUTPATIENT)
Dept: ONCOLOGY | Age: 69
End: 2024-05-15

## 2024-05-15 NOTE — TELEPHONE ENCOUNTER
Left message with time and prep for CT scans and MRI to be done on 6/14/24 [facility] arrival at 12:30 PM. Informed to call with any questions.

## 2024-05-16 DIAGNOSIS — C34.32 SMALL CELL LUNG CANCER, LEFT LOWER LOBE (HCC): Primary | ICD-10-CM

## 2024-05-16 DIAGNOSIS — T66.XXXA RADIATION ESOPHAGITIS: ICD-10-CM

## 2024-05-16 DIAGNOSIS — K20.80 RADIATION ESOPHAGITIS: ICD-10-CM

## 2024-05-16 RX ORDER — OXYCODONE HYDROCHLORIDE 5 MG/1
5 TABLET ORAL EVERY 4 HOURS PRN
Qty: 84 TABLET | Refills: 0 | Status: ON HOLD | OUTPATIENT
Start: 2024-05-16 | End: 2024-05-30

## 2024-05-16 NOTE — PROGRESS NOTES
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 Pt still having pain and irritation with eating and drinking, despite taking prescribed BMX, Pepcid and Carafate. Left message for pt to return call to discuss above. This RN's and ALLAN Jimenez, Lung Navigator's direct contact info given.

## 2024-05-17 ENCOUNTER — CLINICAL DOCUMENTATION (OUTPATIENT)
Dept: RADIATION ONCOLOGY | Age: 69
End: 2024-05-17

## 2024-05-17 NOTE — PROGRESS NOTES
Pt returned this RN's call, continues to c/o continued burning and difficulty swallowing due to irritation in esophagus despite taking Pepcid, BMX and Carafate as prescribed. The above discussed with Dr. Guo and Dr. Birch and referral to GI for eval/EGD ordered. Faxed referrral to Dr. Park's office with clinical documentation and demographics as ordered by Dr. Guo. Discussed above with pt, advised to call office with further needs. Pt voices understanding of above.

## 2024-05-18 ENCOUNTER — HOSPITAL ENCOUNTER (INPATIENT)
Age: 69
LOS: 6 days | Discharge: HOME HEALTH | End: 2024-05-24
Attending: STUDENT IN AN ORGANIZED HEALTH CARE EDUCATION/TRAINING PROGRAM | Admitting: STUDENT IN AN ORGANIZED HEALTH CARE EDUCATION/TRAINING PROGRAM
Payer: MEDICARE

## 2024-05-18 ENCOUNTER — APPOINTMENT (OUTPATIENT)
Dept: GENERAL RADIOLOGY | Age: 69
End: 2024-05-18
Payer: MEDICARE

## 2024-05-18 DIAGNOSIS — D61.818 PANCYTOPENIA (HCC): ICD-10-CM

## 2024-05-18 DIAGNOSIS — D64.9 SEVERE ANEMIA: Primary | ICD-10-CM

## 2024-05-18 LAB
ALBUMIN SERPL-MCNC: 3.3 GM/DL (ref 3.4–5)
ALP BLD-CCNC: 45 IU/L (ref 40–129)
ALT SERPL-CCNC: <5 U/L (ref 10–40)
ANION GAP SERPL CALCULATED.3IONS-SCNC: 21 MMOL/L (ref 7–16)
ANISOCYTOSIS: ABNORMAL
AST SERPL-CCNC: 8 IU/L (ref 15–37)
BANDED NEUTROPHILS ABSOLUTE COUNT: 0 K/CU MM
BANDED NEUTROPHILS RELATIVE PERCENT: 1 % (ref 5–11)
BASOPHILS ABSOLUTE: 0 K/CU MM
BASOPHILS RELATIVE PERCENT: 3 % (ref 0–1)
BILIRUB SERPL-MCNC: 0.3 MG/DL (ref 0–1)
BUN SERPL-MCNC: 8 MG/DL (ref 6–23)
CALCIUM SERPL-MCNC: 8.3 MG/DL (ref 8.3–10.6)
CHLORIDE BLD-SCNC: 94 MMOL/L (ref 99–110)
CO2: 21 MMOL/L (ref 21–32)
CREAT SERPL-MCNC: 0.7 MG/DL (ref 0.6–1.1)
D-DIMER QUANTITATIVE: 1.85 UG/ML (FEU)
DIFFERENTIAL TYPE: ABNORMAL
EOSINOPHILS ABSOLUTE: 0 K/CU MM
EOSINOPHILS RELATIVE PERCENT: 3 % (ref 0–3)
FERRITIN: 829 NG/ML (ref 15–150)
FOLATE SERPL-MCNC: 9.1 NG/ML (ref 3.1–17.5)
GFR, ESTIMATED: >90 ML/MIN/1.73M2
GLUCOSE SERPL-MCNC: 133 MG/DL (ref 70–99)
HCT VFR BLD CALC: 14.6 % (ref 37–47)
HEMOGLOBIN: 4.8 GM/DL (ref 12.5–16)
HYPOCHROMIA: ABNORMAL
IMMATURE NEUTROPHIL %: 0 % (ref 0–0.43)
IRON: 69 UG/DL (ref 37–145)
LYMPHOCYTES ABSOLUTE: 0.2 K/CU MM
LYMPHOCYTES RELATIVE PERCENT: 62 % (ref 24–44)
MACROCYTES: ABNORMAL
MCH RBC QN AUTO: 33.8 PG (ref 27–31)
MCHC RBC AUTO-ENTMCNC: 32.9 % (ref 32–36)
MCV RBC AUTO: 102.8 FL (ref 78–100)
METAMYELOCYTES ABSOLUTE COUNT: 0 K/CU MM
METAMYELOCYTES PERCENT: 1 %
MONOCYTES ABSOLUTE: 0.1 K/CU MM
MONOCYTES RELATIVE PERCENT: 29 % (ref 0–4)
NEUTROPHILS ABSOLUTE: 0 K/CU MM
NEUTROPHILS RELATIVE PERCENT: 1 % (ref 36–66)
OVALOCYTES: ABNORMAL
PCT TRANSFERRIN: 37 % (ref 10–44)
PDW BLD-RTO: 14.6 % (ref 11.7–14.9)
PLATELET # BLD: 34 K/CU MM (ref 140–440)
PMV BLD AUTO: 10.6 FL (ref 7.5–11.1)
POTASSIUM SERPL-SCNC: 2.8 MMOL/L (ref 3.5–5.1)
RBC # BLD: 1.42 M/CU MM (ref 4.2–5.4)
RETICULOCYTE COUNT PCT: 0.6 % (ref 0.2–2.2)
SODIUM BLD-SCNC: 136 MMOL/L (ref 135–145)
TOTAL IMMATURE NEUTOROPHIL: 0 K/CU MM
TOTAL IRON BINDING CAPACITY: 187 UG/DL (ref 250–450)
TOTAL PROTEIN: 6.4 GM/DL (ref 6.4–8.2)
TROPONIN, HIGH SENSITIVITY: 14 NG/L (ref 0–14)
TROPONIN, HIGH SENSITIVITY: 15 NG/L (ref 0–14)
UNSATURATED IRON BINDING CAPACITY: 118 UG/DL (ref 110–370)
VITAMIN B-12: >2000 PG/ML (ref 211–911)
WBC # BLD: 0.3 K/CU MM (ref 4–10.5)

## 2024-05-18 PROCEDURE — 86922 COMPATIBILITY TEST ANTIGLOB: CPT

## 2024-05-18 PROCEDURE — 83550 IRON BINDING TEST: CPT

## 2024-05-18 PROCEDURE — 86900 BLOOD TYPING SEROLOGIC ABO: CPT

## 2024-05-18 PROCEDURE — 86901 BLOOD TYPING SEROLOGIC RH(D): CPT

## 2024-05-18 PROCEDURE — 85045 AUTOMATED RETICULOCYTE COUNT: CPT

## 2024-05-18 PROCEDURE — 86850 RBC ANTIBODY SCREEN: CPT

## 2024-05-18 PROCEDURE — 85025 COMPLETE CBC W/AUTO DIFF WBC: CPT

## 2024-05-18 PROCEDURE — 6360000002 HC RX W HCPCS: Performed by: STUDENT IN AN ORGANIZED HEALTH CARE EDUCATION/TRAINING PROGRAM

## 2024-05-18 PROCEDURE — 83036 HEMOGLOBIN GLYCOSYLATED A1C: CPT

## 2024-05-18 PROCEDURE — 71045 X-RAY EXAM CHEST 1 VIEW: CPT

## 2024-05-18 PROCEDURE — 96365 THER/PROPH/DIAG IV INF INIT: CPT

## 2024-05-18 PROCEDURE — 93005 ELECTROCARDIOGRAM TRACING: CPT | Performed by: STUDENT IN AN ORGANIZED HEALTH CARE EDUCATION/TRAINING PROGRAM

## 2024-05-18 PROCEDURE — 6370000000 HC RX 637 (ALT 250 FOR IP): Performed by: STUDENT IN AN ORGANIZED HEALTH CARE EDUCATION/TRAINING PROGRAM

## 2024-05-18 PROCEDURE — 85379 FIBRIN DEGRADATION QUANT: CPT

## 2024-05-18 PROCEDURE — 99285 EMERGENCY DEPT VISIT HI MDM: CPT

## 2024-05-18 PROCEDURE — 2140000000 HC CCU INTERMEDIATE R&B

## 2024-05-18 PROCEDURE — 80053 COMPREHEN METABOLIC PANEL: CPT

## 2024-05-18 PROCEDURE — 83540 ASSAY OF IRON: CPT

## 2024-05-18 PROCEDURE — 82746 ASSAY OF FOLIC ACID SERUM: CPT

## 2024-05-18 PROCEDURE — 82607 VITAMIN B-12: CPT

## 2024-05-18 PROCEDURE — 82728 ASSAY OF FERRITIN: CPT

## 2024-05-18 PROCEDURE — 2580000003 HC RX 258: Performed by: STUDENT IN AN ORGANIZED HEALTH CARE EDUCATION/TRAINING PROGRAM

## 2024-05-18 PROCEDURE — 84484 ASSAY OF TROPONIN QUANT: CPT

## 2024-05-18 RX ORDER — POTASSIUM CHLORIDE 7.45 MG/ML
10 INJECTION INTRAVENOUS
Status: DISCONTINUED | OUTPATIENT
Start: 2024-05-19 | End: 2024-05-19

## 2024-05-18 RX ORDER — INSULIN LISPRO 100 [IU]/ML
0-4 INJECTION, SOLUTION INTRAVENOUS; SUBCUTANEOUS
Status: DISCONTINUED | OUTPATIENT
Start: 2024-05-19 | End: 2024-05-24 | Stop reason: HOSPADM

## 2024-05-18 RX ORDER — SODIUM CHLORIDE 9 MG/ML
INJECTION, SOLUTION INTRAVENOUS PRN
Status: DISCONTINUED | OUTPATIENT
Start: 2024-05-18 | End: 2024-05-24 | Stop reason: HOSPADM

## 2024-05-18 RX ORDER — GLUCAGON 1 MG/ML
1 KIT INJECTION PRN
Status: DISCONTINUED | OUTPATIENT
Start: 2024-05-18 | End: 2024-05-24 | Stop reason: HOSPADM

## 2024-05-18 RX ORDER — POTASSIUM CHLORIDE 7.45 MG/ML
10 INJECTION INTRAVENOUS ONCE
Status: DISCONTINUED | OUTPATIENT
Start: 2024-05-18 | End: 2024-05-18

## 2024-05-18 RX ORDER — INSULIN LISPRO 100 [IU]/ML
0-4 INJECTION, SOLUTION INTRAVENOUS; SUBCUTANEOUS NIGHTLY
Status: DISCONTINUED | OUTPATIENT
Start: 2024-05-19 | End: 2024-05-24 | Stop reason: HOSPADM

## 2024-05-18 RX ORDER — DEXTROSE MONOHYDRATE 100 MG/ML
INJECTION, SOLUTION INTRAVENOUS CONTINUOUS PRN
Status: DISCONTINUED | OUTPATIENT
Start: 2024-05-18 | End: 2024-05-24 | Stop reason: HOSPADM

## 2024-05-18 RX ORDER — POTASSIUM CHLORIDE 7.45 MG/ML
10 INJECTION INTRAVENOUS ONCE
Status: COMPLETED | OUTPATIENT
Start: 2024-05-18 | End: 2024-05-18

## 2024-05-18 RX ORDER — POTASSIUM CHLORIDE 20 MEQ/1
40 TABLET, EXTENDED RELEASE ORAL ONCE
Status: DISCONTINUED | OUTPATIENT
Start: 2024-05-18 | End: 2024-05-18

## 2024-05-18 RX ORDER — 0.9 % SODIUM CHLORIDE 0.9 %
500 INTRAVENOUS SOLUTION INTRAVENOUS ONCE
Status: COMPLETED | OUTPATIENT
Start: 2024-05-18 | End: 2024-05-18

## 2024-05-18 RX ORDER — ONDANSETRON 2 MG/ML
4 INJECTION INTRAMUSCULAR; INTRAVENOUS EVERY 6 HOURS PRN
Status: DISCONTINUED | OUTPATIENT
Start: 2024-05-18 | End: 2024-05-19

## 2024-05-18 RX ADMIN — SODIUM CHLORIDE 500 ML: 9 INJECTION, SOLUTION INTRAVENOUS at 21:33

## 2024-05-18 RX ADMIN — ALUMINUM HYDROXIDE, MAGNESIUM HYDROXIDE, AND SIMETHICONE: 1200; 120; 1200 SUSPENSION ORAL at 21:31

## 2024-05-18 RX ADMIN — POTASSIUM CHLORIDE 10 MEQ: 7.46 INJECTION, SOLUTION INTRAVENOUS at 21:35

## 2024-05-18 ASSESSMENT — PAIN SCALES - GENERAL: PAINLEVEL_OUTOF10: 4

## 2024-05-18 ASSESSMENT — PAIN DESCRIPTION - ORIENTATION: ORIENTATION: MID

## 2024-05-18 ASSESSMENT — PAIN DESCRIPTION - PAIN TYPE: TYPE: ACUTE PAIN

## 2024-05-18 ASSESSMENT — PAIN DESCRIPTION - LOCATION: LOCATION: CHEST

## 2024-05-18 ASSESSMENT — PAIN DESCRIPTION - DESCRIPTORS: DESCRIPTORS: SHARP

## 2024-05-18 ASSESSMENT — PAIN - FUNCTIONAL ASSESSMENT: PAIN_FUNCTIONAL_ASSESSMENT: 0-10

## 2024-05-19 LAB
ANION GAP SERPL CALCULATED.3IONS-SCNC: 16 MMOL/L (ref 7–16)
BASOPHILS ABSOLUTE: 0 K/CU MM
BASOPHILS RELATIVE PERCENT: 2.7 % (ref 0–1)
BUN SERPL-MCNC: 7 MG/DL (ref 6–23)
CALCIUM SERPL-MCNC: 7.8 MG/DL (ref 8.3–10.6)
CHLORIDE BLD-SCNC: 98 MMOL/L (ref 99–110)
CO2: 24 MMOL/L (ref 21–32)
CREAT SERPL-MCNC: 0.6 MG/DL (ref 0.6–1.1)
DIFFERENTIAL TYPE: ABNORMAL
EKG ATRIAL RATE: 91 BPM
EKG DIAGNOSIS: NORMAL
EKG P AXIS: 91 DEGREES
EKG P-R INTERVAL: 138 MS
EKG Q-T INTERVAL: 360 MS
EKG QRS DURATION: 64 MS
EKG QTC CALCULATION (BAZETT): 442 MS
EKG R AXIS: 19 DEGREES
EKG T AXIS: 41 DEGREES
EKG VENTRICULAR RATE: 91 BPM
EOSINOPHILS ABSOLUTE: 0 K/CU MM
EOSINOPHILS RELATIVE PERCENT: 2.7 % (ref 0–3)
ESTIMATED AVERAGE GLUCOSE: 128 MG/DL
GFR, ESTIMATED: >90 ML/MIN/1.73M2
GLUCOSE BLD-MCNC: 109 MG/DL (ref 70–99)
GLUCOSE BLD-MCNC: 123 MG/DL (ref 70–99)
GLUCOSE BLD-MCNC: 124 MG/DL (ref 70–99)
GLUCOSE BLD-MCNC: 92 MG/DL (ref 70–99)
GLUCOSE SERPL-MCNC: 100 MG/DL (ref 70–99)
HBA1C MFR BLD: 6.1 % (ref 4.2–6.3)
HCT VFR BLD CALC: 26.9 % (ref 37–47)
HCT VFR BLD CALC: 33.6 % (ref 37–47)
HEMOGLOBIN: 11.2 GM/DL (ref 12.5–16)
HEMOGLOBIN: 9 GM/DL (ref 12.5–16)
IMMATURE NEUTROPHIL %: 0 % (ref 0–0.43)
LYMPHOCYTES ABSOLUTE: 0.2 K/CU MM
LYMPHOCYTES RELATIVE PERCENT: 59.5 % (ref 24–44)
MAGNESIUM: 1.5 MG/DL (ref 1.8–2.4)
MCH RBC QN AUTO: 32.3 PG (ref 27–31)
MCHC RBC AUTO-ENTMCNC: 33.5 % (ref 32–36)
MCV RBC AUTO: 96.4 FL (ref 78–100)
MONOCYTES ABSOLUTE: 0.1 K/CU MM
MONOCYTES RELATIVE PERCENT: 35.1 % (ref 0–4)
NEUTROPHILS ABSOLUTE: 0 K/CU MM
NEUTROPHILS RELATIVE PERCENT: 0 % (ref 36–66)
NUCLEATED RBC %: 0 %
PDW BLD-RTO: 15.8 % (ref 11.7–14.9)
PLATELET # BLD: 28 K/CU MM (ref 140–440)
PMV BLD AUTO: 10.1 FL (ref 7.5–11.1)
POTASSIUM SERPL-SCNC: 2.9 MMOL/L (ref 3.5–5.1)
POTASSIUM SERPL-SCNC: 3.8 MMOL/L (ref 3.5–5.1)
RBC # BLD: 2.79 M/CU MM (ref 4.2–5.4)
SODIUM BLD-SCNC: 138 MMOL/L (ref 135–145)
TOTAL IMMATURE NEUTOROPHIL: 0 K/CU MM
TOTAL NUCLEATED RBC: 0 K/CU MM
WBC # BLD: 0.4 K/CU MM (ref 4–10.5)

## 2024-05-19 PROCEDURE — 80048 BASIC METABOLIC PNL TOTAL CA: CPT

## 2024-05-19 PROCEDURE — 6360000002 HC RX W HCPCS: Performed by: STUDENT IN AN ORGANIZED HEALTH CARE EDUCATION/TRAINING PROGRAM

## 2024-05-19 PROCEDURE — 84132 ASSAY OF SERUM POTASSIUM: CPT

## 2024-05-19 PROCEDURE — 83735 ASSAY OF MAGNESIUM: CPT

## 2024-05-19 PROCEDURE — 6370000000 HC RX 637 (ALT 250 FOR IP): Performed by: STUDENT IN AN ORGANIZED HEALTH CARE EDUCATION/TRAINING PROGRAM

## 2024-05-19 PROCEDURE — 36430 TRANSFUSION BLD/BLD COMPNT: CPT

## 2024-05-19 PROCEDURE — 2580000003 HC RX 258: Performed by: STUDENT IN AN ORGANIZED HEALTH CARE EDUCATION/TRAINING PROGRAM

## 2024-05-19 PROCEDURE — 30233N1 TRANSFUSION OF NONAUTOLOGOUS RED BLOOD CELLS INTO PERIPHERAL VEIN, PERCUTANEOUS APPROACH: ICD-10-PCS | Performed by: STUDENT IN AN ORGANIZED HEALTH CARE EDUCATION/TRAINING PROGRAM

## 2024-05-19 PROCEDURE — 93010 ELECTROCARDIOGRAM REPORT: CPT | Performed by: INTERNAL MEDICINE

## 2024-05-19 PROCEDURE — 82962 GLUCOSE BLOOD TEST: CPT

## 2024-05-19 PROCEDURE — 85025 COMPLETE CBC W/AUTO DIFF WBC: CPT

## 2024-05-19 PROCEDURE — 94761 N-INVAS EAR/PLS OXIMETRY MLT: CPT

## 2024-05-19 PROCEDURE — 85018 HEMOGLOBIN: CPT

## 2024-05-19 PROCEDURE — 2140000000 HC CCU INTERMEDIATE R&B

## 2024-05-19 PROCEDURE — P9016 RBC LEUKOCYTES REDUCED: HCPCS

## 2024-05-19 PROCEDURE — 85014 HEMATOCRIT: CPT

## 2024-05-19 RX ORDER — POTASSIUM CHLORIDE 7.45 MG/ML
10 INJECTION INTRAVENOUS
Status: COMPLETED | OUTPATIENT
Start: 2024-05-19 | End: 2024-05-19

## 2024-05-19 RX ORDER — FERROUS SULFATE 325(65) MG
325 TABLET ORAL
Status: DISCONTINUED | OUTPATIENT
Start: 2024-05-19 | End: 2024-05-24 | Stop reason: HOSPADM

## 2024-05-19 RX ORDER — SODIUM CHLORIDE 0.9 % (FLUSH) 0.9 %
5-40 SYRINGE (ML) INJECTION EVERY 12 HOURS SCHEDULED
Status: DISCONTINUED | OUTPATIENT
Start: 2024-05-19 | End: 2024-05-24 | Stop reason: HOSPADM

## 2024-05-19 RX ORDER — LANOLIN ALCOHOL/MO/W.PET/CERES
3 CREAM (GRAM) TOPICAL NIGHTLY PRN
Status: DISCONTINUED | OUTPATIENT
Start: 2024-05-19 | End: 2024-05-24 | Stop reason: HOSPADM

## 2024-05-19 RX ORDER — POLYETHYLENE GLYCOL 3350 17 G/17G
17 POWDER, FOR SOLUTION ORAL DAILY PRN
Status: DISCONTINUED | OUTPATIENT
Start: 2024-05-19 | End: 2024-05-24 | Stop reason: HOSPADM

## 2024-05-19 RX ORDER — LEVOFLOXACIN 500 MG/1
500 TABLET, FILM COATED ORAL DAILY
Status: DISCONTINUED | OUTPATIENT
Start: 2024-05-19 | End: 2024-05-21

## 2024-05-19 RX ORDER — ONDANSETRON 2 MG/ML
4 INJECTION INTRAMUSCULAR; INTRAVENOUS EVERY 6 HOURS PRN
Status: DISCONTINUED | OUTPATIENT
Start: 2024-05-19 | End: 2024-05-24 | Stop reason: HOSPADM

## 2024-05-19 RX ORDER — POTASSIUM CHLORIDE 20 MEQ/1
40 TABLET, EXTENDED RELEASE ORAL PRN
Status: DISCONTINUED | OUTPATIENT
Start: 2024-05-19 | End: 2024-05-24 | Stop reason: HOSPADM

## 2024-05-19 RX ORDER — SODIUM CHLORIDE 0.9 % (FLUSH) 0.9 %
5-40 SYRINGE (ML) INJECTION PRN
Status: DISCONTINUED | OUTPATIENT
Start: 2024-05-19 | End: 2024-05-24 | Stop reason: HOSPADM

## 2024-05-19 RX ORDER — MAGNESIUM SULFATE IN WATER 40 MG/ML
2000 INJECTION, SOLUTION INTRAVENOUS PRN
Status: DISCONTINUED | OUTPATIENT
Start: 2024-05-19 | End: 2024-05-24 | Stop reason: HOSPADM

## 2024-05-19 RX ORDER — LISINOPRIL 5 MG/1
2.5 TABLET ORAL DAILY
Status: DISCONTINUED | OUTPATIENT
Start: 2024-05-19 | End: 2024-05-24 | Stop reason: HOSPADM

## 2024-05-19 RX ORDER — OXYCODONE HYDROCHLORIDE 5 MG/1
5 TABLET ORAL EVERY 4 HOURS PRN
Status: COMPLETED | OUTPATIENT
Start: 2024-05-19 | End: 2024-05-19

## 2024-05-19 RX ORDER — POTASSIUM CHLORIDE 7.45 MG/ML
10 INJECTION INTRAVENOUS PRN
Status: DISCONTINUED | OUTPATIENT
Start: 2024-05-19 | End: 2024-05-24 | Stop reason: HOSPADM

## 2024-05-19 RX ORDER — ACETAMINOPHEN 325 MG/1
650 TABLET ORAL EVERY 6 HOURS PRN
Status: DISCONTINUED | OUTPATIENT
Start: 2024-05-19 | End: 2024-05-24 | Stop reason: HOSPADM

## 2024-05-19 RX ORDER — ONDANSETRON 4 MG/1
4 TABLET, ORALLY DISINTEGRATING ORAL EVERY 8 HOURS PRN
Status: DISCONTINUED | OUTPATIENT
Start: 2024-05-19 | End: 2024-05-24 | Stop reason: HOSPADM

## 2024-05-19 RX ORDER — ACETAMINOPHEN 650 MG/1
650 SUPPOSITORY RECTAL EVERY 6 HOURS PRN
Status: DISCONTINUED | OUTPATIENT
Start: 2024-05-19 | End: 2024-05-24 | Stop reason: HOSPADM

## 2024-05-19 RX ORDER — LIDOCAINE HYDROCHLORIDE 20 MG/ML
15 SOLUTION OROPHARYNGEAL
Status: DISCONTINUED | OUTPATIENT
Start: 2024-05-19 | End: 2024-05-24 | Stop reason: HOSPADM

## 2024-05-19 RX ORDER — ATORVASTATIN CALCIUM 10 MG/1
20 TABLET, FILM COATED ORAL DAILY
Status: DISCONTINUED | OUTPATIENT
Start: 2024-05-19 | End: 2024-05-24 | Stop reason: HOSPADM

## 2024-05-19 RX ORDER — SODIUM CHLORIDE 9 MG/ML
INJECTION, SOLUTION INTRAVENOUS PRN
Status: DISCONTINUED | OUTPATIENT
Start: 2024-05-19 | End: 2024-05-24 | Stop reason: HOSPADM

## 2024-05-19 RX ORDER — FAMOTIDINE 20 MG/1
20 TABLET, FILM COATED ORAL 2 TIMES DAILY
Status: DISCONTINUED | OUTPATIENT
Start: 2024-05-19 | End: 2024-05-20

## 2024-05-19 RX ORDER — ASPIRIN 81 MG/1
81 TABLET, CHEWABLE ORAL DAILY
Status: DISCONTINUED | OUTPATIENT
Start: 2024-05-19 | End: 2024-05-24 | Stop reason: HOSPADM

## 2024-05-19 RX ORDER — SUCRALFATE 1 G/1
1 TABLET ORAL EVERY 6 HOURS SCHEDULED
Status: DISCONTINUED | OUTPATIENT
Start: 2024-05-19 | End: 2024-05-24 | Stop reason: HOSPADM

## 2024-05-19 RX ORDER — POTASSIUM CHLORIDE 7.45 MG/ML
10 INJECTION INTRAVENOUS
Status: DISPENSED | OUTPATIENT
Start: 2024-05-19 | End: 2024-05-19

## 2024-05-19 RX ADMIN — SUCRALFATE 1 G: 1 TABLET ORAL at 04:48

## 2024-05-19 RX ADMIN — SODIUM CHLORIDE, PRESERVATIVE FREE 10 ML: 5 INJECTION INTRAVENOUS at 09:14

## 2024-05-19 RX ADMIN — ATORVASTATIN CALCIUM 20 MG: 10 TABLET, FILM COATED ORAL at 08:29

## 2024-05-19 RX ADMIN — SUCRALFATE 1 G: 1 TABLET ORAL at 12:07

## 2024-05-19 RX ADMIN — LEVOFLOXACIN 500 MG: 500 TABLET, FILM COATED ORAL at 12:07

## 2024-05-19 RX ADMIN — FAMOTIDINE 20 MG: 20 TABLET ORAL at 21:04

## 2024-05-19 RX ADMIN — OXYCODONE HYDROCHLORIDE 5 MG: 5 TABLET ORAL at 08:33

## 2024-05-19 RX ADMIN — LIDOCAINE HYDROCHLORIDE 15 ML: 20 SOLUTION ORAL; TOPICAL at 12:07

## 2024-05-19 RX ADMIN — POTASSIUM CHLORIDE 10 MEQ: 7.46 INJECTION, SOLUTION INTRAVENOUS at 13:02

## 2024-05-19 RX ADMIN — LISINOPRIL 2.5 MG: 5 TABLET ORAL at 08:29

## 2024-05-19 RX ADMIN — OXYCODONE HYDROCHLORIDE 5 MG: 5 TABLET ORAL at 01:13

## 2024-05-19 RX ADMIN — FAMOTIDINE 20 MG: 20 TABLET ORAL at 01:13

## 2024-05-19 RX ADMIN — POTASSIUM CHLORIDE 10 MEQ: 7.46 INJECTION, SOLUTION INTRAVENOUS at 08:47

## 2024-05-19 RX ADMIN — LIDOCAINE HYDROCHLORIDE 15 ML: 20 SOLUTION ORAL; TOPICAL at 02:04

## 2024-05-19 RX ADMIN — POTASSIUM CHLORIDE 10 MEQ: 7.46 INJECTION, SOLUTION INTRAVENOUS at 16:31

## 2024-05-19 RX ADMIN — EMPAGLIFLOZIN 10 MG: 10 TABLET, FILM COATED ORAL at 08:29

## 2024-05-19 RX ADMIN — LIDOCAINE HYDROCHLORIDE 15 ML: 20 SOLUTION ORAL; TOPICAL at 08:36

## 2024-05-19 RX ADMIN — LIDOCAINE HYDROCHLORIDE 15 ML: 20 SOLUTION ORAL; TOPICAL at 04:49

## 2024-05-19 RX ADMIN — LIDOCAINE HYDROCHLORIDE 15 ML: 20 SOLUTION ORAL; TOPICAL at 15:25

## 2024-05-19 RX ADMIN — SODIUM CHLORIDE, PRESERVATIVE FREE 10 ML: 5 INJECTION INTRAVENOUS at 21:04

## 2024-05-19 RX ADMIN — FAMOTIDINE 20 MG: 20 TABLET ORAL at 08:29

## 2024-05-19 RX ADMIN — FERROUS SULFATE TAB 325 MG (65 MG ELEMENTAL FE) 325 MG: 325 (65 FE) TAB at 08:29

## 2024-05-19 RX ADMIN — POTASSIUM CHLORIDE 10 MEQ: 7.46 INJECTION, SOLUTION INTRAVENOUS at 10:54

## 2024-05-19 RX ADMIN — POTASSIUM CHLORIDE 10 MEQ: 7.46 INJECTION, SOLUTION INTRAVENOUS at 15:22

## 2024-05-19 RX ADMIN — OXYCODONE HYDROCHLORIDE 5 MG: 5 TABLET ORAL at 16:30

## 2024-05-19 RX ADMIN — POTASSIUM CHLORIDE 10 MEQ: 7.46 INJECTION, SOLUTION INTRAVENOUS at 09:54

## 2024-05-19 RX ADMIN — LIDOCAINE HYDROCHLORIDE 15 ML: 20 SOLUTION ORAL; TOPICAL at 21:03

## 2024-05-19 RX ADMIN — FILGRASTIM-AAFI 300 MCG: 300 INJECTION, SOLUTION SUBCUTANEOUS at 02:04

## 2024-05-19 ASSESSMENT — PAIN DESCRIPTION - ORIENTATION
ORIENTATION: MID;LOWER
ORIENTATION: MID
ORIENTATION: LOWER;MID
ORIENTATION: MID

## 2024-05-19 ASSESSMENT — PAIN DESCRIPTION - DESCRIPTORS
DESCRIPTORS: OTHER (COMMENT)
DESCRIPTORS: ACHING

## 2024-05-19 ASSESSMENT — PAIN - FUNCTIONAL ASSESSMENT
PAIN_FUNCTIONAL_ASSESSMENT: ACTIVITIES ARE NOT PREVENTED

## 2024-05-19 ASSESSMENT — PAIN DESCRIPTION - LOCATION
LOCATION: CHEST
LOCATION: THROAT
LOCATION: CHEST
LOCATION: THROAT
LOCATION: CHEST
LOCATION: THROAT

## 2024-05-19 ASSESSMENT — PAIN SCALES - GENERAL
PAINLEVEL_OUTOF10: 8
PAINLEVEL_OUTOF10: 8
PAINLEVEL_OUTOF10: 0
PAINLEVEL_OUTOF10: 7
PAINLEVEL_OUTOF10: 5
PAINLEVEL_OUTOF10: 7
PAINLEVEL_OUTOF10: 7

## 2024-05-19 ASSESSMENT — PAIN SCALES - WONG BAKER: WONGBAKER_NUMERICALRESPONSE: NO HURT

## 2024-05-19 NOTE — PLAN OF CARE
Problem: Discharge Planning  Goal: Discharge to home or other facility with appropriate resources  5/19/2024 1721 by Remedios Landon RN  Outcome: Progressing  5/19/2024 0355 by Parveen Payton RN  Outcome: Progressing  5/19/2024 0353 by Parveen Payton RN  Outcome: Progressing     Problem: Pain  Goal: Verbalizes/displays adequate comfort level or baseline comfort level  5/19/2024 1721 by Remedios Landon RN  Outcome: Progressing  5/19/2024 0355 by Parveen Payton RN  Outcome: Progressing  5/19/2024 0353 by Parveen Payton RN  Outcome: Progressing     Problem: Safety - Adult  Goal: Free from fall injury  5/19/2024 1721 by Remedios Landon RN  Outcome: Progressing  5/19/2024 0355 by Parveen Payton RN  Outcome: Progressing  5/19/2024 0353 by Parveen Payton RN  Outcome: Progressing     Problem: ABCDS Injury Assessment  Goal: Absence of physical injury  5/19/2024 1721 by Remedios Landon RN  Outcome: Progressing  5/19/2024 0355 by Parveen Payton RN  Outcome: Progressing  5/19/2024 0353 by Parveen Payton RN  Outcome: Progressing

## 2024-05-19 NOTE — ED PROVIDER NOTES
EMERGENCY DEPARTMENT HISTORY AND PHYSICAL EXAM      Patient Name: Lin Bates  MRN: [id number]  : 1955  Date of Evaluation: 2024  ED Provider:  Sonya Mari DO    History of Presenting Illness     Chief Complaint:   Chief Complaint   Patient presents with    Chest Pain     Pt reports she has lung cancer and it has gone to her esophagus. Pt states pain is mid sternal and reports she had emesis PTA       HPI: Patient is a 68 y.o. female with past history of hypertension, diabetes, and lung cancer status post radiation 1 month ago is presenting to the emergency department with a chief complaint of chest pain.  Patient states she has discomfort in the center of her chest that is intermittent and has been going on for the last month.  Patient denies any associated shortness of breath.  Patient denies recent fevers or cough.  Patient states pain is worse when she eats and at times she feels like her food is getting stuck in the bottom of her esophagus.  Patient denies any abdominal pain.  Patient states she has had some recent vomiting.  Patient denies diarrhea or dysuria.  Patient states her last chemo regimen ended on May 10.        Past History     Past Medical History:   Past Medical History:   Diagnosis Date    Cancer (HCC)     Hyperlipidemia     Hypertension     Thickened endometrium     Type 2 diabetes mellitus without complication (HCC)      Surgical History:   Past Surgical History:   Procedure Laterality Date    CT NEEDLE BIOPSY LUNG PERCUTANEOUS  2024    CT NEEDLE BIOPSY LUNG PERCUTANEOUS 2024 [facility] CT SCAN    DENTAL SURGERY      's    DILATION AND CURETTAGE OF UTERUS N/A 3/22/2023    DILATATION AND CURETTAGE HYSTEROSCOPY performed by Zac Pretty MD at [facility] OR    PORT SURGERY N/A 4/10/2024    RIGHT SUBCLAVIAN MEDIPORT INSERTION performed by Brian Humphries II, MD at [facility] OR     Family History:   Family History   Problem Relation Age of Onset    Cancer Father

## 2024-05-19 NOTE — PROGRESS NOTES
Is known to Dr. Birch  Limited stage small cell lung cancer  Completed last cycle of cisplatin and etoposide looks like on 5/9/2024.  Presented with pancytopenia.  No fever.  No signs or symptoms of infection  Will avoid growth factor support if possible.  Empiric antibiotics, low threshold for infective w/u   Correct electrolyte imbalance    Discussed with the admitting team  Full consult to follow  ADOLFO

## 2024-05-19 NOTE — PROGRESS NOTES
4 Eyes Skin Assessment     NAME:  Lin Bates  YOB: 1955  MEDICAL RECORD NUMBER:  [id number]    The patient is being assessed for  Admission    I agree that at least one RN has performed a thorough Head to Toe Skin Assessment on the patient. ALL assessment sites listed below have been assessed.      Areas assessed by both nurses:    {Pressure Areas Assessed:80857}        Does the Patient have a Wound? No noted wound(s)       Giuseppe Prevention initiated by RN: Yes  Wound Care Orders initiated by RN: No    Pressure Injury (Stage 3,4, Unstageable, DTI, NWPT, and Complex wounds) if present, place Wound referral order by RN under : No    New Ostomies, if present place, Ostomy referral order under : No     Nurse 1 eSignature: Electronically signed by Parveen Payton RN on 5/19/24 at 1:27 AM EDT    **SHARE this note so that the co-signing nurse can place an eSignature**    Nurse 2 eSignature: {Esignature:[id number]}

## 2024-05-19 NOTE — PLAN OF CARE
Problem: Discharge Planning  Goal: Discharge to home or other facility with appropriate resources  5/19/2024 0355 by Parveen Payton RN  Outcome: Progressing  5/19/2024 0353 by Parveen Payton RN  Outcome: Progressing     Problem: Pain  Goal: Verbalizes/displays adequate comfort level or baseline comfort level  5/19/2024 0355 by Parveen Payton RN  Outcome: Progressing  5/19/2024 0353 by Parveen Payton RN  Outcome: Progressing     Problem: Safety - Adult  Goal: Free from fall injury  5/19/2024 0355 by Parveen Payton RN  Outcome: Progressing  5/19/2024 0353 by Parveen Payton RN  Outcome: Progressing     Problem: ABCDS Injury Assessment  Goal: Absence of physical injury  5/19/2024 0355 by Parveen Payton RN  Outcome: Progressing  5/19/2024 0353 by Parveen Payton RN  Outcome: Progressing

## 2024-05-19 NOTE — H&P
History and Physical      Name:  Lin Bates /Age/Sex: 1955  (68 y.o. female)   MRN & CSN:  [id number] & [id number] Encounter Date/Time: 2024 9:31 PM   Location:  ED31/ED-31 PCP: Dorothy Terrazas PA       Hospital Day: 1    Assessment and Plan:     Patient is a 68-year-old female who presented with chest tightness.     # Pancytopenia  # Small cell carcinoma of lung  - Endorsed increased fatigue and chest tightness over past month. No bleeding or fevers. Not on anticoagulants. Last chemotherapy on 5/10, also receiving RT.   - Hg 4.8, baseline ~9. ANC 3. Platelets 34.   - Received x2 u pRBC in ED, anemia panel ordered (add-on), follow-up. Will also give Neupogen, neutropenic precautions. H/O consulted, appreciate assistance. Transfuse if Hg <7 or platelets <10k, hold ASA.     # Odynophagia   - Suspected to be secondary to radiation.   - No evidence of thrush. Continue Carafate and supportive care.     # Hypokalemia  - Moderate. Likely secondary to emesis.   - Repleted, follow-up repeat labs.    # Non-MI troponin elevation  - Denied any typical CP.  - Initial Tn mildly elevated, repeat normal. ECG without acute ischemic changes.  - Likely secondary to severe anemia.     # Essential hypertension  - Continue low-dose Lisinopril.     # T2DM with hyperglycemia  - A1c ordered (add-on).   - Held Metformin.  - LCSI.     Checklist:  Advanced care planning: full  Diet: cardiac  DVT ppx: SCD  Sugar: BG goal of 140-180 while inpatient    Disposition: admit to inpatient.  Estimated discharge: 2-3 day(s).  Current living situation: home.  Expected disposition: home.    Spoke with ED provider who recommended admission for the patient and I agree with that plan.  Personally reviewed lab studies and imaging.  EKG interpreted personally and results as stated above.  Imaging that was interpreted personally and results as stated above.    History of Present Illness:     Chief Complaint: chest

## 2024-05-19 NOTE — PROGRESS NOTES
V2.0  [facility] [place] Note      Name:  Lin Bates /Age/Sex: 1955  (68 y.o. female)   MRN & CSN:  [id number] & [id number] Encounter Date/Time: 2024 9:32 AM EDT    Location:  [facility] PCP: Dorothy Terrazas PA       Hospital Day: 2    Assessment and Plan:   Lin Bates is a 68 y.o. female with pmh of  small cell carcinoma of lung, odynophagia, HTN and T2DM  who presents with Pancytopenia (HCC)      Plan:  # Pancytopenia  # Small cell carcinoma of lung  - Endorsed increased fatigue and chest tightness over past month. No bleeding or fevers. Not on anticoagulants. Last chemotherapy on 5/10, also receiving RT.   - Hg 4.8, baseline ~9. ANC 3. Platelets 34.   - Received x2 u pRBC in ED, anemia panel ordered (add-on), follow-up. Will also give Neupogen, neutropenic precautions. H/O consulted, appreciate assistance. Transfuse if Hg <7 or platelets <10k, hold ASA.   -Hemoglobin improved to 11.2 after 2 units PRBC  -Discussed with oncology, will hold further doses of filgrastim.  Patient to be started on prophylactic antibiotics for neutropenia with Levaquin  -Will start infectious workup if patient shows any evidence     # Odynophagia   - Suspected to be secondary to radiation.   - No evidence of thrush. Continue Carafate and supportive care.      # Hypokalemia  - Moderate. Likely secondary to emesis.   - Repleted, follow-up repeat labs.     # Non-MI troponin elevation  - Denied any typical CP.  - Initial Tn mildly elevated, repeat normal. ECG without acute ischemic changes.  - Likely secondary to severe anemia.      # Essential hypertension  - Continue low-dose Lisinopril.     # T2DM with hyperglycemia  - A1c 6.1   - Held Metformin.  - LCSI.    Diet ADULT DIET; Regular   DVT Prophylaxis [] Lovenox, []  Heparin, [] SCDs, [] Ambulation,  [] Eliquis, [] Xarelto  [] Coumadin   Code Status Full Code   Disposition From: Home  Expected Disposition: Home  Estimated Date of Discharge:  PM   Result Value Ref Range    Troponin, High Sensitivity 14 0 - 14 ng/L   D-Dimer, Rapid    Collection Time: 05/18/24  9:40 PM   Result Value Ref Range    D-Dimer, Quant 1.85 (H) <0.47 ug/mL (FEU)   TYPE AND SCREEN    Collection Time: 05/18/24  9:41 PM   Result Value Ref Range    ABO/Rh O POSITIVE     Antibody Screen NEGATIVE     Unit Number [id number]     Component LEUKO-POOR RED CELLS     Unit Divison 00     Status ISSUED     Transfusion Status OK TO TRANSFUSE     Crossmatch Result COMPATIBLE     Unit Number [id number]     Component LEUKO-POOR RED CELLS     Unit Divison 00     Status ALLOCATED     Transfusion Status OK TO TRANSFUSE     Crossmatch Result COMPATIBLE     Unit Number [id number]     Component LEUKO-POOR RED CELLS     Unit Divison 00     Status ISSUED     Transfusion Status OK TO TRANSFUSE     Crossmatch Result COMPATIBLE    POCT Glucose    Collection Time: 05/19/24  2:05 AM   Result Value Ref Range    POC Glucose 124 (H) 70 - 99 MG/DL   Basic Metabolic Panel w/ Reflex to MG    Collection Time: 05/19/24  4:30 AM   Result Value Ref Range    Sodium 138 135 - 145 MMOL/L    Potassium 2.9 (LL) 3.5 - 5.1 MMOL/L    Chloride 98 (L) 99 - 110 mMol/L    CO2 24 21 - 32 MMOL/L    Anion Gap 16 7 - 16    Glucose 100 (H) 70 - 99 MG/DL    BUN 7 6 - 23 MG/DL    Creatinine 0.6 0.6 - 1.1 MG/DL    Est, Glom Filt Rate >90 >60 mL/min/1.73m2    Calcium 7.8 (L) 8.3 - 10.6 MG/DL   CBC with Auto Differential    Collection Time: 05/19/24  4:30 AM   Result Value Ref Range    WBC 0.4 (LL) 4.0 - 10.5 K/CU MM    RBC 2.79 (L) 4.2 - 5.4 M/CU MM    Hemoglobin 9.0 (L) 12.5 - 16.0 GM/DL    Hematocrit 26.9 (L) 37 - 47 %    MCV 96.4 78 - 100 FL    MCH 32.3 (H) 27 - 31 PG    MCHC 33.5 32.0 - 36.0 %    RDW 15.8 (H) 11.7 - 14.9 %    Platelets 28 (L) 140 - 440 K/CU MM    MPV 10.1 7.5 - 11.1 FL    Differential Type AUTOMATED DIFFERENTIAL     Neutrophils % 0.0 (L) 36 - 66 %    Lymphocytes % 59.5 (H) 24 - 44 %    Monocytes % 35.1 (H) 0 -

## 2024-05-19 NOTE — ED NOTES
ED TO INPATIENT SBAR HANDOFF    Patient Name: Lin Bates   :  1955  68 y.o.   Preferred Name    Family/Caregiver Present yes  Restraints no   C-SSRS: Risk of Suicide: No Risk  Sitter no   Sepsis Risk Score Sepsis Risk Score: 1.89      Situation  Chief Complaint   Patient presents with    Chest Pain     Pt reports she has lung cancer and it has gone to her esophagus. Pt states pain is mid sternal and reports she had emesis PTA     Brief Description of Patient's Condition: Pt came to the ED with discomfort to her mid chest. She has lung cancer that has gone to her esophagus and had her last chemo on May 10th. Pt's WBC came back at 0.3, her hgb is 4.8, and platelets are 34. She was given IV potassium due to a low potassium of 2.8, but she said she is not able to tolerate oral potassium. Pt's blood is ready but has not been able to be started at this time.   Mental Status: oriented, alert, coherent, logical, and thought processes intact  Arrived from: home    Imaging:   XR CHEST PORTABLE   Final Result   1.  No acute cardiopulmonary findings.   2.  A right-sided chest port is present. The catheter is kinked underneath the clavicle, with similar configuration to prior examinations.      Electronically signed by Zeke Franco MD        Abnormal labs:   Abnormal Labs Reviewed   TROPONIN - Abnormal; Notable for the following components:       Result Value    Troponin, High Sensitivity 15 (*)     All other components within normal limits   COMPREHENSIVE METABOLIC PANEL - Abnormal; Notable for the following components:    Potassium 2.8 (*)     Chloride 94 (*)     Anion Gap 21 (*)     Glucose 133 (*)     Albumin 3.3 (*)     ALT <5 (*)     AST 8 (*)     All other components within normal limits   CBC WITH AUTO DIFFERENTIAL - Abnormal; Notable for the following components:    WBC 0.3 (*)     RBC 1.42 (*)     Hemoglobin 4.8 (*)     Hematocrit 14.6 (*)     .8 (*)     MCH 33.8 (*)     Platelets 34 (*)

## 2024-05-19 NOTE — CONSENT
Informed Consent for Blood Component Transfusion Note    I have discussed with the patient the rationale for blood component transfusion; its benefits in treating or preventing fatigue, organ damage, or death; and its risk which includes mild transfusion reactions, rare risk of blood borne infection, or more serious but rare reactions. I have discussed the alternatives to transfusion, including the risk and consequences of not receiving transfusion. The patient had an opportunity to ask questions and had agreed to proceed with transfusion of blood components.    Electronically signed by Sonya Mari DO on 5/18/24 at 9:09 PM EDT

## 2024-05-19 NOTE — CONSULTS
Rounding completed. PowerMedPort sterile dressing c/d/i and transfusion infusing without abnormalities noted. Pt denies c/o or needs.

## 2024-05-20 PROBLEM — D64.9 SEVERE ANEMIA: Status: ACTIVE | Noted: 2024-04-21

## 2024-05-20 LAB
ABO/RH: NORMAL
ANION GAP SERPL CALCULATED.3IONS-SCNC: 14 MMOL/L (ref 7–16)
ANISOCYTOSIS: ABNORMAL
ANTIBODY SCREEN: NEGATIVE
BLASTS ABSOLUTE COUNT: 0.02 K/CU MM
BLASTS RELATIVE PERCENT: 2 %
BUN SERPL-MCNC: 7 MG/DL (ref 6–23)
CALCIUM SERPL-MCNC: 8.2 MG/DL (ref 8.3–10.6)
CELLS COUNTED: 50
CHLORIDE BLD-SCNC: 97 MMOL/L (ref 99–110)
CO2: 24 MMOL/L (ref 21–32)
COMPONENT: NORMAL
CREAT SERPL-MCNC: 0.7 MG/DL (ref 0.6–1.1)
CROSSMATCH RESULT: NORMAL
DIFFERENTIAL TYPE: ABNORMAL
EOSINOPHILS ABSOLUTE: 0 K/CU MM
EOSINOPHILS RELATIVE PERCENT: 4 % (ref 0–3)
GFR, ESTIMATED: >90 ML/MIN/1.73M2
GLUCOSE BLD-MCNC: 110 MG/DL (ref 70–99)
GLUCOSE BLD-MCNC: 115 MG/DL (ref 70–99)
GLUCOSE BLD-MCNC: 129 MG/DL (ref 70–99)
GLUCOSE BLD-MCNC: 92 MG/DL (ref 70–99)
GLUCOSE SERPL-MCNC: 90 MG/DL (ref 70–99)
HCT VFR BLD CALC: 31.4 % (ref 37–47)
HEMOGLOBIN: 10.5 GM/DL (ref 12.5–16)
LYMPHOCYTES ABSOLUTE: 0.4 K/CU MM
LYMPHOCYTES RELATIVE PERCENT: 46 % (ref 24–44)
MAGNESIUM: 1.5 MG/DL (ref 1.8–2.4)
MCH RBC QN AUTO: 30.9 PG (ref 27–31)
MCHC RBC AUTO-ENTMCNC: 33.4 % (ref 32–36)
MCV RBC AUTO: 92.4 FL (ref 78–100)
METAMYELOCYTES ABSOLUTE COUNT: 0.04 K/CU MM
METAMYELOCYTES PERCENT: 4 %
MONOCYTES ABSOLUTE: 0.4 K/CU MM
MONOCYTES RELATIVE PERCENT: 40 % (ref 0–4)
NEUTROPHILS ABSOLUTE: 0 K/CU MM
NEUTROPHILS RELATIVE PERCENT: 4 % (ref 36–66)
NUCLEATED RED BLOOD CELLS: 2
PDW BLD-RTO: 15.9 % (ref 11.7–14.9)
PLATELET # BLD: 31 K/CU MM (ref 140–440)
PMV BLD AUTO: 11 FL (ref 7.5–11.1)
POTASSIUM SERPL-SCNC: 3.3 MMOL/L (ref 3.5–5.1)
RBC # BLD: 3.4 M/CU MM (ref 4.2–5.4)
SODIUM BLD-SCNC: 135 MMOL/L (ref 135–145)
STATUS: NORMAL
TRANSFUSION STATUS: NORMAL
UNIT DIVISION: 0
UNIT NUMBER: NORMAL
WBC # BLD: 0.9 K/CU MM (ref 4–10.5)

## 2024-05-20 PROCEDURE — 2580000003 HC RX 258: Performed by: STUDENT IN AN ORGANIZED HEALTH CARE EDUCATION/TRAINING PROGRAM

## 2024-05-20 PROCEDURE — APPNB60 APP NON BILLABLE TIME 46-60 MINS: Performed by: PHYSICIAN ASSISTANT

## 2024-05-20 PROCEDURE — 2140000000 HC CCU INTERMEDIATE R&B

## 2024-05-20 PROCEDURE — 6360000002 HC RX W HCPCS: Performed by: SPECIALIST

## 2024-05-20 PROCEDURE — 6370000000 HC RX 637 (ALT 250 FOR IP): Performed by: STUDENT IN AN ORGANIZED HEALTH CARE EDUCATION/TRAINING PROGRAM

## 2024-05-20 PROCEDURE — 6370000000 HC RX 637 (ALT 250 FOR IP): Performed by: FAMILY MEDICINE

## 2024-05-20 PROCEDURE — C9113 INJ PANTOPRAZOLE SODIUM, VIA: HCPCS | Performed by: SPECIALIST

## 2024-05-20 PROCEDURE — 85027 COMPLETE CBC AUTOMATED: CPT

## 2024-05-20 PROCEDURE — 94761 N-INVAS EAR/PLS OXIMETRY MLT: CPT

## 2024-05-20 PROCEDURE — 99223 1ST HOSP IP/OBS HIGH 75: CPT | Performed by: INTERNAL MEDICINE

## 2024-05-20 PROCEDURE — 83735 ASSAY OF MAGNESIUM: CPT

## 2024-05-20 PROCEDURE — 6370000000 HC RX 637 (ALT 250 FOR IP): Performed by: SPECIALIST

## 2024-05-20 PROCEDURE — 82962 GLUCOSE BLOOD TEST: CPT

## 2024-05-20 PROCEDURE — 85007 BL SMEAR W/DIFF WBC COUNT: CPT

## 2024-05-20 PROCEDURE — 80048 BASIC METABOLIC PNL TOTAL CA: CPT

## 2024-05-20 RX ORDER — PANTOPRAZOLE SODIUM 40 MG/10ML
40 INJECTION, POWDER, LYOPHILIZED, FOR SOLUTION INTRAVENOUS DAILY
Status: DISCONTINUED | OUTPATIENT
Start: 2024-05-20 | End: 2024-05-24 | Stop reason: HOSPADM

## 2024-05-20 RX ORDER — OXYCODONE HYDROCHLORIDE 5 MG/1
5 TABLET ORAL EVERY 4 HOURS PRN
Status: COMPLETED | OUTPATIENT
Start: 2024-05-20 | End: 2024-05-20

## 2024-05-20 RX ADMIN — ATORVASTATIN CALCIUM 20 MG: 10 TABLET, FILM COATED ORAL at 09:33

## 2024-05-20 RX ADMIN — LIDOCAINE HYDROCHLORIDE 15 ML: 20 SOLUTION ORAL; TOPICAL at 00:34

## 2024-05-20 RX ADMIN — EMPAGLIFLOZIN 10 MG: 10 TABLET, FILM COATED ORAL at 09:33

## 2024-05-20 RX ADMIN — OXYCODONE HYDROCHLORIDE 5 MG: 5 TABLET ORAL at 04:29

## 2024-05-20 RX ADMIN — LIDOCAINE HYDROCHLORIDE: 20 SOLUTION ORAL; TOPICAL at 17:43

## 2024-05-20 RX ADMIN — SODIUM CHLORIDE, PRESERVATIVE FREE 10 ML: 5 INJECTION INTRAVENOUS at 09:32

## 2024-05-20 RX ADMIN — LIDOCAINE HYDROCHLORIDE: 20 SOLUTION ORAL; TOPICAL at 20:45

## 2024-05-20 RX ADMIN — LIDOCAINE HYDROCHLORIDE 15 ML: 20 SOLUTION ORAL; TOPICAL at 11:22

## 2024-05-20 RX ADMIN — PANTOPRAZOLE SODIUM 40 MG: 40 INJECTION, POWDER, FOR SOLUTION INTRAVENOUS at 15:49

## 2024-05-20 RX ADMIN — LIDOCAINE HYDROCHLORIDE 15 ML: 20 SOLUTION ORAL; TOPICAL at 15:46

## 2024-05-20 RX ADMIN — LISINOPRIL 2.5 MG: 5 TABLET ORAL at 09:33

## 2024-05-20 RX ADMIN — FAMOTIDINE 20 MG: 20 TABLET ORAL at 09:33

## 2024-05-20 RX ADMIN — LIDOCAINE HYDROCHLORIDE 15 ML: 20 SOLUTION ORAL; TOPICAL at 04:16

## 2024-05-20 RX ADMIN — OXYCODONE HYDROCHLORIDE 5 MG: 5 TABLET ORAL at 15:46

## 2024-05-20 RX ADMIN — SODIUM CHLORIDE, PRESERVATIVE FREE 10 ML: 5 INJECTION INTRAVENOUS at 20:45

## 2024-05-20 RX ADMIN — FERROUS SULFATE TAB 325 MG (65 MG ELEMENTAL FE) 325 MG: 325 (65 FE) TAB at 09:33

## 2024-05-20 RX ADMIN — LIDOCAINE HYDROCHLORIDE 15 ML: 20 SOLUTION ORAL; TOPICAL at 08:19

## 2024-05-20 RX ADMIN — OXYCODONE HYDROCHLORIDE 5 MG: 5 TABLET ORAL at 09:32

## 2024-05-20 ASSESSMENT — PAIN DESCRIPTION - LOCATION
LOCATION: CHEST
LOCATION: ABDOMEN;CHEST
LOCATION: CHEST;THROAT
LOCATION: CHEST

## 2024-05-20 ASSESSMENT — PAIN SCALES - WONG BAKER
WONGBAKER_NUMERICALRESPONSE: NO HURT

## 2024-05-20 ASSESSMENT — ENCOUNTER SYMPTOMS
SHORTNESS OF BREATH: 0
WHEEZING: 0
DIARRHEA: 0
CHEST TIGHTNESS: 1
COUGH: 0
EYE DISCHARGE: 0
CONSTIPATION: 0
ABDOMINAL DISTENTION: 0
SORE THROAT: 0
BACK PAIN: 0

## 2024-05-20 ASSESSMENT — PAIN DESCRIPTION - DESCRIPTORS
DESCRIPTORS: DISCOMFORT
DESCRIPTORS: ACHING
DESCRIPTORS: DISCOMFORT

## 2024-05-20 ASSESSMENT — PAIN DESCRIPTION - ORIENTATION
ORIENTATION: MID
ORIENTATION: MID
ORIENTATION: MID;LOWER
ORIENTATION: MID

## 2024-05-20 ASSESSMENT — PAIN - FUNCTIONAL ASSESSMENT
PAIN_FUNCTIONAL_ASSESSMENT: ACTIVITIES ARE NOT PREVENTED

## 2024-05-20 ASSESSMENT — PAIN SCALES - GENERAL
PAINLEVEL_OUTOF10: 8
PAINLEVEL_OUTOF10: 7

## 2024-05-20 ASSESSMENT — PAIN DESCRIPTION - DIRECTION: RADIATING_TOWARDS: STOMACH

## 2024-05-20 ASSESSMENT — PAIN DESCRIPTION - PAIN TYPE: TYPE: ACUTE PAIN

## 2024-05-20 ASSESSMENT — PAIN DESCRIPTION - FREQUENCY: FREQUENCY: CONTINUOUS

## 2024-05-20 NOTE — CARE COORDINATION
.CM met with pt and S.O. for d/c planning.  Introduced self, updated white board and explained role of CM. Permission received from pt to discuss d/c planning with S.O. at bedside.  Pt lives with S.O. and is independent with ADL's except bathing.  Pt  has a PCP, has insurance, and is able to afford her medication. Pt has a rollator walker, w/c and a shower chair.  HHC offered and pt is agreeable. Meals [facility] offered and pt declined. D/c plan is to return home with S.O. and HHC.  CM to f/u tomorrow for HHC choice. HHC list and [facility] info provided.  Pt denies any other d/c needs. TE       05/20/24 2467   Service Assessment   Patient Orientation Alert and Oriented   Cognition Alert   History Provided By Patient   Primary Caregiver Self   Support Systems Spouse/Significant Other   Patient's Healthcare Decision Maker is:   (SELF)   PCP Verified by CM Yes   Last Visit to PCP Within last 6 months   Prior Functional Level Independent in ADLs/IADLs;Assistance with the following:;Bathing   Current Functional Level Independent in ADLs/IADLs;Assistance with the following:;Bathing   Can patient return to prior living arrangement Yes   Ability to make needs known: Good   Family able to assist with home care needs: Yes   Would you like for me to discuss the discharge plan with any other family members/significant others, and if so, who? No   Financial Resources Medicaid;Medicare   Community Resources None   Social/Functional History   Lives With Significant other   Type of Home Apartment   Home Layout One level   Home Access Level entry   Bathroom Shower/Tub Tub/Shower unit   Bathroom Toilet Standard   Bathroom Equipment Shower chair   Bathroom Accessibility Accessible   Home Equipment Rollator;Walker - Rolling;Wheelchair - Manual   Receives Help From Family   Ambulation Assistance Independent   Transfer Assistance Independent   Active  No   Patient's  Info S.O. PROVIDES HER TRANSPORTATION   Mode of

## 2024-05-20 NOTE — PLAN OF CARE
Problem: Discharge Planning  Goal: Discharge to home or other facility with appropriate resources  5/20/2024 1140 by Shari Recinos RN  Outcome: Progressing  5/19/2024 2310 by Luis Miguel Orozco RN  Outcome: Progressing     Problem: Pain  Goal: Verbalizes/displays adequate comfort level or baseline comfort level  5/20/2024 1140 by Shari Recinos RN  Outcome: Progressing  5/19/2024 2310 by Luis Miguel Orozco RN  Outcome: Progressing     Problem: Safety - Adult  Goal: Free from fall injury  5/20/2024 1140 by Shari Recinos RN  Outcome: Progressing  5/19/2024 2310 by Luis Miguel Orozco RN  Outcome: Progressing     Problem: ABCDS Injury Assessment  Goal: Absence of physical injury  5/20/2024 1140 by Shari Recinos RN  Outcome: Progressing  5/19/2024 2310 by Luis Miguel Orozco RN  Outcome: Progressing     Problem: Gastrointestinal - Adult  Goal: Minimal or absence of nausea and vomiting  Outcome: Progressing  Goal: Maintains or returns to baseline bowel function  Outcome: Progressing  Goal: Maintains adequate nutritional intake  Outcome: Progressing     Problem: Infection - Adult  Goal: Absence of fever/infection during anticipated neutropenic period  Outcome: Progressing     Problem: Metabolic/Fluid and Electrolytes - Adult  Goal: Electrolytes maintained within normal limits  Outcome: Progressing  Goal: Hemodynamic stability and optimal renal function maintained  Outcome: Progressing  Goal: Glucose maintained within prescribed range  Outcome: Progressing     Problem: Hematologic - Adult  Goal: Maintains hematologic stability  Outcome: Progressing

## 2024-05-20 NOTE — PROGRESS NOTES
V2.0  [facility] [place] [facility] Hospitalist Progress Note      Name:  Lin Bates /Age/Sex: 1955  (68 y.o. female)   MRN & CSN:  [id number] & [id number] Encounter Date/Time: 2024 9:32 AM EDT    [facility] PCP: Dorothy Terrazas PA       Hospital Day: 3    Assessment and Plan:   Lin Bates is a 68 y.o. female with pmh of  small cell carcinoma of lung, odynophagia, HTN and T2DM  who presents with Pancytopenia (HCC)      Plan:  # Pancytopenia  # Small cell carcinoma of lung  - Endorsed increased fatigue and chest tightness over past month. No bleeding or fevers. Not on anticoagulants. Last chemotherapy on 5/10, also receiving RT.   - Hg 4.8, baseline ~9. ANC 3. Platelets 34.   - Received x2 u pRBC in ED, anemia panel ordered (add-on), follow-up. Will also give Neupogen, neutropenic precautions. H/O consulted, appreciate assistance. Transfuse if Hg <7 or platelets <10k, hold ASA.   -Hemoglobin improved to 11.2 after 2 units PRBC  -Discussed with oncology, will hold further doses of filgrastim.  Patient to be started on prophylactic antibiotics for neutropenia with Levaquin  -Will start infectious workup if patient shows any evidence  -Follow-up oncology recs     # Odynophagia   # Malnutrition secondary to above  - Suspected to be secondary to radiation.   - No evidence of thrush. Continue Carafate and supportive care.   - GI consulted as per oncology request  -Dietitian consulted to optimize nutrition     # Hypokalemia  - Moderate. Likely secondary to emesis.   - Repleted, follow-up repeat labs.     # Non-MI troponin elevation  - Denied any typical CP.  - Initial Tn mildly elevated, repeat normal. ECG without acute ischemic changes.  - Likely secondary to severe anemia.      # Essential hypertension  - Continue low-dose Lisinopril.     # T2DM with hyperglycemia  - A1c 6.1   - Held Metformin.  - LCSI.    Diet ADULT DIET; Regular   DVT Prophylaxis [] Lovenox, []  Heparin, [] SCDs, []

## 2024-05-20 NOTE — CONSULTS
mouth/upper throat pain as well as pain in chest after swallowing.  No anginal equivalents.  No fever/chills.  No SOB or dyspnea. No change in bowel or bladder habits. Nausea and vomiting is improved since after treatment. No melena or BRBPR.  Encouraged liquid oral supplementation.     Past Medical History:   Diagnosis Date    Cancer (HCC)     Hyperlipidemia     Hypertension     Thickened endometrium     Type 2 diabetes mellitus without complication (HCC)        Past Surgical History:   Procedure Laterality Date    CT NEEDLE BIOPSY LUNG PERCUTANEOUS  2024    CT NEEDLE BIOPSY LUNG PERCUTANEOUS 2024 [facility] CT SCAN    DENTAL SURGERY      's    DILATION AND CURETTAGE OF UTERUS N/A 3/22/2023    DILATATION AND CURETTAGE HYSTEROSCOPY performed by Zac Pretty MD at [facility] OR    PORT SURGERY N/A 4/10/2024    RIGHT SUBCLAVIAN MEDIPORT INSERTION performed by Brian Humphries II, MD at [facility] OR                                                                                Social History     Socioeconomic History    Marital status:      Spouse name: Not on file    Number of children: Not on file    Years of education: Not on file    Highest education level: Not on file   Occupational History    Not on file   Tobacco Use    Smoking status: Former     Current packs/day: 0.00     Average packs/day: 1 pack/day for 52.1 years (52.1 ttl pk-yrs)     Types: Cigarettes     Start date:      Quit date: 2024     Years since quittin.3    Smokeless tobacco: Never    Tobacco comments:     Down to half pack 22   Vaping Use    Vaping Use: Never used   Substance and Sexual Activity    Alcohol use: Not Currently     Comment: yearly    Drug use: No    Sexual activity: Not Currently   Other Topics Concern    Not on file   Social History Narrative    Not on file     Social Determinants of Health     Financial Resource Strain: Low Risk  (2023)    Overall Financial Resource Strain ([facility])      05/06/2024    MG 1.5 (L) 05/20/2024    BILITOT 0.3 05/18/2024    ALKPHOS 45 05/18/2024    AST 8 (L) 05/18/2024    ALT <5 (L) 05/18/2024    LABGLOM >90 05/20/2024    GFRAA >60 03/09/2020    POCGLU 92 05/20/2024     Lab Results   Component Value Date    ALKPHOS 45 05/18/2024    ALT <5 (L) 05/18/2024    AST 8 (L) 05/18/2024    BILITOT 0.3 05/18/2024     No results found for: \"URICACID\"  Lab Results   Component Value Date    LDH 75 (L) 12/21/2023     Lab Results   Component Value Date    IRON 69 05/18/2024    TIBC 187 (L) 05/18/2024    FERRITIN 829 (H) 05/18/2024       Imaging:  XR CHEST PORTABLE   Final Result   1.  No acute cardiopulmonary findings.   2.  A right-sided chest port is present. The catheter is kinked underneath the clavicle, with similar configuration to prior examinations.      Electronically signed by Zeke Franco MD        Assessment/Plan:    #Pancytopenia  Predominately treatment related, last cycle cisplatin/etoposide was dose reduced. Did receive filgrastim x 1 in the ED, did not continue in absence of infectious s/s however she is on prophylactic Levaquin. She has completed planned chemotherapy and expect bone marrow recovery.  No nutritional deficiencies or evidence of bleeding.     Recommend transfusion goals >7 for Hgb and >10k for plt unless actively bleeding/procedure planned. First Hgb may be spurious based on trend.     #Dysphagia/Odynophagia   Timing coincided with last days of radiation however would expect some improvement by now.  May also have neutropenic/chemo related mucositis, esophagitis from vomiting,  No evidence of oral candida.  Appreciate GI input. Continue supportive care, continue to encourage pt to utilize medications as outpatient.     #Limited Stage SCLC  S/p definitive chemoradiation completed earlier this month.  Will continue with close monitoring.     #MPL+ MPN  Had planned to start lower dose hydroxyurea on 6/1/2024, will determine start based on count

## 2024-05-20 NOTE — CONSULTS
colon.    REVIEW OF SYSTEMS:  CENTRAL NERVOUS SYSTEM:  The patient denies headache or focal sensorimotor symptoms.  CARDIOVASCULAR SYSTEM:  No history of chest pain, but the patient complains of shortness of breath upon exertion, but no leg swelling.  GENITOURINARY SYSTEM:  No history of dysuria, pyuria or hematuria.  MUSCULOSKELETAL SYSTEM:  The patient complains of generalized weakness.  RESPIRATORY SYSTEM:  No history of cough, hemoptysis, fever or chills.    PAST MEDICAL HISTORY:  Significant for history of small cell carcinoma of the lung diagnosed on January 30, 2024, status post radiation and chemotherapy.  Also history of hypertension, diabetes mellitus, and hyperlipidemia.    FAMILY HISTORY:  The patient's brother was diagnosed with esophageal carcinoma and father with carcinoma of the lung.    MEDICATIONS:  Please refer to the chart.    SOCIOECONOMIC HISTORY:  The patient is a former smoker.  There is no history of EtOH abuse.    PAST SURGICAL HISTORY:  The patient has had dental surgery done, D and C, and also had a port placed for chemotherapy.    ALLERGIES:  NO KNOWN DRUG ALLERGIES.      PHYSICAL EXAMINATION:  GENERAL:  Shows a 68-year-old white female of average build and fair nutritional status who is lying flat in bed, no acute distress.  She is awake, alert, and oriented, and pleasant to talk.  VITAL SIGNS:  Stable.  HEENT:  Shows skull to be atraumatic.  NECK:  Supple.  CHEST:  Shows diminished breath sounds.  HEART:  S1, S2 normal.  ABDOMEN:  Soft, nondistended, nontender.  Liver and spleen are not palpable.  RECTAL:  Deferred.  CNS:  Shows the patient to be awake, alert, and oriented.  There are no focal sensorimotor signs.  MUSCULOSKELETAL SYSTEM:  Shows evidence of degenerative joint disease changes.    LABORATORY DATA:  As above mentioned.    IMPRESSION:    1. A 68-year-old white female with small cell carcinoma of the lung diagnosed on January 30, 2024, status post radiation and chemotherapy,

## 2024-05-20 NOTE — PLAN OF CARE
Problem: Discharge Planning  Goal: Discharge to home or other facility with appropriate resources  5/19/2024 2310 by Luis Miguel Orozco, RN  Outcome: Progressing  5/19/2024 1721 by Remedios Landon RN  Outcome: Progressing     Problem: Pain  Goal: Verbalizes/displays adequate comfort level or baseline comfort level  5/19/2024 2310 by Luis Miguel Orozco, RN  Outcome: Progressing  5/19/2024 1721 by Remedios Landon RN  Outcome: Progressing     Problem: Safety - Adult  Goal: Free from fall injury  5/19/2024 2310 by Luis Miguel Orozco, RN  Outcome: Progressing  5/19/2024 1721 by Remedios Landon RN  Outcome: Progressing     Problem: ABCDS Injury Assessment  Goal: Absence of physical injury  5/19/2024 2310 by Luis Miguel Orozco, RN  Outcome: Progressing  5/19/2024 1721 by Remedios Landon RN  Outcome: Progressing

## 2024-05-21 PROBLEM — E44.0 MODERATE MALNUTRITION (HCC): Chronic | Status: ACTIVE | Noted: 2024-05-21

## 2024-05-21 LAB
ALBUMIN SERPL-MCNC: 3.6 GM/DL (ref 3.4–5)
ALP BLD-CCNC: 56 IU/L (ref 40–128)
ALT SERPL-CCNC: <5 U/L (ref 10–40)
ANION GAP SERPL CALCULATED.3IONS-SCNC: 23 MMOL/L (ref 7–16)
ANISOCYTOSIS: ABNORMAL
APTT: 31.7 SECONDS (ref 25.1–37.1)
AST SERPL-CCNC: 8 IU/L (ref 15–37)
BANDED NEUTROPHILS ABSOLUTE COUNT: 0.3 K/CU MM
BANDED NEUTROPHILS RELATIVE PERCENT: 11 % (ref 5–11)
BILIRUB SERPL-MCNC: 0.3 MG/DL (ref 0–1)
BLASTS ABSOLUTE COUNT: 0.05 K/CU MM
BLASTS RELATIVE PERCENT: 2 %
BUN SERPL-MCNC: 7 MG/DL (ref 6–23)
CALCIUM SERPL-MCNC: 8.7 MG/DL (ref 8.3–10.6)
CHLORIDE BLD-SCNC: 97 MMOL/L (ref 99–110)
CO2: 19 MMOL/L (ref 21–32)
CREAT SERPL-MCNC: 0.7 MG/DL (ref 0.6–1.1)
DIFFERENTIAL TYPE: ABNORMAL
EOSINOPHILS ABSOLUTE: 0 K/CU MM
EOSINOPHILS RELATIVE PERCENT: 1 % (ref 0–3)
GFR, ESTIMATED: >90 ML/MIN/1.73M2
GLUCOSE BLD-MCNC: 114 MG/DL (ref 70–99)
GLUCOSE BLD-MCNC: 95 MG/DL (ref 70–99)
GLUCOSE BLD-MCNC: 96 MG/DL (ref 70–99)
GLUCOSE SERPL-MCNC: 94 MG/DL (ref 70–99)
HCT VFR BLD CALC: 34.5 % (ref 37–47)
HEMOGLOBIN: 12.1 GM/DL (ref 12.5–16)
INR BLD: 1.4 INDEX
LIPASE: 6 IU/L (ref 13–60)
LYMPHOCYTES ABSOLUTE: 0.5 K/CU MM
LYMPHOCYTES RELATIVE PERCENT: 19 % (ref 24–44)
MCH RBC QN AUTO: 32.4 PG (ref 27–31)
MCHC RBC AUTO-ENTMCNC: 35.1 % (ref 32–36)
MCV RBC AUTO: 92.2 FL (ref 78–100)
METAMYELOCYTES ABSOLUTE COUNT: 0.03 K/CU MM
METAMYELOCYTES PERCENT: 1 %
MONOCYTES ABSOLUTE: 1 K/CU MM
MONOCYTES RELATIVE PERCENT: 37 % (ref 0–4)
MYELOCYTE PERCENT: 7 %
MYELOCYTES ABSOLUTE COUNT: 0.19 K/CU MM
NEUTROPHILS ABSOLUTE: 0.5 K/CU MM
NEUTROPHILS RELATIVE PERCENT: 19 % (ref 36–66)
PDW BLD-RTO: 15.8 % (ref 11.7–14.9)
PLATELET # BLD: 57 K/CU MM (ref 140–440)
PMV BLD AUTO: 10.9 FL (ref 7.5–11.1)
POTASSIUM SERPL-SCNC: 3.3 MMOL/L (ref 3.5–5.1)
POTASSIUM SERPL-SCNC: 4 MMOL/L (ref 3.5–5.1)
PROMYELOCYTES ABSOLUTE COUNT: 0.08 K/CU MM
PROMYELOCYTES PERCENT: 3 %
PROTHROMBIN TIME: 17.4 SECONDS (ref 11.7–14.5)
RBC # BLD: 3.74 M/CU MM (ref 4.2–5.4)
SODIUM BLD-SCNC: 139 MMOL/L (ref 135–145)
TOTAL PROTEIN: 6.5 GM/DL (ref 6.4–8.2)
TOXIC GRANULATION: PRESENT
WBC # BLD: 2.7 K/CU MM (ref 4–10.5)

## 2024-05-21 PROCEDURE — 85007 BL SMEAR W/DIFF WBC COUNT: CPT

## 2024-05-21 PROCEDURE — 6360000002 HC RX W HCPCS: Performed by: STUDENT IN AN ORGANIZED HEALTH CARE EDUCATION/TRAINING PROGRAM

## 2024-05-21 PROCEDURE — 6370000000 HC RX 637 (ALT 250 FOR IP): Performed by: STUDENT IN AN ORGANIZED HEALTH CARE EDUCATION/TRAINING PROGRAM

## 2024-05-21 PROCEDURE — 94761 N-INVAS EAR/PLS OXIMETRY MLT: CPT

## 2024-05-21 PROCEDURE — 6360000002 HC RX W HCPCS: Performed by: SPECIALIST

## 2024-05-21 PROCEDURE — 99232 SBSQ HOSP IP/OBS MODERATE 35: CPT | Performed by: PHYSICIAN ASSISTANT

## 2024-05-21 PROCEDURE — 80053 COMPREHEN METABOLIC PANEL: CPT

## 2024-05-21 PROCEDURE — 85730 THROMBOPLASTIN TIME PARTIAL: CPT

## 2024-05-21 PROCEDURE — A4216 STERILE WATER/SALINE, 10 ML: HCPCS

## 2024-05-21 PROCEDURE — 85610 PROTHROMBIN TIME: CPT

## 2024-05-21 PROCEDURE — 2140000000 HC CCU INTERMEDIATE R&B

## 2024-05-21 PROCEDURE — 6370000000 HC RX 637 (ALT 250 FOR IP): Performed by: SPECIALIST

## 2024-05-21 PROCEDURE — 83690 ASSAY OF LIPASE: CPT

## 2024-05-21 PROCEDURE — 85027 COMPLETE CBC AUTOMATED: CPT

## 2024-05-21 PROCEDURE — 2580000003 HC RX 258: Performed by: STUDENT IN AN ORGANIZED HEALTH CARE EDUCATION/TRAINING PROGRAM

## 2024-05-21 PROCEDURE — 2580000003 HC RX 258

## 2024-05-21 PROCEDURE — 84132 ASSAY OF SERUM POTASSIUM: CPT

## 2024-05-21 PROCEDURE — 82962 GLUCOSE BLOOD TEST: CPT

## 2024-05-21 PROCEDURE — C9113 INJ PANTOPRAZOLE SODIUM, VIA: HCPCS | Performed by: SPECIALIST

## 2024-05-21 RX ORDER — SODIUM CHLORIDE 9 MG/ML
INJECTION, SOLUTION INTRAMUSCULAR; INTRAVENOUS; SUBCUTANEOUS
Status: COMPLETED
Start: 2024-05-21 | End: 2024-05-21

## 2024-05-21 RX ORDER — POTASSIUM CHLORIDE 20 MEQ/1
40 TABLET, EXTENDED RELEASE ORAL ONCE
Status: DISCONTINUED | OUTPATIENT
Start: 2024-05-21 | End: 2024-05-24 | Stop reason: HOSPADM

## 2024-05-21 RX ORDER — OXYCODONE HYDROCHLORIDE 5 MG/1
5 TABLET ORAL EVERY 6 HOURS PRN
Status: DISCONTINUED | OUTPATIENT
Start: 2024-05-21 | End: 2024-05-24 | Stop reason: HOSPADM

## 2024-05-21 RX ORDER — LEVOFLOXACIN 5 MG/ML
500 INJECTION, SOLUTION INTRAVENOUS EVERY 24 HOURS
Status: DISCONTINUED | OUTPATIENT
Start: 2024-05-21 | End: 2024-05-24 | Stop reason: HOSPADM

## 2024-05-21 RX ADMIN — LEVOFLOXACIN 500 MG: 5 INJECTION, SOLUTION INTRAVENOUS at 11:51

## 2024-05-21 RX ADMIN — SODIUM CHLORIDE 10 ML: 9 INJECTION INTRAMUSCULAR; INTRAVENOUS; SUBCUTANEOUS at 09:37

## 2024-05-21 RX ADMIN — PANTOPRAZOLE SODIUM 40 MG: 40 INJECTION, POWDER, FOR SOLUTION INTRAVENOUS at 09:36

## 2024-05-21 RX ADMIN — LIDOCAINE HYDROCHLORIDE 15 ML: 20 SOLUTION ORAL; TOPICAL at 09:06

## 2024-05-21 RX ADMIN — LIDOCAINE HYDROCHLORIDE 15 ML: 20 SOLUTION ORAL; TOPICAL at 05:16

## 2024-05-21 RX ADMIN — LIDOCAINE HYDROCHLORIDE 15 ML: 20 SOLUTION ORAL; TOPICAL at 00:09

## 2024-05-21 RX ADMIN — POTASSIUM CHLORIDE 10 MEQ: 10 INJECTION, SOLUTION INTRAVENOUS at 12:45

## 2024-05-21 RX ADMIN — LIDOCAINE HYDROCHLORIDE: 20 SOLUTION ORAL; TOPICAL at 20:56

## 2024-05-21 RX ADMIN — POTASSIUM CHLORIDE 10 MEQ: 10 INJECTION, SOLUTION INTRAVENOUS at 11:39

## 2024-05-21 RX ADMIN — POTASSIUM CHLORIDE 10 MEQ: 10 INJECTION, SOLUTION INTRAVENOUS at 10:34

## 2024-05-21 RX ADMIN — LIDOCAINE HYDROCHLORIDE: 20 SOLUTION ORAL; TOPICAL at 17:33

## 2024-05-21 RX ADMIN — LIDOCAINE HYDROCHLORIDE: 20 SOLUTION ORAL; TOPICAL at 11:52

## 2024-05-21 RX ADMIN — POTASSIUM CHLORIDE 10 MEQ: 10 INJECTION, SOLUTION INTRAVENOUS at 14:16

## 2024-05-21 RX ADMIN — SODIUM CHLORIDE, PRESERVATIVE FREE 10 ML: 5 INJECTION INTRAVENOUS at 09:37

## 2024-05-21 RX ADMIN — SODIUM CHLORIDE, PRESERVATIVE FREE 10 ML: 5 INJECTION INTRAVENOUS at 21:18

## 2024-05-21 RX ADMIN — OXYCODONE HYDROCHLORIDE 5 MG: 5 TABLET ORAL at 09:38

## 2024-05-21 RX ADMIN — OXYCODONE HYDROCHLORIDE 5 MG: 5 TABLET ORAL at 17:30

## 2024-05-21 ASSESSMENT — PAIN SCALES - GENERAL
PAINLEVEL_OUTOF10: 7
PAINLEVEL_OUTOF10: 6
PAINLEVEL_OUTOF10: 0
PAINLEVEL_OUTOF10: 6
PAINLEVEL_OUTOF10: 0

## 2024-05-21 ASSESSMENT — ENCOUNTER SYMPTOMS
EYE DISCHARGE: 0
CHEST TIGHTNESS: 1
SHORTNESS OF BREATH: 0
CONSTIPATION: 0
ABDOMINAL DISTENTION: 0
WHEEZING: 0
SORE THROAT: 0
DIARRHEA: 0
COUGH: 0
BACK PAIN: 0

## 2024-05-21 ASSESSMENT — PAIN - FUNCTIONAL ASSESSMENT
PAIN_FUNCTIONAL_ASSESSMENT: PREVENTS OR INTERFERES SOME ACTIVE ACTIVITIES AND ADLS
PAIN_FUNCTIONAL_ASSESSMENT: PREVENTS OR INTERFERES SOME ACTIVE ACTIVITIES AND ADLS

## 2024-05-21 ASSESSMENT — PAIN DESCRIPTION - DESCRIPTORS
DESCRIPTORS: ACHING
DESCRIPTORS: ACHING

## 2024-05-21 ASSESSMENT — PAIN SCALES - WONG BAKER
WONGBAKER_NUMERICALRESPONSE: HURTS A LITTLE BIT
WONGBAKER_NUMERICALRESPONSE: NO HURT
WONGBAKER_NUMERICALRESPONSE: HURTS A LITTLE BIT
WONGBAKER_NUMERICALRESPONSE: HURTS A LITTLE BIT
WONGBAKER_NUMERICALRESPONSE: NO HURT

## 2024-05-21 ASSESSMENT — PAIN DESCRIPTION - ORIENTATION
ORIENTATION: MID
ORIENTATION: MID

## 2024-05-21 ASSESSMENT — PAIN DESCRIPTION - LOCATION
LOCATION: THROAT
LOCATION: THROAT

## 2024-05-21 NOTE — PROGRESS NOTES
pantoprazole  40 mg IntraVENous Daily    [Held by provider] aspirin  81 mg Oral Daily    atorvastatin  20 mg Oral Daily    ferrous sulfate  325 mg Oral Daily with breakfast    empagliflozin  10 mg Oral Daily    lisinopril  2.5 mg Oral Daily    sucralfate  1 g Oral 4 times per day    sodium chloride flush  5-40 mL IntraVENous 2 times per day    insulin lispro  0-4 Units SubCUTAneous TID WC    insulin lispro  0-4 Units SubCUTAneous Nightly      Infusions:    sodium chloride      sodium chloride      dextrose       PRN Meds: oxyCODONE, 5 mg, Q6H PRN  sodium chloride flush, 5-40 mL, PRN  sodium chloride, , PRN  potassium chloride, 10 mEq, PRN  magnesium sulfate, 2,000 mg, PRN  ondansetron, 4 mg, Q8H PRN   Or  ondansetron, 4 mg, Q6H PRN  melatonin, 3 mg, Nightly PRN  polyethylene glycol, 17 g, Daily PRN  acetaminophen, 650 mg, Q6H PRN   Or  acetaminophen, 650 mg, Q6H PRN  lidocaine viscous hcl, 15 mL, Q3H PRN  potassium chloride, 40 mEq, PRN   Or  potassium alternative oral replacement, 40 mEq, PRN   Or  potassium chloride, 10 mEq, PRN  magnesium sulfate, 2,000 mg, PRN  sodium chloride, , PRN  glucose, 4 tablet, PRN  dextrose bolus, 125 mL, PRN   Or  dextrose bolus, 250 mL, PRN  glucagon (rDNA), 1 mg, PRN  dextrose, , Continuous PRN        Labs      Recent Results (from the past 24 hour(s))   POCT Glucose    Collection Time: 05/20/24 11:21 AM   Result Value Ref Range    POC Glucose 129 (H) 70 - 99 MG/DL   POCT Glucose    Collection Time: 05/20/24  4:07 PM   Result Value Ref Range    POC Glucose 115 (H) 70 - 99 MG/DL   POCT Glucose    Collection Time: 05/20/24  8:18 PM   Result Value Ref Range    POC Glucose 110 (H) 70 - 99 MG/DL   Comprehensive Metabolic Panel    Collection Time: 05/21/24  5:30 AM   Result Value Ref Range    Sodium 139 135 - 145 MMOL/L    Potassium 3.3 (L) 3.5 - 5.1 MMOL/L    Chloride 97 (L) 99 - 110 mMol/L    CO2 19 (L) 21 - 32 MMOL/L    Anion Gap 23 (H) 7 - 16    Glucose 94 70 - 99 MG/DL    BUN 7 6 -  23 MG/DL    Creatinine 0.7 0.6 - 1.1 MG/DL    Est, Glom Filt Rate >90 >60 mL/min/1.73m2    Calcium 8.7 8.3 - 10.6 MG/DL    Total Protein 6.5 6.4 - 8.2 GM/DL    Albumin 3.6 3.4 - 5.0 GM/DL    Total Bilirubin 0.3 0.0 - 1.0 MG/DL    Alkaline Phosphatase 56 40 - 128 IU/L    ALT <5 (L) 10 - 40 U/L    AST 8 (L) 15 - 37 IU/L   Lipase    Collection Time: 05/21/24  5:30 AM   Result Value Ref Range    Lipase 6 (L) 13 - 60 IU/L   Protime/INR & PTT    Collection Time: 05/21/24  5:30 AM   Result Value Ref Range    Protime 17.4 (H) 11.7 - 14.5 SECONDS    INR 1.4 INDEX    aPTT 31.7 25.1 - 37.1 SECONDS   CBC with Auto Differential    Collection Time: 05/21/24  5:30 AM   Result Value Ref Range    WBC 2.7 (L) 4.0 - 10.5 K/CU MM    RBC 3.74 (L) 4.2 - 5.4 M/CU MM    Hemoglobin 12.1 (L) 12.5 - 16.0 GM/DL    Hematocrit 34.5 (L) 37 - 47 %    MCV 92.2 78 - 100 FL    MCH 32.4 (H) 27 - 31 PG    MCHC 35.1 32.0 - 36.0 %    RDW 15.8 (H) 11.7 - 14.9 %    Platelets 57 (L) 140 - 440 K/CU MM    MPV 10.9 7.5 - 11.1 FL   POCT Glucose    Collection Time: 05/21/24  6:44 AM   Result Value Ref Range    POC Glucose 96 70 - 99 MG/DL        Imaging/Diagnostics Last 24 Hours   XR CHEST PORTABLE    Result Date: 5/18/2024  EXAM: XR CHEST PORTABLE INDICATION: Chest Pain COMPARISON: April 21, 2024 FINDINGS: Medical Devices: A right subclavian chest port is seen with its tip in the SVC. The catheter is kinked underneath the clavicle. Lungs: The lungs are clear. Pleura: No pneumothorax or pleural effusion. Heart and Mediastinum: The cardiomediastinal silhouette is within normal limits. Bones: No acute suspicious abnormality.     1.  No acute cardiopulmonary findings. 2.  A right-sided chest port is present. The catheter is kinked underneath the clavicle, with similar configuration to prior examinations. Electronically signed by Zeke Franco MD      Electronically signed by eDnis Ford MD on 5/21/2024 at 10:36 AM

## 2024-05-21 NOTE — PLAN OF CARE
Problem: Discharge Planning  Goal: Discharge to home or other facility with appropriate resources  5/20/2024 2251 by Luis Miguel Orozco RN  Outcome: Progressing  5/20/2024 1140 by Shari Recinos RN  Outcome: Progressing     Problem: Pain  Goal: Verbalizes/displays adequate comfort level or baseline comfort level  5/20/2024 2251 by Luis Miguel Orozco RN  Outcome: Progressing  5/20/2024 1140 by Shari Recinos RN  Outcome: Progressing     Problem: Safety - Adult  Goal: Free from fall injury  5/20/2024 2251 by Luis Miguel Orozco RN  Outcome: Progressing  5/20/2024 1140 by Shari Recinos RN  Outcome: Progressing     Problem: ABCDS Injury Assessment  Goal: Absence of physical injury  5/20/2024 2251 by Luis Miguel Orozco RN  Outcome: Progressing  5/20/2024 1140 by Shair Recinos RN  Outcome: Progressing     Problem: Gastrointestinal - Adult  Goal: Minimal or absence of nausea and vomiting  5/20/2024 2251 by Luis Miguel Orozco RN  Outcome: Progressing  5/20/2024 1140 by Shari Recinos RN  Outcome: Progressing  Goal: Maintains or returns to baseline bowel function  5/20/2024 2251 by Luis Miguel Orozco RN  Outcome: Progressing  5/20/2024 1140 by Shari Recinos RN  Outcome: Progressing  Goal: Maintains adequate nutritional intake  5/20/2024 2251 by Luis Miguel Orozco RN  Outcome: Progressing  5/20/2024 1140 by Shari Recinos RN  Outcome: Progressing     Problem: Infection - Adult  Goal: Absence of fever/infection during anticipated neutropenic period  5/20/2024 2251 by Luis Miguel Orozco RN  Outcome: Progressing  5/20/2024 1140 by Shari Recinos RN  Outcome: Progressing     Problem: Metabolic/Fluid and Electrolytes - Adult  Goal: Electrolytes maintained within normal limits  5/20/2024 2251 by Luis Miguel Orozco RN  Outcome: Progressing  5/20/2024 1140 by Shari Recinos RN  Outcome: Progressing  Goal: Hemodynamic stability and optimal renal function maintained  5/20/2024 2251 by Pam

## 2024-05-21 NOTE — CARE COORDINATION
CM met with pt and BF to f/u for HHC choice.  Pt has decided that she does not want HHC. Pt denies any d/c needs.  D/c plan is home with BF, no needs.  Notify CM if any d/c needs arise.  TE

## 2024-05-21 NOTE — DISCHARGE INSTR - DIET
Good nutrition is important when healing from an illness, injury, or surgery.  Follow any nutrition recommendations given to you during your hospital stay.   If you were given an oral nutrition supplement while in the hospital, continue to take this supplement at home.  You can take it with meals, in-between meals, and/or before bedtime. These supplements can be purchased at most local grocery stores, pharmacies, and chain super-stores.   If you have any questions about your diet or nutrition, call the hospital and ask for the dietitian.    Please read Nutrition handout below:     High Calorie, High Protein Nutrition Therapy from Nutrition Care Manual     A high-calorie, high-protein diet has been recommended to you. Your registered dietitian nutritionist (RDN) may have recommended this diet because you are having difficulty eating enough calories throughout the day, you have lost weight, and/or you need to add protein to your diet.  Sometimes you may not feel like eating, even if you know the importance of good nutrition. The recommendations in this handout can help you with the following:  Regaining your strength and energy  Keeping your body healthy  Healing and recovering from surgery or illness and fighting infection    Tips  Schedule Your Meals and Snacks  Several small meals and snacks are often better tolerated and digested than large meals.  Strategies  Plan to eat 3 meals and 3 snacks daily.  [place] with timing meals to find out when you have a larger appetite.  Appetite may be greatest in the morning after not eating all night so you may prefer to eat your larger meals and snacks in the morning and at lunch.  Breakfast-type foods are often better tolerated so eat foods such as eggs, pancakes, waffles and cereal for any meal or snack.  Carry snacks with you so you are prepared to eat every 2 to 3 hours.  Determine what works best for you if your body’s cues for feeling hungry or full are not

## 2024-05-21 NOTE — PROGRESS NOTES
Hematology Oncology Inpatient Progress Note    Patient Name:  Lin Bates  Patient :  1955  Patient MRN:  [id number]  Room: [address] [place], [place] [address]   Admitted: 2024  7:35 PM   Hospital Attending Provider: Jodie Larios MD    Primary Oncologist: Burt Birch MD  PCP:  Dorothy Terrazas PA     Date of Service: 24       Reason for Consult: pancytopenia     Chief Complaint:    Chief Complaint   Patient presents with    Chest Pain     Pt reports she has lung cancer and it has gone to her esophagus. Pt states pain is mid sternal and reports she had emesis PTA     HPI:   Lin Bates is a 68 y.o. 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  She recently completed definitive chemoradiation.  She last received C4D1 cisplatin /etoposide on 2024. She required dose reductions towards end of treatment with both drugs reduced by 15% of original dose on last cycle.  She completed 30/30 fractions of radiation to the left lung on 2024.  Toward the end of her course she did develop esophagitis which was treated with Carafate and Magic mouthwash. She had some improvement in swallowing function, mostly with lidocaine only.  She did report to us in clinic not utilizing supportive medications as prescribed and was encouraged to do so. She presented to [facility] complaining of chest tightness and was found to have Hgb 4.8 from baseline 9 and was given 2u PRBC with better than expected response to 9.0.  repeat 11.2 was likely spurious, 10.5 on day of consult.  Initial value suspect. She was also noted to be neutropenic with incalculably low ANC.  Filgrastim x 1 was given in the ED.  She has no fever or other infectious s/s so this was not continued, WBC improved to 0.9 still with unmeasurable ANC. Plt 34 on admission => 31 on day of consult.  She is on prophylactic Levaquin.     On exam she is fatigued and complaining of

## 2024-05-21 NOTE — PLAN OF CARE
Problem: Discharge Planning  Goal: Discharge to home or other facility with appropriate resources  5/21/2024 1104 by Candida Rangel RN  Outcome: Progressing  5/20/2024 2251 by Luis Miguel Orozco RN  Outcome: Progressing     Problem: Pain  Goal: Verbalizes/displays adequate comfort level or baseline comfort level  5/21/2024 1104 by Candida Rangel RN  Outcome: Progressing  5/20/2024 2251 by Luis Miguel Orozco RN  Outcome: Progressing     Problem: Safety - Adult  Goal: Free from fall injury  5/21/2024 1104 by Candida Rangel RN  Outcome: Progressing  5/20/2024 2251 by Luis Miguel Orozco RN  Outcome: Progressing     Problem: ABCDS Injury Assessment  Goal: Absence of physical injury  5/21/2024 1104 by Candida Rangel RN  Outcome: Progressing  5/20/2024 2251 by Luis Miguel Orozco RN  Outcome: Progressing     Problem: Gastrointestinal - Adult  Goal: Minimal or absence of nausea and vomiting  5/21/2024 1104 by Candida Rangel RN  Outcome: Progressing  5/20/2024 2251 by Luis Miguel Orozco RN  Outcome: Progressing  Goal: Maintains or returns to baseline bowel function  5/21/2024 1104 by Candida Rangel RN  Outcome: Progressing  5/20/2024 2251 by Luis Miguel Orozco RN  Outcome: Progressing  Goal: Maintains adequate nutritional intake  5/21/2024 1104 by Candida Rangel RN  Outcome: Progressing  5/20/2024 2251 by Luis Miguel Orozco RN  Outcome: Progressing     Problem: Infection - Adult  Goal: Absence of fever/infection during anticipated neutropenic period  5/21/2024 1104 by Candida Rangel RN  Outcome: Progressing  5/20/2024 2251 by Luis Miguel Orozco RN  Outcome: Progressing     Problem: Metabolic/Fluid and Electrolytes - Adult  Goal: Electrolytes maintained within normal limits  5/21/2024 1104 by Candida Rangel RN  Outcome: Progressing  5/20/2024 2251 by Luis Miguel Orozco, RN  Outcome: Progressing  Goal: Hemodynamic

## 2024-05-21 NOTE — CONSULTS
Comprehensive Nutrition Assessment    Type and Reason for Visit:  Initial, Consult (Poor Intake/Appetite 5 or more days)    Nutrition Recommendations/Plan:   Continue current diet  Add variety to oral nutrition supplement, encouraged between meals  If intake does not improve, consider need for PEG tube feeding   Please consult RD for TF order/mgt as needed     Malnutrition Assessment:  Malnutrition Status:  Moderate malnutrition (05/21/24 6445)    Context:  Chronic Illness     Findings of the 6 clinical characteristics of malnutrition:  Energy Intake:  75% or less estimated energy requirements for 1 month or longer  Weight Loss:  Mild weight loss (specify amount and time period)     Body Fat Loss:  No significant body fat loss Orbital, Buccal region, Triceps   Muscle Mass Loss:  Mild muscle mass loss Temples (temporalis), Hand (interosseous)  Fluid Accumulation:  No significant fluid accumulation Extremities   Strength:  Not Performed    Nutrition Assessment:    Pt admitted with pancytopenia, severe anemia. H/O small cell carcinoma of lung, odynophagia, HTN and T2DM.  Recently completed definitive chemoradiation, last chemotherapy on 5/10. Patient endorses odynophagia when she eats, started after her radiation for lung cancer. Unable to tolerate diet 2/2 pain, reports poor oral intake and weight loss, some intermittent nausea and vomiting as well. Plan for EGD per Pt. Reviewed oral supplement options and discussed PEG feeding in case needed. Has lost 17% over the past yr. Pt meets criteria for moderate malnutrition. Will continue to follow as high nutrition risk.    Nutrition Related Findings:    K 3.3, Glu 114   Wound Type: None       Current Nutrition Intake & Therapies:    Average Meal Intake: 0%, %  Average Supplements Intake: 0%  ADULT DIET; Regular  ADULT ORAL NUTRITION SUPPLEMENT; AM Snack, Lunch, PM Snack; Diabetic Oral Supplement    Anthropometric Measures:  Height: 154.9 cm (5' 1\")  Ideal Body

## 2024-05-21 NOTE — PROGRESS NOTES
DOING BETTER NO ABD PAIN STILL WITH ODYNOPHAGIA  VITALS STABLE   LABS NOTED PANCYTOPENIA IMPROVING  WILL CPM  PT WILL NEED EGD D/W

## 2024-05-22 ENCOUNTER — ANESTHESIA EVENT (OUTPATIENT)
Dept: ENDOSCOPY | Age: 69
End: 2024-05-22
Payer: MEDICARE

## 2024-05-22 LAB
ALBUMIN SERPL-MCNC: 3.5 GM/DL (ref 3.4–5)
ALP BLD-CCNC: 59 IU/L (ref 40–128)
ALT SERPL-CCNC: <5 U/L (ref 10–40)
ANION GAP SERPL CALCULATED.3IONS-SCNC: 21 MMOL/L (ref 7–16)
ANISOCYTOSIS: ABNORMAL
APTT: 30 SECONDS (ref 25.1–37.1)
AST SERPL-CCNC: 11 IU/L (ref 15–37)
BANDED NEUTROPHILS ABSOLUTE COUNT: 0.47 K/CU MM
BANDED NEUTROPHILS RELATIVE PERCENT: 12 % (ref 5–11)
BILIRUB SERPL-MCNC: 0.2 MG/DL (ref 0–1)
BLASTS ABSOLUTE COUNT: 0.08 K/CU MM
BLASTS RELATIVE PERCENT: 2 %
BUN SERPL-MCNC: 7 MG/DL (ref 6–23)
CALCIUM SERPL-MCNC: 8.7 MG/DL (ref 8.3–10.6)
CHLORIDE BLD-SCNC: 101 MMOL/L (ref 99–110)
CO2: 17 MMOL/L (ref 21–32)
CREAT SERPL-MCNC: 0.8 MG/DL (ref 0.6–1.1)
DIFFERENTIAL TYPE: ABNORMAL
EOSINOPHILS ABSOLUTE: 0 K/CU MM
EOSINOPHILS RELATIVE PERCENT: 1 % (ref 0–3)
GFR, ESTIMATED: 80 ML/MIN/1.73M2
GLUCOSE BLD-MCNC: 108 MG/DL (ref 70–99)
GLUCOSE BLD-MCNC: 109 MG/DL (ref 70–99)
GLUCOSE BLD-MCNC: 142 MG/DL (ref 70–99)
GLUCOSE SERPL-MCNC: 118 MG/DL (ref 70–99)
HCT VFR BLD CALC: 37.2 % (ref 37–47)
HEMOGLOBIN: 12.2 GM/DL (ref 12.5–16)
INR BLD: 1.5 INDEX
LYMPHOCYTES ABSOLUTE: 0.6 K/CU MM
LYMPHOCYTES RELATIVE PERCENT: 16 % (ref 24–44)
MCH RBC QN AUTO: 31.3 PG (ref 27–31)
MCHC RBC AUTO-ENTMCNC: 32.8 % (ref 32–36)
MCV RBC AUTO: 95.4 FL (ref 78–100)
METAMYELOCYTES ABSOLUTE COUNT: 0.12 K/CU MM
METAMYELOCYTES PERCENT: 3 %
MONOCYTES ABSOLUTE: 1.1 K/CU MM
MONOCYTES RELATIVE PERCENT: 27 % (ref 0–4)
MYELOCYTE PERCENT: 7 %
MYELOCYTES ABSOLUTE COUNT: 0.27 K/CU MM
NEUTROPHILS ABSOLUTE: 1.1 K/CU MM
NEUTROPHILS RELATIVE PERCENT: 29 % (ref 36–66)
PDW BLD-RTO: 16.1 % (ref 11.7–14.9)
PLATELET # BLD: 96 K/CU MM (ref 140–440)
PMV BLD AUTO: 10.4 FL (ref 7.5–11.1)
POTASSIUM SERPL-SCNC: 3.9 MMOL/L (ref 3.5–5.1)
PROMYELOCYTES ABSOLUTE COUNT: 0.12 K/CU MM
PROMYELOCYTES PERCENT: 3 %
PROTHROMBIN TIME: 18.4 SECONDS (ref 11.7–14.5)
RBC # BLD: 3.9 M/CU MM (ref 4.2–5.4)
RBC # BLD: ABNORMAL 10*6/UL
SODIUM BLD-SCNC: 139 MMOL/L (ref 135–145)
TOTAL PROTEIN: 6.4 GM/DL (ref 6.4–8.2)
TOXIC GRANULATION: PRESENT
WBC # BLD: 3.9 K/CU MM (ref 4–10.5)

## 2024-05-22 PROCEDURE — 2580000003 HC RX 258: Performed by: STUDENT IN AN ORGANIZED HEALTH CARE EDUCATION/TRAINING PROGRAM

## 2024-05-22 PROCEDURE — 80053 COMPREHEN METABOLIC PANEL: CPT

## 2024-05-22 PROCEDURE — 6360000002 HC RX W HCPCS: Performed by: SPECIALIST

## 2024-05-22 PROCEDURE — C9113 INJ PANTOPRAZOLE SODIUM, VIA: HCPCS | Performed by: SPECIALIST

## 2024-05-22 PROCEDURE — 99232 SBSQ HOSP IP/OBS MODERATE 35: CPT | Performed by: PHYSICIAN ASSISTANT

## 2024-05-22 PROCEDURE — 82962 GLUCOSE BLOOD TEST: CPT

## 2024-05-22 PROCEDURE — 85027 COMPLETE CBC AUTOMATED: CPT

## 2024-05-22 PROCEDURE — 94761 N-INVAS EAR/PLS OXIMETRY MLT: CPT

## 2024-05-22 PROCEDURE — 85610 PROTHROMBIN TIME: CPT

## 2024-05-22 PROCEDURE — 85007 BL SMEAR W/DIFF WBC COUNT: CPT

## 2024-05-22 PROCEDURE — 6370000000 HC RX 637 (ALT 250 FOR IP): Performed by: SPECIALIST

## 2024-05-22 PROCEDURE — 36415 COLL VENOUS BLD VENIPUNCTURE: CPT

## 2024-05-22 PROCEDURE — 1200000000 HC SEMI PRIVATE

## 2024-05-22 PROCEDURE — 6360000002 HC RX W HCPCS: Performed by: STUDENT IN AN ORGANIZED HEALTH CARE EDUCATION/TRAINING PROGRAM

## 2024-05-22 PROCEDURE — 6370000000 HC RX 637 (ALT 250 FOR IP): Performed by: STUDENT IN AN ORGANIZED HEALTH CARE EDUCATION/TRAINING PROGRAM

## 2024-05-22 PROCEDURE — 85730 THROMBOPLASTIN TIME PARTIAL: CPT

## 2024-05-22 RX ADMIN — ATORVASTATIN CALCIUM 20 MG: 10 TABLET, FILM COATED ORAL at 08:24

## 2024-05-22 RX ADMIN — SODIUM CHLORIDE, PRESERVATIVE FREE 10 ML: 5 INJECTION INTRAVENOUS at 21:33

## 2024-05-22 RX ADMIN — OXYCODONE HYDROCHLORIDE 5 MG: 5 TABLET ORAL at 22:59

## 2024-05-22 RX ADMIN — EMPAGLIFLOZIN 10 MG: 10 TABLET, FILM COATED ORAL at 08:24

## 2024-05-22 RX ADMIN — LIDOCAINE HYDROCHLORIDE: 20 SOLUTION ORAL; TOPICAL at 20:14

## 2024-05-22 RX ADMIN — OXYCODONE HYDROCHLORIDE 5 MG: 5 TABLET ORAL at 05:35

## 2024-05-22 RX ADMIN — FERROUS SULFATE TAB 325 MG (65 MG ELEMENTAL FE) 325 MG: 325 (65 FE) TAB at 08:24

## 2024-05-22 RX ADMIN — LISINOPRIL 2.5 MG: 5 TABLET ORAL at 08:25

## 2024-05-22 RX ADMIN — LEVOFLOXACIN 500 MG: 5 INJECTION, SOLUTION INTRAVENOUS at 11:54

## 2024-05-22 RX ADMIN — LIDOCAINE HYDROCHLORIDE: 20 SOLUTION ORAL; TOPICAL at 15:19

## 2024-05-22 RX ADMIN — PANTOPRAZOLE SODIUM 40 MG: 40 INJECTION, POWDER, FOR SOLUTION INTRAVENOUS at 08:25

## 2024-05-22 RX ADMIN — SODIUM CHLORIDE 25 ML: 9 INJECTION, SOLUTION INTRAVENOUS at 11:54

## 2024-05-22 RX ADMIN — LIDOCAINE HYDROCHLORIDE: 20 SOLUTION ORAL; TOPICAL at 10:23

## 2024-05-22 RX ADMIN — SODIUM CHLORIDE, PRESERVATIVE FREE 10 ML: 5 INJECTION INTRAVENOUS at 08:26

## 2024-05-22 ASSESSMENT — ENCOUNTER SYMPTOMS
BACK PAIN: 0
CHEST TIGHTNESS: 1
DIARRHEA: 0
SHORTNESS OF BREATH: 0
SORE THROAT: 0
ABDOMINAL DISTENTION: 0
WHEEZING: 0
CONSTIPATION: 0
COUGH: 0
EYE DISCHARGE: 0

## 2024-05-22 ASSESSMENT — PAIN DESCRIPTION - LOCATION
LOCATION: GENERALIZED
LOCATION: GENERALIZED

## 2024-05-22 ASSESSMENT — PAIN SCALES - GENERAL
PAINLEVEL_OUTOF10: 0
PAINLEVEL_OUTOF10: 5
PAINLEVEL_OUTOF10: 5
PAINLEVEL_OUTOF10: 0
PAINLEVEL_OUTOF10: 0

## 2024-05-22 ASSESSMENT — PAIN SCALES - WONG BAKER
WONGBAKER_NUMERICALRESPONSE: NO HURT
WONGBAKER_NUMERICALRESPONSE: NO HURT

## 2024-05-22 ASSESSMENT — PAIN DESCRIPTION - DESCRIPTORS
DESCRIPTORS: ACHING;CRAMPING
DESCRIPTORS: ACHING;CRAMPING

## 2024-05-22 ASSESSMENT — PAIN DESCRIPTION - PAIN TYPE: TYPE: CHRONIC PAIN

## 2024-05-22 NOTE — PROGRESS NOTES
05/22/24 1634   Encounter Summary   Encounter Overview/Reason Initial Encounter   Service Provided For Patient   Last Encounter  05/22/24  (Patient was not available at the time of visit.)   Begin Time 1630   End Time  1635   Total Time Calculated 5 min   Assessment/Intervention/Outcome   Assessment Unable to assess   Intervention Sustaining Presence/Ministry of presence   Plan and Referrals   Plan/Referrals Continue Support (comment)

## 2024-05-22 NOTE — ANESTHESIA PRE PROCEDURE
20 mg Oral Daily Imer Lock MD   20 mg at 05/22/24 0824   • ferrous sulfate (IRON 325) tablet 325 mg  325 mg Oral Daily with breakfast Imer Lock MD   325 mg at 05/22/24 0824   • empagliflozin (JARDIANCE) tablet 10 mg  10 mg Oral Daily Imer Lock MD   10 mg at 05/22/24 0824   • lisinopril (PRINIVIL;ZESTRIL) tablet 2.5 mg  2.5 mg Oral Daily Imer Lock MD   2.5 mg at 05/22/24 0825   • sucralfate (CARAFATE) tablet 1 g  1 g Oral 4 times per day Imer Lock MD   1 g at 05/19/24 1207   • sodium chloride flush 0.9 % injection 5-40 mL  5-40 mL IntraVENous 2 times per day Imer Lock MD   10 mL at 05/22/24 0826   • sodium chloride flush 0.9 % injection 5-40 mL  5-40 mL IntraVENous PRN Imer Lock MD       • 0.9 % sodium chloride infusion   IntraVENous PRN Imer Lock  mL/hr at 05/22/24 1154 25 mL at 05/22/24 1154   • potassium chloride 10 mEq/100 mL IVPB (Peripheral Line)  10 mEq IntraVENous PRN Imer Lock  mL/hr at 05/21/24 1416 10 mEq at 05/21/24 1416   • magnesium sulfate 2000 mg in 50 mL IVPB premix  2,000 mg IntraVENous PRN Imer Lock MD       • ondansetron (ZOFRAN-ODT) disintegrating tablet 4 mg  4 mg Oral Q8H PRN Imer Lock MD        Or   • ondansetron (ZOFRAN) injection 4 mg  4 mg IntraVENous Q6H PRN Imer Lock MD       • melatonin tablet 3 mg  3 mg Oral Nightly PRN Imer Lock MD       • polyethylene glycol (GLYCOLAX) packet 17 g  17 g Oral Daily PRN Imer Lock MD       • acetaminophen (TYLENOL) tablet 650 mg  650 mg Oral Q6H PRN Imer Lock MD        Or   • acetaminophen (TYLENOL) suppository 650 mg  650 mg Rectal Q6H PRN Imer Lock MD       • lidocaine viscous hcl (XYLOCAINE) 2 % solution 15 mL  15 mL Mouth/Throat Q3H PRN Imer Lock MD   15 mL at 05/21/24 0906   • potassium chloride (KLOR-CON M) extended release tablet 40 mEq  40 mEq Oral PRN Jodie Larios MD        Or   •

## 2024-05-22 NOTE — PROGRESS NOTES
DOING FAIR STILL WITH CP / ODYNOPHAGIA UPON EATING  ORAL INTAKE FAIR   VITALS STABLE   LABS FROM AM PENDING  WILL CPM D/W DR LOVING WILL SCHEDULE FOR EGD  D/W PT AND RN GEETHA ALSO

## 2024-05-22 NOTE — PROGRESS NOTES
mouth/upper throat pain as well as pain in chest after swallowing.  No anginal equivalents.  No fever/chills.  No SOB or dyspnea. No change in bowel or bladder habits. Nausea and vomiting is improved since after treatment. No melena or BRBPR.  Encouraged liquid oral supplementation.     Interval History    No acute events overnight.  Does still have issues swallowing, states that it \"comes and goes.\"  Encouraged supplementation.  Counts improving.  No infectious s/s. Dr Lindquist is following and planning for EGD tentatively tomorrow.  Able to take some liquid PO.    AM lab data reviewed. WBC improved to 3.9, ANC pending. Hgb 12.1. Plt pending. Cr 0.8. LFT grossly normal.  Past Medical History:   Diagnosis Date    Cancer (HCC)     Hyperlipidemia     Hypertension     Thickened endometrium     Type 2 diabetes mellitus without complication (HCC)        Past Surgical History:   Procedure Laterality Date    CT NEEDLE BIOPSY LUNG PERCUTANEOUS  2024    CT NEEDLE BIOPSY LUNG PERCUTANEOUS 2024 [facility] CT SCAN    DENTAL SURGERY      's    DILATION AND CURETTAGE OF UTERUS N/A 3/22/2023    DILATATION AND CURETTAGE HYSTEROSCOPY performed by Zac Pretty MD at [facility] OR    PORT SURGERY N/A 4/10/2024    RIGHT SUBCLAVIAN MEDIPORT INSERTION performed by Brian Humphries II, MD at [facility] OR                                                                                Social History     Socioeconomic History    Marital status:      Spouse name: Not on file    Number of children: Not on file    Years of education: Not on file    Highest education level: Not on file   Occupational History    Not on file   Tobacco Use    Smoking status: Former     Current packs/day: 0.00     Average packs/day: 1 pack/day for 52.1 years (52.1 ttl pk-yrs)     Types: Cigarettes     Start date:      Quit date: 2024     Years since quittin.3    Smokeless tobacco: Never    Tobacco comments:     Down to half pack 22    Component Value Date    LDH 75 (L) 12/21/2023     Lab Results   Component Value Date    IRON 69 05/18/2024    TIBC 187 (L) 05/18/2024    FERRITIN 829 (H) 05/18/2024       Imaging:  XR CHEST PORTABLE   Final Result   1.  No acute cardiopulmonary findings.   2.  A right-sided chest port is present. The catheter is kinked underneath the clavicle, with similar configuration to prior examinations.      Electronically signed by Zeke Franco MD        Assessment/Plan:    #Pancytopenia  Predominately treatment related, last cycle cisplatin/etoposide was dose reduced. Did receive filgrastim x 1 in the ED, did not continue in absence of infectious s/s however she is on prophylactic Levaquin. She has completed planned chemotherapy and expect bone marrow recovery.  No nutritional deficiencies or evidence of bleeding.     Recommend transfusion goals >7 for Hgb and >10k for plt unless actively bleeding/procedure planned. First Hgb may be spurious based on trend. Hgb stable 12.1 today. WBC improved to 3.9, ANC and Plt 57 pending. . Noted 2% blasts on 5/21, possibly related to growth factor, will watch closely.    #Dysphagia/Odynophagia   Timing coincided with last days of radiation however would expect some improvement by now.  May also have neutropenic/chemo related mucositis, esophagitis from vomiting,  No evidence of oral candida.  Appreciate GI input, will consider EGD. Continue supportive care, continue to encourage pt to utilize medications as outpatient.     #Limited Stage SCLC  S/p definitive chemoradiation completed earlier this month.  Will continue with close monitoring.     #MPL+ MPN  Had planned to start lower dose hydroxyurea on 6/1/2024, will determine start based on count recovery.    Remainder of care per primary and consulting teams. We will continue to follow along, please call for any questions or concerns. Discussed findings and plan of care with patient and answered all questions as able.    .Melisa FLYNN

## 2024-05-22 NOTE — PLAN OF CARE
Problem: Discharge Planning  Goal: Discharge to home or other facility with appropriate resources  Outcome: Progressing     Problem: Pain  Goal: Verbalizes/displays adequate comfort level or baseline comfort level  Outcome: Progressing     Problem: Safety - Adult  Goal: Free from fall injury  Outcome: Progressing     Problem: ABCDS Injury Assessment  Goal: Absence of physical injury  Outcome: Progressing     Problem: Gastrointestinal - Adult  Goal: Minimal or absence of nausea and vomiting  Outcome: Progressing  Goal: Maintains or returns to baseline bowel function  Outcome: Progressing  Goal: Maintains adequate nutritional intake  Outcome: Progressing     Problem: Infection - Adult  Goal: Absence of fever/infection during anticipated neutropenic period  Outcome: Progressing     Problem: Metabolic/Fluid and Electrolytes - Adult  Goal: Electrolytes maintained within normal limits  Outcome: Progressing  Goal: Hemodynamic stability and optimal renal function maintained  Outcome: Progressing  Goal: Glucose maintained within prescribed range  Outcome: Progressing     Problem: Hematologic - Adult  Goal: Maintains hematologic stability  Outcome: Progressing     Problem: Chronic Conditions and Co-morbidities  Goal: Patient's chronic conditions and co-morbidity symptoms are monitored and maintained or improved  Outcome: Progressing     Problem: Nutrition Deficit:  Goal: Optimize nutritional status  Outcome: Progressing

## 2024-05-22 NOTE — PROGRESS NOTES
V2.0  [facility] Hospitalist Progress Note      Name:  Lin Bates /Age/Sex: 1955  (68 y.o. female)   MRN & CSN:  [id number] & [id number] Encounter Date/Time: 2024 9:32 AM EDT    [facility] PCP: Dorothy Terrazas PA       Hospital Day: 5    Assessment and Plan:   Lin Bates is a 68 y.o. female with pmh of  small cell carcinoma of lung, odynophagia, HTN and T2DM  who presents with Severe anemia      Plan:  # Pancytopenia  # Small cell carcinoma of lung  - Endorsed increased fatigue and chest tightness over past month. No bleeding or fevers. Not on anticoagulants. Last chemotherapy on 5/10, also receiving RT.   - Hg 4.8, baseline ~9. ANC 3. Platelets 34 on presentation  - Received x2 u pRBC in ED, anemia panel ordered (add-on), follow-up.  Given Neupogen, neutropenic precautions. H/O consulted, appreciate assistance. Transfuse if Hg <7 or platelets <10k, hold ASA.   -Patient to be started on prophylactic antibiotics for neutropenia with Levaquin  -Blood counts improved.  Follow-up oncology recs     # Odynophagia   # Malnutrition secondary to above  - Suspected to be secondary to radiation.   - No evidence of thrush. Continue Carafate and supportive care.   - GI consulted as per oncology request.  Plan for EGD once blood counts stabilize.  GI following.  Magic mouthwash, Carafate and Protonix ordered for now.  Discussed with GI may need PEG tube placement if oral intake does not improved and EGD with significant abnormal findings.  -Dietitian consulted to optimize nutrition     # Hypokalemia  - Moderate. Likely secondary to emesis.   -Replenished with IV supplementation given lack of p.o. intake.  Recheck labs tomorrow morning.     # Non-MI troponin elevation  - Denied any typical CP.  - Initial Tn mildly elevated, repeat normal. ECG without acute ischemic changes.  - Likely secondary to severe anemia.      # Essential hypertension  - Continue low-dose Lisinopril.     # T2DM  Differential    Collection Time: 05/22/24  9:28 AM   Result Value Ref Range    WBC 3.9 (L) 4.0 - 10.5 K/CU MM    RBC 3.90 (L) 4.2 - 5.4 M/CU MM    Hemoglobin 12.2 (L) 12.5 - 16.0 GM/DL    Hematocrit 37.2 37 - 47 %    MCV 95.4 78 - 100 FL    MCH 31.3 (H) 27 - 31 PG    MCHC 32.8 32.0 - 36.0 %    RDW 16.1 (H) 11.7 - 14.9 %        Imaging/Diagnostics Last 24 Hours   XR CHEST PORTABLE    Result Date: 5/18/2024  EXAM: XR CHEST PORTABLE INDICATION: Chest Pain COMPARISON: April 21, 2024 FINDINGS: Medical Devices: A right subclavian chest port is seen with its tip in the SVC. The catheter is kinked underneath the clavicle. Lungs: The lungs are clear. Pleura: No pneumothorax or pleural effusion. Heart and Mediastinum: The cardiomediastinal silhouette is within normal limits. Bones: No acute suspicious abnormality.     1.  No acute cardiopulmonary findings. 2.  A right-sided chest port is present. The catheter is kinked underneath the clavicle, with similar configuration to prior examinations. Electronically signed by Zeke Franco MD      Electronically signed by Denis Ford MD on 5/22/2024 at 10:39 AM

## 2024-05-23 ENCOUNTER — ANESTHESIA (OUTPATIENT)
Dept: ENDOSCOPY | Age: 69
End: 2024-05-23
Payer: MEDICARE

## 2024-05-23 LAB
ALBUMIN SERPL-MCNC: 3.3 GM/DL (ref 3.4–5)
ALP BLD-CCNC: 56 IU/L (ref 40–128)
ALT SERPL-CCNC: 7 U/L (ref 10–40)
ANION GAP SERPL CALCULATED.3IONS-SCNC: 12 MMOL/L (ref 7–16)
AST SERPL-CCNC: 13 IU/L (ref 15–37)
BILIRUB SERPL-MCNC: 0.2 MG/DL (ref 0–1)
BUN SERPL-MCNC: 8 MG/DL (ref 6–23)
CALCIUM SERPL-MCNC: 8.3 MG/DL (ref 8.3–10.6)
CHLORIDE BLD-SCNC: 102 MMOL/L (ref 99–110)
CO2: 26 MMOL/L (ref 21–32)
CREAT SERPL-MCNC: 0.7 MG/DL (ref 0.6–1.1)
GFR, ESTIMATED: >90 ML/MIN/1.73M2
GLUCOSE BLD-MCNC: 122 MG/DL (ref 70–99)
GLUCOSE BLD-MCNC: 133 MG/DL (ref 70–99)
GLUCOSE BLD-MCNC: 156 MG/DL (ref 70–99)
GLUCOSE BLD-MCNC: 173 MG/DL (ref 70–99)
GLUCOSE BLD-MCNC: 183 MG/DL (ref 70–99)
GLUCOSE SERPL-MCNC: 142 MG/DL (ref 70–99)
POTASSIUM SERPL-SCNC: 3.3 MMOL/L (ref 3.5–5.1)
POTASSIUM SERPL-SCNC: 4 MMOL/L (ref 3.5–5.1)
SODIUM BLD-SCNC: 140 MMOL/L (ref 135–145)
TOTAL PROTEIN: 6 GM/DL (ref 6.4–8.2)

## 2024-05-23 PROCEDURE — 3700000000 HC ANESTHESIA ATTENDED CARE: Performed by: SPECIALIST

## 2024-05-23 PROCEDURE — 82962 GLUCOSE BLOOD TEST: CPT

## 2024-05-23 PROCEDURE — 94761 N-INVAS EAR/PLS OXIMETRY MLT: CPT

## 2024-05-23 PROCEDURE — 6360000002 HC RX W HCPCS: Performed by: SPECIALIST

## 2024-05-23 PROCEDURE — 85730 THROMBOPLASTIN TIME PARTIAL: CPT

## 2024-05-23 PROCEDURE — 1200000000 HC SEMI PRIVATE

## 2024-05-23 PROCEDURE — 3700000001 HC ADD 15 MINUTES (ANESTHESIA): Performed by: SPECIALIST

## 2024-05-23 PROCEDURE — 2580000003 HC RX 258: Performed by: STUDENT IN AN ORGANIZED HEALTH CARE EDUCATION/TRAINING PROGRAM

## 2024-05-23 PROCEDURE — 3609019500 HC ESOPHAGOSCOPY: Performed by: SPECIALIST

## 2024-05-23 PROCEDURE — 83690 ASSAY OF LIPASE: CPT

## 2024-05-23 PROCEDURE — C9113 INJ PANTOPRAZOLE SODIUM, VIA: HCPCS | Performed by: SPECIALIST

## 2024-05-23 PROCEDURE — 6360000002 HC RX W HCPCS: Performed by: STUDENT IN AN ORGANIZED HEALTH CARE EDUCATION/TRAINING PROGRAM

## 2024-05-23 PROCEDURE — 6370000000 HC RX 637 (ALT 250 FOR IP): Performed by: STUDENT IN AN ORGANIZED HEALTH CARE EDUCATION/TRAINING PROGRAM

## 2024-05-23 PROCEDURE — 85610 PROTHROMBIN TIME: CPT

## 2024-05-23 PROCEDURE — 0DJ08ZZ INSPECTION OF UPPER INTESTINAL TRACT, VIA NATURAL OR ARTIFICIAL OPENING ENDOSCOPIC: ICD-10-PCS | Performed by: STUDENT IN AN ORGANIZED HEALTH CARE EDUCATION/TRAINING PROGRAM

## 2024-05-23 PROCEDURE — 80053 COMPREHEN METABOLIC PANEL: CPT

## 2024-05-23 PROCEDURE — 6370000000 HC RX 637 (ALT 250 FOR IP): Performed by: SPECIALIST

## 2024-05-23 PROCEDURE — 84132 ASSAY OF SERUM POTASSIUM: CPT

## 2024-05-23 PROCEDURE — 2500000003 HC RX 250 WO HCPCS: Performed by: NURSE ANESTHETIST, CERTIFIED REGISTERED

## 2024-05-23 PROCEDURE — 99232 SBSQ HOSP IP/OBS MODERATE 35: CPT | Performed by: PHYSICIAN ASSISTANT

## 2024-05-23 PROCEDURE — 6360000002 HC RX W HCPCS: Performed by: NURSE ANESTHETIST, CERTIFIED REGISTERED

## 2024-05-23 PROCEDURE — 85025 COMPLETE CBC W/AUTO DIFF WBC: CPT

## 2024-05-23 RX ORDER — DIPHENHYDRAMINE HYDROCHLORIDE AND LIDOCAINE HYDROCHLORIDE AND ALUMINUM HYDROXIDE AND MAGNESIUM HYDRO
5 KIT 4 TIMES DAILY PRN
Status: DISCONTINUED | OUTPATIENT
Start: 2024-05-23 | End: 2024-05-24 | Stop reason: HOSPADM

## 2024-05-23 RX ORDER — SODIUM CHLORIDE 9 MG/ML
INJECTION, SOLUTION INTRAVENOUS CONTINUOUS
Status: DISPENSED | OUTPATIENT
Start: 2024-05-23 | End: 2024-05-23

## 2024-05-23 RX ORDER — LIDOCAINE HYDROCHLORIDE 20 MG/ML
INJECTION, SOLUTION EPIDURAL; INFILTRATION; INTRACAUDAL; PERINEURAL PRN
Status: DISCONTINUED | OUTPATIENT
Start: 2024-05-23 | End: 2024-05-23 | Stop reason: SDUPTHER

## 2024-05-23 RX ORDER — PROPOFOL 10 MG/ML
INJECTION, EMULSION INTRAVENOUS PRN
Status: DISCONTINUED | OUTPATIENT
Start: 2024-05-23 | End: 2024-05-23 | Stop reason: SDUPTHER

## 2024-05-23 RX ADMIN — LIDOCAINE HYDROCHLORIDE 100 MG: 20 INJECTION, SOLUTION EPIDURAL; INFILTRATION; INTRACAUDAL; PERINEURAL at 10:56

## 2024-05-23 RX ADMIN — OXYCODONE HYDROCHLORIDE 5 MG: 5 TABLET ORAL at 13:33

## 2024-05-23 RX ADMIN — LIDOCAINE HYDROCHLORIDE: 20 SOLUTION ORAL; TOPICAL at 17:18

## 2024-05-23 RX ADMIN — PROPOFOL 100 MG: 10 INJECTION, EMULSION INTRAVENOUS at 11:15

## 2024-05-23 RX ADMIN — SODIUM CHLORIDE, PRESERVATIVE FREE 10 ML: 5 INJECTION INTRAVENOUS at 20:08

## 2024-05-23 RX ADMIN — POTASSIUM CHLORIDE 10 MEQ: 10 INJECTION, SOLUTION INTRAVENOUS at 13:32

## 2024-05-23 RX ADMIN — PANTOPRAZOLE SODIUM 40 MG: 40 INJECTION, POWDER, FOR SOLUTION INTRAVENOUS at 12:35

## 2024-05-23 RX ADMIN — SODIUM CHLORIDE: 9 INJECTION, SOLUTION INTRAVENOUS at 12:22

## 2024-05-23 RX ADMIN — LIDOCAINE HYDROCHLORIDE: 20 SOLUTION ORAL; TOPICAL at 12:29

## 2024-05-23 RX ADMIN — POTASSIUM CHLORIDE 10 MEQ: 10 INJECTION, SOLUTION INTRAVENOUS at 16:06

## 2024-05-23 RX ADMIN — POTASSIUM CHLORIDE 10 MEQ: 10 INJECTION, SOLUTION INTRAVENOUS at 14:46

## 2024-05-23 RX ADMIN — LEVOFLOXACIN 500 MG: 5 INJECTION, SOLUTION INTRAVENOUS at 10:45

## 2024-05-23 RX ADMIN — SODIUM CHLORIDE: 9 INJECTION, SOLUTION INTRAVENOUS at 10:27

## 2024-05-23 RX ADMIN — PROPOFOL 50 MG: 10 INJECTION, EMULSION INTRAVENOUS at 11:00

## 2024-05-23 RX ADMIN — POTASSIUM CHLORIDE 10 MEQ: 10 INJECTION, SOLUTION INTRAVENOUS at 12:27

## 2024-05-23 RX ADMIN — LIDOCAINE HYDROCHLORIDE: 20 SOLUTION ORAL; TOPICAL at 20:04

## 2024-05-23 RX ADMIN — ONDANSETRON 4 MG: 2 INJECTION INTRAMUSCULAR; INTRAVENOUS at 12:49

## 2024-05-23 ASSESSMENT — PAIN SCALES - GENERAL
PAINLEVEL_OUTOF10: 0
PAINLEVEL_OUTOF10: 3
PAINLEVEL_OUTOF10: 5
PAINLEVEL_OUTOF10: 0

## 2024-05-23 ASSESSMENT — PAIN SCALES - WONG BAKER
WONGBAKER_NUMERICALRESPONSE: NO HURT

## 2024-05-23 ASSESSMENT — PAIN DESCRIPTION - LOCATION: LOCATION: CHEST

## 2024-05-23 ASSESSMENT — ENCOUNTER SYMPTOMS
ABDOMINAL DISTENTION: 0
WHEEZING: 0
SHORTNESS OF BREATH: 0
COUGH: 0
BACK PAIN: 0
CONSTIPATION: 0
CHEST TIGHTNESS: 1
DIARRHEA: 0
SORE THROAT: 0
EYE DISCHARGE: 0

## 2024-05-23 ASSESSMENT — PAIN DESCRIPTION - DESCRIPTORS: DESCRIPTORS: DISCOMFORT;BURNING;SHARP

## 2024-05-23 ASSESSMENT — PAIN - FUNCTIONAL ASSESSMENT
PAIN_FUNCTIONAL_ASSESSMENT: ACTIVITIES ARE NOT PREVENTED
PAIN_FUNCTIONAL_ASSESSMENT: 0-10

## 2024-05-23 ASSESSMENT — PAIN DESCRIPTION - ORIENTATION: ORIENTATION: MID

## 2024-05-23 NOTE — PROGRESS NOTES
dizziness, syncope and speech difficulty.   Hematological:  Negative for adenopathy.   Psychiatric/Behavioral:  Negative for agitation and confusion.    All other systems reviewed and are negative.        Objective:   No intake or output data in the 24 hours ending 05/23/24 1006       Vitals:   Vitals:    05/23/24 1001   BP: (!) 124/53   Pulse: 99   Resp:    Temp:    SpO2: 95%       Physical Exam:   Physical Exam  Vitals and nursing note reviewed.   Constitutional:       General: She is not in acute distress.     Appearance: She is obese. She is not ill-appearing.   HENT:      Head: Normocephalic and atraumatic.      Nose: Nose normal.      Mouth/Throat:      Mouth: Mucous membranes are moist.   Eyes:      Extraocular Movements: Extraocular movements intact.      Pupils: Pupils are equal, round, and reactive to light.   Cardiovascular:      Rate and Rhythm: Normal rate and regular rhythm.      Pulses: Normal pulses.      Heart sounds: Normal heart sounds.   Pulmonary:      Effort: Pulmonary effort is normal.      Breath sounds: Normal breath sounds.   Abdominal:      Palpations: Abdomen is soft.   Musculoskeletal:         General: Normal range of motion.      Cervical back: Normal range of motion.   Skin:     General: Skin is warm.      Capillary Refill: Capillary refill takes less than 2 seconds.   Neurological:      General: No focal deficit present.      Mental Status: She is alert and oriented to person, place, and time.   Psychiatric:         Mood and Affect: Mood normal.         Behavior: Behavior normal.            Medications:   Medications:    potassium chloride  40 mEq Oral Once    levofloxacin  500 mg IntraVENous Q24H    aluminum & magnesium hydroxide-simethicone (MAALOX) 30 mL, lidocaine viscous hcl (XYLOCAINE) 5 mL (GI COCKTAIL)   Oral 4x Daily    pantoprazole  40 mg IntraVENous Daily    [Held by provider] aspirin  81 mg Oral Daily    atorvastatin  20 mg Oral Daily    ferrous sulfate  325 mg Oral Daily  with breakfast    empagliflozin  10 mg Oral Daily    lisinopril  2.5 mg Oral Daily    sucralfate  1 g Oral 4 times per day    sodium chloride flush  5-40 mL IntraVENous 2 times per day    insulin lispro  0-4 Units SubCUTAneous TID WC    insulin lispro  0-4 Units SubCUTAneous Nightly      Infusions:    sodium chloride      sodium chloride 25 mL (05/22/24 1154)    sodium chloride      dextrose       PRN Meds: oxyCODONE, 5 mg, Q6H PRN  sodium chloride flush, 5-40 mL, PRN  sodium chloride, , PRN  potassium chloride, 10 mEq, PRN  magnesium sulfate, 2,000 mg, PRN  ondansetron, 4 mg, Q8H PRN   Or  ondansetron, 4 mg, Q6H PRN  melatonin, 3 mg, Nightly PRN  polyethylene glycol, 17 g, Daily PRN  acetaminophen, 650 mg, Q6H PRN   Or  acetaminophen, 650 mg, Q6H PRN  lidocaine viscous hcl, 15 mL, Q3H PRN  potassium chloride, 40 mEq, PRN   Or  potassium alternative oral replacement, 40 mEq, PRN   Or  potassium chloride, 10 mEq, PRN  magnesium sulfate, 2,000 mg, PRN  sodium chloride, , PRN  glucose, 4 tablet, PRN  dextrose bolus, 125 mL, PRN   Or  dextrose bolus, 250 mL, PRN  glucagon (rDNA), 1 mg, PRN  dextrose, , Continuous PRN        Labs      Recent Results (from the past 24 hour(s))   POCT Glucose    Collection Time: 05/22/24 10:33 AM   Result Value Ref Range    POC Glucose 109 (H) 70 - 99 MG/DL   POCT Glucose    Collection Time: 05/22/24  3:53 PM   Result Value Ref Range    POC Glucose 142 (H) 70 - 99 MG/DL   POCT Glucose    Collection Time: 05/23/24  6:43 AM   Result Value Ref Range    POC Glucose 122 (H) 70 - 99 MG/DL   Comprehensive Metabolic Panel    Collection Time: 05/23/24  7:50 AM   Result Value Ref Range    Sodium 140 135 - 145 MMOL/L    Potassium 3.3 (L) 3.5 - 5.1 MMOL/L    Chloride 102 99 - 110 mMol/L    CO2 26 21 - 32 MMOL/L    Anion Gap 12 7 - 16    Glucose 142 (H) 70 - 99 MG/DL    BUN 8 6 - 23 MG/DL    Creatinine 0.7 0.6 - 1.1 MG/DL    Est, Glom Filt Rate >90 >60 mL/min/1.73m2    Calcium 8.3 8.3 - 10.6 MG/DL

## 2024-05-23 NOTE — PROGRESS NOTES
Hematology Oncology Inpatient Progress Note    Patient Name:  Lin Bates  Patient :  1955  Patient MRN:  [id number]  Room: [address] [place], [place] [address]   Admitted: 2024  7:35 PM   Hospital Attending Provider: Jodie Larios MD    Primary Oncologist: Burt Birch MD  PCP:  Dorothy Terrazas PA     Date of Service: 24       Reason for Consult: pancytopenia     Chief Complaint:    Chief Complaint   Patient presents with    Chest Pain     Pt reports she has lung cancer and it has gone to her esophagus. Pt states pain is mid sternal and reports she had emesis PTA     HPI:   Lin Bates is a 68 y.o. 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  She recently completed definitive chemoradiation.  She last received C4D1 cisplatin /etoposide on 2024. She required dose reductions towards end of treatment with both drugs reduced by 15% of original dose on last cycle.  She completed 30/30 fractions of radiation to the left lung on 2024.  Toward the end of her course she did develop esophagitis which was treated with Carafate and Magic mouthwash. She had some improvement in swallowing function, mostly with lidocaine only.  She did report to us in clinic not utilizing supportive medications as prescribed and was encouraged to do so. She presented to [facility] complaining of chest tightness and was found to have Hgb 4.8 from baseline 9 and was given 2u PRBC with better than expected response to 9.0.  repeat 11.2 was likely spurious, 10.5 on day of consult.  Initial value suspect. She was also noted to be neutropenic with incalculably low ANC.  Filgrastim x 1 was given in the ED.  She has no fever or other infectious s/s so this was not continued, WBC improved to 0.9 still with unmeasurable ANC. Plt 34 on admission => 31 on day of consult.  She is on prophylactic Levaquin.     On exam she is fatigued and complaining of  mouth/upper throat pain as well as pain in chest after swallowing.  No anginal equivalents.  No fever/chills.  No SOB or dyspnea. No change in bowel or bladder habits. Nausea and vomiting is improved since after treatment. No melena or BRBPR.  Encouraged liquid oral supplementation.     Interval History    2024  No acute events overnight.  Does still have issues swallowing, states that it \"comes and goes.\"  Encouraged supplementation.  Counts improving.  No infectious s/s. Dr Lindquist is following and planning for EGD today. She was able to tolerate some cracers yesterday.    AM lab data remains pending. Reviewed data from , WBC improved to 3.9, ANC 1100, Hgb 12.2. Plt 96k. Cr 0..7LFT grossly normal.  Past Medical History:   Diagnosis Date    Cancer (HCC)     Hyperlipidemia     Hypertension     Thickened endometrium     Type 2 diabetes mellitus without complication (HCC)        Past Surgical History:   Procedure Laterality Date    CT NEEDLE BIOPSY LUNG PERCUTANEOUS  2024    CT NEEDLE BIOPSY LUNG PERCUTANEOUS 2024 [facility] CT SCAN    DENTAL SURGERY      's    DILATION AND CURETTAGE OF UTERUS N/A 3/22/2023    DILATATION AND CURETTAGE HYSTEROSCOPY performed by Zac Pretty MD at [facility] OR    PORT SURGERY N/A 4/10/2024    RIGHT SUBCLAVIAN MEDIPORT INSERTION performed by Brian Humphries II, MD at [facility] OR                                                                                Social History     Socioeconomic History    Marital status:      Spouse name: Not on file    Number of children: Not on file    Years of education: Not on file    Highest education level: Not on file   Occupational History    Not on file   Tobacco Use    Smoking status: Former     Current packs/day: 0.00     Average packs/day: 1 pack/day for 52.1 years (52.1 ttl pk-yrs)     Types: Cigarettes     Start date:      Quit date: 2024     Years since quittin.3    Smokeless tobacco: Never    Tobacco

## 2024-05-23 NOTE — PLAN OF CARE
Problem: Discharge Planning  Goal: Discharge to home or other facility with appropriate resources  5/23/2024 1528 by Andria Matthews RN  Outcome: Progressing  5/23/2024 0457 by Shelby Herrera RN  Outcome: Progressing     Problem: Pain  Goal: Verbalizes/displays adequate comfort level or baseline comfort level  5/23/2024 1528 by Andria Matthews RN  Outcome: Progressing  5/23/2024 0457 by Shelby Herrera RN  Outcome: Progressing     Problem: Safety - Adult  Goal: Free from fall injury  5/23/2024 1528 by Andria Matthews RN  Outcome: Progressing  5/23/2024 0457 by Shelby Herrera RN  Outcome: Progressing     Problem: ABCDS Injury Assessment  Goal: Absence of physical injury  5/23/2024 1528 by Andria Matthews RN  Outcome: Progressing  5/23/2024 0457 by Shelby Herrera RN  Outcome: Progressing     Problem: Gastrointestinal - Adult  Goal: Minimal or absence of nausea and vomiting  5/23/2024 1528 by Andria Matthews RN  Outcome: Progressing  5/23/2024 0457 by Shelby Herrera RN  Outcome: Progressing  Goal: Maintains or returns to baseline bowel function  5/23/2024 1528 by Andria Matthews RN  Outcome: Progressing  5/23/2024 0457 by Shelby Herrera RN  Outcome: Progressing  Goal: Maintains adequate nutritional intake  5/23/2024 1528 by Andria Matthews RN  Outcome: Progressing  5/23/2024 0457 by Shelby Herrera RN  Outcome: Progressing     Problem: Infection - Adult  Goal: Absence of fever/infection during anticipated neutropenic period  5/23/2024 1528 by Andria Matthews RN  Outcome: Progressing  5/23/2024 0457 by Shelby Herrera RN  Outcome: Progressing     Problem: Metabolic/Fluid and Electrolytes - Adult  Goal: Electrolytes maintained within normal limits  5/23/2024 1528 by Andria Matthews RN  Outcome: Progressing  5/23/2024 0457 by Shelby Herrera RN  Outcome: Progressing  Goal: Hemodynamic stability and optimal renal function maintained  5/23/2024 1528 by Andria Matthews RN  Outcome: Progressing  5/23/2024 0457 by Shelby Herrera RN  Outcome:

## 2024-05-23 NOTE — PROGRESS NOTES
Maalox given to pt and about 5mins after, pt threw it back up. Oral medications refused at this time as pt cannot tolerate. IV zofran given. Dr. Ford updated. LG

## 2024-05-23 NOTE — PROCEDURES
PROCEDURE NOTE  Date: 5/23/2024   Name: Lin Bates  YOB: 1955    Procedures  FLEXIBLE ESOPHAGOSCOPY REPORT DICTATED #[id number]

## 2024-05-23 NOTE — ANESTHESIA POSTPROCEDURE EVALUATION
Department of Anesthesiology  Postprocedure Note    Patient: Lin Bates  MRN: [id number]  YOB: 1955  Date of evaluation: 5/23/2024    Procedure Summary       Date: 05/23/24 Room / Location: [address]    Anesthesia Start: 1052 Anesthesia Stop: 1112    Procedure: ESOPHAGOSCOPY DIAGNOSTIC Diagnosis:       Dysphagia, unspecified type      (Dysphagia, unspecified type [R13.10])    Surgeons: Yenny Lindquist MD Responsible Provider: Robert Sheikh MD    Anesthesia Type: MAC, TIVA ASA Status: 4            Anesthesia Type: No value filed.    Hudson Phase I: Hudson Score: 10    Hudson Phase II:      Anesthesia Post Evaluation    Patient location during evaluation: bedside  Patient participation: complete - patient participated  Level of consciousness: awake  Pain score: 0  Airway patency: patent  Nausea & Vomiting: no nausea and no vomiting  Cardiovascular status: hemodynamically stable  Respiratory status: airway suctioned and acceptable  Hydration status: stable  Pain management: adequate    No notable events documented.

## 2024-05-23 NOTE — OP NOTE
[address] [address]                            OPERATIVE REPORT      PATIENT NAME: GIOVANI FLORES             : 1955  MED REC NO: [id number]                      ROOM: 3109  ACCOUNT NO: [id number]                       ADMIT DATE: 2024  PROVIDER: Arsalan Lindquist MD      DATE OF PROCEDURE:      SURGEON:  Arsalan Lindquist MD    PROCEDURE PERFORMED:  Flexible esophagoscopy.    CHIEF COMPLAINT:  History of dysphagia/odynophagia.  The patient is status post radiation and chemotherapy for small-cell carcinoma of the lung.    PREMEDICATION:  Please refer to the anesthesiologist's notes.    DESCRIPTION OF PROCEDURE:  The patient was placed in the left lateral decubitus position and the video Olympus gastroscope was introduced into the back of the throat and was advanced into the esophagus.    Esophagus:  The upper esophageal sphincter was around 15 cm and the mucosa of the esophagus was severely ulcerated, narrowed, and friable at 20 cm with grayish white exudate overlying the mucosa.  The gastroscope could not be advanced, in fact distally, because of severe narrowing and inflammation from recent radiation therapy.  The procedure was terminated at this point.    POSTOPERATIVE DIAGNOSIS:  Severely ulcerated, friable, and narrowed esophagus at 20 cm from recent radiation therapy.    RECOMMENDATIONS:    1. Soft pureed diet.  2. We will continue the patient on Protonix 40 mg b.i.d. along with Carafate suspension.  3. Oral nutritional supplements.  4. The patient will need a followup EGD with dilatation at a later date once the inflammation in the esophagus improves.    The patient tolerated the procedure well.  There were no postop complications.  The blood loss during the procedure was nil.          ARSALAN LINDQUIST MD      D:  2024 11:08:26     T:  2024 11:47:30     AR/HAFSA  Job #:  [id number]     Doc#:  [id number]    CC:

## 2024-05-23 NOTE — BRIEF OP NOTE
Brief Postoperative Note      Lin Bates is a 68 y.o. female     Pre-operative Diagnosis: DYSPHAGIA    Post-operative Diagnosis: SEVERELY ULCERATED-FRIABLE AND NARROWED ESOPHAGUS AT 20 CM FROM RAD TX-UNABLE TO ADVANCE SCOPE DISTALLY    Procedure: FLEXIBLE ESOPHAGOSCOPY    Anesthesia: MAC    Surgeons/Assistants:Yenny Lindquist MD     Estimated Blood Loss: NIL    Complications: None    Specimens: were not obtained  REC-- CPM / PUREED DIET  / NUTRITIONAL SUPPLEMENTS / F/U EGD LATER WITH DILATION    Yenny Lindquist MD   5/23/2024   11:02 AM

## 2024-05-23 NOTE — CARE COORDINATION
Met with pt to f/u for HHC choice.  Pt states that she has changed her mind and does not want HHC at this time.  She denies any d/c needs.  D/c plan is home with BF, denies needs.  Notify CM if any d/c needs arise.  TE

## 2024-05-24 VITALS
HEIGHT: 61 IN | TEMPERATURE: 98.4 F | SYSTOLIC BLOOD PRESSURE: 120 MMHG | RESPIRATION RATE: 18 BRPM | DIASTOLIC BLOOD PRESSURE: 47 MMHG | BODY MASS INDEX: 28.35 KG/M2 | WEIGHT: 150.13 LBS | HEART RATE: 101 BPM | OXYGEN SATURATION: 90 %

## 2024-05-24 LAB
ANISOCYTOSIS: ABNORMAL
APTT: 27.4 SECONDS (ref 25.1–37.1)
BANDED NEUTROPHILS ABSOLUTE COUNT: 0.57 K/CU MM
BANDED NEUTROPHILS RELATIVE PERCENT: 10 % (ref 5–11)
BASOPHILS ABSOLUTE: 0 K/CU MM
BASOPHILS RELATIVE PERCENT: 0 % (ref 0–1)
DIFFERENTIAL TYPE: ABNORMAL
EOSINOPHILS ABSOLUTE: 0.1 K/CU MM
EOSINOPHILS RELATIVE PERCENT: 1 % (ref 0–3)
GLUCOSE BLD-MCNC: 141 MG/DL (ref 70–99)
HCT VFR BLD CALC: 31.2 % (ref 37–47)
HEMOGLOBIN: 10.1 GM/DL (ref 12.5–16)
HYPOCHROMIA: ABNORMAL
IMMATURE NEUTROPHIL %: 0 % (ref 0–0.43)
INR BLD: 1.2 INDEX
LIPASE: 4 IU/L (ref 13–60)
LYMPHOCYTES ABSOLUTE: 0.5 K/CU MM
LYMPHOCYTES RELATIVE PERCENT: 9 % (ref 24–44)
MACROCYTES: ABNORMAL
MCH RBC QN AUTO: 30.9 PG (ref 27–31)
MCHC RBC AUTO-ENTMCNC: 32.4 % (ref 32–36)
MCV RBC AUTO: 95.4 FL (ref 78–100)
METAMYELOCYTES ABSOLUTE COUNT: 0.17 K/CU MM
METAMYELOCYTES PERCENT: 3 %
MONOCYTES ABSOLUTE: 2 K/CU MM
MONOCYTES RELATIVE PERCENT: 35 % (ref 0–4)
NEUTROPHILS ABSOLUTE: 2.4 K/CU MM
NEUTROPHILS RELATIVE PERCENT: 42 % (ref 36–66)
PDW BLD-RTO: 16.1 % (ref 11.7–14.9)
PLATELET # BLD: 103 K/CU MM (ref 140–440)
PMV BLD AUTO: 9.1 FL (ref 7.5–11.1)
POLYCHROMASIA: ABNORMAL
PROTHROMBIN TIME: 16 SECONDS (ref 11.7–14.5)
RBC # BLD: 3.27 M/CU MM (ref 4.2–5.4)
TOTAL IMMATURE NEUTOROPHIL: 0 K/CU MM
WBC # BLD: 5.7 K/CU MM (ref 4–10.5)

## 2024-05-24 PROCEDURE — 99232 SBSQ HOSP IP/OBS MODERATE 35: CPT | Performed by: PHYSICIAN ASSISTANT

## 2024-05-24 PROCEDURE — 82962 GLUCOSE BLOOD TEST: CPT

## 2024-05-24 PROCEDURE — 6370000000 HC RX 637 (ALT 250 FOR IP): Performed by: STUDENT IN AN ORGANIZED HEALTH CARE EDUCATION/TRAINING PROGRAM

## 2024-05-24 PROCEDURE — 85025 COMPLETE CBC W/AUTO DIFF WBC: CPT

## 2024-05-24 PROCEDURE — 6360000002 HC RX W HCPCS: Performed by: SPECIALIST

## 2024-05-24 PROCEDURE — 83690 ASSAY OF LIPASE: CPT

## 2024-05-24 PROCEDURE — 85730 THROMBOPLASTIN TIME PARTIAL: CPT

## 2024-05-24 PROCEDURE — 6370000000 HC RX 637 (ALT 250 FOR IP): Performed by: SPECIALIST

## 2024-05-24 PROCEDURE — C9113 INJ PANTOPRAZOLE SODIUM, VIA: HCPCS | Performed by: SPECIALIST

## 2024-05-24 PROCEDURE — 6360000002 HC RX W HCPCS: Performed by: STUDENT IN AN ORGANIZED HEALTH CARE EDUCATION/TRAINING PROGRAM

## 2024-05-24 PROCEDURE — 2580000003 HC RX 258: Performed by: STUDENT IN AN ORGANIZED HEALTH CARE EDUCATION/TRAINING PROGRAM

## 2024-05-24 PROCEDURE — 85610 PROTHROMBIN TIME: CPT

## 2024-05-24 RX ORDER — DIPHENHYDRAMINE HYDROCHLORIDE AND LIDOCAINE HYDROCHLORIDE AND ALUMINUM HYDROXIDE AND MAGNESIUM HYDRO
5 KIT 4 TIMES DAILY PRN
Qty: 3 EACH | Refills: 1 | Status: SHIPPED | OUTPATIENT
Start: 2024-05-24

## 2024-05-24 RX ORDER — HEPARIN SODIUM 5000 [USP'U]/ML
500 INJECTION, SOLUTION INTRAVENOUS; SUBCUTANEOUS ONCE
Status: DISCONTINUED | OUTPATIENT
Start: 2024-05-24 | End: 2024-05-24

## 2024-05-24 RX ORDER — HEPARIN 100 UNIT/ML
500 SYRINGE INTRAVENOUS ONCE
Status: COMPLETED | OUTPATIENT
Start: 2024-05-24 | End: 2024-05-24

## 2024-05-24 RX ORDER — PANTOPRAZOLE SODIUM 40 MG/1
40 TABLET, DELAYED RELEASE ORAL
Qty: 30 TABLET | Refills: 1 | Status: SHIPPED | OUTPATIENT
Start: 2024-05-24

## 2024-05-24 RX ADMIN — FERROUS SULFATE TAB 325 MG (65 MG ELEMENTAL FE) 325 MG: 325 (65 FE) TAB at 09:43

## 2024-05-24 RX ADMIN — Medication 500 UNITS: at 10:41

## 2024-05-24 RX ADMIN — EMPAGLIFLOZIN 10 MG: 10 TABLET, FILM COATED ORAL at 09:43

## 2024-05-24 RX ADMIN — SODIUM CHLORIDE, PRESERVATIVE FREE 10 ML: 5 INJECTION INTRAVENOUS at 09:43

## 2024-05-24 RX ADMIN — PANTOPRAZOLE SODIUM 40 MG: 40 INJECTION, POWDER, FOR SOLUTION INTRAVENOUS at 09:41

## 2024-05-24 RX ADMIN — OXYCODONE HYDROCHLORIDE 5 MG: 5 TABLET ORAL at 08:13

## 2024-05-24 RX ADMIN — LISINOPRIL 2.5 MG: 5 TABLET ORAL at 09:41

## 2024-05-24 RX ADMIN — ATORVASTATIN CALCIUM 20 MG: 10 TABLET, FILM COATED ORAL at 09:43

## 2024-05-24 RX ADMIN — OXYCODONE HYDROCHLORIDE 5 MG: 5 TABLET ORAL at 00:52

## 2024-05-24 RX ADMIN — LIDOCAINE HYDROCHLORIDE: 20 SOLUTION ORAL; TOPICAL at 09:53

## 2024-05-24 ASSESSMENT — PAIN SCALES - WONG BAKER: WONGBAKER_NUMERICALRESPONSE: NO HURT

## 2024-05-24 ASSESSMENT — PAIN SCALES - GENERAL
PAINLEVEL_OUTOF10: 7
PAINLEVEL_OUTOF10: 3
PAINLEVEL_OUTOF10: 0
PAINLEVEL_OUTOF10: 7

## 2024-05-24 ASSESSMENT — PAIN DESCRIPTION - PAIN TYPE
TYPE: CHRONIC PAIN
TYPE: CHRONIC PAIN

## 2024-05-24 ASSESSMENT — PAIN - FUNCTIONAL ASSESSMENT: PAIN_FUNCTIONAL_ASSESSMENT: PREVENTS OR INTERFERES SOME ACTIVE ACTIVITIES AND ADLS

## 2024-05-24 ASSESSMENT — PAIN DESCRIPTION - DESCRIPTORS
DESCRIPTORS: ACHING;SORE
DESCRIPTORS: ACHING;DISCOMFORT;SORE

## 2024-05-24 ASSESSMENT — PAIN DESCRIPTION - FREQUENCY: FREQUENCY: CONTINUOUS

## 2024-05-24 ASSESSMENT — PAIN DESCRIPTION - LOCATION
LOCATION: CHEST;ABDOMEN
LOCATION: GENERALIZED

## 2024-05-24 ASSESSMENT — PAIN DESCRIPTION - ORIENTATION: ORIENTATION: UPPER

## 2024-05-24 ASSESSMENT — PAIN DESCRIPTION - ONSET: ONSET: ON-GOING

## 2024-05-24 NOTE — DISCHARGE SUMMARY
taking these medications      aspirin 81 MG EC tablet     atorvastatin 20 MG tablet  Commonly known as: LIPITOR     dexAMETHasone 4 MG tablet  Commonly known as: DECADRON  Take 1 tablet by mouth See Admin Instructions for 4 doses Take one tablet in the AM  the day after the last day of chemotherapy each cycle.     ferrous sulfate 325 (65 Fe) MG tablet  Commonly known as: IRON 325     glipiZIDE 2.5 MG extended release tablet  Commonly known as: GLUCOTROL XL     hydroxyurea 500 MG chemo capsule  Commonly known as: HYDREA  TAKE 1 CAPSULE BY MOUTH DAILY     Jardiance 25 MG tablet  Generic drug: empagliflozin     lidocaine viscous hcl 2 % Soln solution  Commonly known as: XYLOCAINE  Take 15 mLs by mouth every 3 hours as needed for Irritation     lisinopril 2.5 MG tablet  Commonly known as: PRINIVIL;ZESTRIL     Magic Mouthwash  Commonly known as: Miracle Mouthwash  Equal parts of Benadryl, Maalox, 2% Viscous Xylocaine.  Take 5 mL 4 times daily.     meclizine 25 MG tablet  Commonly known as: ANTIVERT     metFORMIN 1000 MG tablet  Commonly known as: GLUCOPHAGE     OLANZapine 5 MG tablet  Commonly known as: ZYPREXA  Take 1 tablet by mouth nightly Take one tablet at bedtime for four nights starting the night before chemotherapy.     * ondansetron 8 MG tablet  Commonly known as: Zofran  Take 1 tablet by mouth every 8 hours as needed for Nausea or Vomiting Take 1 tablet by mouth every 8 hours as needed for nausea or vomiting.     * ondansetron 4 MG tablet  Commonly known as: Zofran  Take 1 tablet by mouth daily as needed for Nausea or Vomiting     oxyCODONE 5 MG immediate release tablet  Commonly known as: Roxicodone  Take 1 tablet by mouth every 4 hours as needed for Pain for up to 14 days. Intended supply: 14 days. Take lowest dose possible to manage pain Max Daily Amount: 30 mg     predniSONE 10 MG tablet  Commonly known as: DELTASONE     sucralfate 1 GM/10ML suspension  Commonly known as: Carafate  Take 10 mLs by mouth 4  Mediastinum: The cardiomediastinal silhouette is within normal limits. Bones: No acute suspicious abnormality.     1.  No acute cardiopulmonary findings. 2.  A right-sided chest port is present. The catheter is kinked underneath the clavicle, with similar configuration to prior examinations. Electronically signed by Zeke Franco MD      CBC:   Recent Labs     05/22/24 0928 05/24/24  0530   WBC 3.9* 5.7   HGB 12.2* 10.1*   PLT 96* 103*     BMP:    Recent Labs     05/22/24 0928 05/23/24  0750 05/23/24  1835    140  --    K 3.9 3.3* 4.0    102  --    CO2 17* 26  --    BUN 7 8  --    CREATININE 0.8 0.7  --    GLUCOSE 118* 142*  --      Hepatic:   Recent Labs     05/22/24 0928 05/23/24  0750   AST 11* 13*   ALT <5* 7*   BILITOT 0.2 0.2   ALKPHOS 59 56       Hemoglobin A1C:   Lab Results   Component Value Date/Time    LABA1C 6.1 05/18/2024 08:30 PM   Troponin:   Lab Results   Component Value Date/Time    TROPONINT <0.010 05/18/2015 05:45 PM     UA:  Lab Results   Component Value Date/Time    NITRU NEGATIVE 05/18/2015 03:40 PM    COLORU YELLOW 05/18/2015 03:40 PM    PHUR 6.0 05/18/2015 03:40 PM    WBCUA 2 TO 3 05/18/2015 03:40 PM    RBCUA NEGATIVE 05/18/2015 03:40 PM    MUCUS NEGATIVE 10/20/2014 09:50 PM    BACTERIA MODERATE 05/18/2015 03:40 PM    CLARITYU HAZY 05/18/2015 03:40 PM    LEUKOCYTESUR TRACE 05/18/2015 03:40 PM    UROBILINOGEN 0.2 05/18/2015 03:40 PM    BILIRUBINUR NEGATIVE 05/18/2015 03:40 PM    BLOODU NEGATIVE 05/18/2015 03:40 PM    GLUCOSEU NEGATIVE 05/18/2015 03:40 PM    KETUA NEGATIVE 05/18/2015 03:40 PM         Time Spent Discharging patient 39 minutes    Electronically signed by Denis Ford MD on 5/24/2024 at 10:54 AM

## 2024-05-24 NOTE — CARE COORDINATION
Met with pt for a f/u visit for d/c planning.  Pt states that she is now agreeable to [facility] and has requested [facility]. D/c plan is home with [facility] and [facility].  Referral made to [facility] Liaison/Estrella via PS.  Pt denies any other d/c needs. Notify CM if any new d/c needs arise.  TE    .Upon discharge pt will be going home with [facility] home care  Please fax H/P, D/S.  AVS, order, and face sheet to [phone]  Please call [phone] and inform of discharge

## 2024-05-24 NOTE — CARE COORDINATION
[facility] Liaison spoke with pt and is aware of discharge & will initiate [facility]. Verified demo info with pt.

## 2024-05-24 NOTE — PROGRESS NOTES
Hematology Oncology Inpatient Progress Note    Patient Name:  Lin Bates  Patient :  1955  Patient MRN:  [id number]  Room: [address] [place], [place] [address]A   Admitted: 2024  7:35 PM   Hospital Attending Provider: Jodie Larios MD    Primary Oncologist: Burt Birch MD  PCP:  Dorothy Terrazas PA     Date of Service: 24       Reason for Consult: pancytopenia     Chief Complaint:    Chief Complaint   Patient presents with    Chest Pain     Pt reports she has lung cancer and it has gone to her esophagus. Pt states pain is mid sternal and reports she had emesis PTA     HPI:   Lin Bates is a 68 y.o. 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  She recently completed definitive chemoradiation.  She last received C4D1 cisplatin /etoposide on 2024. She required dose reductions towards end of treatment with both drugs reduced by 15% of original dose on last cycle.  She completed 30/30 fractions of radiation to the left lung on 2024.  Toward the end of her course she did develop esophagitis which was treated with Carafate and Magic mouthwash. She had some improvement in swallowing function, mostly with lidocaine only.  She did report to us in clinic not utilizing supportive medications as prescribed and was encouraged to do so. She presented to [facility] complaining of chest tightness and was found to have Hgb 4.8 from baseline 9 and was given 2u PRBC with better than expected response to 9.0.  repeat 11.2 was likely spurious, 10.5 on day of consult.  Initial value suspect. She was also noted to be neutropenic with incalculably low ANC.  Filgrastim x 1 was given in the ED.  She has no fever or other infectious s/s so this was not continued, WBC improved to 0.9 still with unmeasurable ANC. Plt 34 on admission => 31 on day of consult.  She is on prophylactic Levaquin.     On exam she is fatigued and complaining of

## 2024-05-24 NOTE — DISCHARGE INSTRUCTIONS
Call GI doctor for dilation within 1 month. Until then eat puréed foods.  Take 2-3 boost or ensures a day.

## 2024-05-24 NOTE — PROGRESS NOTES
Outpatient Pharmacy Progress Note for Meds-to-Beds    Total number of Prescriptions Filled: 1    Additional Documentation:  Patient's family member picked-up the medication(s) in the OP Pharmacy      Thank you for letting us serve your patients.  [address] [address]    Phone: [phone]    Fax: [phone]

## 2024-05-31 ENCOUNTER — HOSPITAL ENCOUNTER (EMERGENCY)
Age: 69
Discharge: HOME | End: 2024-05-31
Attending: EMERGENCY MEDICINE
Payer: MEDICARE

## 2024-05-31 VITALS
DIASTOLIC BLOOD PRESSURE: 49 MMHG | OXYGEN SATURATION: 95 % | WEIGHT: 144 LBS | RESPIRATION RATE: 17 BRPM | HEART RATE: 73 BPM | BODY MASS INDEX: 27.19 KG/M2 | HEIGHT: 61 IN | TEMPERATURE: 98.2 F | SYSTOLIC BLOOD PRESSURE: 106 MMHG

## 2024-05-31 DIAGNOSIS — E87.6 HYPOKALEMIA: Primary | ICD-10-CM

## 2024-05-31 LAB
ANION GAP SERPL CALCULATED.3IONS-SCNC: 11 MMOL/L (ref 7–16)
BUN SERPL-MCNC: 10 MG/DL (ref 6–23)
CALCIUM SERPL-MCNC: 8.6 MG/DL (ref 8.3–10.6)
CHLORIDE BLD-SCNC: 97 MMOL/L (ref 99–110)
CO2: 29 MMOL/L (ref 21–32)
CREAT SERPL-MCNC: 0.8 MG/DL (ref 0.6–1.1)
EKG ATRIAL RATE: 75 BPM
EKG DIAGNOSIS: NORMAL
EKG P AXIS: 1 DEGREES
EKG P-R INTERVAL: 152 MS
EKG Q-T INTERVAL: 410 MS
EKG QRS DURATION: 74 MS
EKG QTC CALCULATION (BAZETT): 457 MS
EKG R AXIS: 22 DEGREES
EKG T AXIS: 46 DEGREES
EKG VENTRICULAR RATE: 75 BPM
GFR, ESTIMATED: 80 ML/MIN/1.73M2
GLUCOSE SERPL-MCNC: 164 MG/DL (ref 70–99)
MAGNESIUM: 1.9 MG/DL (ref 1.8–2.4)
POTASSIUM SERPL-SCNC: 2.6 MMOL/L (ref 3.5–5.1)
SODIUM BLD-SCNC: 137 MMOL/L (ref 135–145)

## 2024-05-31 PROCEDURE — 6370000000 HC RX 637 (ALT 250 FOR IP): Performed by: EMERGENCY MEDICINE

## 2024-05-31 PROCEDURE — 96365 THER/PROPH/DIAG IV INF INIT: CPT

## 2024-05-31 PROCEDURE — 99284 EMERGENCY DEPT VISIT MOD MDM: CPT

## 2024-05-31 PROCEDURE — 2580000003 HC RX 258: Performed by: EMERGENCY MEDICINE

## 2024-05-31 PROCEDURE — 83735 ASSAY OF MAGNESIUM: CPT

## 2024-05-31 PROCEDURE — 93005 ELECTROCARDIOGRAM TRACING: CPT | Performed by: EMERGENCY MEDICINE

## 2024-05-31 PROCEDURE — 85025 COMPLETE CBC W/AUTO DIFF WBC: CPT

## 2024-05-31 PROCEDURE — 80048 BASIC METABOLIC PNL TOTAL CA: CPT

## 2024-05-31 PROCEDURE — 96366 THER/PROPH/DIAG IV INF ADDON: CPT

## 2024-05-31 PROCEDURE — 6360000002 HC RX W HCPCS: Performed by: EMERGENCY MEDICINE

## 2024-05-31 RX ORDER — POTASSIUM CHLORIDE 7.45 MG/ML
10 INJECTION INTRAVENOUS ONCE
Status: COMPLETED | OUTPATIENT
Start: 2024-05-31 | End: 2024-05-31

## 2024-05-31 RX ORDER — SODIUM CHLORIDE 9 MG/ML
INJECTION, SOLUTION INTRAVENOUS ONCE
Status: COMPLETED | OUTPATIENT
Start: 2024-05-31 | End: 2024-05-31

## 2024-05-31 RX ADMIN — POTASSIUM BICARBONATE 50 MEQ: 977.5 TABLET, EFFERVESCENT ORAL at 18:16

## 2024-05-31 RX ADMIN — SODIUM CHLORIDE: 9 INJECTION, SOLUTION INTRAVENOUS at 19:32

## 2024-05-31 RX ADMIN — POTASSIUM CHLORIDE 10 MEQ: 7.46 INJECTION, SOLUTION INTRAVENOUS at 18:18

## 2024-05-31 RX ADMIN — POTASSIUM CHLORIDE 10 MEQ: 7.46 INJECTION, SOLUTION INTRAVENOUS at 19:19

## 2024-05-31 ASSESSMENT — PAIN - FUNCTIONAL ASSESSMENT: PAIN_FUNCTIONAL_ASSESSMENT: NONE - DENIES PAIN

## 2024-05-31 ASSESSMENT — ENCOUNTER SYMPTOMS
GASTROINTESTINAL NEGATIVE: 1
SHORTNESS OF BREATH: 0

## 2024-05-31 NOTE — ED NOTES
Ambulated to  and returned to bed without incident.  No complaints other than chest burns a little.  No NS found running with Potassium.

## 2024-05-31 NOTE — ED TRIAGE NOTES
Arrived ambulatory to room 3 for triage. Tolerated without difficulty. Bed in lowest position. Call light given.

## 2024-06-03 LAB
EKG ATRIAL RATE: 75 BPM
EKG DIAGNOSIS: NORMAL
EKG P AXIS: 1 DEGREES
EKG P-R INTERVAL: 152 MS
EKG Q-T INTERVAL: 410 MS
EKG QRS DURATION: 74 MS
EKG QTC CALCULATION (BAZETT): 457 MS
EKG R AXIS: 22 DEGREES
EKG T AXIS: 46 DEGREES
EKG VENTRICULAR RATE: 75 BPM

## 2024-06-03 PROCEDURE — 93010 ELECTROCARDIOGRAM REPORT: CPT | Performed by: INTERNAL MEDICINE

## 2024-06-07 ENCOUNTER — TELEPHONE (OUTPATIENT)
Dept: ONCOLOGY | Age: 69
End: 2024-06-07

## 2024-06-07 DIAGNOSIS — C34.32 SMALL CELL LUNG CANCER, LEFT LOWER LOBE (HCC): Primary | ICD-10-CM

## 2024-06-07 RX ORDER — OXYCODONE HYDROCHLORIDE 10 MG/1
10 TABLET ORAL EVERY 4 HOURS PRN
Qty: 84 TABLET | Refills: 0 | Status: SHIPPED | OUTPATIENT
Start: 2024-06-07 | End: 2024-06-21

## 2024-06-07 NOTE — TELEPHONE ENCOUNTER
Patient [facility] nurse called stating patient was still having trouble eating due to pain and burning. Patient states the pain is minimally relieved by the Oxycodone 5mg and that she is taking the Magic Mouthwash and Sulcrafate as directed. Dr. Guo notified and Dr. Guo ordered 10 mg Oxycodone (see orders) Patient notified of new prescription and has no questions. Patient to call office should needs arise. Patient has on call number should she need it.

## 2024-06-12 RX ORDER — SUCRALFATE ORAL 1 G/10ML
1 SUSPENSION ORAL
Qty: 420 ML | Refills: 3 | Status: SHIPPED | OUTPATIENT
Start: 2024-06-12

## 2024-06-14 ENCOUNTER — HOSPITAL ENCOUNTER (OUTPATIENT)
Dept: MRI IMAGING | Age: 69
Discharge: HOME | End: 2024-06-14
Attending: INTERNAL MEDICINE
Payer: MEDICARE

## 2024-06-14 ENCOUNTER — HOSPITAL ENCOUNTER (OUTPATIENT)
Dept: CT IMAGING | Age: 69
Discharge: HOME | End: 2024-06-14
Payer: MEDICARE

## 2024-06-14 DIAGNOSIS — C34.32 SMALL CELL LUNG CANCER, LEFT LOWER LOBE (HCC): ICD-10-CM

## 2024-06-14 PROCEDURE — 74177 CT ABD & PELVIS W/CONTRAST: CPT

## 2024-06-14 PROCEDURE — 70553 MRI BRAIN STEM W/O & W/DYE: CPT

## 2024-06-14 PROCEDURE — 6360000004 HC RX CONTRAST MEDICATION: Performed by: INTERNAL MEDICINE

## 2024-06-14 PROCEDURE — 71260 CT THORAX DX C+: CPT

## 2024-06-14 PROCEDURE — A9579 GAD-BASE MR CONTRAST NOS,1ML: HCPCS | Performed by: INTERNAL MEDICINE

## 2024-06-14 RX ADMIN — IOPAMIDOL 20 ML: 755 INJECTION, SOLUTION INTRAVENOUS at 14:40

## 2024-06-14 RX ADMIN — IOPAMIDOL 100 ML: 755 INJECTION, SOLUTION INTRAVENOUS at 14:39

## 2024-06-14 RX ADMIN — GADOTERIDOL 14 ML: 279.3 INJECTION, SOLUTION INTRAVENOUS at 15:33

## 2024-06-20 ENCOUNTER — HOSPITAL ENCOUNTER (OUTPATIENT)
Dept: INFUSION THERAPY | Age: 69
Discharge: HOME | End: 2024-06-20
Payer: MEDICARE

## 2024-06-20 ENCOUNTER — OFFICE VISIT (OUTPATIENT)
Dept: ONCOLOGY | Age: 69
End: 2024-06-20
Payer: MEDICARE

## 2024-06-20 ENCOUNTER — HOSPITAL ENCOUNTER (OUTPATIENT)
Dept: RADIATION ONCOLOGY | Age: 69
Discharge: HOME | End: 2024-06-20
Payer: MEDICARE

## 2024-06-20 VITALS
HEIGHT: 61 IN | WEIGHT: 145.4 LBS | HEART RATE: 110 BPM | DIASTOLIC BLOOD PRESSURE: 49 MMHG | TEMPERATURE: 97.8 F | BODY MASS INDEX: 27.45 KG/M2 | RESPIRATION RATE: 18 BRPM | SYSTOLIC BLOOD PRESSURE: 105 MMHG | OXYGEN SATURATION: 93 %

## 2024-06-20 VITALS
OXYGEN SATURATION: 93 % | RESPIRATION RATE: 18 BRPM | WEIGHT: 145 LBS | BODY MASS INDEX: 27.38 KG/M2 | HEIGHT: 61 IN | HEART RATE: 110 BPM | SYSTOLIC BLOOD PRESSURE: 105 MMHG | DIASTOLIC BLOOD PRESSURE: 49 MMHG | TEMPERATURE: 97.8 F

## 2024-06-20 DIAGNOSIS — C34.32 SMALL CELL LUNG CANCER, LEFT LOWER LOBE (HCC): Primary | ICD-10-CM

## 2024-06-20 DIAGNOSIS — C34.32 SMALL CELL LUNG CANCER, LEFT LOWER LOBE (HCC): ICD-10-CM

## 2024-06-20 LAB
BASOPHILS ABSOLUTE: 0 K/CU MM
BASOPHILS RELATIVE PERCENT: 0.1 % (ref 0–1)
DIFFERENTIAL TYPE: ABNORMAL
EOSINOPHILS ABSOLUTE: 0 K/CU MM
EOSINOPHILS RELATIVE PERCENT: 0.1 % (ref 0–3)
HCT VFR BLD CALC: 35.8 % (ref 37–47)
HEMOGLOBIN: 11.6 GM/DL (ref 12.5–16)
LYMPHOCYTES ABSOLUTE: 0.6 K/CU MM
LYMPHOCYTES RELATIVE PERCENT: 4 % (ref 24–44)
MCH RBC QN AUTO: 31.8 PG (ref 27–31)
MCHC RBC AUTO-ENTMCNC: 32.4 % (ref 32–36)
MCV RBC AUTO: 98.1 FL (ref 78–100)
MONOCYTES ABSOLUTE: 1.7 K/CU MM
MONOCYTES RELATIVE PERCENT: 11.7 % (ref 0–4)
NEUTROPHILS ABSOLUTE: 12.6 K/CU MM
NEUTROPHILS RELATIVE PERCENT: 84.1 % (ref 36–66)
PDW BLD-RTO: 16.4 % (ref 11.7–14.9)
PLATELET # BLD: 250 K/CU MM (ref 140–440)
PMV BLD AUTO: 9.3 FL (ref 7.5–11.1)
RBC # BLD: 3.65 M/CU MM (ref 4.2–5.4)
WBC # BLD: 14.9 K/CU MM (ref 4–10.5)

## 2024-06-20 PROCEDURE — 1090F PRES/ABSN URINE INCON ASSESS: CPT | Performed by: INTERNAL MEDICINE

## 2024-06-20 PROCEDURE — 85025 COMPLETE CBC W/AUTO DIFF WBC: CPT

## 2024-06-20 PROCEDURE — 3017F COLORECTAL CA SCREEN DOC REV: CPT | Performed by: INTERNAL MEDICINE

## 2024-06-20 PROCEDURE — G8417 CALC BMI ABV UP PARAM F/U: HCPCS | Performed by: INTERNAL MEDICINE

## 2024-06-20 PROCEDURE — 99212 OFFICE O/P EST SF 10 MIN: CPT | Performed by: RADIOLOGY

## 2024-06-20 PROCEDURE — G8428 CUR MEDS NOT DOCUMENT: HCPCS | Performed by: INTERNAL MEDICINE

## 2024-06-20 PROCEDURE — G8399 PT W/DXA RESULTS DOCUMENT: HCPCS | Performed by: INTERNAL MEDICINE

## 2024-06-20 PROCEDURE — 1111F DSCHRG MED/CURRENT MED MERGE: CPT | Performed by: INTERNAL MEDICINE

## 2024-06-20 PROCEDURE — 1124F ACP DISCUSS-NO DSCNMKR DOCD: CPT | Performed by: INTERNAL MEDICINE

## 2024-06-20 PROCEDURE — 1036F TOBACCO NON-USER: CPT | Performed by: INTERNAL MEDICINE

## 2024-06-20 PROCEDURE — 36415 COLL VENOUS BLD VENIPUNCTURE: CPT

## 2024-06-20 PROCEDURE — 99214 OFFICE O/P EST MOD 30 MIN: CPT | Performed by: RADIOLOGY

## 2024-06-20 PROCEDURE — 99214 OFFICE O/P EST MOD 30 MIN: CPT | Performed by: INTERNAL MEDICINE

## 2024-06-20 ASSESSMENT — PAIN SCALES - GENERAL: PAINLEVEL_OUTOF10: 0

## 2024-06-20 NOTE — PROGRESS NOTES
by mouth 2 times daily 20 tablet 0    DPH-Lido-AlHydr-MgHydr-Simeth (MAGIC MOUTHWASH) SUSP Swish and spit 5 mLs 4 times daily as needed for Irritation 3 each 1    pantoprazole (PROTONIX) 40 MG tablet Take 1 tablet by mouth every morning (before breakfast) 30 tablet 1    Magic Mouthwash (MIRACLE MOUTHWASH) Equal parts of Benadryl, Maalox, 2% Viscous Xylocaine.  Take 5 mL 4 times daily. 240 mL 2    lidocaine viscous hcl (XYLOCAINE) 2 % SOLN solution Take 15 mLs by mouth every 3 hours as needed for Irritation 7 each 0    ondansetron (ZOFRAN) 4 MG tablet Take 1 tablet by mouth daily as needed for Nausea or Vomiting 20 tablet 3    dexAMETHasone (DECADRON) 4 MG tablet Take 1 tablet by mouth See Admin Instructions for 4 doses Take one tablet in the AM  the day after the last day of chemotherapy each cycle. 4 tablet 0    OLANZapine (ZYPREXA) 5 MG tablet Take 1 tablet by mouth nightly Take one tablet at bedtime for four nights starting the night before chemotherapy. 16 tablet 0    ondansetron (ZOFRAN) 8 MG tablet Take 1 tablet by mouth every 8 hours as needed for Nausea or Vomiting Take 1 tablet by mouth every 8 hours as needed for nausea or vomiting. 30 tablet 1    vitamin B-12 (CYANOCOBALAMIN) 1000 MCG tablet Take 1 tablet by mouth daily 30 tablet 3    hydroxyurea (HYDREA) 500 MG chemo capsule TAKE 1 CAPSULE BY MOUTH DAILY 30 capsule 3    JARDIANCE 25 MG tablet       meclizine (ANTIVERT) 25 MG tablet       predniSONE (DELTASONE) 10 MG tablet       ferrous sulfate (IRON 325) 325 (65 Fe) MG tablet Take 1 tablet by mouth daily (with breakfast)      aspirin 81 MG EC tablet Take 1 tablet by mouth daily      metFORMIN (GLUCOPHAGE) 1000 MG tablet Take 1 tablet by mouth 2 times daily (with meals)      lisinopril (PRINIVIL;ZESTRIL) 2.5 MG tablet Take 1 tablet by mouth daily      glipiZIDE (GLUCOTROL) 2.5 MG CR tablet Take 1 mg by mouth 2 times daily      atorvastatin (LIPITOR) 20 MG tablet Take 1 tablet by mouth daily       No

## 2024-06-20 NOTE — PROGRESS NOTES
MA Rooming Questions  Patient: Lin Bates  MRN: [id number]    Date: 6/20/2024        1. Do you have any new issues?   no         2. Do you need any refills on medications?    no    3. Have you had any imaging done since your last visit?   yes - MRI, CT    4. Have you been hospitalized or seen in the emergency room since your last visit here?   no    5. Did the patient have a depression screening completed today? No    No data recorded     PHQ-9 Given to (if applicable):               PHQ-9 Score (if applicable):                     [] Positive     []  Negative              Does question #9 need addressed (if applicable)                     [] Yes    []  No               Saumya Barroso CMA      
Medical History:  Significant for  1. Hypertension  2. Hyperlipidemia  3. Diabetes mellitus  4. History of positive vaginal HPV     Past Surgical History:  No pertinent past surgical history.     Family History:  Significant for lung cancer in her father, throat cancer in her brother.  No other family history of cancer disease.       Social History:  She is a current smoker and she smokes about 10 cigarettes a day since she was 14. She denies alcohol drinking or illicit drug abuse.  She is a  and currently living in [place] and has four children.     Allergies:  No known drug allergies.     Oncology History   Small cell lung cancer, left lower lobe (HCC)   2/13/2024 - 5/9/2024 Chemotherapy    OP CISplatin 80mg/m2 + etoposide 100mg/m2 Q21D  Plan Provider: Burt Birch MD  Treatment goal: Curative  Line of treatment: 1st Line       Review of Systems:  \"Per interval history; otherwise 10 point ROS is negative.\"  Her energy level is better today and her sleep is fine. She doesn't have fever, chills, night sweats, cough, shortness of breath, chest pain, hemoptysis or palpitations. Her bowels and bladder functions are normal. She doesn't have nausea, vomiting, abdominal pain, diarrhea, constipation, dysuria, loss of appetite or weight loss. She doesn't have neuropathy and she denies bleeding or clotting issues. She denies any pain in her body. No anxiety or depression. The rest of the systems are unremarkable.    Vital Signs:  BP (!) 105/49 (Site: Left Upper Arm, Position: Sitting, Cuff Size: Medium Adult)   Pulse (!) 110   Temp 97.8 °F (36.6 °C) (Temporal)   Resp 18   Ht 1.549 m (5' 0.98\")   Wt 65.8 kg (145 lb)   SpO2 93%   BMI 27.41 kg/m²     Physical Exam:  CONSTITUTIONAL: awake, alert, cooperative, no apparent distress   EYES: pupils equal, round and reactive to light, sclera clear, normal conjunctiva  ENT: Normocephalic, without obvious abnormality, atraumatic  NECK: supple, symmetrical, no jugular venous

## 2024-06-24 DIAGNOSIS — Z79.899 DRUG THERAPY: Primary | ICD-10-CM

## 2024-06-24 DIAGNOSIS — C34.32 SMALL CELL LUNG CANCER, LEFT LOWER LOBE (HCC): Primary | ICD-10-CM

## 2024-07-02 ENCOUNTER — HOSPITAL ENCOUNTER (OUTPATIENT)
Dept: RADIATION ONCOLOGY | Age: 69
Discharge: HOME | End: 2024-07-02
Payer: MEDICARE

## 2024-07-02 ENCOUNTER — OFFICE VISIT (OUTPATIENT)
Dept: ONCOLOGY | Age: 69
End: 2024-07-02
Payer: MEDICARE

## 2024-07-02 ENCOUNTER — HOSPITAL ENCOUNTER (OUTPATIENT)
Dept: INFUSION THERAPY | Age: 69
Discharge: HOME | End: 2024-07-02
Payer: MEDICARE

## 2024-07-02 VITALS
HEART RATE: 62 BPM | WEIGHT: 140.8 LBS | OXYGEN SATURATION: 92 % | RESPIRATION RATE: 16 BRPM | SYSTOLIC BLOOD PRESSURE: 111 MMHG | DIASTOLIC BLOOD PRESSURE: 56 MMHG | BODY MASS INDEX: 27.64 KG/M2 | HEIGHT: 60 IN | TEMPERATURE: 97 F

## 2024-07-02 DIAGNOSIS — C34.32 SMALL CELL LUNG CANCER, LEFT LOWER LOBE (HCC): Primary | ICD-10-CM

## 2024-07-02 DIAGNOSIS — C34.32 SMALL CELL LUNG CANCER, LEFT LOWER LOBE (HCC): ICD-10-CM

## 2024-07-02 LAB
BASOPHILS ABSOLUTE: 0 K/CU MM
BASOPHILS RELATIVE PERCENT: 0.1 % (ref 0–1)
DIFFERENTIAL TYPE: ABNORMAL
EOSINOPHILS ABSOLUTE: 0.1 K/CU MM
EOSINOPHILS RELATIVE PERCENT: 1.2 % (ref 0–3)
HCT VFR BLD CALC: 37.2 % (ref 37–47)
HEMOGLOBIN: 11.5 GM/DL (ref 12.5–16)
LYMPHOCYTES ABSOLUTE: 0.8 K/CU MM
LYMPHOCYTES RELATIVE PERCENT: 7.9 % (ref 24–44)
MCH RBC QN AUTO: 31.2 PG (ref 27–31)
MCHC RBC AUTO-ENTMCNC: 30.9 % (ref 32–36)
MCV RBC AUTO: 100.8 FL (ref 78–100)
MONOCYTES ABSOLUTE: 1.3 K/CU MM
MONOCYTES RELATIVE PERCENT: 13.4 % (ref 0–4)
NEUTROPHILS ABSOLUTE: 7.6 K/CU MM
NEUTROPHILS RELATIVE PERCENT: 77.4 % (ref 36–66)
PDW BLD-RTO: 17 % (ref 11.7–14.9)
PLATELET # BLD: 404 K/CU MM (ref 140–440)
PMV BLD AUTO: 8.9 FL (ref 7.5–11.1)
RBC # BLD: 3.69 M/CU MM (ref 4.2–5.4)
WBC # BLD: 9.9 K/CU MM (ref 4–10.5)

## 2024-07-02 PROCEDURE — 1036F TOBACCO NON-USER: CPT | Performed by: INTERNAL MEDICINE

## 2024-07-02 PROCEDURE — 99214 OFFICE O/P EST MOD 30 MIN: CPT | Performed by: RADIOLOGY

## 2024-07-02 PROCEDURE — 85025 COMPLETE CBC W/AUTO DIFF WBC: CPT

## 2024-07-02 PROCEDURE — 77290 THER RAD SIMULAJ FIELD CPLX: CPT | Performed by: RADIOLOGY

## 2024-07-02 PROCEDURE — 1124F ACP DISCUSS-NO DSCNMKR DOCD: CPT | Performed by: INTERNAL MEDICINE

## 2024-07-02 PROCEDURE — 77334 RADIATION TREATMENT AID(S): CPT | Performed by: RADIOLOGY

## 2024-07-02 PROCEDURE — G8427 DOCREV CUR MEDS BY ELIG CLIN: HCPCS | Performed by: INTERNAL MEDICINE

## 2024-07-02 PROCEDURE — 99212 OFFICE O/P EST SF 10 MIN: CPT | Performed by: RADIOLOGY

## 2024-07-02 PROCEDURE — 99215 OFFICE O/P EST HI 40 MIN: CPT | Performed by: INTERNAL MEDICINE

## 2024-07-02 PROCEDURE — 1090F PRES/ABSN URINE INCON ASSESS: CPT | Performed by: INTERNAL MEDICINE

## 2024-07-02 PROCEDURE — G8417 CALC BMI ABV UP PARAM F/U: HCPCS | Performed by: INTERNAL MEDICINE

## 2024-07-02 PROCEDURE — 3017F COLORECTAL CA SCREEN DOC REV: CPT | Performed by: INTERNAL MEDICINE

## 2024-07-02 PROCEDURE — G8399 PT W/DXA RESULTS DOCUMENT: HCPCS | Performed by: INTERNAL MEDICINE

## 2024-07-02 PROCEDURE — 36415 COLL VENOUS BLD VENIPUNCTURE: CPT

## 2024-07-02 RX ORDER — OXYCODONE HYDROCHLORIDE 10 MG/1
10 TABLET ORAL EVERY 4 HOURS PRN
Qty: 84 TABLET | Refills: 0 | Status: SHIPPED | OUTPATIENT
Start: 2024-07-02 | End: 2024-07-16

## 2024-07-02 NOTE — PLAN OF CARE
Radiation education and handouts given. Side effects and management reviewed with pt. All questions answered and pt voices understanding .    CT/SIM for radiation planning completed today.    Nursing Care Plan for Brain Radiation    Pain related to cancer diagnosis and treatment.    Interventions   Pain assessment.   Monitor pharmacological pain medication.   Teach relaxation and repositioning techniques.     Expected Outcome   Maintain patient's acceptable pain level or <5 on pain scale.       Knowledge deficit related to diagnosis and treatment plan.    Interventions   Assess patient's ability to comprehend diagnosis and treatment plan.   Provide educational materials and teaching regarding plan of care.   Provide emotional support and continued education.   Refer to psychosocial coordinator if further assistance needed.     Expected Outcome   Patient voices understanding of diagnosis and treatment plan.       Altered skin integrity related to treatment.    Interventions   Evaluation of skin integrity at therapy site.   Advise patient on appropriate skin care.   Instruct patient on recommended lotions/ointments/creams/dressing changes to use on therapy site.     Expected Outcome   Minimize adverse skin reaction/infection at treatment site.       Altered neurological status due to treatment process.    Interventions   Assess neurological status.   Monitor changes in mental status.   Educate patient on symptoms to report to staff.     Expected Outcome   Patient to obtain/maintain maximum motor/sensory/cognitive abilities.       Altered musculo/skeletal/functional status related to diagnosis and treatment process.    Interventions   Assess range of motion and ability to perform ADL's.   Provide assistance when needed.   Referral for OT/PT evaluation and treat.     Expected Outcome   Patient to obtain/maintain highest potential functional level.       To re-evaluate weekly during radiation treatments.

## 2024-07-02 NOTE — PROGRESS NOTES
MA Rooming Questions  Patient: Lin Bates  MRN: [id number]    Date: 7/2/2024        1. Do you have any new issues?   yes - on going issues with eating; food gets \"stuck\"         2. Do you need any refills on medications?    yes - pain meds    3. Have you had any imaging done since your last visit?   yes - ct 6/14    4. Have you been hospitalized or seen in the emergency room since your last visit here?   no    5. Did the patient have a depression screening completed today? No    No data recorded     PHQ-9 Given to (if applicable):               PHQ-9 Score (if applicable):                     [] Positive     []  Negative              Does question #9 need addressed (if applicable)                     [] Yes    []  No               Cristina Iglesias MA      
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 MPL was positive.     Reviewed with her natural history of myeloproliferative neoplasm, management and prognosis. Since she has high risk MPN, I started cytoreductive therapy with hydrea since 10/5/23. I also started aspirin 81 mg daily.     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     CT scan was done on 12/27/22 and there is no overt lymphadenopathy or splenomegaly. I recommend for close observation of her monoclonal lymphocytosis. She doesn't have anemia.     For her left lung cystic lesion with solid component, she had repeat CT chest in 9/5/23 and again in 12/5/23. It showed new enlarging LORI lung nodule. It is 1.3 cm and 1.9 cm in size. PET scan done on 1/13/24 showed mild activity in LORI lung nodule, hilar and mediastinal lymph nodes.    I requested CT guided biopsy of LORI lung nodule on 1/30/23 and pathology showed small cell lung cancer.     Since she has limited stage small cell lung cancer and I started chemotherapy with cisplatin and etoposide on 2/13/24. She received RT between 3/5/24 and 4/16/24. She finished chemotherapy on 5/9/24.     Reviewed MRI brain and CT scans done on 6/14/23. There is no sign of brain metastasis or progression of disease.     Dr. Guo d/w her about prophylactic WBRT and I d/w her about consolidative durvalumab.     She agreed to have WBRT and had simulation today. Also agree to have consolidative durvalumab. Will plan to start it after WBRT.     I referred her to GYN for evaluation of her borderline endometrial thickening. She was seen by Dr. Pretty and underwent hysteroscopy and D & C on 3/22/23. Pathology only showed benign changes. Recommend to continue to f/u with Dr. Pretty regularly.

## 2024-07-02 NOTE — PROGRESS NOTES
tablet       meclizine (ANTIVERT) 25 MG tablet       predniSONE (DELTASONE) 10 MG tablet       ferrous sulfate (IRON 325) 325 (65 Fe) MG tablet Take 1 tablet by mouth daily (with breakfast)      aspirin 81 MG EC tablet Take 1 tablet by mouth daily      metFORMIN (GLUCOPHAGE) 1000 MG tablet Take 1 tablet by mouth 2 times daily (with meals)      lisinopril (PRINIVIL;ZESTRIL) 2.5 MG tablet Take 1 tablet by mouth daily      glipiZIDE (GLUCOTROL) 2.5 MG CR tablet Take 1 mg by mouth 2 times daily      atorvastatin (LIPITOR) 20 MG tablet Take 1 tablet by mouth daily       No current facility-administered medications for this encounter.        Additional Comments: pt here for a f/u rad appt    Electronically signed by Cristina Iglesias MA on 7/2/2024 at 10:22 AM             
dextroscoliosis of the thoracic spine. - UPPER ABDOMEN: Please see CT abdomen/pelvis report, same day.     1. Interval marked decrease in size of a nodule in the lower lobe of the left lung. 2. Interval apparent complete resolution of the second nodule in the lower lobe of the left lung. 3. There is a stable air cyst with a mural nodule in the upper lobe of the left lung. 4. There is post therapy interstitial thickening and pleural thickening in the lower lobe of the left lung. 5. Other findings as noted. Electronically signed by Adolfo Campbell    MRI BRAIN W WO CONTRAST    Result Date: 6/15/2024  MRI of the brain INDICATION: Malignant neoplasm of lower lobe, left bronchus or lung COMPARISON: MRI brain from 2/10/2024. Standard MRI sequences were obtained through the brain in multiple planes. Images were obtained before and after intravenous infusion of 14 mL of ProHance. FINDINGS: There is mild atrophy. There are numerous foci of hyperintense T2/FLAIR signal intensity in the periventricular and deep white matter likely relating to chronic small vessel ischemic disease. There are chronic ischemic changes of the pretty. No midline shift, mass effect, or extra-axial fluid collections are identified. No diffusion restriction is identified on diffusion-weighted imaging to suggest acute/subacute ischemic changes. No acute territorial infarction or acute intracranial hemorrhage is present. No abnormal enhancement or enhancing mass is seen. Dural venous sinuses are intact. Corpus callosum, optic chiasm, suprasellar cistern, and cerebellar tonsils are within normal range. Major vascular flow voids are noted. Bilateral orbits are intact.     1. No evidence of brain metastatic disease. 2. No acute intracranial process or abnormal enhancement. 3. Chronic small vessel ischemic disease. 4. Mild atrophy. Electronically signed by Mannie Chaudhry     LABORATORY STUDIES:   CBC:   Lab Results   Component Value Date/Time    WBC 14.9

## 2024-07-05 ENCOUNTER — APPOINTMENT (OUTPATIENT)
Dept: GENERAL RADIOLOGY | Age: 69
End: 2024-07-05
Payer: MEDICARE

## 2024-07-05 ENCOUNTER — HOSPITAL ENCOUNTER (EMERGENCY)
Age: 69
Discharge: HOME | End: 2024-07-06
Attending: STUDENT IN AN ORGANIZED HEALTH CARE EDUCATION/TRAINING PROGRAM
Payer: MEDICARE

## 2024-07-05 DIAGNOSIS — D64.9 ANEMIA, UNSPECIFIED TYPE: Primary | ICD-10-CM

## 2024-07-05 DIAGNOSIS — E87.6 HYPOKALEMIA: ICD-10-CM

## 2024-07-05 LAB
ALBUMIN SERPL-MCNC: 2.4 GM/DL (ref 3.4–5)
ALP BLD-CCNC: 66 IU/L (ref 40–129)
ALT SERPL-CCNC: 7 U/L (ref 10–40)
ANION GAP SERPL CALCULATED.3IONS-SCNC: 10 MMOL/L (ref 7–16)
AST SERPL-CCNC: 19 IU/L (ref 15–37)
BASOPHILS ABSOLUTE: 0.1 K/CU MM
BASOPHILS RELATIVE PERCENT: 1.2 % (ref 0–1)
BILIRUB SERPL-MCNC: 0.3 MG/DL (ref 0–1)
BUN SERPL-MCNC: 8 MG/DL (ref 6–23)
CALCIUM SERPL-MCNC: 7.9 MG/DL (ref 8.3–10.6)
CHLORIDE BLD-SCNC: 104 MMOL/L (ref 99–110)
CO2: 27 MMOL/L (ref 21–32)
CREAT SERPL-MCNC: 0.7 MG/DL (ref 0.6–1.1)
DIFFERENTIAL TYPE: ABNORMAL
EKG ATRIAL RATE: 67 BPM
EKG DIAGNOSIS: NORMAL
EKG P AXIS: 72 DEGREES
EKG P-R INTERVAL: 148 MS
EKG Q-T INTERVAL: 402 MS
EKG QRS DURATION: 70 MS
EKG QTC CALCULATION (BAZETT): 424 MS
EKG R AXIS: 26 DEGREES
EKG T AXIS: 42 DEGREES
EKG VENTRICULAR RATE: 67 BPM
EOSINOPHILS ABSOLUTE: 0.2 K/CU MM
EOSINOPHILS RELATIVE PERCENT: 3.7 % (ref 0–3)
GFR, ESTIMATED: >90 ML/MIN/1.73M2
GLUCOSE SERPL-MCNC: 142 MG/DL (ref 70–99)
HCT VFR BLD CALC: 26.6 % (ref 37–47)
HEMOGLOBIN: 8.6 GM/DL (ref 12.5–16)
IMMATURE NEUTROPHIL %: 0.8 % (ref 0–0.43)
LYMPHOCYTES ABSOLUTE: 0.8 K/CU MM
LYMPHOCYTES RELATIVE PERCENT: 16.4 % (ref 24–44)
MCH RBC QN AUTO: 30.8 PG (ref 27–31)
MCHC RBC AUTO-ENTMCNC: 32.3 % (ref 32–36)
MCV RBC AUTO: 95.3 FL (ref 78–100)
MONOCYTES ABSOLUTE: 1.1 K/CU MM
MONOCYTES RELATIVE PERCENT: 21.5 % (ref 0–4)
NEUTROPHILS ABSOLUTE: 2.9 K/CU MM
NEUTROPHILS RELATIVE PERCENT: 56.4 % (ref 36–66)
PDW BLD-RTO: 16.4 % (ref 11.7–14.9)
PLATELET # BLD: 263 K/CU MM (ref 140–440)
PMV BLD AUTO: 10.2 FL (ref 7.5–11.1)
POTASSIUM SERPL-SCNC: 3.1 MMOL/L (ref 3.5–5.1)
RBC # BLD: 2.79 M/CU MM (ref 4.2–5.4)
SODIUM BLD-SCNC: 141 MMOL/L (ref 135–145)
TOTAL IMMATURE NEUTOROPHIL: 0.04 K/CU MM
TOTAL PROTEIN: 5.1 GM/DL (ref 6.4–8.2)
TROPONIN, HIGH SENSITIVITY: 15 NG/L (ref 0–14)
WBC # BLD: 5.1 K/CU MM (ref 4–10.5)

## 2024-07-05 PROCEDURE — 99285 EMERGENCY DEPT VISIT HI MDM: CPT

## 2024-07-05 PROCEDURE — 84484 ASSAY OF TROPONIN QUANT: CPT

## 2024-07-05 PROCEDURE — 80053 COMPREHEN METABOLIC PANEL: CPT

## 2024-07-05 PROCEDURE — 93005 ELECTROCARDIOGRAM TRACING: CPT | Performed by: STUDENT IN AN ORGANIZED HEALTH CARE EDUCATION/TRAINING PROGRAM

## 2024-07-05 PROCEDURE — 85025 COMPLETE CBC W/AUTO DIFF WBC: CPT

## 2024-07-05 PROCEDURE — 71045 X-RAY EXAM CHEST 1 VIEW: CPT

## 2024-07-05 RX ORDER — POTASSIUM CHLORIDE 20 MEQ/1
40 TABLET, EXTENDED RELEASE ORAL ONCE
Status: COMPLETED | OUTPATIENT
Start: 2024-07-06 | End: 2024-07-06

## 2024-07-05 ASSESSMENT — PAIN SCALES - GENERAL: PAINLEVEL_OUTOF10: 0

## 2024-07-05 ASSESSMENT — PAIN - FUNCTIONAL ASSESSMENT: PAIN_FUNCTIONAL_ASSESSMENT: 0-10

## 2024-07-06 VITALS
BODY MASS INDEX: 27.48 KG/M2 | SYSTOLIC BLOOD PRESSURE: 114 MMHG | WEIGHT: 140 LBS | RESPIRATION RATE: 18 BRPM | HEIGHT: 60 IN | HEART RATE: 78 BPM | OXYGEN SATURATION: 96 % | DIASTOLIC BLOOD PRESSURE: 62 MMHG | TEMPERATURE: 98.5 F

## 2024-07-06 LAB — TROPONIN, HIGH SENSITIVITY: 15 NG/L (ref 0–14)

## 2024-07-06 PROCEDURE — 6370000000 HC RX 637 (ALT 250 FOR IP): Performed by: STUDENT IN AN ORGANIZED HEALTH CARE EDUCATION/TRAINING PROGRAM

## 2024-07-06 RX ORDER — POTASSIUM CHLORIDE 20 MEQ/1
20 TABLET, EXTENDED RELEASE ORAL 2 TIMES DAILY
Qty: 14 TABLET | Refills: 0 | Status: SHIPPED | OUTPATIENT
Start: 2024-07-06 | End: 2024-07-13

## 2024-07-06 RX ADMIN — POTASSIUM CHLORIDE 40 MEQ: 1500 TABLET, EXTENDED RELEASE ORAL at 01:15

## 2024-07-06 NOTE — ED PROVIDER NOTES
EMERGENCY DEPARTMENT HISTORY AND PHYSICAL EXAM      Patient Name: Lin Bates  MRN: [id number]  : 1955  Date of Evaluation: 2024  ED Provider:  Sonya Mari DO    History of Presenting Illness     Chief Complaint:   Chief Complaint   Patient presents with    Fatigue     X1-2 days       HPI: Patient is a 69 y.o. female with past medical history of hypertension, diabetes, and lung cancer status post radiation treatment who is presenting to the emergency department with chief complaint of fatigue.  Patient states starting yesterday she started to just feel tired and weak in general.  Patient denies fevers or chills.  Patient denies any cough or new congestion.  Patient denies any chest pain or shortness of breath.  Patient denies abdominal pain.  Patient states she is having formed brown bowel movements.  Patient denies any urinary symptoms.  Patient denies any new or worsening swelling.  Patient states she was recently started on a new medicine for a fungal infection which she has been taking for the last 3 days.  Patient is uncertain what medication this is          Past History     Past Medical History:   Past Medical History:   Diagnosis Date    Cancer (HCC)     History of external beam radiation therapy 2024    LEFT LUNG and LN 6,000 cGy in 30 fractions    Hyperlipidemia     Hypertension     Thickened endometrium     Type 2 diabetes mellitus without complication (HCC)      Surgical History:   Past Surgical History:   Procedure Laterality Date    CT NEEDLE BIOPSY LUNG PERCUTANEOUS  2024    CT NEEDLE BIOPSY LUNG PERCUTANEOUS 2024 [facility] CT SCAN    DENTAL SURGERY      's    DILATION AND CURETTAGE OF UTERUS N/A 3/22/2023    DILATATION AND CURETTAGE HYSTEROSCOPY performed by Zac Pretty MD at [facility] OR    ESOPHAGOSCOPY N/A 2024    ESOPHAGOSCOPY DIAGNOSTIC performed by Yenny Lindquist MD at [facility] ENDOSCOPY    PORT SURGERY N/A 4/10/2024    RIGHT SUBCLAVIAN MEDIPORT

## 2024-07-06 NOTE — DISCHARGE INSTRUCTIONS
Return to the hospital on Sunday for repeat blood work.  Call Dr. Birch and schedule follow-up appointment as soon as possible.  Return to emergency department if you develop new or worsening symptoms.

## 2024-07-06 NOTE — DISCHARGE INSTR - COC
Continuity of Care Form    Patient Name: Lin Bates   :  1955  MRN:  [id number]    Admit date:  2024  Discharge date:  ***    Code Status Order: Prior   Advance Directives:     Admitting Physician:  No admitting provider for patient encounter.  PCP: Dorothy Terrazas PA    Discharging Nurse: ***  Discharging Hospital Unit/Room#:   Discharging Unit Phone Number: ***    Emergency Contact:   Extended Emergency Contact Information  Primary Emergency Contact: Matt Bhat  Address: [address]  Home Phone: [phone]  Mobile Phone: [phone]  Relation: Boyfriend  Secondary Emergency Contact: Huang Jensen  Address: [address] [place] [address]  Home Phone: [phone]  Work Phone: [phone]  Mobile Phone: [phone]  Relation: Brother/Sister    Past Surgical History:  Past Surgical History:   Procedure Laterality Date    CT NEEDLE BIOPSY LUNG PERCUTANEOUS  2024    CT NEEDLE BIOPSY LUNG PERCUTANEOUS 2024 [facility] CT SCAN    DENTAL SURGERY      's    DILATION AND CURETTAGE OF UTERUS N/A 3/22/2023    DILATATION AND CURETTAGE HYSTEROSCOPY performed by Zac Pretty MD at [facility] OR    ESOPHAGOSCOPY N/A 2024    ESOPHAGOSCOPY DIAGNOSTIC performed by Yenny Lindquist MD at [facility] ENDOSCOPY    PORT SURGERY N/A 4/10/2024    RIGHT SUBCLAVIAN MEDIPORT INSERTION performed by Brian Humphries II, MD at [facility] OR       Immunization History:   Immunization History   Administered Date(s) Administered    COVID-19, MODERNA BLUE border, Primary or Immunocompromised, (age 12y+), IM, 100 mcg/0.5mL 2021, 2021    COVID-19, [place] Vaccine, Vaccine Unspecified 2021, 2021    Influenza Virus Vaccine 2018    Zoster Recombinant (Shingrix) 2023       Active Problems:  Patient Active Problem List   Diagnosis Code    Thrombocytosis D75.839    Leucocytosis D72.829     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    Thickened endometrium R93.89    PMB (postmenopausal bleeding) N95.0    Lung nodule, solitary R91.1    Need for hepatitis B screening test Z11.59    Small cell lung cancer, left lower lobe (HCC) C34.32    Severe anemia D64.9    Moderate malnutrition (HCC) E44.0    Drug therapy Z79.899       Isolation/Infection:   Isolation            No Isolation          Patient Infection Status       None to display            Nurse Assessment:  Last Vital Signs: /62   Pulse 78   Temp 98.5 °F (36.9 °C) (Oral)   Resp 18   Ht 1.524 m (5')   Wt 63.5 kg (140 lb)   SpO2 96%   BMI 27.34 kg/m²     Last documented pain score (0-10 scale): Pain Level: 0  Last Weight:   Wt Readings from Last 1 Encounters:   07/05/24 63.5 kg (140 lb)     Mental Status:  {IP PT MENTAL STATUS:48031}    IV Access:  { RUPINDER IV ACCESS:743968652}    Nursing Mobility/ADLs:  Walking   {CHP DME ADLs:889623276}  Transfer  {CHP DME ADLs:432610732}  Bathing  {CHP DME ADLs:228872635}  Dressing  {CHP DME ADLs:793112140}  Toileting  {CHP DME ADLs:962769858}  Feeding  {CHP DME ADLs:417107752}  Med Admin  {P DME ADLs:663768112}  Med Delivery   { RUPINDER MED Delivery:584066908}    Wound Care Documentation and Therapy:  Incision 04/10/24 Chest Upper;Right (Active)   Number of days: 86        Elimination:  Continence:   Bowel: {YES / NO:19727}  Bladder: {YES / NO:19727}  Urinary Catheter: {Urinary Catheter:508901300}   Colostomy/Ileostomy/Ileal Conduit: {YES / NO:19727}       Date of Last BM: ***  No intake or output data in the 24 hours ending 07/06/24 0135  No intake/output data recorded.    Safety Concerns:     { RUPINDER Safety Concerns:659257630}    Impairments/Disabilities:      { RUPINDER Impairments/Disabilities:238761650}    Nutrition Therapy:  Current Nutrition Therapy:   { RUPINDER Diet List:341275860}    Routes of Feeding: {CHP DME Other Feedings:652247121}  Liquids: {Slp liquid thickness:84474}  Daily Fluid

## 2024-07-07 ENCOUNTER — HOSPITAL ENCOUNTER (OUTPATIENT)
Age: 69
Discharge: HOME | End: 2024-07-07
Payer: MEDICARE

## 2024-07-07 DIAGNOSIS — D64.9 ANEMIA, UNSPECIFIED TYPE: ICD-10-CM

## 2024-07-07 DIAGNOSIS — E87.6 HYPOKALEMIA: ICD-10-CM

## 2024-07-07 LAB
ANION GAP SERPL CALCULATED.3IONS-SCNC: 13 MMOL/L (ref 7–16)
BASOPHILS ABSOLUTE: 0.1 K/CU MM
BASOPHILS RELATIVE PERCENT: 2.2 % (ref 0–1)
BUN SERPL-MCNC: 7 MG/DL (ref 6–23)
CALCIUM SERPL-MCNC: 8.9 MG/DL (ref 8.3–10.6)
CHLORIDE BLD-SCNC: 103 MMOL/L (ref 99–110)
CO2: 24 MMOL/L (ref 21–32)
CREAT SERPL-MCNC: 0.7 MG/DL (ref 0.6–1.1)
DIFFERENTIAL TYPE: ABNORMAL
EOSINOPHILS ABSOLUTE: 0.1 K/CU MM
EOSINOPHILS RELATIVE PERCENT: 1.5 % (ref 0–3)
GFR, ESTIMATED: >90 ML/MIN/1.73M2
GLUCOSE SERPL-MCNC: 110 MG/DL (ref 70–99)
HCT VFR BLD CALC: 38.4 % (ref 37–47)
HEMOGLOBIN: 12.3 GM/DL (ref 12.5–16)
IMMATURE NEUTROPHIL %: 0.2 % (ref 0–0.43)
LYMPHOCYTES ABSOLUTE: 0.9 K/CU MM
LYMPHOCYTES RELATIVE PERCENT: 20.9 % (ref 24–44)
MCH RBC QN AUTO: 30.9 PG (ref 27–31)
MCHC RBC AUTO-ENTMCNC: 32 % (ref 32–36)
MCV RBC AUTO: 96.5 FL (ref 78–100)
MONOCYTES ABSOLUTE: 0.8 K/CU MM
MONOCYTES RELATIVE PERCENT: 18.4 % (ref 0–4)
NEUTROPHILS ABSOLUTE: 2.3 K/CU MM
NEUTROPHILS RELATIVE PERCENT: 56.8 % (ref 36–66)
PDW BLD-RTO: 16.9 % (ref 11.7–14.9)
PLATELET # BLD: 399 K/CU MM (ref 140–440)
PMV BLD AUTO: 10.6 FL (ref 7.5–11.1)
POTASSIUM SERPL-SCNC: 5.3 MMOL/L (ref 3.5–5.1)
RBC # BLD: 3.98 M/CU MM (ref 4.2–5.4)
SODIUM BLD-SCNC: 140 MMOL/L (ref 135–145)
TOTAL IMMATURE NEUTOROPHIL: 0.01 K/CU MM
WBC # BLD: 4.1 K/CU MM (ref 4–10.5)

## 2024-07-07 PROCEDURE — 80048 BASIC METABOLIC PNL TOTAL CA: CPT

## 2024-07-07 PROCEDURE — 85025 COMPLETE CBC W/AUTO DIFF WBC: CPT

## 2024-07-08 LAB
EKG ATRIAL RATE: 67 BPM
EKG DIAGNOSIS: NORMAL
EKG P AXIS: 72 DEGREES
EKG P-R INTERVAL: 148 MS
EKG Q-T INTERVAL: 402 MS
EKG QRS DURATION: 70 MS
EKG QTC CALCULATION (BAZETT): 424 MS
EKG R AXIS: 26 DEGREES
EKG T AXIS: 42 DEGREES
EKG VENTRICULAR RATE: 67 BPM

## 2024-07-08 PROCEDURE — 93010 ELECTROCARDIOGRAM REPORT: CPT | Performed by: INTERNAL MEDICINE

## 2024-07-15 ENCOUNTER — APPOINTMENT (OUTPATIENT)
Dept: RADIATION ONCOLOGY | Age: 69
End: 2024-07-15
Payer: MEDICARE

## 2024-07-15 PROCEDURE — 77280 THER RAD SIMULAJ FIELD SMPL: CPT | Performed by: RADIOLOGY

## 2024-07-15 PROCEDURE — 77412 RADIATION TX DELIVERY LVL 3: CPT | Performed by: RADIOLOGY

## 2024-07-16 ENCOUNTER — APPOINTMENT (OUTPATIENT)
Dept: RADIATION ONCOLOGY | Age: 69
End: 2024-07-16
Payer: MEDICARE

## 2024-07-16 ENCOUNTER — HOSPITAL ENCOUNTER (OUTPATIENT)
Dept: RADIATION ONCOLOGY | Age: 69
Discharge: HOME | End: 2024-07-16
Payer: MEDICARE

## 2024-07-16 PROCEDURE — 77412 RADIATION TX DELIVERY LVL 3: CPT | Performed by: RADIOLOGY

## 2024-07-16 ASSESSMENT — PAIN SCALES - GENERAL: PAINLEVEL_OUTOF10: 0

## 2024-07-16 NOTE — PROGRESS NOTES
Weekly Radiation Treatment Progress Note    DATE OF SERVICE: 7/16/2024     DIAGNOSIS: limited stage small cell lung cancer    TREATMENT COURSE:       Site: brain, PCI   Current Total Radiation Dose: 500 cGy  Fraction: 2/10    Pt doing well. Energy fairly good.  No neuro symptoms. Still has swallowing issues but better than it was a few months ago. Food still gets stuck at times.       EXAM  Wt Readings from Last 3 Encounters:   07/05/24 63.5 kg (140 lb)   07/02/24 63.9 kg (140 lb 12.8 oz)   06/20/24 66 kg (145 lb 6.4 oz)     NAD      Setup images, chart, plan reviewed    A/P:   Tolerating RT well  Continue RT as planned      Electronically signed by Hunter Guo MD on 7/16/2024 at 12:58 PM

## 2024-07-17 ENCOUNTER — APPOINTMENT (OUTPATIENT)
Dept: RADIATION ONCOLOGY | Age: 69
End: 2024-07-17
Payer: MEDICARE

## 2024-07-17 PROCEDURE — 77412 RADIATION TX DELIVERY LVL 3: CPT | Performed by: RADIOLOGY

## 2024-07-18 ENCOUNTER — APPOINTMENT (OUTPATIENT)
Dept: RADIATION ONCOLOGY | Age: 69
End: 2024-07-18
Payer: MEDICARE

## 2024-07-18 PROCEDURE — 77412 RADIATION TX DELIVERY LVL 3: CPT | Performed by: RADIOLOGY

## 2024-07-19 ENCOUNTER — APPOINTMENT (OUTPATIENT)
Dept: RADIATION ONCOLOGY | Age: 69
End: 2024-07-19
Payer: MEDICARE

## 2024-07-19 PROCEDURE — 77412 RADIATION TX DELIVERY LVL 3: CPT | Performed by: RADIOLOGY

## 2024-07-21 NOTE — PROGRESS NOTES
Patient Name: Lin Bates  Patient : 1955  Patient MRN: [id number]     Primary Oncologist: Burt Birch MD  Referring Provider: Dorothy Terrazas PA     Date of Service: 2024      Chief Complaint:   Chief Complaint   Patient presents with    Follow-up     Patient Active Problem List:       Leukocytosis       Thrombocytosis    HPI:   Ms. Bates is a 69-year-old very pleasant female with medical history significant for hypertension, hyperlipidemia, diabetes mellitus, history of HPV infection, initially referred to me on 2014, for evaluation of mild leukocytosis.       She was found to have mild leukocytosis on routine blood tests done on 2014, by her primary care physician. She is a chronic smoker and she smokes about 10 cigarettes a day since she was 14.     Laboratory workup on 2014, showed slight elevation in white blood cell count (13). Her hemoglobin, hematocrit, platelet count and LDH were normal. There was no significant immunophenotypic abnormalities in flow cytometry. JAK2  and bcr/abl studies were negative.     She has been followed periodically since then. She missed follow up with me since 2019 until 2022.     Laboratory work ups done on 22 showed leukocytosis (WBC 16.1), thrombocytosis (platelet 651) and flow cytometry showed monoclonal B-cell population (~450 cells/cumm). The immunophenotype of the clonal cells is non-specific. DDX includes peripheral blood involvement by a B-cell lymphoma and monoclonal B-cell lymphocytosis.     The rests of the blood tests, including BCR-ABL1, JAK2 V617F, JAK2 exon 12-13, MPL, CALR, MARIKA, ESR and LDH, were within normal range.      CT scan of the neck, chest, abdomen and pelvis done on 2022 showed no significant pathology in her neck.  No splenomegaly or overt lymphadenopathy.  There are scattered mildly prominent left internal mammary, upper abdominal and left common iliac lymph nodes.

## 2024-07-22 ENCOUNTER — APPOINTMENT (OUTPATIENT)
Dept: RADIATION ONCOLOGY | Age: 69
End: 2024-07-22
Payer: MEDICARE

## 2024-07-22 PROCEDURE — 77417 THER RADIOLOGY PORT IMAGE(S): CPT | Performed by: RADIOLOGY

## 2024-07-22 PROCEDURE — 77412 RADIATION TX DELIVERY LVL 3: CPT | Performed by: RADIOLOGY

## 2024-07-22 PROCEDURE — 77336 RADIATION PHYSICS CONSULT: CPT | Performed by: RADIOLOGY

## 2024-07-23 ENCOUNTER — APPOINTMENT (OUTPATIENT)
Dept: RADIATION ONCOLOGY | Age: 69
End: 2024-07-23
Payer: MEDICARE

## 2024-07-23 ENCOUNTER — HOSPITAL ENCOUNTER (OUTPATIENT)
Dept: RADIATION ONCOLOGY | Age: 69
Discharge: HOME | End: 2024-07-23
Payer: MEDICARE

## 2024-07-23 PROCEDURE — 77412 RADIATION TX DELIVERY LVL 3: CPT | Performed by: RADIOLOGY

## 2024-07-23 NOTE — PROGRESS NOTES
Weekly Radiation Treatment Progress Note    DATE OF SERVICE: 7/23/2024     DIAGNOSIS: limited small cell lung cancer     TREATMENT COURSE:     Site: brain, PCI   Current Total Radiation Dose: 1750 cGy  Fraction: 7/10    Pt doing well. Energy fairly good.  Doing well, swallowing slowly improving. May have to get back in at some point for dilation. Seeing Dr. Birch on Thursday.       EXAM  Wt Readings from Last 3 Encounters:   07/05/24 63.5 kg (140 lb)   07/02/24 63.9 kg (140 lb 12.8 oz)   06/20/24 66 kg (145 lb 6.4 oz)     NAD      Setup images, chart, plan reviewed    A/P:   Tolerating RT well  Continue RT as planned      Electronically signed by Hunter Guo MD on 7/23/2024 at 12:54 PM

## 2024-07-24 ENCOUNTER — APPOINTMENT (OUTPATIENT)
Dept: RADIATION ONCOLOGY | Age: 69
End: 2024-07-24
Payer: MEDICARE

## 2024-07-24 PROCEDURE — 77412 RADIATION TX DELIVERY LVL 3: CPT | Performed by: RADIOLOGY

## 2024-07-25 ENCOUNTER — NURSE ONLY (OUTPATIENT)
Dept: ONCOLOGY | Age: 69
End: 2024-07-25

## 2024-07-25 ENCOUNTER — HOSPITAL ENCOUNTER (OUTPATIENT)
Dept: INFUSION THERAPY | Age: 69
Discharge: HOME | End: 2024-07-25
Payer: MEDICARE

## 2024-07-25 ENCOUNTER — APPOINTMENT (OUTPATIENT)
Dept: RADIATION ONCOLOGY | Age: 69
End: 2024-07-25
Payer: MEDICARE

## 2024-07-25 ENCOUNTER — OFFICE VISIT (OUTPATIENT)
Dept: ONCOLOGY | Age: 69
End: 2024-07-25
Payer: MEDICARE

## 2024-07-25 VITALS
WEIGHT: 133.6 LBS | HEIGHT: 60 IN | TEMPERATURE: 96.9 F | OXYGEN SATURATION: 96 % | DIASTOLIC BLOOD PRESSURE: 48 MMHG | BODY MASS INDEX: 26.23 KG/M2 | HEART RATE: 77 BPM | SYSTOLIC BLOOD PRESSURE: 99 MMHG

## 2024-07-25 VITALS
TEMPERATURE: 96.9 F | HEART RATE: 77 BPM | OXYGEN SATURATION: 96 % | DIASTOLIC BLOOD PRESSURE: 48 MMHG | SYSTOLIC BLOOD PRESSURE: 99 MMHG | BODY MASS INDEX: 26.23 KG/M2 | WEIGHT: 133.6 LBS | HEIGHT: 60 IN

## 2024-07-25 DIAGNOSIS — Z79.899 DRUG THERAPY: ICD-10-CM

## 2024-07-25 DIAGNOSIS — C34.32 SMALL CELL LUNG CANCER, LEFT LOWER LOBE (HCC): Primary | ICD-10-CM

## 2024-07-25 DIAGNOSIS — C34.32 SMALL CELL LUNG CANCER, LEFT LOWER LOBE (HCC): ICD-10-CM

## 2024-07-25 DIAGNOSIS — Z79.899 DRUG THERAPY: Primary | ICD-10-CM

## 2024-07-25 DIAGNOSIS — D75.839 THROMBOCYTOSIS: ICD-10-CM

## 2024-07-25 LAB
ALBUMIN SERPL-MCNC: 3.1 GM/DL (ref 3.4–5)
ALP BLD-CCNC: 78 IU/L (ref 40–129)
ALT SERPL-CCNC: 9 U/L (ref 10–40)
ANION GAP SERPL CALCULATED.3IONS-SCNC: 10 MMOL/L (ref 7–16)
AST SERPL-CCNC: 19 IU/L (ref 15–37)
BASOPHILS ABSOLUTE: 0 K/CU MM
BASOPHILS RELATIVE PERCENT: 0.3 % (ref 0–1)
BILIRUB SERPL-MCNC: 0.3 MG/DL (ref 0–1)
BUN SERPL-MCNC: 17 MG/DL (ref 6–23)
CALCIUM SERPL-MCNC: 8.6 MG/DL (ref 8.3–10.6)
CHLORIDE BLD-SCNC: 105 MMOL/L (ref 99–110)
CO2: 27 MMOL/L (ref 21–32)
CORTISOL, PLASMA: 11.4
CREAT SERPL-MCNC: 0.9 MG/DL (ref 0.6–1.1)
DIFFERENTIAL TYPE: ABNORMAL
EOSINOPHILS ABSOLUTE: 0.1 K/CU MM
EOSINOPHILS RELATIVE PERCENT: 2.5 % (ref 0–3)
GFR, ESTIMATED: 69 ML/MIN/1.73M2
GLUCOSE SERPL-MCNC: 162 MG/DL (ref 70–99)
HCT VFR BLD CALC: 34.1 % (ref 37–47)
HEMOGLOBIN: 11 GM/DL (ref 12.5–16)
LACTATE DEHYDROGENASE: 90 IU/L (ref 120–246)
LYMPHOCYTES ABSOLUTE: 0.7 K/CU MM
LYMPHOCYTES RELATIVE PERCENT: 17 % (ref 24–44)
MCH RBC QN AUTO: 31.7 PG (ref 27–31)
MCHC RBC AUTO-ENTMCNC: 32.3 % (ref 32–36)
MCV RBC AUTO: 98.3 FL (ref 78–100)
MONOCYTES ABSOLUTE: 0.6 K/CU MM
MONOCYTES RELATIVE PERCENT: 14.5 % (ref 0–4)
NEUTROPHILS ABSOLUTE: 2.6 K/CU MM
NEUTROPHILS RELATIVE PERCENT: 65.7 % (ref 36–66)
PDW BLD-RTO: 17.1 % (ref 11.7–14.9)
PLATELET # BLD: 223 K/CU MM (ref 140–440)
PMV BLD AUTO: 9.7 FL (ref 7.5–11.1)
POTASSIUM SERPL-SCNC: 3.9 MMOL/L (ref 3.5–5.1)
RBC # BLD: 3.47 M/CU MM (ref 4.2–5.4)
SODIUM BLD-SCNC: 142 MMOL/L (ref 135–145)
TOTAL PROTEIN: 5.9 GM/DL (ref 6.4–8.2)
TSH SERPL DL<=0.005 MIU/L-ACNC: 0.86 UIU/ML (ref 0.27–4.2)
WBC # BLD: 3.9 K/CU MM (ref 4–10.5)

## 2024-07-25 PROCEDURE — G8427 DOCREV CUR MEDS BY ELIG CLIN: HCPCS | Performed by: INTERNAL MEDICINE

## 2024-07-25 PROCEDURE — 99213 OFFICE O/P EST LOW 20 MIN: CPT

## 2024-07-25 PROCEDURE — 1090F PRES/ABSN URINE INCON ASSESS: CPT | Performed by: INTERNAL MEDICINE

## 2024-07-25 PROCEDURE — 1124F ACP DISCUSS-NO DSCNMKR DOCD: CPT | Performed by: INTERNAL MEDICINE

## 2024-07-25 PROCEDURE — 83615 LACTATE (LD) (LDH) ENZYME: CPT

## 2024-07-25 PROCEDURE — 3017F COLORECTAL CA SCREEN DOC REV: CPT | Performed by: INTERNAL MEDICINE

## 2024-07-25 PROCEDURE — G8417 CALC BMI ABV UP PARAM F/U: HCPCS | Performed by: INTERNAL MEDICINE

## 2024-07-25 PROCEDURE — 84443 ASSAY THYROID STIM HORMONE: CPT

## 2024-07-25 PROCEDURE — G8399 PT W/DXA RESULTS DOCUMENT: HCPCS | Performed by: INTERNAL MEDICINE

## 2024-07-25 PROCEDURE — 1036F TOBACCO NON-USER: CPT | Performed by: INTERNAL MEDICINE

## 2024-07-25 PROCEDURE — 77412 RADIATION TX DELIVERY LVL 3: CPT | Performed by: RADIOLOGY

## 2024-07-25 PROCEDURE — 85025 COMPLETE CBC W/AUTO DIFF WBC: CPT

## 2024-07-25 PROCEDURE — 82533 TOTAL CORTISOL: CPT

## 2024-07-25 PROCEDURE — 36415 COLL VENOUS BLD VENIPUNCTURE: CPT

## 2024-07-25 PROCEDURE — 99214 OFFICE O/P EST MOD 30 MIN: CPT | Performed by: INTERNAL MEDICINE

## 2024-07-25 PROCEDURE — 80053 COMPREHEN METABOLIC PANEL: CPT

## 2024-07-25 NOTE — PROGRESS NOTES
BON BLANK NOTE    Patient: Lin Bates  MRN: [id number]    Date: 7/25/2024        Treatment planning               Zara Glass CMA

## 2024-07-25 NOTE — PROGRESS NOTES
Patient arrived with her  for treatment planning and chemotherapy education.  Discussed treatment plan, potential side effects, side effect prevention, and symptom management.  Reviewed chemotherapy education folder and drug monograph.  Patient's regimen will be Imfinzi day 1 every 28 days.    Patient signed chemotherapy consent for Imfinzi. Copy of consent given to patient.    Reviewed chemotherapy schedule.     Patient given on-call phone number for after office hours and weekends. Magnet given with after hours, prescription and results phone numbers.    This RN direct contact information given to patient for patient to call with any questions/concerns..    CBC, CMP and other ordered pre-chemo labs drawn    Distress thermometer given to patient to fill out - this RN reviewed with patient. Patient deferred any referrals at this time.  Copy given to Psychosocial Coordinator.

## 2024-07-25 NOTE — PROGRESS NOTES
MA Rooming Questions  Patient: Lin Bates  MRN: [id number]    Date: 7/25/2024        1. Do you have any new issues?   no         2. Do you need any refills on medications?    no    3. Have you had any imaging done since your last visit?   no    4. Have you been hospitalized or seen in the emergency room since your last visit here?   no    5. Did the patient have a depression screening completed today? No    No data recorded     PHQ-9 Given to (if applicable):               PHQ-9 Score (if applicable):                     [] Positive     []  Negative              Does question #9 need addressed (if applicable)                     [] Yes    []  No               Zara Glass CMA

## 2024-07-26 ENCOUNTER — TELEPHONE (OUTPATIENT)
Dept: ONCOLOGY | Age: 69
End: 2024-07-26

## 2024-07-26 ENCOUNTER — APPOINTMENT (OUTPATIENT)
Dept: RADIATION ONCOLOGY | Age: 69
End: 2024-07-26
Payer: MEDICARE

## 2024-07-26 PROCEDURE — 77336 RADIATION PHYSICS CONSULT: CPT | Performed by: RADIOLOGY

## 2024-07-26 PROCEDURE — 77412 RADIATION TX DELIVERY LVL 3: CPT | Performed by: RADIOLOGY

## 2024-07-26 NOTE — TELEPHONE ENCOUNTER
Patient returned call to this RN called patient as per Dr. Birch request to inform her of the following as per Dr. Birch- platelet count was normal on today labs. Patient is to continue with hydrea 500 mg  PO every other day for now.   Patient verbalizes understanding, has no questions or further needs at this time.

## 2024-07-26 NOTE — TELEPHONE ENCOUNTER
This RN called patient as per Dr. Birch request to inform her of the following as per Dr. Birch- platelet count was normal on today labs. Patient is to continue with hydrea 500 mg  PO every other day for now.   Patient did not answer. Message left with only this RN name and contact information for call back.

## 2024-07-31 ENCOUNTER — HOSPITAL ENCOUNTER (OUTPATIENT)
Dept: INFUSION THERAPY | Age: 69
Discharge: HOME | End: 2024-07-31
Payer: MEDICARE

## 2024-07-31 VITALS
OXYGEN SATURATION: 99 % | DIASTOLIC BLOOD PRESSURE: 54 MMHG | SYSTOLIC BLOOD PRESSURE: 111 MMHG | HEART RATE: 78 BPM | HEIGHT: 60 IN | BODY MASS INDEX: 25.13 KG/M2 | WEIGHT: 128 LBS | TEMPERATURE: 98.8 F

## 2024-07-31 DIAGNOSIS — Z79.899 DRUG THERAPY: Primary | ICD-10-CM

## 2024-07-31 DIAGNOSIS — C34.32 SMALL CELL LUNG CANCER, LEFT LOWER LOBE (HCC): ICD-10-CM

## 2024-07-31 LAB
ALBUMIN SERPL-MCNC: 3.4 GM/DL (ref 3.4–5)
ALP BLD-CCNC: 63 IU/L (ref 40–128)
ALT SERPL-CCNC: 13 U/L (ref 10–40)
ANION GAP SERPL CALCULATED.3IONS-SCNC: 12 MMOL/L (ref 7–16)
AST SERPL-CCNC: 23 IU/L (ref 15–37)
BASOPHILS ABSOLUTE: 0 K/CU MM
BASOPHILS RELATIVE PERCENT: 0.5 % (ref 0–1)
BILIRUB SERPL-MCNC: 0.4 MG/DL (ref 0–1)
BUN SERPL-MCNC: 13 MG/DL (ref 6–23)
CALCIUM SERPL-MCNC: 8.9 MG/DL (ref 8.3–10.6)
CHLORIDE BLD-SCNC: 105 MMOL/L (ref 99–110)
CO2: 26 MMOL/L (ref 21–32)
CREAT SERPL-MCNC: 0.8 MG/DL (ref 0.6–1.1)
DIFFERENTIAL TYPE: ABNORMAL
EOSINOPHILS ABSOLUTE: 0.1 K/CU MM
EOSINOPHILS RELATIVE PERCENT: 2.2 % (ref 0–3)
GFR, ESTIMATED: 80 ML/MIN/1.73M2
GLUCOSE SERPL-MCNC: 126 MG/DL (ref 70–99)
HCT VFR BLD CALC: 32.9 % (ref 37–47)
HEMOGLOBIN: 10.9 GM/DL (ref 12.5–16)
LYMPHOCYTES ABSOLUTE: 0.7 K/CU MM
LYMPHOCYTES RELATIVE PERCENT: 16.8 % (ref 24–44)
MCH RBC QN AUTO: 31.8 PG (ref 27–31)
MCHC RBC AUTO-ENTMCNC: 33.1 % (ref 32–36)
MCV RBC AUTO: 95.9 FL (ref 78–100)
MONOCYTES ABSOLUTE: 0.7 K/CU MM
MONOCYTES RELATIVE PERCENT: 16.5 % (ref 0–4)
NEUTROPHILS ABSOLUTE: 2.7 K/CU MM
NEUTROPHILS RELATIVE PERCENT: 64 % (ref 36–66)
PDW BLD-RTO: 17 % (ref 11.7–14.9)
PLATELET # BLD: 249 K/CU MM (ref 140–440)
PMV BLD AUTO: 9.5 FL (ref 7.5–11.1)
POTASSIUM SERPL-SCNC: 3.9 MMOL/L (ref 3.5–5.1)
RBC # BLD: 3.43 M/CU MM (ref 4.2–5.4)
SODIUM BLD-SCNC: 143 MMOL/L (ref 135–145)
TOTAL PROTEIN: 6.8 GM/DL (ref 6.4–8.2)
WBC # BLD: 4.2 K/CU MM (ref 4–10.5)

## 2024-07-31 PROCEDURE — 80053 COMPREHEN METABOLIC PANEL: CPT

## 2024-07-31 PROCEDURE — 6360000002 HC RX W HCPCS: Performed by: INTERNAL MEDICINE

## 2024-07-31 PROCEDURE — 96413 CHEMO IV INFUSION 1 HR: CPT

## 2024-07-31 PROCEDURE — 85025 COMPLETE CBC W/AUTO DIFF WBC: CPT

## 2024-07-31 PROCEDURE — 2580000003 HC RX 258: Performed by: INTERNAL MEDICINE

## 2024-07-31 RX ORDER — HEPARIN 100 UNIT/ML
500 SYRINGE INTRAVENOUS PRN
Status: CANCELLED | OUTPATIENT
Start: 2024-07-31

## 2024-07-31 RX ORDER — ONDANSETRON 2 MG/ML
8 INJECTION INTRAMUSCULAR; INTRAVENOUS
Status: CANCELLED | OUTPATIENT
Start: 2024-07-31

## 2024-07-31 RX ORDER — SODIUM CHLORIDE 9 MG/ML
INJECTION, SOLUTION INTRAVENOUS CONTINUOUS
OUTPATIENT
Start: 2024-08-28

## 2024-07-31 RX ORDER — ONDANSETRON 2 MG/ML
8 INJECTION INTRAMUSCULAR; INTRAVENOUS
OUTPATIENT
Start: 2024-08-28

## 2024-07-31 RX ORDER — ALBUTEROL SULFATE 90 UG/1
4 AEROSOL, METERED RESPIRATORY (INHALATION) PRN
OUTPATIENT
Start: 2024-08-28

## 2024-07-31 RX ORDER — SODIUM CHLORIDE 9 MG/ML
5-250 INJECTION, SOLUTION INTRAVENOUS PRN
Status: DISCONTINUED | OUTPATIENT
Start: 2024-07-31 | End: 2024-08-01 | Stop reason: HOSPADM

## 2024-07-31 RX ORDER — SODIUM CHLORIDE 9 MG/ML
5-250 INJECTION, SOLUTION INTRAVENOUS PRN
OUTPATIENT
Start: 2024-09-25

## 2024-07-31 RX ORDER — EPINEPHRINE 1 MG/ML
0.3 INJECTION, SOLUTION, CONCENTRATE INTRAVENOUS PRN
Status: CANCELLED | OUTPATIENT
Start: 2024-07-31

## 2024-07-31 RX ORDER — FAMOTIDINE 10 MG/ML
20 INJECTION, SOLUTION INTRAVENOUS
Status: CANCELLED | OUTPATIENT
Start: 2024-07-31

## 2024-07-31 RX ORDER — ACETAMINOPHEN 325 MG/1
650 TABLET ORAL
OUTPATIENT
Start: 2024-09-25

## 2024-07-31 RX ORDER — SODIUM CHLORIDE 0.9 % (FLUSH) 0.9 %
5-40 SYRINGE (ML) INJECTION PRN
Status: DISCONTINUED | OUTPATIENT
Start: 2024-07-31 | End: 2024-08-01 | Stop reason: HOSPADM

## 2024-07-31 RX ORDER — MEPERIDINE HYDROCHLORIDE 25 MG/ML
12.5 INJECTION INTRAMUSCULAR; INTRAVENOUS; SUBCUTANEOUS PRN
Status: CANCELLED | OUTPATIENT
Start: 2024-07-31

## 2024-07-31 RX ORDER — SODIUM CHLORIDE 9 MG/ML
5-250 INJECTION, SOLUTION INTRAVENOUS PRN
OUTPATIENT
Start: 2024-08-28

## 2024-07-31 RX ORDER — SODIUM CHLORIDE 9 MG/ML
INJECTION, SOLUTION INTRAVENOUS CONTINUOUS
OUTPATIENT
Start: 2024-09-25

## 2024-07-31 RX ORDER — ONDANSETRON 2 MG/ML
8 INJECTION INTRAMUSCULAR; INTRAVENOUS
OUTPATIENT
Start: 2024-09-25

## 2024-07-31 RX ORDER — ALBUTEROL SULFATE 90 UG/1
4 AEROSOL, METERED RESPIRATORY (INHALATION) PRN
OUTPATIENT
Start: 2024-09-25

## 2024-07-31 RX ORDER — SODIUM CHLORIDE 0.9 % (FLUSH) 0.9 %
5-40 SYRINGE (ML) INJECTION PRN
OUTPATIENT
Start: 2024-09-25

## 2024-07-31 RX ORDER — EPINEPHRINE 1 MG/ML
0.3 INJECTION, SOLUTION, CONCENTRATE INTRAVENOUS PRN
OUTPATIENT
Start: 2024-08-28

## 2024-07-31 RX ORDER — ACETAMINOPHEN 325 MG/1
650 TABLET ORAL
Status: CANCELLED | OUTPATIENT
Start: 2024-07-31

## 2024-07-31 RX ORDER — ALBUTEROL SULFATE 90 UG/1
4 AEROSOL, METERED RESPIRATORY (INHALATION) PRN
Status: CANCELLED | OUTPATIENT
Start: 2024-07-31

## 2024-07-31 RX ORDER — ACETAMINOPHEN 325 MG/1
650 TABLET ORAL
OUTPATIENT
Start: 2024-08-28

## 2024-07-31 RX ORDER — MEPERIDINE HYDROCHLORIDE 25 MG/ML
12.5 INJECTION INTRAMUSCULAR; INTRAVENOUS; SUBCUTANEOUS PRN
OUTPATIENT
Start: 2024-09-25

## 2024-07-31 RX ORDER — DIPHENHYDRAMINE HYDROCHLORIDE 50 MG/ML
50 INJECTION INTRAMUSCULAR; INTRAVENOUS
OUTPATIENT
Start: 2024-09-25

## 2024-07-31 RX ORDER — HEPARIN 100 UNIT/ML
500 SYRINGE INTRAVENOUS PRN
OUTPATIENT
Start: 2024-09-25

## 2024-07-31 RX ORDER — SODIUM CHLORIDE 0.9 % (FLUSH) 0.9 %
5-40 SYRINGE (ML) INJECTION PRN
OUTPATIENT
Start: 2024-08-28

## 2024-07-31 RX ORDER — SODIUM CHLORIDE 9 MG/ML
INJECTION, SOLUTION INTRAVENOUS CONTINUOUS
Status: CANCELLED | OUTPATIENT
Start: 2024-07-31

## 2024-07-31 RX ORDER — FAMOTIDINE 10 MG/ML
20 INJECTION, SOLUTION INTRAVENOUS
OUTPATIENT
Start: 2024-08-28

## 2024-07-31 RX ORDER — DIPHENHYDRAMINE HYDROCHLORIDE 50 MG/ML
50 INJECTION INTRAMUSCULAR; INTRAVENOUS
Status: CANCELLED | OUTPATIENT
Start: 2024-07-31

## 2024-07-31 RX ORDER — FAMOTIDINE 10 MG/ML
20 INJECTION, SOLUTION INTRAVENOUS
OUTPATIENT
Start: 2024-09-25

## 2024-07-31 RX ORDER — DIPHENHYDRAMINE HYDROCHLORIDE 50 MG/ML
50 INJECTION INTRAMUSCULAR; INTRAVENOUS
OUTPATIENT
Start: 2024-08-28

## 2024-07-31 RX ORDER — SODIUM CHLORIDE 9 MG/ML
5-250 INJECTION, SOLUTION INTRAVENOUS PRN
Status: CANCELLED | OUTPATIENT
Start: 2024-07-31

## 2024-07-31 RX ORDER — MEPERIDINE HYDROCHLORIDE 25 MG/ML
12.5 INJECTION INTRAMUSCULAR; INTRAVENOUS; SUBCUTANEOUS PRN
OUTPATIENT
Start: 2024-08-28

## 2024-07-31 RX ORDER — EPINEPHRINE 1 MG/ML
0.3 INJECTION, SOLUTION, CONCENTRATE INTRAVENOUS PRN
OUTPATIENT
Start: 2024-09-25

## 2024-07-31 RX ORDER — HEPARIN 100 UNIT/ML
500 SYRINGE INTRAVENOUS PRN
OUTPATIENT
Start: 2024-08-28

## 2024-07-31 RX ADMIN — SODIUM CHLORIDE 1500 MG: 9 INJECTION, SOLUTION INTRAVENOUS at 15:33

## 2024-07-31 NOTE — PROGRESS NOTES
Status appropriately assessed and documented. All required labs and results reviewed. Treatment approved by provider. Treatment orders and medications verified by 2 Registered Nurses where applicable. Treatment plan was confirmed with patient prior to administration, and educated the need to report any treatment-related symptoms      Ambulated to infusion area, here today for chemotherapy. Pt has been fatigued and poor appetite. Right chest mediport accessed, positive blood return noted. Labs reviewed, Labs within defined limits.  Chemo administered as ordered. Call light within reach. Tolerated infusion without incident.  Discharge instructions provided.

## 2024-08-07 ENCOUNTER — HOSPITAL ENCOUNTER (OUTPATIENT)
Dept: INFUSION THERAPY | Age: 69
Discharge: HOME | End: 2024-08-07
Payer: MEDICARE

## 2024-08-07 ENCOUNTER — OFFICE VISIT (OUTPATIENT)
Dept: ONCOLOGY | Age: 69
End: 2024-08-07
Payer: MEDICARE

## 2024-08-07 VITALS
WEIGHT: 132.4 LBS | HEIGHT: 60 IN | SYSTOLIC BLOOD PRESSURE: 131 MMHG | HEART RATE: 65 BPM | OXYGEN SATURATION: 100 % | TEMPERATURE: 97.3 F | BODY MASS INDEX: 26 KG/M2 | DIASTOLIC BLOOD PRESSURE: 61 MMHG

## 2024-08-07 DIAGNOSIS — C34.32 SMALL CELL LUNG CANCER, LEFT LOWER LOBE (HCC): Primary | ICD-10-CM

## 2024-08-07 PROCEDURE — 99211 OFF/OP EST MAY X REQ PHY/QHP: CPT

## 2024-08-07 PROCEDURE — G8417 CALC BMI ABV UP PARAM F/U: HCPCS | Performed by: PHYSICIAN ASSISTANT

## 2024-08-07 PROCEDURE — G8399 PT W/DXA RESULTS DOCUMENT: HCPCS | Performed by: PHYSICIAN ASSISTANT

## 2024-08-07 PROCEDURE — 3017F COLORECTAL CA SCREEN DOC REV: CPT | Performed by: PHYSICIAN ASSISTANT

## 2024-08-07 PROCEDURE — G8427 DOCREV CUR MEDS BY ELIG CLIN: HCPCS | Performed by: PHYSICIAN ASSISTANT

## 2024-08-07 PROCEDURE — 1036F TOBACCO NON-USER: CPT | Performed by: PHYSICIAN ASSISTANT

## 2024-08-07 PROCEDURE — 1124F ACP DISCUSS-NO DSCNMKR DOCD: CPT | Performed by: PHYSICIAN ASSISTANT

## 2024-08-07 PROCEDURE — 99213 OFFICE O/P EST LOW 20 MIN: CPT | Performed by: PHYSICIAN ASSISTANT

## 2024-08-07 PROCEDURE — 1090F PRES/ABSN URINE INCON ASSESS: CPT | Performed by: PHYSICIAN ASSISTANT

## 2024-08-07 NOTE — PROGRESS NOTES
Patient Name: Lin Bates  Patient : 1955  Patient MRN: [id number]     Primary Oncologist: Burt Birch MD  Referring Provider: Dorothy Terrazas PA     Date of Service: 2024      Chief Complaint:   Chief Complaint   Patient presents with    Follow-up     Patient Active Problem List:       Leukocytosis       Thrombocytosis    HPI:   Ms. Bates is a 69-year-old very pleasant female with medical history significant for hypertension, hyperlipidemia, diabetes mellitus, history of HPV infection, initially referred to me on 2014, for evaluation of mild leukocytosis.       She was found to have mild leukocytosis on routine blood tests done on 2014, by her primary care physician. She is a chronic smoker and she smokes about 10 cigarettes a day since she was 14.     Laboratory workup on 2014, showed slight elevation in white blood cell count (13). Her hemoglobin, hematocrit, platelet count and LDH were normal. There was no significant immunophenotypic abnormalities in flow cytometry. JAK2  and bcr/abl studies were negative.     She has been followed periodically since then. She missed follow up with me since 2019 until 2022.     Laboratory work ups done on 22 showed leukocytosis (WBC 16.1), thrombocytosis (platelet 651) and flow cytometry showed monoclonal B-cell population (~450 cells/cumm). The immunophenotype of the clonal cells is non-specific. DDX includes peripheral blood involvement by a B-cell lymphoma and monoclonal B-cell lymphocytosis.     The rests of the blood tests, including BCR-ABL1, JAK2 V617F, JAK2 exon 12-13, MPL, CALR, MARIKA, ESR and LDH, were within normal range.      CT scan of the neck, chest, abdomen and pelvis done on 2022 showed no significant pathology in her neck.  No splenomegaly or overt lymphadenopathy.  There are scattered mildly prominent left internal mammary, upper abdominal and left common iliac lymph nodes.  There

## 2024-08-07 NOTE — PROGRESS NOTES
MA Rooming Questions  Patient: Lin Bates  MRN: [id number]    Date: 8/7/2024        1. Do you have any new issues?   no         2. Do you need any refills on medications?    no    3. Have you had any imaging done since your last visit?   no    4. Have you been hospitalized or seen in the emergency room since your last visit here?   no    5. Did the patient have a depression screening completed today? No    No data recorded     PHQ-9 Given to (if applicable):               PHQ-9 Score (if applicable):                     [] Positive     []  Negative              Does question #9 need addressed (if applicable)                     [] Yes    []  No               Tracy Car MA

## 2024-08-25 NOTE — PROGRESS NOTES
Patient Name: Lin Bates  Patient : 1955  Patient MRN: [id number]     Primary Oncologist: Burt Birch MD  Referring Provider: Dorothy Terarzas PA     Date of Service: 2024      Chief Complaint:   Chief Complaint   Patient presents with    Follow-up     Patient Active Problem List:       Leukocytosis       Thrombocytosis    HPI:   Ms. Bates is a 69-year-old very pleasant female with medical history significant for hypertension, hyperlipidemia, diabetes mellitus, history of HPV infection, initially referred to me on 2014, for evaluation of mild leukocytosis.       She was found to have mild leukocytosis on routine blood tests done on 2014, by her primary care physician. She is a chronic smoker and she smokes about 10 cigarettes a day since she was 14.     Laboratory workup on 2014, showed slight elevation in white blood cell count (13). Her hemoglobin, hematocrit, platelet count and LDH were normal. There was no significant immunophenotypic abnormalities in flow cytometry. JAK2  and bcr/abl studies were negative.     She has been followed periodically since then. She missed follow up with me since 2019 until 2022.     Laboratory work ups done on 22 showed leukocytosis (WBC 16.1), thrombocytosis (platelet 651) and flow cytometry showed monoclonal B-cell population (~450 cells/cumm). The immunophenotype of the clonal cells is non-specific. DDX includes peripheral blood involvement by a B-cell lymphoma and monoclonal B-cell lymphocytosis.     The rests of the blood tests, including BCR-ABL1, JAK2 V617F, JAK2 exon 12-13, MPL, CALR, MARIKA, ESR and LDH, were within normal range.      CT scan of the neck, chest, abdomen and pelvis done on 2022 showed no significant pathology in her neck.  No splenomegaly or overt lymphadenopathy.  There are scattered mildly prominent left internal mammary, upper abdominal and left common iliac lymph nodes.   both leukocytosis and thrombocytosis, I repeated labs on 11/8/22.     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 Other labs were normal.     She continues to have persistent thrombocytosis (platelet 765,000/cumm) on 7/11/23. 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 MPL was positive.     Reviewed with her natural history of myeloproliferative neoplasm, management and prognosis. Since she has high risk MPN, I started cytoreductive therapy with hydrea since 10/5/23. I also started aspirin 81 mg daily.     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     CT scan was done on 12/27/22 and there is no overt lymphadenopathy or splenomegaly. I recommend for close observation of her monoclonal lymphocytosis. She doesn't have anemia.     For her left lung cystic lesion with solid component, she had repeat CT chest in 9/5/23 and again in 12/5/23. It showed new enlarging LORI lung nodule. It is 1.3 cm and 1.9 cm in size. PET scan done on 1/13/24 showed mild activity in LORI lung nodule, hilar and mediastinal lymph nodes.    I requested CT guided biopsy of LORI lung nodule on 1/30/23 and pathology showed small cell lung cancer.     Since she has limited stage small cell lung cancer and I started chemotherapy with cisplatin and etoposide on 2/13/24. She received RT between 3/5/24 and 4/16/24. She finished chemotherapy on 5/9/24.     Reviewed MRI brain and CT scans done on 6/14/23. There is no sign of brain metastasis or progression of disease.     She received WBRT between 7/15/24 and 7/26/24. I started consolidative durvalumab on 7/31/24.     She is here for close monitoring of toxicity and side effects from immunotherapy. She is tolerating current therapy well. She doesn't

## 2024-08-27 ENCOUNTER — CLINICAL DOCUMENTATION (OUTPATIENT)
Dept: INFUSION THERAPY | Age: 69
End: 2024-08-27

## 2024-08-27 ENCOUNTER — HOSPITAL ENCOUNTER (OUTPATIENT)
Dept: INFUSION THERAPY | Age: 69
Discharge: HOME | End: 2024-08-27
Payer: MEDICARE

## 2024-08-27 DIAGNOSIS — Z79.899 DRUG THERAPY: ICD-10-CM

## 2024-08-27 DIAGNOSIS — C34.32 SMALL CELL LUNG CANCER, LEFT LOWER LOBE (HCC): ICD-10-CM

## 2024-08-27 LAB
ALBUMIN SERPL-MCNC: 3.9 GM/DL (ref 3.4–5)
ALP BLD-CCNC: 67 IU/L (ref 40–129)
ALT SERPL-CCNC: 9 U/L (ref 10–40)
ANION GAP SERPL CALCULATED.3IONS-SCNC: 10 MMOL/L (ref 7–16)
AST SERPL-CCNC: 14 IU/L (ref 15–37)
BASOPHILS ABSOLUTE: 0 K/CU MM
BASOPHILS RELATIVE PERCENT: 0.4 % (ref 0–1)
BILIRUB SERPL-MCNC: 0.2 MG/DL (ref 0–1)
BUN SERPL-MCNC: 17 MG/DL (ref 6–23)
CALCIUM SERPL-MCNC: 9 MG/DL (ref 8.3–10.6)
CHLORIDE BLD-SCNC: 99 MMOL/L (ref 99–110)
CO2: 27 MMOL/L (ref 21–32)
CREAT SERPL-MCNC: 0.9 MG/DL (ref 0.6–1.1)
DIFFERENTIAL TYPE: ABNORMAL
EOSINOPHILS ABSOLUTE: 0.1 K/CU MM
EOSINOPHILS RELATIVE PERCENT: 1.5 % (ref 0–3)
GFR, ESTIMATED: 69 ML/MIN/1.73M2
GLUCOSE SERPL-MCNC: 196 MG/DL (ref 70–99)
HCT VFR BLD CALC: 34.2 % (ref 37–47)
HEMOGLOBIN: 11.2 GM/DL (ref 12.5–16)
LYMPHOCYTES ABSOLUTE: 0.8 K/CU MM
LYMPHOCYTES RELATIVE PERCENT: 17.6 % (ref 24–44)
MCH RBC QN AUTO: 32.7 PG (ref 27–31)
MCHC RBC AUTO-ENTMCNC: 32.7 % (ref 32–36)
MCV RBC AUTO: 100 FL (ref 78–100)
MONOCYTES ABSOLUTE: 0.7 K/CU MM
MONOCYTES RELATIVE PERCENT: 15.5 % (ref 0–4)
NEUTROPHILS ABSOLUTE: 3.1 K/CU MM
NEUTROPHILS RELATIVE PERCENT: 65 % (ref 36–66)
PDW BLD-RTO: 15.4 % (ref 11.7–14.9)
PLATELET # BLD: 225 K/CU MM (ref 140–440)
PMV BLD AUTO: 9.7 FL (ref 7.5–11.1)
POTASSIUM SERPL-SCNC: 4.6 MMOL/L (ref 3.5–5.1)
RBC # BLD: 3.42 M/CU MM (ref 4.2–5.4)
SODIUM BLD-SCNC: 136 MMOL/L (ref 135–145)
TOTAL PROTEIN: 6.8 GM/DL (ref 6.4–8.2)
WBC # BLD: 4.8 K/CU MM (ref 4–10.5)

## 2024-08-27 PROCEDURE — 36415 COLL VENOUS BLD VENIPUNCTURE: CPT

## 2024-08-27 PROCEDURE — 80053 COMPREHEN METABOLIC PANEL: CPT

## 2024-08-27 PROCEDURE — 85025 COMPLETE CBC W/AUTO DIFF WBC: CPT

## 2024-08-27 NOTE — PROGRESS NOTES
Pt here for lab draw pre treatment 8/28. CBC reviewed and pt aware that her labs are within defined limits for treatment, pt stated her understanding.

## 2024-08-28 ENCOUNTER — HOSPITAL ENCOUNTER (OUTPATIENT)
Dept: INFUSION THERAPY | Age: 69
Discharge: HOME | End: 2024-08-28
Payer: MEDICARE

## 2024-08-28 ENCOUNTER — OFFICE VISIT (OUTPATIENT)
Dept: ONCOLOGY | Age: 69
End: 2024-08-28
Payer: MEDICARE

## 2024-08-28 VITALS
DIASTOLIC BLOOD PRESSURE: 53 MMHG | RESPIRATION RATE: 14 BRPM | BODY MASS INDEX: 26.11 KG/M2 | HEART RATE: 84 BPM | SYSTOLIC BLOOD PRESSURE: 115 MMHG | TEMPERATURE: 97.1 F | HEIGHT: 60 IN | WEIGHT: 133 LBS | OXYGEN SATURATION: 99 %

## 2024-08-28 DIAGNOSIS — Z79.899 DRUG THERAPY: ICD-10-CM

## 2024-08-28 DIAGNOSIS — C34.32 SMALL CELL LUNG CANCER, LEFT LOWER LOBE (HCC): Primary | ICD-10-CM

## 2024-08-28 PROCEDURE — 2580000003 HC RX 258: Performed by: INTERNAL MEDICINE

## 2024-08-28 PROCEDURE — G8399 PT W/DXA RESULTS DOCUMENT: HCPCS | Performed by: INTERNAL MEDICINE

## 2024-08-28 PROCEDURE — 1036F TOBACCO NON-USER: CPT | Performed by: INTERNAL MEDICINE

## 2024-08-28 PROCEDURE — 6360000002 HC RX W HCPCS: Performed by: INTERNAL MEDICINE

## 2024-08-28 PROCEDURE — 3017F COLORECTAL CA SCREEN DOC REV: CPT | Performed by: INTERNAL MEDICINE

## 2024-08-28 PROCEDURE — 1090F PRES/ABSN URINE INCON ASSESS: CPT | Performed by: INTERNAL MEDICINE

## 2024-08-28 PROCEDURE — G8427 DOCREV CUR MEDS BY ELIG CLIN: HCPCS | Performed by: INTERNAL MEDICINE

## 2024-08-28 PROCEDURE — 96413 CHEMO IV INFUSION 1 HR: CPT

## 2024-08-28 PROCEDURE — G8417 CALC BMI ABV UP PARAM F/U: HCPCS | Performed by: INTERNAL MEDICINE

## 2024-08-28 PROCEDURE — 1124F ACP DISCUSS-NO DSCNMKR DOCD: CPT | Performed by: INTERNAL MEDICINE

## 2024-08-28 PROCEDURE — 99214 OFFICE O/P EST MOD 30 MIN: CPT | Performed by: INTERNAL MEDICINE

## 2024-08-28 RX ORDER — SODIUM CHLORIDE 0.9 % (FLUSH) 0.9 %
5-40 SYRINGE (ML) INJECTION PRN
Status: DISCONTINUED | OUTPATIENT
Start: 2024-08-28 | End: 2024-08-29 | Stop reason: HOSPADM

## 2024-08-28 RX ORDER — SODIUM CHLORIDE 9 MG/ML
5-250 INJECTION, SOLUTION INTRAVENOUS PRN
Status: DISCONTINUED | OUTPATIENT
Start: 2024-08-28 | End: 2024-08-29 | Stop reason: HOSPADM

## 2024-08-28 RX ADMIN — SODIUM CHLORIDE 20 ML/HR: 9 INJECTION, SOLUTION INTRAVENOUS at 14:08

## 2024-08-28 RX ADMIN — SODIUM CHLORIDE 1500 MG: 9 INJECTION, SOLUTION INTRAVENOUS at 14:08

## 2024-08-28 RX ADMIN — SODIUM CHLORIDE, PRESERVATIVE FREE 20 ML: 5 INJECTION INTRAVENOUS at 15:17

## 2024-08-28 NOTE — PROGRESS NOTES
Pt here for tx after OV.  Port accessed with blood return noted. CBC CMP reviewed from 8/27 and within defined limits. Pt has no concerns or issues to discuss at this time.     Patient's status assessed and documented appropriately.  All labs and required results were also reviewed today.  Treatment parameters have been reviewed.  Today's treatment has been approved by the provider.        Treatment orders and medication sequencing (when applicable) was verified by 2 registered nurses.  The treatment plan was confirmed with the patient prior to administration, and the patient understands the need to report any treatment-related symptoms.    Prior to administration, when applicable, the following 8 elements of medication administration were reviewed with 2nd Registered Nurse prior to dosing: drug name, drug dose, infusion volume when prepared in a syringe, rate of administration, expiration dates and/or times, appearance and integrity of drug(s), and rate of pump for infusion.  The 5 rights of medication administration have been verified.      Pt tolerated tx without incident left ambulatory discharge instructions given

## 2024-08-28 NOTE — PROGRESS NOTES
MA Rooming Questions  Patient: Lin Bates  MRN: [id number]    Date: 8/28/2024        1. Do you have any new issues?   no         BP Readings from Last 3 Encounters:   08/28/24 (!) 115/53   08/07/24 131/61   07/31/24 (!) 111/54      2. Do you need any refills on medications?    no    3. Have you had any imaging done since your last visit?   no    4. Have you been hospitalized or seen in the emergency room since your last visit here?   no    5. Did the patient have a depression screening completed today? No    No data recorded     PHQ-9 Given to (if applicable):               PHQ-9 Score (if applicable):                     [] Positive     []  Negative              Does question #9 need addressed (if applicable)                     [] Yes    []  No               Cristina Iglesias MA

## 2024-09-05 ENCOUNTER — TELEPHONE (OUTPATIENT)
Dept: RADIATION ONCOLOGY | Age: 69
End: 2024-09-05

## 2024-09-05 ENCOUNTER — HOSPITAL ENCOUNTER (OUTPATIENT)
Dept: RADIATION ONCOLOGY | Age: 69
Discharge: HOME | End: 2024-09-05

## 2024-09-05 VITALS
OXYGEN SATURATION: 97 % | WEIGHT: 137.4 LBS | SYSTOLIC BLOOD PRESSURE: 129 MMHG | DIASTOLIC BLOOD PRESSURE: 61 MMHG | HEIGHT: 60 IN | RESPIRATION RATE: 16 BRPM | TEMPERATURE: 98.1 F | BODY MASS INDEX: 26.97 KG/M2 | HEART RATE: 97 BPM

## 2024-09-05 NOTE — TELEPHONE ENCOUNTER
As requested by Dr. Guo, telephone call to Dr. Lindquist regarding scheduled follow up. Pt with no upcoming appts. Dr. Lindquist's last note 6/18/2024 recommended repeat EGD in 4 weeks, Dr. Lindquist's office staff to reach out to pt to schedule.

## 2024-09-05 NOTE — PROGRESS NOTES
Lin Bates  9/5/2024    Patient is seen today for follow up.     Vitals:    09/05/24 1417   BP: 129/61   Pulse: 97   Resp: 16   Temp: 98.1 °F (36.7 °C)   SpO2: 97%        Oxygen Therapy  SpO2: 97 %  Pulse Oximeter Device Mode: Continuous  Pulse Oximeter Device Location: Finger  O2 Device: None (Room air)  Skin Assessment: Clean, dry, & intact    Wt Readings from Last 3 Encounters:   09/05/24 62.3 kg (137 lb 6.4 oz)   08/28/24 60.3 kg (133 lb)   08/07/24 60.1 kg (132 lb 6.4 oz)       Pain Assessment  Pain Assessment: None - Denies Pain  Denies Need for Intervention     No Known Allergies     Current Outpatient Medications   Medication Sig Dispense Refill    sucralfate (CARAFATE) 1 GM/10ML suspension TAKE 10 MLS BY MOUTH 4 TIMES DAILY (BEFORE MEALS AND NIGHTLY) 420 mL 3    potassium bicarbonate (EFFER-K) 25 MEQ disintegrating tablet Take 1 tablet by mouth 2 times daily 20 tablet 0    DPH-Lido-AlHydr-MgHydr-Simeth (MAGIC MOUTHWASH) SUSP Swish and spit 5 mLs 4 times daily as needed for Irritation 3 each 1    pantoprazole (PROTONIX) 40 MG tablet Take 1 tablet by mouth every morning (before breakfast) 30 tablet 1    Magic Mouthwash (MIRACLE MOUTHWASH) Equal parts of Benadryl, Maalox, 2% Viscous Xylocaine.  Take 5 mL 4 times daily. 240 mL 2    lidocaine viscous hcl (XYLOCAINE) 2 % SOLN solution Take 15 mLs by mouth every 3 hours as needed for Irritation 7 each 0    ondansetron (ZOFRAN) 4 MG tablet Take 1 tablet by mouth daily as needed for Nausea or Vomiting 20 tablet 3    dexAMETHasone (DECADRON) 4 MG tablet Take 1 tablet by mouth See Admin Instructions for 4 doses Take one tablet in the AM  the day after the last day of chemotherapy each cycle. 4 tablet 0    OLANZapine (ZYPREXA) 5 MG tablet Take 1 tablet by mouth nightly Take one tablet at bedtime for four nights starting the night before chemotherapy. 16 tablet 0    ondansetron (ZOFRAN) 8 MG tablet Take 1 tablet by mouth every 8 hours as needed for Nausea or

## 2024-09-05 NOTE — PROGRESS NOTES
[facility]   Radiation Oncology Center  [address] [place] [address]  Phone: [phone]  Fax: [phone]    RADIATION ONCOLOGY FOLLOW UP REPORT    PATIENT NAME:  Lin Bates              : 1955  MEDICAL RECORD NO: [id number]    [facility] NO: [id number]        PROVIDER: Hunter Guo MD    DATE OF SERVICE: 2024     FOLLOW UP PHYSICIANS:  Dr. Birch    DIAGNOSIS:  limited stage small cell lung cancer    TREATMENT COURSE:   Def chemoRT 60Gy/30fx completed 24  PCI 25Gy/10fx completed 24  Adjuvant immunotherapy.     HPI:   Lin Bates is a 69 y.o. female who has a history as above, who returns today for a routine follow-up visit.    The patient completed PCI 24 and doing okay.     Doing okay, starting immunotherapy and tolerating well.  No new neurological issues or problems.  No new shortness of breath, chest pain, cough, mops this, or involuntary weight loss.  She still has some issues with eating with food catching especially needs however she is able to eat mostly what she wants.  She is able to eat and drink.  She is maintaining her weight.  Sees medical oncology at the end of the month.    RADIOLOGIC STUDIES:      LABORATORY STUDIES:   CBC:   Lab Results   Component Value Date/Time    WBC 4.8 2024 01:34 PM    RBC 3.42 2024 01:34 PM    HGB 11.2 2024 01:34 PM    HCT 34.2 2024 01:34 PM    .0 2024 01:34 PM    MCH 32.7 2024 01:34 PM    MCHC 32.7 2024 01:34 PM    RDW 15.4 2024 01:34 PM     2024 01:34 PM    MPV 9.7 2024 01:34 PM     CMP:  Lab Results   Component Value Date/Time     2024 01:34 PM    K 4.6 2024 01:34 PM    CL 99 2024 01:34 PM    CO2 27 2024 01:34 PM    BUN 17 2024 01:34 PM    CREATININE 0.9 2024 01:34 PM    GFRAA >60 2020 02:58 PM    LABGLOM 69 2024 01:34 PM    LABGLOM >90 2024 05:21 AM    GLUCOSE 196

## 2024-09-19 RX ORDER — BENZOCAINE/MENTHOL 6 MG-10 MG
LOZENGE MUCOUS MEMBRANE
Qty: 120 G | Refills: 1 | Status: SHIPPED | OUTPATIENT
Start: 2024-09-19 | End: 2024-09-26

## 2024-09-19 RX ORDER — LORATADINE 10 MG/1
10 TABLET ORAL DAILY
Qty: 30 TABLET | Refills: 0 | Status: SHIPPED | OUTPATIENT
Start: 2024-09-19 | End: 2024-10-19

## 2024-09-23 RX ORDER — HYDROXYUREA 500 MG/1
500 CAPSULE ORAL DAILY
Qty: 30 CAPSULE | Refills: 3 | Status: ON HOLD | OUTPATIENT
Start: 2024-09-23

## 2024-09-24 ENCOUNTER — HOSPITAL ENCOUNTER (EMERGENCY)
Age: 69
Discharge: ANOTHER HOSPITAL | End: 2024-09-25
Attending: EMERGENCY MEDICINE
Payer: MEDICARE

## 2024-09-24 ENCOUNTER — HOSPITAL ENCOUNTER (OUTPATIENT)
Dept: INFUSION THERAPY | Age: 69
Discharge: HOME | End: 2024-09-24
Payer: MEDICARE

## 2024-09-24 DIAGNOSIS — A41.9 SEPTIC SHOCK (HCC): ICD-10-CM

## 2024-09-24 DIAGNOSIS — C34.32 SMALL CELL LUNG CANCER, LEFT LOWER LOBE (HCC): Primary | ICD-10-CM

## 2024-09-24 DIAGNOSIS — J90 PLEURAL EFFUSION: ICD-10-CM

## 2024-09-24 DIAGNOSIS — E04.1 THYROID NODULE: ICD-10-CM

## 2024-09-24 DIAGNOSIS — J96.01 ACUTE RESPIRATORY FAILURE WITH HYPOXIA: Primary | ICD-10-CM

## 2024-09-24 DIAGNOSIS — R65.21 SEPTIC SHOCK (HCC): ICD-10-CM

## 2024-09-24 DIAGNOSIS — J18.9 PNEUMONIA OF BOTH LUNGS DUE TO INFECTIOUS ORGANISM, UNSPECIFIED PART OF LUNG: ICD-10-CM

## 2024-09-24 DIAGNOSIS — Z79.899 DRUG THERAPY: ICD-10-CM

## 2024-09-24 LAB
ALBUMIN SERPL-MCNC: 2.9 G/DL (ref 3.4–5)
ALBUMIN/GLOB SERPL: 1 {RATIO} (ref 1.1–2.2)
ALP SERPL-CCNC: 85 U/L (ref 40–129)
ALT SERPL-CCNC: 22 U/L (ref 10–40)
ANION GAP SERPL CALCULATED.3IONS-SCNC: 13 MMOL/L (ref 9–17)
AST SERPL-CCNC: 23 U/L (ref 15–37)
BASOPHILS # BLD: 0.07 K/UL
BASOPHILS NFR BLD: 1 % (ref 0–1)
BILIRUB SERPL-MCNC: 0.3 MG/DL (ref 0–1)
BUN SERPL-MCNC: 14 MG/DL (ref 7–20)
CALCIUM SERPL-MCNC: 8.5 MG/DL (ref 8.3–10.6)
CHLORIDE SERPL-SCNC: 103 MMOL/L (ref 99–110)
CO2 SERPL-SCNC: 24 MMOL/L (ref 21–32)
CREAT SERPL-MCNC: 0.7 MG/DL (ref 0.6–1.2)
EOSINOPHIL # BLD: 0.12 K/UL
EOSINOPHILS RELATIVE PERCENT: 1 % (ref 0–3)
ERYTHROCYTE [DISTWIDTH] IN BLOOD BY AUTOMATED COUNT: 14 % (ref 11.7–14.9)
GFR, ESTIMATED: 79 ML/MIN/1.73M2
GLUCOSE SERPL-MCNC: 149 MG/DL (ref 74–99)
HCT VFR BLD AUTO: 30.4 % (ref 37–47)
HGB BLD-MCNC: 9.6 G/DL (ref 12.5–16)
IMM GRANULOCYTES # BLD AUTO: 0.04 K/UL
IMM GRANULOCYTES NFR BLD: 1 %
LYMPHOCYTES NFR BLD: 0.75 K/UL
LYMPHOCYTES RELATIVE PERCENT: 9 % (ref 24–44)
MCH RBC QN AUTO: 31.6 PG (ref 27–31)
MCHC RBC AUTO-ENTMCNC: 31.6 G/DL (ref 32–36)
MCV RBC AUTO: 100 FL (ref 78–100)
MONOCYTES NFR BLD: 1.05 K/UL
MONOCYTES NFR BLD: 12 % (ref 0–4)
NEUTROPHILS NFR BLD: 76 % (ref 36–66)
NEUTS SEG NFR BLD: 6.48 K/UL
PLATELET # BLD AUTO: 376 K/UL (ref 140–440)
PMV BLD AUTO: 10.3 FL (ref 7.5–11.1)
POTASSIUM SERPL-SCNC: 3.3 MMOL/L (ref 3.5–5.1)
PROT SERPL-MCNC: 5.7 G/DL (ref 6.4–8.2)
RBC # BLD AUTO: 3.04 M/UL (ref 4.2–5.4)
SODIUM SERPL-SCNC: 139 MMOL/L (ref 136–145)
T4 FREE SERPL-MCNC: 2.4 NG/DL (ref 0.9–1.8)
TSH SERPL DL<=0.05 MIU/L-ACNC: 0.06 UIU/ML (ref 0.27–4.2)
WBC OTHER # BLD: 8.5 K/UL (ref 4–10.5)

## 2024-09-24 PROCEDURE — 80053 COMPREHEN METABOLIC PANEL: CPT

## 2024-09-24 PROCEDURE — 85025 COMPLETE CBC W/AUTO DIFF WBC: CPT

## 2024-09-24 PROCEDURE — 2580000003 HC RX 258: Performed by: INTERNAL MEDICINE

## 2024-09-24 PROCEDURE — 84439 ASSAY OF FREE THYROXINE: CPT

## 2024-09-24 PROCEDURE — 99291 CRITICAL CARE FIRST HOUR: CPT

## 2024-09-24 PROCEDURE — 84443 ASSAY THYROID STIM HORMONE: CPT

## 2024-09-24 PROCEDURE — 99285 EMERGENCY DEPT VISIT HI MDM: CPT

## 2024-09-24 PROCEDURE — 36591 DRAW BLOOD OFF VENOUS DEVICE: CPT

## 2024-09-24 RX ORDER — DIPHENHYDRAMINE HYDROCHLORIDE 50 MG/ML
50 INJECTION INTRAMUSCULAR; INTRAVENOUS
OUTPATIENT
Start: 2024-09-24

## 2024-09-24 RX ORDER — DEXAMETHASONE SODIUM PHOSPHATE 4 MG/ML
8 INJECTION, SOLUTION INTRA-ARTICULAR; INTRALESIONAL; INTRAMUSCULAR; INTRAVENOUS; SOFT TISSUE ONCE
Start: 2024-09-24 | End: 2024-09-24

## 2024-09-24 RX ORDER — SODIUM CHLORIDE 9 MG/ML
INJECTION, SOLUTION INTRAVENOUS CONTINUOUS
Status: CANCELLED | OUTPATIENT
Start: 2024-09-24

## 2024-09-24 RX ORDER — ONDANSETRON 2 MG/ML
8 INJECTION INTRAMUSCULAR; INTRAVENOUS
OUTPATIENT
Start: 2024-09-24

## 2024-09-24 RX ORDER — ONDANSETRON 2 MG/ML
8 INJECTION INTRAMUSCULAR; INTRAVENOUS
Status: CANCELLED | OUTPATIENT
Start: 2024-09-24

## 2024-09-24 RX ORDER — ALBUTEROL SULFATE 90 UG/1
4 INHALANT RESPIRATORY (INHALATION) PRN
OUTPATIENT
Start: 2024-09-24

## 2024-09-24 RX ORDER — ACETAMINOPHEN 325 MG/1
650 TABLET ORAL
Status: CANCELLED | OUTPATIENT
Start: 2024-09-24

## 2024-09-24 RX ORDER — IPRATROPIUM BROMIDE AND ALBUTEROL SULFATE 2.5; .5 MG/3ML; MG/3ML
1 SOLUTION RESPIRATORY (INHALATION) ONCE
Status: COMPLETED | OUTPATIENT
Start: 2024-09-25 | End: 2024-09-25

## 2024-09-24 RX ORDER — ALBUTEROL SULFATE 90 UG/1
4 INHALANT RESPIRATORY (INHALATION) PRN
Status: CANCELLED | OUTPATIENT
Start: 2024-09-24

## 2024-09-24 RX ORDER — IPRATROPIUM BROMIDE AND ALBUTEROL SULFATE 2.5; .5 MG/3ML; MG/3ML
SOLUTION RESPIRATORY (INHALATION)
Status: DISCONTINUED
Start: 2024-09-24 | End: 2024-09-24

## 2024-09-24 RX ORDER — ACETAMINOPHEN 325 MG/1
650 TABLET ORAL
OUTPATIENT
Start: 2024-09-24

## 2024-09-24 RX ORDER — SODIUM CHLORIDE 9 MG/ML
5-250 INJECTION, SOLUTION INTRAVENOUS PRN
OUTPATIENT
Start: 2024-09-24

## 2024-09-24 RX ORDER — FAMOTIDINE 10 MG/ML
20 INJECTION, SOLUTION INTRAVENOUS
OUTPATIENT
Start: 2024-09-24

## 2024-09-24 RX ORDER — FAMOTIDINE 10 MG/ML
20 INJECTION, SOLUTION INTRAVENOUS ONCE
Start: 2024-09-24 | End: 2024-09-24

## 2024-09-24 RX ORDER — EPINEPHRINE 1 MG/ML
0.3 INJECTION, SOLUTION, CONCENTRATE INTRAVENOUS PRN
OUTPATIENT
Start: 2024-09-24

## 2024-09-24 RX ORDER — SODIUM CHLORIDE 9 MG/ML
INJECTION, SOLUTION INTRAVENOUS CONTINUOUS
OUTPATIENT
Start: 2024-09-24

## 2024-09-24 RX ORDER — EPINEPHRINE 1 MG/ML
0.3 INJECTION, SOLUTION, CONCENTRATE INTRAVENOUS PRN
Status: CANCELLED | OUTPATIENT
Start: 2024-09-24

## 2024-09-24 RX ORDER — HEPARIN 100 UNIT/ML
500 SYRINGE INTRAVENOUS PRN
Status: CANCELLED | OUTPATIENT
Start: 2024-09-24

## 2024-09-24 RX ORDER — FAMOTIDINE 10 MG/ML
20 INJECTION, SOLUTION INTRAVENOUS
Status: CANCELLED | OUTPATIENT
Start: 2024-09-24

## 2024-09-24 RX ORDER — HEPARIN 100 UNIT/ML
500 SYRINGE INTRAVENOUS PRN
OUTPATIENT
Start: 2024-09-24

## 2024-09-24 RX ORDER — SODIUM CHLORIDE 0.9 % (FLUSH) 0.9 %
5-40 SYRINGE (ML) INJECTION PRN
OUTPATIENT
Start: 2024-09-24

## 2024-09-24 RX ORDER — 0.9 % SODIUM CHLORIDE 0.9 %
1000 INTRAVENOUS SOLUTION INTRAVENOUS ONCE
Status: COMPLETED | OUTPATIENT
Start: 2024-09-25 | End: 2024-09-25

## 2024-09-24 RX ORDER — ACETAMINOPHEN 500 MG
1000 TABLET ORAL ONCE
Status: COMPLETED | OUTPATIENT
Start: 2024-09-25 | End: 2024-09-25

## 2024-09-24 RX ORDER — SODIUM CHLORIDE 9 MG/ML
25 INJECTION, SOLUTION INTRAVENOUS PRN
OUTPATIENT
Start: 2024-09-24

## 2024-09-24 RX ORDER — DIPHENHYDRAMINE HYDROCHLORIDE 50 MG/ML
50 INJECTION INTRAMUSCULAR; INTRAVENOUS
Status: CANCELLED | OUTPATIENT
Start: 2024-09-24

## 2024-09-24 RX ORDER — SODIUM CHLORIDE 9 MG/ML
25 INJECTION, SOLUTION INTRAVENOUS PRN
Status: CANCELLED | OUTPATIENT
Start: 2024-09-24

## 2024-09-24 RX ORDER — SODIUM CHLORIDE 0.9 % (FLUSH) 0.9 %
5-40 SYRINGE (ML) INJECTION PRN
Status: DISCONTINUED | OUTPATIENT
Start: 2024-09-24 | End: 2024-09-25 | Stop reason: HOSPADM

## 2024-09-24 RX ADMIN — SODIUM CHLORIDE, PRESERVATIVE FREE 20 ML: 5 INJECTION INTRAVENOUS at 14:15

## 2024-09-24 ASSESSMENT — PAIN - FUNCTIONAL ASSESSMENT: PAIN_FUNCTIONAL_ASSESSMENT: NONE - DENIES PAIN

## 2024-09-25 ENCOUNTER — APPOINTMENT (OUTPATIENT)
Dept: CT IMAGING | Age: 69
End: 2024-09-25
Payer: MEDICARE

## 2024-09-25 ENCOUNTER — HOSPITAL ENCOUNTER (INPATIENT)
Age: 69
LOS: 10 days | Discharge: HOME | DRG: 871 | End: 2024-10-05
Attending: STUDENT IN AN ORGANIZED HEALTH CARE EDUCATION/TRAINING PROGRAM | Admitting: INTERNAL MEDICINE
Payer: MEDICARE

## 2024-09-25 VITALS
BODY MASS INDEX: 25.68 KG/M2 | OXYGEN SATURATION: 97 % | HEART RATE: 92 BPM | HEIGHT: 61 IN | TEMPERATURE: 98.4 F | RESPIRATION RATE: 29 BRPM | SYSTOLIC BLOOD PRESSURE: 129 MMHG | DIASTOLIC BLOOD PRESSURE: 61 MMHG | WEIGHT: 136 LBS

## 2024-09-25 DIAGNOSIS — R06.02 SHORTNESS OF BREATH: ICD-10-CM

## 2024-09-25 DIAGNOSIS — J90 PLEURAL EFFUSION ON RIGHT: Primary | ICD-10-CM

## 2024-09-25 DIAGNOSIS — J90 PLEURAL EFFUSION, LEFT: ICD-10-CM

## 2024-09-25 PROBLEM — A41.9 SEPTIC SHOCK (HCC): Status: ACTIVE | Noted: 2024-09-25

## 2024-09-25 PROBLEM — R65.21 SEPTIC SHOCK (HCC): Status: ACTIVE | Noted: 2024-09-25

## 2024-09-25 LAB
ALBUMIN SERPL-MCNC: 2.8 G/DL (ref 3.4–5)
ALBUMIN SERPL-MCNC: 2.9 G/DL (ref 3.4–5)
ALBUMIN/GLOB SERPL: 0.8 {RATIO} (ref 1.1–2.2)
ALBUMIN/GLOB SERPL: 0.9 {RATIO} (ref 1.1–2.2)
ALP SERPL-CCNC: 83 U/L (ref 40–129)
ALP SERPL-CCNC: 89 U/L (ref 40–129)
ALT SERPL-CCNC: 17 U/L (ref 10–40)
ALT SERPL-CCNC: 22 U/L (ref 10–40)
ANION GAP SERPL CALCULATED.3IONS-SCNC: 13 MMOL/L (ref 9–17)
ANION GAP SERPL CALCULATED.3IONS-SCNC: 18 MMOL/L (ref 4–16)
ARTERIAL PATENCY WRIST A: YES
AST SERPL-CCNC: 21 U/L (ref 15–37)
AST SERPL-CCNC: 22 U/L (ref 15–37)
B PARAP IS1001 DNA NPH QL NAA+NON-PROBE: NOT DETECTED
B PERT DNA SPEC QL NAA+PROBE: NOT DETECTED
BASOPHILS # BLD: 0.06 K/UL
BASOPHILS # BLD: 0.07 K/UL
BASOPHILS NFR BLD: 0 % (ref 0–1)
BASOPHILS NFR BLD: 0 % (ref 0–1)
BDY SITE: ABNORMAL
BILIRUB DIRECT SERPL-MCNC: <0.2 MG/DL (ref 0–0.3)
BILIRUB DIRECT SERPL-MCNC: <0.2 MG/DL (ref 0–0.3)
BILIRUB INDIRECT SERPL-MCNC: ABNORMAL MG/DL (ref 0–0.7)
BILIRUB INDIRECT SERPL-MCNC: ABNORMAL MG/DL (ref 0–0.7)
BILIRUB SERPL-MCNC: 0.3 MG/DL (ref 0–1)
BILIRUB SERPL-MCNC: 0.4 MG/DL (ref 0–1)
BODY TEMPERATURE: 37
BUN SERPL-MCNC: 12 MG/DL (ref 7–20)
BUN SERPL-MCNC: 13 MG/DL (ref 6–23)
C PNEUM DNA NPH QL NAA+NON-PROBE: NOT DETECTED
CA-I BLD-SCNC: 1.14 MMOL/L (ref 1.15–1.33)
CALCIUM SERPL-MCNC: 8.2 MG/DL (ref 8.3–10.6)
CALCIUM SERPL-MCNC: 8.3 MG/DL (ref 8.3–10.6)
CHLORIDE SERPL-SCNC: 101 MMOL/L (ref 99–110)
CHLORIDE SERPL-SCNC: 106 MMOL/L (ref 99–110)
CO2 SERPL-SCNC: 21 MMOL/L (ref 21–32)
CO2 SERPL-SCNC: 23 MMOL/L (ref 21–32)
COHGB MFR BLD: 0.3 % (ref 0.5–1.5)
CREAT SERPL-MCNC: 0.6 MG/DL (ref 0.6–1.1)
CREAT SERPL-MCNC: 0.7 MG/DL (ref 0.6–1.2)
D DIMER PPP FEU-MCNC: 4.47 UG/ML FEU (ref 0–0.46)
EKG ATRIAL RATE: 125 BPM
EKG DIAGNOSIS: NORMAL
EKG P AXIS: 29 DEGREES
EKG P-R INTERVAL: 134 MS
EKG Q-T INTERVAL: 332 MS
EKG QRS DURATION: 64 MS
EKG QTC CALCULATION (BAZETT): 479 MS
EKG R AXIS: 70 DEGREES
EKG T AXIS: 38 DEGREES
EKG VENTRICULAR RATE: 125 BPM
EOSINOPHIL # BLD: 0 K/UL
EOSINOPHIL # BLD: 0.09 K/UL
EOSINOPHILS RELATIVE PERCENT: 0 % (ref 0–3)
EOSINOPHILS RELATIVE PERCENT: 1 % (ref 0–3)
ERYTHROCYTE [DISTWIDTH] IN BLOOD BY AUTOMATED COUNT: 13.8 % (ref 11.7–14.9)
ERYTHROCYTE [DISTWIDTH] IN BLOOD BY AUTOMATED COUNT: 13.9 % (ref 11.7–14.9)
FIBRINOGEN PPP-MCNC: 699 MG/DL (ref 170–540)
FIO2 ON VENT: 32 %
FLUAV RNA NPH QL NAA+NON-PROBE: NOT DETECTED
FLUBV RNA NPH QL NAA+NON-PROBE: NOT DETECTED
GAS FLOW.O2 O2 DELIVERY SYS: ABNORMAL L/MIN
GFR, ESTIMATED: 77 ML/MIN/1.73M2
GFR, ESTIMATED: >90 ML/MIN/1.73M2
GLOBULIN SER CALC-MCNC: 3.1 G/DL (ref 1.3–4.9)
GLUCOSE BLD-MCNC: 187 MG/DL (ref 74–99)
GLUCOSE BLD-MCNC: 240 MG/DL (ref 74–99)
GLUCOSE BLD-MCNC: 296 MG/DL (ref 74–99)
GLUCOSE SERPL-MCNC: 198 MG/DL (ref 70–99)
GLUCOSE SERPL-MCNC: 215 MG/DL (ref 74–99)
HADV DNA NPH QL NAA+NON-PROBE: NOT DETECTED
HCO3 ARTERIAL: 21.4 MMOL/L (ref 21–28)
HCO3 VENOUS: 26.9 MMOL/L (ref 22–29)
HCOV 229E RNA NPH QL NAA+NON-PROBE: NOT DETECTED
HCOV HKU1 RNA NPH QL NAA+NON-PROBE: NOT DETECTED
HCOV NL63 RNA NPH QL NAA+NON-PROBE: NOT DETECTED
HCOV OC43 RNA NPH QL NAA+NON-PROBE: NOT DETECTED
HCT VFR BLD AUTO: 28.8 % (ref 37–47)
HCT VFR BLD AUTO: 30.8 % (ref 37–47)
HGB BLD-MCNC: 10 G/DL (ref 12.5–16)
HGB BLD-MCNC: 9.4 G/DL (ref 12.5–16)
HMPV RNA NPH QL NAA+NON-PROBE: NOT DETECTED
HPIV1 RNA NPH QL NAA+NON-PROBE: NOT DETECTED
HPIV2 RNA NPH QL NAA+NON-PROBE: NOT DETECTED
HPIV3 RNA NPH QL NAA+NON-PROBE: NOT DETECTED
HPIV4 RNA NPH QL NAA+NON-PROBE: NOT DETECTED
IMM GRANULOCYTES # BLD AUTO: 0.09 K/UL
IMM GRANULOCYTES # BLD AUTO: 0.27 K/UL
IMM GRANULOCYTES NFR BLD: 1 %
IMM GRANULOCYTES NFR BLD: 1 %
INR PPP: 1.2
INR PPP: 1.3
LACTATE BLDV-SCNC: 1.5 MMOL/L (ref 0.4–2)
LACTATE BLDV-SCNC: 1.6 MMOL/L (ref 0.4–2)
LYMPHOCYTES NFR BLD: 0.23 K/UL
LYMPHOCYTES NFR BLD: 0.68 K/UL
LYMPHOCYTES RELATIVE PERCENT: 1 % (ref 24–44)
LYMPHOCYTES RELATIVE PERCENT: 4 % (ref 24–44)
M PNEUMO DNA NPH QL NAA+NON-PROBE: NOT DETECTED
MAGNESIUM SERPL-MCNC: 1.8 MG/DL (ref 1.8–2.4)
MAGNESIUM SERPL-MCNC: 2.2 MG/DL (ref 1.8–2.4)
MCH RBC QN AUTO: 31.6 PG (ref 27–31)
MCH RBC QN AUTO: 32.4 PG (ref 27–31)
MCHC RBC AUTO-ENTMCNC: 32.5 G/DL (ref 32–36)
MCHC RBC AUTO-ENTMCNC: 32.6 G/DL (ref 32–36)
MCV RBC AUTO: 97.5 FL (ref 78–100)
MCV RBC AUTO: 99.3 FL (ref 78–100)
METHEMOGLOBIN: 0.4 % (ref 0.5–1.5)
MONOCYTES NFR BLD: 0.28 K/UL
MONOCYTES NFR BLD: 1 % (ref 0–4)
MONOCYTES NFR BLD: 1.1 K/UL
MONOCYTES NFR BLD: 7 % (ref 0–4)
MRSA, DNA, NASAL: NOT DETECTED
NEGATIVE BASE EXCESS, ART: 3.5 MMOL/L (ref 0–3)
NEUTROPHILS NFR BLD: 88 % (ref 36–66)
NEUTROPHILS NFR BLD: 96 % (ref 36–66)
NEUTS SEG NFR BLD: 14.88 K/UL
NEUTS SEG NFR BLD: 21.99 K/UL
O2 SAT, VEN: 80.3 % (ref 34–95)
OXYHGB MFR BLD: 91 %
PARTIAL THROMBOPLASTIN TIME: 31.4 SEC (ref 25.1–37.1)
PARTIAL THROMBOPLASTIN TIME: 32.4 SEC (ref 25.1–37.1)
PCO2 ARTERIAL: 38.1 MMHG (ref 32–45)
PCO2 VENOUS: 37.8 MM HG (ref 41–51)
PH ARTERIAL: 7.37 (ref 7.35–7.45)
PH VENOUS: 7.46 (ref 7.32–7.43)
PHOSPHATE SERPL-MCNC: 4.6 MG/DL (ref 2.5–4.9)
PLATELET # BLD AUTO: 374 K/UL (ref 140–440)
PLATELET # BLD AUTO: 407 K/UL (ref 140–440)
PLATELET ESTIMATE: NORMAL
PMV BLD AUTO: 10.3 FL (ref 7.5–11.1)
PMV BLD AUTO: 9.7 FL (ref 7.5–11.1)
PO2 ARTERIAL: 67.5 MMHG (ref 83–108)
PO2 VENOUS: 43.7 MM HG (ref 28–48)
POSITIVE BASE EXCESS, VEN: 3.1 MMOL/L (ref 0–3)
POTASSIUM SERPL-SCNC: 3.5 MMOL/L (ref 3.5–5.1)
POTASSIUM SERPL-SCNC: 3.7 MMOL/L (ref 3.5–5.1)
PROT SERPL-MCNC: 5.9 G/DL (ref 6.4–8.2)
PROT SERPL-MCNC: 6.3 G/DL (ref 6.4–8.2)
PROTHROMBIN TIME: 15.7 SEC (ref 11.7–14.5)
PROTHROMBIN TIME: 16.7 SEC (ref 11.7–14.5)
RBC # BLD AUTO: 2.9 M/UL (ref 4.2–5.4)
RBC # BLD AUTO: 3.16 M/UL (ref 4.2–5.4)
RBC # BLD: NORMAL 10*6/UL
RSV RNA NPH QL NAA+NON-PROBE: NOT DETECTED
RV+EV RNA NPH QL NAA+NON-PROBE: NOT DETECTED
SARS-COV-2 RNA NPH QL NAA+NON-PROBE: NOT DETECTED
SODIUM SERPL-SCNC: 140 MMOL/L (ref 135–145)
SODIUM SERPL-SCNC: 142 MMOL/L (ref 136–145)
SPECIMEN DESCRIPTION: NORMAL
SPECIMEN DESCRIPTION: NORMAL
TCO2 CALC VENOUS: 28 MMOL/L (ref 21–32)
TROPONIN I SERPL HS-MCNC: 11 NG/L (ref 0–13)
WBC # BLD: ABNORMAL 10*3/UL
WBC OTHER # BLD: 16.9 K/UL (ref 4–10.5)
WBC OTHER # BLD: 22.8 K/UL (ref 4–10.5)

## 2024-09-25 PROCEDURE — 80053 COMPREHEN METABOLIC PANEL: CPT

## 2024-09-25 PROCEDURE — 6360000004 HC RX CONTRAST MEDICATION: Performed by: EMERGENCY MEDICINE

## 2024-09-25 PROCEDURE — 85610 PROTHROMBIN TIME: CPT

## 2024-09-25 PROCEDURE — 0202U NFCT DS 22 TRGT SARS-COV-2: CPT

## 2024-09-25 PROCEDURE — 85025 COMPLETE CBC W/AUTO DIFF WBC: CPT

## 2024-09-25 PROCEDURE — 96366 THER/PROPH/DIAG IV INF ADDON: CPT

## 2024-09-25 PROCEDURE — 2700000000 HC OXYGEN THERAPY PER DAY

## 2024-09-25 PROCEDURE — 6370000000 HC RX 637 (ALT 250 FOR IP): Performed by: EMERGENCY MEDICINE

## 2024-09-25 PROCEDURE — 6370000000 HC RX 637 (ALT 250 FOR IP): Performed by: PHYSICIAN ASSISTANT

## 2024-09-25 PROCEDURE — 94640 AIRWAY INHALATION TREATMENT: CPT

## 2024-09-25 PROCEDURE — 2500000003 HC RX 250 WO HCPCS: Performed by: STUDENT IN AN ORGANIZED HEALTH CARE EDUCATION/TRAINING PROGRAM

## 2024-09-25 PROCEDURE — 82330 ASSAY OF CALCIUM: CPT

## 2024-09-25 PROCEDURE — 85379 FIBRIN DEGRADATION QUANT: CPT

## 2024-09-25 PROCEDURE — 6360000002 HC RX W HCPCS: Performed by: EMERGENCY MEDICINE

## 2024-09-25 PROCEDURE — 83605 ASSAY OF LACTIC ACID: CPT

## 2024-09-25 PROCEDURE — 93005 ELECTROCARDIOGRAM TRACING: CPT | Performed by: EMERGENCY MEDICINE

## 2024-09-25 PROCEDURE — 2580000003 HC RX 258: Performed by: STUDENT IN AN ORGANIZED HEALTH CARE EDUCATION/TRAINING PROGRAM

## 2024-09-25 PROCEDURE — 92610 EVALUATE SWALLOWING FUNCTION: CPT

## 2024-09-25 PROCEDURE — 82248 BILIRUBIN DIRECT: CPT

## 2024-09-25 PROCEDURE — 3E033XZ INTRODUCTION OF VASOPRESSOR INTO PERIPHERAL VEIN, PERCUTANEOUS APPROACH: ICD-10-PCS | Performed by: INTERNAL MEDICINE

## 2024-09-25 PROCEDURE — 87040 BLOOD CULTURE FOR BACTERIA: CPT

## 2024-09-25 PROCEDURE — 36600 WITHDRAWAL OF ARTERIAL BLOOD: CPT

## 2024-09-25 PROCEDURE — 84484 ASSAY OF TROPONIN QUANT: CPT

## 2024-09-25 PROCEDURE — 84100 ASSAY OF PHOSPHORUS: CPT

## 2024-09-25 PROCEDURE — 93010 ELECTROCARDIOGRAM REPORT: CPT | Performed by: INTERNAL MEDICINE

## 2024-09-25 PROCEDURE — 96365 THER/PROPH/DIAG IV INF INIT: CPT

## 2024-09-25 PROCEDURE — 83735 ASSAY OF MAGNESIUM: CPT

## 2024-09-25 PROCEDURE — 5A0935A ASSISTANCE WITH RESPIRATORY VENTILATION, LESS THAN 24 CONSECUTIVE HOURS, HIGH NASAL FLOW/VELOCITY: ICD-10-PCS | Performed by: INTERNAL MEDICINE

## 2024-09-25 PROCEDURE — 2000000000 HC ICU R&B

## 2024-09-25 PROCEDURE — 2500000003 HC RX 250 WO HCPCS: Performed by: PHYSICIAN ASSISTANT

## 2024-09-25 PROCEDURE — 2500000003 HC RX 250 WO HCPCS: Performed by: EMERGENCY MEDICINE

## 2024-09-25 PROCEDURE — 6360000002 HC RX W HCPCS: Performed by: STUDENT IN AN ORGANIZED HEALTH CARE EDUCATION/TRAINING PROGRAM

## 2024-09-25 PROCEDURE — 85384 FIBRINOGEN ACTIVITY: CPT

## 2024-09-25 PROCEDURE — 87641 MR-STAPH DNA AMP PROBE: CPT

## 2024-09-25 PROCEDURE — 96367 TX/PROPH/DG ADDL SEQ IV INF: CPT

## 2024-09-25 PROCEDURE — 82962 GLUCOSE BLOOD TEST: CPT

## 2024-09-25 PROCEDURE — 94664 DEMO&/EVAL PT USE INHALER: CPT

## 2024-09-25 PROCEDURE — 96375 TX/PRO/DX INJ NEW DRUG ADDON: CPT

## 2024-09-25 PROCEDURE — 6370000000 HC RX 637 (ALT 250 FOR IP): Performed by: STUDENT IN AN ORGANIZED HEALTH CARE EDUCATION/TRAINING PROGRAM

## 2024-09-25 PROCEDURE — 2580000003 HC RX 258: Performed by: EMERGENCY MEDICINE

## 2024-09-25 PROCEDURE — 85730 THROMBOPLASTIN TIME PARTIAL: CPT

## 2024-09-25 PROCEDURE — 94761 N-INVAS EAR/PLS OXIMETRY MLT: CPT

## 2024-09-25 PROCEDURE — 82803 BLOOD GASES ANY COMBINATION: CPT

## 2024-09-25 PROCEDURE — 71275 CT ANGIOGRAPHY CHEST: CPT

## 2024-09-25 PROCEDURE — 82805 BLOOD GASES W/O2 SATURATION: CPT

## 2024-09-25 PROCEDURE — 2580000003 HC RX 258: Performed by: PHYSICIAN ASSISTANT

## 2024-09-25 RX ORDER — 0.9 % SODIUM CHLORIDE 0.9 %
500 INTRAVENOUS SOLUTION INTRAVENOUS ONCE
Status: COMPLETED | OUTPATIENT
Start: 2024-09-25 | End: 2024-09-25

## 2024-09-25 RX ORDER — GLUCAGON 1 MG/ML
1 KIT INJECTION PRN
Status: DISCONTINUED | OUTPATIENT
Start: 2024-09-25 | End: 2024-10-05 | Stop reason: HOSPADM

## 2024-09-25 RX ORDER — IPRATROPIUM BROMIDE AND ALBUTEROL SULFATE 2.5; .5 MG/3ML; MG/3ML
1 SOLUTION RESPIRATORY (INHALATION)
Status: DISCONTINUED | OUTPATIENT
Start: 2024-09-25 | End: 2024-09-25

## 2024-09-25 RX ORDER — SUCRALFATE 1 G/1
1 TABLET ORAL EVERY 6 HOURS SCHEDULED
Status: DISCONTINUED | OUTPATIENT
Start: 2024-09-25 | End: 2024-10-05 | Stop reason: HOSPADM

## 2024-09-25 RX ORDER — IPRATROPIUM BROMIDE AND ALBUTEROL SULFATE 2.5; .5 MG/3ML; MG/3ML
SOLUTION RESPIRATORY (INHALATION)
Status: DISCONTINUED
Start: 2024-09-25 | End: 2024-09-25 | Stop reason: HOSPADM

## 2024-09-25 RX ORDER — ACETAMINOPHEN 325 MG/1
650 TABLET ORAL EVERY 6 HOURS PRN
Status: DISCONTINUED | OUTPATIENT
Start: 2024-09-25 | End: 2024-10-05 | Stop reason: HOSPADM

## 2024-09-25 RX ORDER — FERROUS SULFATE 325(65) MG
325 TABLET ORAL
Status: DISCONTINUED | OUTPATIENT
Start: 2024-09-26 | End: 2024-10-05 | Stop reason: HOSPADM

## 2024-09-25 RX ORDER — ASPIRIN 81 MG/1
81 TABLET, CHEWABLE ORAL DAILY
Status: DISCONTINUED | OUTPATIENT
Start: 2024-09-25 | End: 2024-10-05 | Stop reason: HOSPADM

## 2024-09-25 RX ORDER — HYDROXYUREA 500 MG/1
500 CAPSULE ORAL DAILY
Status: DISCONTINUED | OUTPATIENT
Start: 2024-09-25 | End: 2024-10-05 | Stop reason: HOSPADM

## 2024-09-25 RX ORDER — IPRATROPIUM BROMIDE AND ALBUTEROL SULFATE 2.5; .5 MG/3ML; MG/3ML
1 SOLUTION RESPIRATORY (INHALATION)
Status: DISCONTINUED | OUTPATIENT
Start: 2024-09-25 | End: 2024-09-27

## 2024-09-25 RX ORDER — ONDANSETRON 4 MG/1
4 TABLET, ORALLY DISINTEGRATING ORAL EVERY 8 HOURS PRN
Status: DISCONTINUED | OUTPATIENT
Start: 2024-09-25 | End: 2024-10-05 | Stop reason: HOSPADM

## 2024-09-25 RX ORDER — ONDANSETRON 2 MG/ML
4 INJECTION INTRAMUSCULAR; INTRAVENOUS EVERY 6 HOURS PRN
Status: DISCONTINUED | OUTPATIENT
Start: 2024-09-25 | End: 2024-10-05 | Stop reason: HOSPADM

## 2024-09-25 RX ORDER — SODIUM CHLORIDE, SODIUM LACTATE, POTASSIUM CHLORIDE, CALCIUM CHLORIDE 600; 310; 30; 20 MG/100ML; MG/100ML; MG/100ML; MG/100ML
INJECTION, SOLUTION INTRAVENOUS ONCE
Status: COMPLETED | OUTPATIENT
Start: 2024-09-25 | End: 2024-09-25

## 2024-09-25 RX ORDER — MAGNESIUM SULFATE IN WATER 40 MG/ML
2000 INJECTION, SOLUTION INTRAVENOUS PRN
Status: DISCONTINUED | OUTPATIENT
Start: 2024-09-25 | End: 2024-10-05 | Stop reason: HOSPADM

## 2024-09-25 RX ORDER — PANTOPRAZOLE SODIUM 40 MG/1
40 TABLET, DELAYED RELEASE ORAL
Status: DISCONTINUED | OUTPATIENT
Start: 2024-09-26 | End: 2024-10-05 | Stop reason: HOSPADM

## 2024-09-25 RX ORDER — DEXTROSE MONOHYDRATE 100 MG/ML
INJECTION, SOLUTION INTRAVENOUS CONTINUOUS PRN
Status: DISCONTINUED | OUTPATIENT
Start: 2024-09-25 | End: 2024-10-05 | Stop reason: HOSPADM

## 2024-09-25 RX ORDER — NOREPINEPHRINE BITARTRATE 0.06 MG/ML
1-40 INJECTION, SOLUTION INTRAVENOUS CONTINUOUS
Status: DISCONTINUED | OUTPATIENT
Start: 2024-09-25 | End: 2024-09-26

## 2024-09-25 RX ORDER — POLYETHYLENE GLYCOL 3350 17 G/17G
17 POWDER, FOR SOLUTION ORAL DAILY PRN
Status: DISCONTINUED | OUTPATIENT
Start: 2024-09-25 | End: 2024-10-05 | Stop reason: HOSPADM

## 2024-09-25 RX ORDER — NOREPINEPHRINE BITARTRATE 0.06 MG/ML
1-100 INJECTION, SOLUTION INTRAVENOUS CONTINUOUS
Status: DISCONTINUED | OUTPATIENT
Start: 2024-09-25 | End: 2024-09-25 | Stop reason: HOSPADM

## 2024-09-25 RX ORDER — POTASSIUM CHLORIDE 29.8 MG/ML
20 INJECTION INTRAVENOUS PRN
Status: DISCONTINUED | OUTPATIENT
Start: 2024-09-25 | End: 2024-10-05 | Stop reason: HOSPADM

## 2024-09-25 RX ORDER — SODIUM CHLORIDE 0.9 % (FLUSH) 0.9 %
5-40 SYRINGE (ML) INJECTION EVERY 12 HOURS SCHEDULED
Status: DISCONTINUED | OUTPATIENT
Start: 2024-09-25 | End: 2024-10-05 | Stop reason: HOSPADM

## 2024-09-25 RX ORDER — IOPAMIDOL 755 MG/ML
75 INJECTION, SOLUTION INTRAVASCULAR
Status: COMPLETED | OUTPATIENT
Start: 2024-09-25 | End: 2024-09-25

## 2024-09-25 RX ORDER — POTASSIUM CHLORIDE 7.45 MG/ML
10 INJECTION INTRAVENOUS PRN
Status: DISCONTINUED | OUTPATIENT
Start: 2024-09-25 | End: 2024-10-05 | Stop reason: HOSPADM

## 2024-09-25 RX ORDER — SODIUM CHLORIDE 9 MG/ML
INJECTION, SOLUTION INTRAVENOUS CONTINUOUS
Status: DISCONTINUED | OUTPATIENT
Start: 2024-09-25 | End: 2024-09-25

## 2024-09-25 RX ORDER — SODIUM CHLORIDE 0.9 % (FLUSH) 0.9 %
5-40 SYRINGE (ML) INJECTION PRN
Status: DISCONTINUED | OUTPATIENT
Start: 2024-09-25 | End: 2024-10-05 | Stop reason: HOSPADM

## 2024-09-25 RX ORDER — MIDODRINE HYDROCHLORIDE 5 MG/1
10 TABLET ORAL
Status: DISCONTINUED | OUTPATIENT
Start: 2024-09-25 | End: 2024-10-05 | Stop reason: HOSPADM

## 2024-09-25 RX ORDER — ATORVASTATIN CALCIUM 10 MG/1
20 TABLET, FILM COATED ORAL NIGHTLY
Status: DISCONTINUED | OUTPATIENT
Start: 2024-09-25 | End: 2024-10-05 | Stop reason: HOSPADM

## 2024-09-25 RX ORDER — SODIUM CHLORIDE 9 MG/ML
INJECTION, SOLUTION INTRAVENOUS PRN
Status: DISCONTINUED | OUTPATIENT
Start: 2024-09-25 | End: 2024-10-05 | Stop reason: HOSPADM

## 2024-09-25 RX ORDER — INSULIN LISPRO 100 [IU]/ML
0-4 INJECTION, SOLUTION INTRAVENOUS; SUBCUTANEOUS NIGHTLY
Status: DISCONTINUED | OUTPATIENT
Start: 2024-09-25 | End: 2024-09-28

## 2024-09-25 RX ORDER — INSULIN LISPRO 100 [IU]/ML
0-8 INJECTION, SOLUTION INTRAVENOUS; SUBCUTANEOUS
Status: DISCONTINUED | OUTPATIENT
Start: 2024-09-25 | End: 2024-09-28

## 2024-09-25 RX ORDER — ENOXAPARIN SODIUM 100 MG/ML
40 INJECTION SUBCUTANEOUS DAILY
Status: DISCONTINUED | OUTPATIENT
Start: 2024-09-25 | End: 2024-10-05 | Stop reason: HOSPADM

## 2024-09-25 RX ORDER — 0.9 % SODIUM CHLORIDE 0.9 %
1000 INTRAVENOUS SOLUTION INTRAVENOUS ONCE
Status: COMPLETED | OUTPATIENT
Start: 2024-09-25 | End: 2024-09-25

## 2024-09-25 RX ORDER — ACETAMINOPHEN 650 MG/1
650 SUPPOSITORY RECTAL EVERY 6 HOURS PRN
Status: DISCONTINUED | OUTPATIENT
Start: 2024-09-25 | End: 2024-10-05 | Stop reason: HOSPADM

## 2024-09-25 RX ADMIN — AZITHROMYCIN MONOHYDRATE 500 MG: 500 INJECTION, POWDER, LYOPHILIZED, FOR SOLUTION INTRAVENOUS at 02:11

## 2024-09-25 RX ADMIN — PIPERACILLIN AND TAZOBACTAM 3375 MG: 3; .375 INJECTION, POWDER, LYOPHILIZED, FOR SOLUTION INTRAVENOUS at 22:32

## 2024-09-25 RX ADMIN — NOREPINEPHRINE BITARTRATE 5 MCG/MIN: 64 SOLUTION INTRAVENOUS at 17:40

## 2024-09-25 RX ADMIN — INSULIN LISPRO 2 UNITS: 100 INJECTION, SOLUTION INTRAVENOUS; SUBCUTANEOUS at 14:35

## 2024-09-25 RX ADMIN — SODIUM CHLORIDE 500 ML: 9 INJECTION, SOLUTION INTRAVENOUS at 17:00

## 2024-09-25 RX ADMIN — SODIUM CHLORIDE 1000 ML: 9 INJECTION, SOLUTION INTRAVENOUS at 02:47

## 2024-09-25 RX ADMIN — IPRATROPIUM BROMIDE AND ALBUTEROL SULFATE 1 DOSE: 2.5; .5 SOLUTION RESPIRATORY (INHALATION) at 15:40

## 2024-09-25 RX ADMIN — IPRATROPIUM BROMIDE AND ALBUTEROL SULFATE 1 DOSE: 2.5; .5 SOLUTION RESPIRATORY (INHALATION) at 00:07

## 2024-09-25 RX ADMIN — ATORVASTATIN CALCIUM 20 MG: 10 TABLET, FILM COATED ORAL at 20:51

## 2024-09-25 RX ADMIN — HYDROXYUREA 500 MG: 500 CAPSULE ORAL at 14:18

## 2024-09-25 RX ADMIN — NOREPINEPHRINE BITARTRATE 10 MCG/MIN: 0.06 INJECTION, SOLUTION INTRAVENOUS at 03:32

## 2024-09-25 RX ADMIN — SODIUM CHLORIDE, PRESERVATIVE FREE 10 ML: 5 INJECTION INTRAVENOUS at 11:40

## 2024-09-25 RX ADMIN — CEFTRIAXONE SODIUM 1000 MG: 1 INJECTION, POWDER, FOR SOLUTION INTRAMUSCULAR; INTRAVENOUS at 01:44

## 2024-09-25 RX ADMIN — SODIUM CHLORIDE: 9 INJECTION, SOLUTION INTRAVENOUS at 11:39

## 2024-09-25 RX ADMIN — SODIUM CHLORIDE, PRESERVATIVE FREE 10 ML: 5 INJECTION INTRAVENOUS at 22:32

## 2024-09-25 RX ADMIN — ASPIRIN 81 MG: 81 TABLET, CHEWABLE ORAL at 14:18

## 2024-09-25 RX ADMIN — SODIUM CHLORIDE 1000 ML: 9 INJECTION, SOLUTION INTRAVENOUS at 01:42

## 2024-09-25 RX ADMIN — PIPERACILLIN AND TAZOBACTAM 3375 MG: 3; .375 INJECTION, POWDER, LYOPHILIZED, FOR SOLUTION INTRAVENOUS at 16:38

## 2024-09-25 RX ADMIN — IPRATROPIUM BROMIDE AND ALBUTEROL SULFATE 1 DOSE: .5; 2.5 SOLUTION RESPIRATORY (INHALATION) at 11:32

## 2024-09-25 RX ADMIN — ENOXAPARIN SODIUM 40 MG: 100 INJECTION SUBCUTANEOUS at 11:39

## 2024-09-25 RX ADMIN — ACETAMINOPHEN 1000 MG: 500 TABLET ORAL at 00:04

## 2024-09-25 RX ADMIN — SODIUM CHLORIDE, POTASSIUM CHLORIDE, SODIUM LACTATE AND CALCIUM CHLORIDE: 600; 310; 30; 20 INJECTION, SOLUTION INTRAVENOUS at 05:03

## 2024-09-25 RX ADMIN — IOPAMIDOL 75 ML: 755 INJECTION, SOLUTION INTRAVENOUS at 01:05

## 2024-09-25 RX ADMIN — CALCIUM CHLORIDE 1000 MG: 100 INJECTION, SOLUTION INTRAVENOUS at 14:33

## 2024-09-25 RX ADMIN — MIDODRINE HYDROCHLORIDE 10 MG: 5 TABLET ORAL at 17:39

## 2024-09-25 RX ADMIN — PIPERACILLIN AND TAZOBACTAM 4500 MG: 4; .5 INJECTION, POWDER, FOR SOLUTION INTRAVENOUS at 11:41

## 2024-09-25 RX ADMIN — SUCRALFATE 1 G: 1 TABLET ORAL at 22:35

## 2024-09-25 RX ADMIN — HYDROCORTISONE SODIUM SUCCINATE 100 MG: 100 INJECTION, POWDER, FOR SOLUTION INTRAMUSCULAR; INTRAVENOUS at 03:24

## 2024-09-25 RX ADMIN — IPRATROPIUM BROMIDE AND ALBUTEROL SULFATE 1 DOSE: 2.5; .5 SOLUTION RESPIRATORY (INHALATION) at 19:17

## 2024-09-25 RX ADMIN — POTASSIUM BICARBONATE 40 MEQ: 782 TABLET, EFFERVESCENT ORAL at 14:29

## 2024-09-25 ASSESSMENT — PAIN SCALES - GENERAL
PAINLEVEL_OUTOF10: 0

## 2024-09-25 NOTE — H&P
History and Physical      Name:  Lin Bates /Age/Sex: 1955  (69 y.o. female)   MRN & CSN:  [id number] & [id number] Admission Date/Time: 2024  9:09 AM   Location:  -A PCP: Dorothy Terrazas PA       Hospital Day: 1     Attending physician:     Assessment and Plan:   Lin Bates is a 69 y.o.  female with significant PMH of HTN, HLD, DM-2, history of HPV infection, nicotine use, small cell carcinoma -chemotherapy with cisplatin and etoposide was started since  , completed radiation therapy on 2024 , myeloproliferative neoplasm-(based on the bone marrow biopsy on 2023) , chemoradiation induced esophagitis , currently on immunotherapy who presents with Septic shock (HCC)    Assessment and plan:   Acute hypoxic respiratory failure, on nasal cannula  Hypotension, briefly required vasopressor support  Severe sepsis  Left-sided pleural effusion  Community-acquired pneumonia  DM-2 per history  Essential hypertension per history  HLD  Monoclonal B-cell lymphocytosis  Small cell lung cancer left lower lobe s/p chemotherapy and radiation, currently on immunotherapy  Nicotine use    Neuro: Patient is alert oriented, no focal deficits, pain management  CV: Was briefly on Levophed at [facility], hemodynamically stable since arrival to ICU, maintain MAP> 65 mmHg, SBP> 100 mmHg, HTN per history, no echocardiogram available to review  Continue telemetry monitoring  Respiratory: Presented to ER with respiratory distress of 2 days duration, possible pneumonia, started on antibiotic coverage, nebulizers, follow-up respiratory PCR panel, respiratory culture, reviewed CTA PE protocol, negative for PE, evidence of left-sided pleural effusion, will consult IR for thoracentesis  No hx of  prior thoracentesis  GI: Patient has history of chemoradiation induced esophagitis, continue Protonix and sucralfate, minced diet at home, swallow eval.  : Cr-0.6, renal function wnl,   Burt Birch MD   hydrocortisone (ALA-ROSARIO) 1 % cream Apply topically 2 times daily. 9/19/24 9/26/24  Burt Birch MD   loratadine (CLARITIN) 10 MG tablet Take 1 tablet by mouth daily 9/19/24 10/19/24  Burt Birch MD   potassium chloride (KLOR-CON M) 20 MEQ extended release tablet Take 1 tablet by mouth 2 times daily for 7 days 7/6/24 8/7/24  Sonya Mari DO   sucralfate (CARAFATE) 1 GM/10ML suspension TAKE 10 MLS BY MOUTH 4 TIMES DAILY (BEFORE MEALS AND NIGHTLY) 6/12/24   Hunter Guo MD   potassium bicarbonate (EFFER-K) 25 MEQ disintegrating tablet Take 1 tablet by mouth 2 times daily 5/31/24   Jose Mcmahon DO   DPH-Lido-AlHydr-MgHydr-Simeth (MAGIC MOUTHWASH) SUSP Swish and spit 5 mLs 4 times daily as needed for Irritation 5/24/24   Denis Ford MD   pantoprazole (PROTONIX) 40 MG tablet Take 1 tablet by mouth every morning (before breakfast) 5/24/24   Denis Ford MD   Magic Mouthwash (MIRACLE MOUTHWASH) Equal parts of Benadryl, Maalox, 2% Viscous Xylocaine.  Take 5 mL 4 times daily. 4/29/24   Burt Birch MD   lidocaine viscous hcl (XYLOCAINE) 2 % SOLN solution Take 15 mLs by mouth every 3 hours as needed for Irritation 4/26/24   Burt Birch MD   ondansetron (ZOFRAN) 4 MG tablet Take 1 tablet by mouth daily as needed for Nausea or Vomiting 4/10/24   Brian Humphries II, MD   dexAMETHasone (DECADRON) 4 MG tablet Take 1 tablet by mouth See Admin Instructions for 4 doses Take one tablet in the AM  the day after the last day of chemotherapy each cycle. 2/12/24   Burt Birch MD   OLANZapine (ZYPREXA) 5 MG tablet Take 1 tablet by mouth nightly Take one tablet at bedtime for four nights starting the night before chemotherapy. 2/12/24   Burt Birch MD   ondansetron (ZOFRAN) 8 MG tablet Take 1 tablet by mouth every 8 hours as needed for Nausea or Vomiting Take 1 tablet by mouth every 8 hours as needed for nausea or vomiting. 2/12/24   Burt Birch MD   vitamin B-12 (CYANOCOBALAMIN) 1000 MCG tablet

## 2024-09-25 NOTE — PROGRESS NOTES
Pt arrived 2119 at this time. Norepinephrine gtt @ 10 and LR @ 100 via pt chest port on arrival..    Pt A&Ox 4 on 3L NC.

## 2024-09-25 NOTE — PROGRESS NOTES
4 Eyes Skin Assessment     NAME:  Lin Bates  YOB: 1955  MEDICAL RECORD NUMBER:  [id number]    The patient is being assessed for  Admission    I agree that at least one RN has performed a thorough Head to Toe Skin Assessment on the patient. ALL assessment sites listed below have been assessed.      Areas assessed by both nurses:    Head, Face, Ears, Shoulders, Back, Chest, Arms, Elbows, Hands, Sacrum. Buttock, Coccyx, Ischium, Legs. Feet and Heels, and Under Medical Devices         Does the Patient have a Wound? No noted wound(s)       Giuseppe Prevention initiated by RN: No  Wound Care Orders initiated by RN: No    Pressure Injury (Stage 3,4, Unstageable, DTI, NWPT, and Complex wounds) if present, place Wound referral order by RN under : No    New Ostomies, if present place, Ostomy referral order under : No     Nurse 1 eSignature: Electronically signed by Andrzej Hillman RN on 9/25/24 at 9:18 AM EDT    **SHARE this note so that the co-signing nurse can place an eSignature**    Nurse 2 eSignature: Electronically signed by Pina Menendez RN on 9/25/24 at 10:44 AM EDT

## 2024-09-25 NOTE — PROGRESS NOTES
Facility/Department: [facility] ICU   CLINICAL BEDSIDE SWALLOW EVALUATION    NAME: Lin Bates  : 1955  MRN: [id number]    ADMISSION DATE: 2024  ADMITTING DIAGNOSIS: has Thrombocytosis; Leucocytosis; Monoclonal B-cell lymphocytosis; Thickened endometrium; PMB (postmenopausal bleeding); Lung nodule, solitary; Need for hepatitis B screening test; Small cell lung cancer, left lower lobe (HCC); Severe anemia; Moderate malnutrition (HCC); Drug therapy; and Septic shock (HCC) on their problem list.  ONSET DATE: this admission     Date of Eval: 2024  Evaluating Therapist: SHARON Walker    Impression: Lin Bates was seen today for a clinical bedside swallow evaluation following admission to [facility] with septic shock. Pulmonary CTA on 24 reveals \"Multiple patchy bilateral infiltrates, underlying masses can't be excluded.\" Relevant medical hx includes B-cell lymphoma, small cell lung cancer of left lung, diabetes.    Lin Bates was positioned upright in bed with her fiance at bedside. Pt pleasant and agreeable throughout. Pt's  present at bedside and provides majority of hx. He reports pt has been scoped 3x by GI doctor. Per chart review, most recent scope on 24 revealed need for dilation, however, it was not done d/t inflammation resulting from radiation. Pt complains of food feeling stuck (indicates mid-sternum). Pt's fiance reports she eats soft foods at home, including mashed potatoes, spaghetti, soups. Pt is edentulous and reports she occasionally wears top dentures to eat. Oral mechanism examination is WFL. Vocal quality is WFL.     Lin Bates consumed trials of thin liquid via cup, puree, and regular solid during this visit. Pt presents with evidence of mild oropharyngeal dysphagia d/t dentition characterized by mildly prolonged bolus preparation/mastication. Oral acceptance, a/p transit, and oral clearance are WFL. Pharyngeal swallow initiation appears timely with no  all oral intake;Small bites/sips    Treatment/Goals  Short-term Goals  Timeframe for Short-term Goals: LOS or until goals are met  Goal 1: Pt will tolerate a soft and bite sized diet and thin liquid without overt s/sx of penetration/aspiration in 100% of trials  Goal 2: Pt/caregivers will demonstrate understanding of SLP recommendations/POC    General  Chart Reviewed: Yes  Behavior/Cognition: Alert;Cooperative;Pleasant mood  Temperature Spikes Noted: No  Respiratory Status: O2 via nasual cannula  O2 Device: Nasal cannula  Communication Observation: Functional  Follows Directions: Complex  Dentition: Edentulous (Pt reports she occasionally wears top dentures to eat; not present for current visit)  Patient Positioning: Upright in bed  Baseline Vocal Quality: Normal  Prior Dysphagia History: No SLP hx per chart review; Pt/fiance report hx of esophageal dysphagia  Consistencies Administered: Regular;Pureed;Thin - cup    Vision/Hearing   No deficits noted    Oral Motor Deficits  Labial: No impairment  Dentition: Edentulous (Pt occasionally wear top dentures to eat; not present during eval)  Oral Hygiene: Clean  Lingual: No impairment  Velum: No Impairment  Mandible: No impairment  Consistencies Administered: Regular;Pureed;Thin - cup    Oral Phase Dysfunction  Oral Phase  Oral Phase: Exceptions     Indicators of Pharyngeal Phase Dysfunction   Pharyngeal Phase  Pharyngeal Phase: WFL  Pharyngeal Phase   Pharyngeal Phase: WFL    Prognosis  Prognosis: Good  Prognosis Considerations: Age;Severity of Impairments;Previous Level of Function;Medical Diagnosis  Consulted and agree with results and recommendations: Patient;Family member  Family member consulted: Fiance    Education  Patient Education: Recommendations/POC  Patient Education Response: Verbalizes understanding  Safety Devices in place: Yes  Type of devices: Bed alarm in place       Therapy Time  SLP Individual Minutes  Time In: 1142  Time Out: 1156  Minutes: 14

## 2024-09-26 LAB
ALBUMIN SERPL-MCNC: 2.7 G/DL (ref 3.4–5)
ALBUMIN/GLOB SERPL: 0.9 {RATIO} (ref 1.1–2.2)
ALP SERPL-CCNC: 73 U/L (ref 40–129)
ALT SERPL-CCNC: 12 U/L (ref 10–40)
ANION GAP SERPL CALCULATED.3IONS-SCNC: 11 MMOL/L (ref 9–17)
AST SERPL-CCNC: 13 U/L (ref 15–37)
BASOPHILS # BLD: 0.07 K/UL
BASOPHILS NFR BLD: 0 % (ref 0–1)
BILIRUB SERPL-MCNC: <0.2 MG/DL (ref 0–1)
BUN SERPL-MCNC: 15 MG/DL (ref 7–20)
CA-I BLD-SCNC: 1.29 MMOL/L (ref 1.15–1.33)
CALCIUM SERPL-MCNC: 9 MG/DL (ref 8.3–10.6)
CHLORIDE SERPL-SCNC: 108 MMOL/L (ref 99–110)
CO2 SERPL-SCNC: 23 MMOL/L (ref 21–32)
CREAT SERPL-MCNC: 0.7 MG/DL (ref 0.6–1.2)
EOSINOPHIL # BLD: 0.08 K/UL
EOSINOPHILS RELATIVE PERCENT: 0 % (ref 0–3)
ERYTHROCYTE [DISTWIDTH] IN BLOOD BY AUTOMATED COUNT: 14.2 % (ref 11.7–14.9)
GFR, ESTIMATED: 77 ML/MIN/1.73M2
GLUCOSE BLD-MCNC: 190 MG/DL (ref 74–99)
GLUCOSE BLD-MCNC: 202 MG/DL (ref 74–99)
GLUCOSE BLD-MCNC: 208 MG/DL (ref 74–99)
GLUCOSE BLD-MCNC: 244 MG/DL (ref 74–99)
GLUCOSE SERPL-MCNC: 155 MG/DL (ref 74–99)
HCT VFR BLD AUTO: 27.3 % (ref 37–47)
HGB BLD-MCNC: 8.8 G/DL (ref 12.5–16)
IMM GRANULOCYTES # BLD AUTO: 0.12 K/UL
IMM GRANULOCYTES NFR BLD: 1 %
LYMPHOCYTES NFR BLD: 0.65 K/UL
LYMPHOCYTES RELATIVE PERCENT: 4 % (ref 24–44)
MAGNESIUM SERPL-MCNC: 2.2 MG/DL (ref 1.8–2.4)
MCH RBC QN AUTO: 31.7 PG (ref 27–31)
MCHC RBC AUTO-ENTMCNC: 32.2 G/DL (ref 32–36)
MCV RBC AUTO: 98.2 FL (ref 78–100)
MONOCYTES NFR BLD: 1.68 K/UL
MONOCYTES NFR BLD: 9 % (ref 0–4)
NEUTROPHILS NFR BLD: 86 % (ref 36–66)
NEUTS SEG NFR BLD: 15.3 K/UL
PHOSPHATE SERPL-MCNC: 3.1 MG/DL (ref 2.5–4.9)
PLATELET # BLD AUTO: 420 K/UL (ref 140–440)
PMV BLD AUTO: 9.7 FL (ref 7.5–11.1)
POTASSIUM SERPL-SCNC: 3.4 MMOL/L (ref 3.5–5.1)
PROT SERPL-MCNC: 5.9 G/DL (ref 6.4–8.2)
RBC # BLD AUTO: 2.78 M/UL (ref 4.2–5.4)
SODIUM SERPL-SCNC: 143 MMOL/L (ref 136–145)
T3FREE SERPL-MCNC: 2.12 PG/ML (ref 2.3–4.2)
WBC OTHER # BLD: 17.9 K/UL (ref 4–10.5)

## 2024-09-26 PROCEDURE — 84481 FREE ASSAY (FT-3): CPT

## 2024-09-26 PROCEDURE — 99223 1ST HOSP IP/OBS HIGH 75: CPT | Performed by: INTERNAL MEDICINE

## 2024-09-26 PROCEDURE — 36591 DRAW BLOOD OFF VENOUS DEVICE: CPT

## 2024-09-26 PROCEDURE — 6360000002 HC RX W HCPCS: Performed by: STUDENT IN AN ORGANIZED HEALTH CARE EDUCATION/TRAINING PROGRAM

## 2024-09-26 PROCEDURE — 87899 AGENT NOS ASSAY W/OPTIC: CPT

## 2024-09-26 PROCEDURE — 82962 GLUCOSE BLOOD TEST: CPT

## 2024-09-26 PROCEDURE — 87449 NOS EACH ORGANISM AG IA: CPT

## 2024-09-26 PROCEDURE — 86800 THYROGLOBULIN ANTIBODY: CPT

## 2024-09-26 PROCEDURE — 84100 ASSAY OF PHOSPHORUS: CPT

## 2024-09-26 PROCEDURE — 6370000000 HC RX 637 (ALT 250 FOR IP): Performed by: PHYSICIAN ASSISTANT

## 2024-09-26 PROCEDURE — 2700000000 HC OXYGEN THERAPY PER DAY

## 2024-09-26 PROCEDURE — 2500000003 HC RX 250 WO HCPCS: Performed by: STUDENT IN AN ORGANIZED HEALTH CARE EDUCATION/TRAINING PROGRAM

## 2024-09-26 PROCEDURE — 2000000000 HC ICU R&B

## 2024-09-26 PROCEDURE — 2580000003 HC RX 258: Performed by: STUDENT IN AN ORGANIZED HEALTH CARE EDUCATION/TRAINING PROGRAM

## 2024-09-26 PROCEDURE — 94761 N-INVAS EAR/PLS OXIMETRY MLT: CPT

## 2024-09-26 PROCEDURE — 85025 COMPLETE CBC W/AUTO DIFF WBC: CPT

## 2024-09-26 PROCEDURE — 94640 AIRWAY INHALATION TREATMENT: CPT

## 2024-09-26 PROCEDURE — 80053 COMPREHEN METABOLIC PANEL: CPT

## 2024-09-26 PROCEDURE — 82330 ASSAY OF CALCIUM: CPT

## 2024-09-26 PROCEDURE — 86376 MICROSOMAL ANTIBODY EACH: CPT

## 2024-09-26 PROCEDURE — 83735 ASSAY OF MAGNESIUM: CPT

## 2024-09-26 PROCEDURE — 92526 ORAL FUNCTION THERAPY: CPT

## 2024-09-26 PROCEDURE — 6370000000 HC RX 637 (ALT 250 FOR IP): Performed by: STUDENT IN AN ORGANIZED HEALTH CARE EDUCATION/TRAINING PROGRAM

## 2024-09-26 RX ORDER — M-VIT,TX,IRON,MINS/CALC/FOLIC 27MG-0.4MG
1 TABLET ORAL DAILY
Status: DISCONTINUED | OUTPATIENT
Start: 2024-09-26 | End: 2024-10-05 | Stop reason: HOSPADM

## 2024-09-26 RX ORDER — DIPHENHYDRAMINE HYDROCHLORIDE AND LIDOCAINE HYDROCHLORIDE AND ALUMINUM HYDROXIDE AND MAGNESIUM HYDRO
5 KIT 4 TIMES DAILY PRN
Status: DISCONTINUED | OUTPATIENT
Start: 2024-09-26 | End: 2024-10-05 | Stop reason: HOSPADM

## 2024-09-26 RX ORDER — FOLIC ACID 1 MG/1
1 TABLET ORAL DAILY
Status: DISCONTINUED | OUTPATIENT
Start: 2024-09-26 | End: 2024-10-05 | Stop reason: HOSPADM

## 2024-09-26 RX ORDER — NOREPINEPHRINE BITARTRATE 0.06 MG/ML
1-40 INJECTION, SOLUTION INTRAVENOUS CONTINUOUS
Status: DISCONTINUED | OUTPATIENT
Start: 2024-09-26 | End: 2024-10-03

## 2024-09-26 RX ORDER — ZINC SULFATE 50(220)MG
50 CAPSULE ORAL DAILY
Status: DISCONTINUED | OUTPATIENT
Start: 2024-09-26 | End: 2024-10-05 | Stop reason: HOSPADM

## 2024-09-26 RX ORDER — LANOLIN ALCOHOL/MO/W.PET/CERES
100 CREAM (GRAM) TOPICAL DAILY
Status: DISCONTINUED | OUTPATIENT
Start: 2024-09-26 | End: 2024-10-05 | Stop reason: HOSPADM

## 2024-09-26 RX ADMIN — POTASSIUM & SODIUM PHOSPHATES POWDER PACK 280-160-250 MG 500 MG: 280-160-250 PACK at 20:26

## 2024-09-26 RX ADMIN — DEXAMETHASONE SODIUM PHOSPHATE 4 MG: 4 INJECTION, SOLUTION INTRAMUSCULAR; INTRAVENOUS at 20:31

## 2024-09-26 RX ADMIN — FERROUS SULFATE TAB 325 MG (65 MG ELEMENTAL FE) 325 MG: 325 (65 FE) TAB at 08:58

## 2024-09-26 RX ADMIN — IPRATROPIUM BROMIDE AND ALBUTEROL SULFATE 1 DOSE: 2.5; .5 SOLUTION RESPIRATORY (INHALATION) at 00:13

## 2024-09-26 RX ADMIN — PIPERACILLIN AND TAZOBACTAM 4500 MG: 4; .5 INJECTION, POWDER, FOR SOLUTION INTRAVENOUS at 20:36

## 2024-09-26 RX ADMIN — NOREPINEPHRINE BITARTRATE 4 MCG/MIN: 64 SOLUTION INTRAVENOUS at 11:58

## 2024-09-26 RX ADMIN — Medication 1 TABLET: at 08:59

## 2024-09-26 RX ADMIN — SUCRALFATE 1 G: 1 TABLET ORAL at 05:35

## 2024-09-26 RX ADMIN — POTASSIUM BICARBONATE 40 MEQ: 782 TABLET, EFFERVESCENT ORAL at 08:59

## 2024-09-26 RX ADMIN — ZINC SULFATE 220 MG (50 MG) CAPSULE 50 MG: CAPSULE at 08:58

## 2024-09-26 RX ADMIN — FOLIC ACID 1 MG: 1 TABLET ORAL at 08:59

## 2024-09-26 RX ADMIN — POTASSIUM & SODIUM PHOSPHATES POWDER PACK 280-160-250 MG 500 MG: 280-160-250 PACK at 18:14

## 2024-09-26 RX ADMIN — IPRATROPIUM BROMIDE AND ALBUTEROL SULFATE 1 DOSE: 2.5; .5 SOLUTION RESPIRATORY (INHALATION) at 07:33

## 2024-09-26 RX ADMIN — HYDROXYUREA 500 MG: 500 CAPSULE ORAL at 09:04

## 2024-09-26 RX ADMIN — MIDODRINE HYDROCHLORIDE 10 MG: 5 TABLET ORAL at 11:54

## 2024-09-26 RX ADMIN — INSULIN LISPRO 2 UNITS: 100 INJECTION, SOLUTION INTRAVENOUS; SUBCUTANEOUS at 12:08

## 2024-09-26 RX ADMIN — IPRATROPIUM BROMIDE AND ALBUTEROL SULFATE 1 DOSE: 2.5; .5 SOLUTION RESPIRATORY (INHALATION) at 15:19

## 2024-09-26 RX ADMIN — Medication 100 MG: at 08:59

## 2024-09-26 RX ADMIN — MIDODRINE HYDROCHLORIDE 10 MG: 5 TABLET ORAL at 08:59

## 2024-09-26 RX ADMIN — ASPIRIN 81 MG: 81 TABLET, CHEWABLE ORAL at 08:58

## 2024-09-26 RX ADMIN — SUCRALFATE 1 G: 1 TABLET ORAL at 11:54

## 2024-09-26 RX ADMIN — DEXAMETHASONE SODIUM PHOSPHATE 4 MG: 4 INJECTION, SOLUTION INTRAMUSCULAR; INTRAVENOUS at 16:42

## 2024-09-26 RX ADMIN — ENOXAPARIN SODIUM 40 MG: 100 INJECTION SUBCUTANEOUS at 09:04

## 2024-09-26 RX ADMIN — SODIUM CHLORIDE, PRESERVATIVE FREE 10 ML: 5 INJECTION INTRAVENOUS at 09:11

## 2024-09-26 RX ADMIN — POTASSIUM & SODIUM PHOSPHATES POWDER PACK 280-160-250 MG 500 MG: 280-160-250 PACK at 08:59

## 2024-09-26 RX ADMIN — INSULIN LISPRO 2 UNITS: 100 INJECTION, SOLUTION INTRAVENOUS; SUBCUTANEOUS at 18:19

## 2024-09-26 RX ADMIN — SUCRALFATE 1 G: 1 TABLET ORAL at 18:14

## 2024-09-26 RX ADMIN — PIPERACILLIN AND TAZOBACTAM 4500 MG: 4; .5 INJECTION, POWDER, FOR SOLUTION INTRAVENOUS at 14:13

## 2024-09-26 RX ADMIN — PIPERACILLIN AND TAZOBACTAM 3375 MG: 3; .375 INJECTION, POWDER, LYOPHILIZED, FOR SOLUTION INTRAVENOUS at 09:48

## 2024-09-26 RX ADMIN — ATORVASTATIN CALCIUM 20 MG: 10 TABLET, FILM COATED ORAL at 20:26

## 2024-09-26 RX ADMIN — PANTOPRAZOLE SODIUM 40 MG: 40 TABLET, DELAYED RELEASE ORAL at 05:35

## 2024-09-26 RX ADMIN — POTASSIUM & SODIUM PHOSPHATES POWDER PACK 280-160-250 MG 500 MG: 280-160-250 PACK at 14:03

## 2024-09-26 RX ADMIN — SODIUM CHLORIDE, PRESERVATIVE FREE 10 ML: 5 INJECTION INTRAVENOUS at 20:26

## 2024-09-26 RX ADMIN — DEXAMETHASONE SODIUM PHOSPHATE 4 MG: 4 INJECTION, SOLUTION INTRAMUSCULAR; INTRAVENOUS at 09:49

## 2024-09-26 RX ADMIN — MIDODRINE HYDROCHLORIDE 10 MG: 5 TABLET ORAL at 18:14

## 2024-09-26 NOTE — CONSULTS
Endocrinology   Consult Note  9/25/2024  9:09 AM     Primary Care provider: Dorothy Terrazas PA     Referring physician:  Namrata Isidro MD     Dear Doctor Eyal    Thank You for the Consult     Pt. Was Admitted for : Septic shock    Reason for Consult: Evaluate thyroid functions with appear to be somewhat abnormal      History Obtained From:   EMR       HISTORY OF PRESENT ILLNESS:                The patient is a 69 y.o. female with significant past medical history of hypertension, hyperlipidemia, type II endometrium, type 2 diabetes mellitus and history of herpes viral infection, nicotine use, small cell carcinoma lung with benign tumor therapy with cisplatin and etoposide started recently after completion of the radiation therapy.  Patient did develop mild myelo proliferative disorder of neoplasm based upon the bone marrow examination done September 2023 also has chemoradiation induced esophagitis and currently on immunotherapy for lung cancer.  Patient noted to have elevated serum free T4 levels but normal TSH levels I was consulted for evaluation of that this abnormal thyroid functions.  Patient has no previous history of thyroid diseases or any goiters.      ROS:   Pt's ROS done in detail.  Abnormal ROS are noted in Medical and Surgical History Section below:     Other Medical History:        Diagnosis Date    Cancer (HCC)     History of external beam radiation therapy 03/2024    LEFT LUNG and LN 6,000 cGy in 30 fractions    Hyperlipidemia     Hypertension     Thickened endometrium     Type 2 diabetes mellitus without complication (HCC)      Surgical History:        Procedure Laterality Date    CT NEEDLE BIOPSY LUNG PERCUTANEOUS  1/30/2024    CT NEEDLE BIOPSY LUNG PERCUTANEOUS 1/30/2024 [facility] CT SCAN    DENTAL SURGERY      1970's    DILATION AND CURETTAGE OF UTERUS N/A 3/22/2023    DILATATION AND CURETTAGE HYSTEROSCOPY performed by Zac Pretty MD at [facility] OR    ESOPHAGOSCOPY N/A 5/23/2024

## 2024-09-26 NOTE — CARE COORDINATION
CM reviewed pt’s medical record and discussed pt in IDR. CM introduced self and explained the role of case management. CM in to see pt to initiate D/C planning. Pt is sitting up in recliner. Pt is alert, oriented, and cooperative with CM assessment. PTA pt was independent with ADLs and mobility. PT has insurance and PCP. Pt denies food insecurity and has RX coverage with insurance. CM discussed PT/OT process/recommendations. Pt denies needing HHC or SNF at discharge. CM discussed HHC should she need IV antibiotics at discharge. CM discussed the benefits of HHC for post hospital care. Pt continues to decline both HHC and SNF at discharge. Pt denies having any other DC needs. Plan for discharge is home with fiance. Denies needing HHC or SNF. Denies having any other discharge needs. Please contact CM if any new discharge needs arise.        09/26/24 4836   Service Assessment   Patient Orientation Alert and Oriented   Cognition Alert   History Provided By Patient;Medical Record   Primary Caregiver Self   Support Systems Spouse/Significant Other   Patient's Healthcare Decision Maker is: Legal Next of Kin   PCP Verified by CM Yes   Last Visit to PCP Within last 3 months   Prior Functional Level Independent in ADLs/IADLs   Current Functional Level Independent in ADLs/IADLs   Can patient return to prior living arrangement Yes   Ability to make needs known: Good   Family able to assist with home care needs: Other (comment)  (michell)   Would you like for me to discuss the discharge plan with any other family members/significant others, and if so, who? Yes  (Matt Bhat)   Financial Resources Medicaid;Medicare   Community Resources None   Social/Functional History   Lives With Significant other   Type of Home Apartment   Home Layout One level   Home Access Level entry   Bathroom Shower/Tub Tub/Shower unit   Bathroom Toilet Standard   Bathroom Equipment Grab bars in shower;Tub transfer bench   Home Equipment Walker -

## 2024-09-26 NOTE — PROGRESS NOTES
V2.0  [facility] [place] Hospitalist Progress Note      Name:  Lin Bates /Age/Sex: 1955  (69 y.o. female)   MRN & CSN:  [id number] & [id number] Encounter Date/Time: 2024 1:20 PM EDT    Location:  -A PCP: Dorothy Terrazas PA       Hospital Day: 2    Assessment and Plan:   Lin Bates is a 69 y.o. female with pmh of monoclonal lymphocytosis hypertension hyperlipidemia diabetes small cell lung cancer status postchemotherapy and radiation therapy on immunotherapy who presents with Septic shock (HCC)      Plan:  Acute hypoxic respiratory in the setting of severe sepsis with left-sided pleural pneumonia.  Currently on antibiotics de-escalate based on culture results..  IR for consideration of thoracentesis  Septic shock requiring pressors in the setting of above wean off as tolerated  Diabetes mellitus type 2 on sliding insulin currently in ICU monitor for hypoglycemia  Benign essential hypertension  Hyperlipidemia on statin  Monoclonal B-cell lymphocytosis with small cell lung cancer of the left lower lobe status postchemotherapy and radiation therapy currently on immunotherapy       Medical Decision Making:  The following items were considered in medical decision making:  Discussion of patient care with other providers  Reviewed clinical lab tests if any  Reviewed radiology tests if any  Reviewed other diagnostic tests/interventions  Independent review of radiologic images if any  Microbiology cultures and other micro tests if any    Estimated time spent for medical decision-making encompassing complexity of the case, history taking, medication review, physical examination, communication with family, RN, , discussion with specialists, and ancillary staff members to provide accurate care for the patient was around 25 minutes.    The billing in this case is level 3 due to the combination of above and specifically because of complexity of care.    Comment: Please note this  % 4 (L) 24 - 44 %    Monocytes % 9 (H) 0 - 4 %    Eosinophils % 0 0 - 3 %    Basophils % 0 0 - 1 %    Immature Granulocytes % 1 (H) 0 %    Neutrophils Absolute 15.30 k/uL    Lymphocytes Absolute 0.65 k/uL    Monocytes Absolute 1.68 k/uL    Eosinophils Absolute 0.08 k/uL    Basophils Absolute 0.07 k/uL    Immature Granulocytes Absolute 0.12 k/uL   Phosphorus    Collection Time: 09/26/24  5:50 AM   Result Value Ref Range    Phosphorus 3.1 2.5 - 4.9 mg/dL   Magnesium    Collection Time: 09/26/24  5:50 AM   Result Value Ref Range    Magnesium 2.2 1.8 - 2.4 mg/dL   POCT Glucose    Collection Time: 09/26/24  8:55 AM   Result Value Ref Range    POC Glucose 190 (H) 74 - 99 mg/dL   T3, Free    Collection Time: 09/26/24  9:45 AM   Result Value Ref Range    T3, Free 2.12 (L) 2.30 - 4.20 pg/mL        Imaging/Diagnostics Last 24 Hours   CTA PULMONARY W CONTRAST    Result Date: 9/25/2024  EXAMINATION: CTA PULMONARY W CONTRAST 9/25/2024 1:04 AM ACCESSION NUMBER: [id number] COMPARISON: None available INDICATION: Fever with new onset hypoxia.  Concern for pneumonia versus PE. Known history of left lower lobe small cell lung carcinoma TECHNIQUE: Multiple contiguous 2D axial CT images of the chest were obtained from the lung apices to the lung bases after the intravenous administration of 100mL Iso-justo 370 per pulmonary angiography protocol.  Coronal reconstructions were performed. Radiation dose reduction techniques were used for this study. Our CT scanners use one or all of the following: Automated exposure control, adjustment of the mA and/or kV according to patient size, iterative reconstruction. FINDINGS: STUDY QUALITY: The exam study quality is good. AIRWAYS: The central airways are patent. LUNGS: Multiple patchy bilateral infiltrates PLEURA: Moderate to large left pleural effusion. HEART: The heart is not enlarged. No calcified coronary atherosclerosis.  No pericardial effusion. THORACIC AORTA: The aorta is normal in caliber.

## 2024-09-26 NOTE — CONSULTS
Immunologic:  No eczema, No frequent mucous infections, No frequent respiratory infections, No recurrent urticarial, No frequent skin infections.     Vital Signs: BP (!) 115/54   Pulse 96   Temp 97.7 °F (36.5 °C) (Oral)   Resp 24   SpO2 95%      Physical Exam:  CONSTITUTIONAL: awake, alert, cooperative, no apparent distress   EYES: pupils equal, round and reactive to light, sclera clear, normal conjunctiva  ENT: Normocephalic, without obvious abnormality, atraumatic  NECK: supple, symmetrical, no jugular venous distension, no carotid bruits   HEMATOLOGIC/LYMPHATIC: no cervical, supraclavicular or axillary lymphadenopathy   LUNGS: VBS, has wheezes, rhonchi +,    CARDIOVASCULAR: regular rate and rhythm, normal S1 and S2, no murmur noted  ABDOMEN: normal bowel sounds x 4, soft, non-distended, non-tender, no masses palpated, no hepatosplenomegaly   MUSCULOSKELETAL: full range of motion noted, tone is normal  NEUROLOGIC: awake, alert, oriented to name, place and time. Motor skills grossly intact.   SKIN: appears intact, normal skin color, normal texture, normal turgor, no jaundice.   EXTREMITIES: no LE edema, no leg swelling, no cyanosis, no clubbing,       Labs:  Hematology:  Lab Results   Component Value Date    WBC 17.9 (H) 09/26/2024    RBC 2.78 (L) 09/26/2024    HGB 8.8 (L) 09/26/2024    HCT 27.3 (L) 09/26/2024    MCV 98.2 09/26/2024    MCH 31.7 (H) 09/26/2024    MCHC 32.2 09/26/2024    RDW 14.2 09/26/2024     09/26/2024    MPV 9.7 09/26/2024    BANDSPCT 10 05/24/2024    BASOPCT 0 09/26/2024    LYMPHOPCT 4 (L) 09/26/2024    MONOPCT 9 (H) 09/26/2024    BANDABS 0.57 05/24/2024    EOSABS 0.08 09/26/2024    BASOSABS 0.07 09/26/2024    LYMPHSABS 0.65 09/26/2024    MONOSABS 1.68 09/26/2024    DIFFTYPE AUTOMATED DIFFERENTIAL 08/27/2024    ANISOCYTOSIS 1+ 05/24/2024    POLYCHROM 1+ 05/24/2024    WBCMORP 1+ TOXIC GRANULATION 09/25/2024    PLTM ADEQUATE 04/21/2024     No results found for: \"ESR\"  Chemistry:  Lab  mg Q8.     I recommend to continue with current antibiotics and management as per ICU team for sepsis and septic shock. We will hold immunotherapy until she gets better.     Recommend to continue with hydroxyurea for MPL positive MPN.     Low TSH and high free T4 - TSH and T4 were normal on 7/25/24. Could be due to sepsis/acute events. However, in view of being on immunotherapy, she will need non emergent endocrine evaluation     I answered all the questions and concerns for today. Thank you for allowing me to participate in the care of this very pleasant patient.    Recent imaging and labs were reviewed and discussed with the patient.

## 2024-09-26 NOTE — PROGRESS NOTES
Inpatient Progress Note 9/26/2024        Lin Bates  1955  [id number]      Assessment/Plan:  Lin Bates is a 69 y.o.  female with significant PMH of HTN, HLD, DM-2, history of HPV infection, nicotine use, small cell carcinoma -chemotherapy with cisplatin and etoposide was started since 2/24 , completed radiation therapy on 4/16/2024 , myeloproliferative neoplasm-(based on the bone marrow biopsy on 9/22/2023) , chemoradiation induced esophagitis , currently on immunotherapy who presents with Septic shock (HCC)       Problem list  Acute hypoxic respiratory failure, on nasal cannula  Septic shock  Left-sided pleural effusion  Health care acquired Pneumonia  Pneumonitis from Immunotherapy  DM-2 per history  Essential hypertension per history  HLD  Monoclonal B-cell lymphocytosis  Small cell lung cancer left lower lobe s/p chemotherapy and radiation, currently on immunotherapy  Nicotine use      Neuro: Alert, oriented following commands, plain controlled with current multimodal regimen, adjust as needed  Cardio:  Septic shock, hypotensive requiring vasoactive agents, titrate to MAP > 65 mmHg  Resp: Hypoxic, requiring increasing O2 requirement. On NC 5L, noted tachypneic. Continue inhalers, nebs and aggressive pulm hygiene. Radiographic imaging of the chest show evidence of Pneumonia with possible superimposed pneumonitis from immunotherapy.   GI: ADAT, GI ppx.   : Cr 0.7, monitor uop. Monitor electrolytes and replenish as needed  Heme:  Hgb 8.8, plts 420, DVT ppx: Lovenox  ID: WBC 17.9, on broad spectrum antibiotics. MRSA nares negative, PCR negative, Bcx pending. F/u cultures and sensitivities and de-escalate as able.   Endo: POC BG ISS PRN, maintain euglycemia, avoid hypoglycemia.   MSK: DTI prevention, wound care, PT/OT/OOBTC    Tubes/Lines/Drains:  Port    Code Status: Limited, DNR/DNI      Subjective:    Patient was seen and evaluated at bedside, no acute events overnight, noted to have had  some hypotension yesterday afternoon for which she required vasoactive agents. Otherwise she denies any fever, chills, nausea, vomiting, headache, dizziness, lightheadedness, FND, chest pain, abdominal pain, urinary or bowel symptoms         Physical Exam:            BP (!) 115/55   Pulse 100   Temp 97.7 °F (36.5 °C) (Oral)   Resp 30   SpO2 95%     General: ill appearing  Eyes: EOMI  ENT: neck supple  Cardiovascular: Regular rate.  Respiratory: crackles bilaterally.   Gastrointestinal: Soft, non tender  Genitourinary: no suprapubic tenderness  Musculoskeletal: No edema.  Skin: warm, dry  Neuro: Alert, moving all extremities  Psych: Mood appropriate.    Current Medications:   potassium & sodium phosphates  2 packet Oral 4x Daily    folic acid  1 mg Oral Daily    multivitamin  1 tablet Oral Daily    thiamine  100 mg Oral Daily    zinc sulfate  50 mg Oral Daily    dexAMETHasone (DECADRON) 4 mg in sodium chloride 0.9 % 50 mL IVPB  4 mg IntraVENous Q6H    piperacillin-tazobactam  4,500 mg IntraVENous Q8H    sodium chloride flush  5-40 mL IntraVENous 2 times per day    enoxaparin  40 mg SubCUTAneous Daily    aspirin  81 mg Oral Daily    atorvastatin  20 mg Oral Nightly    ferrous sulfate  325 mg Oral Daily with breakfast    hydroxyurea  500 mg Oral Daily    sucralfate  1 g Oral 4 times per day    pantoprazole  40 mg Oral QAM AC    insulin lispro  0-8 Units SubCUTAneous TID WC    insulin lispro  0-4 Units SubCUTAneous Nightly    ipratropium 0.5 mg-albuterol 2.5 mg  1 Dose Inhalation Q4H RT    midodrine  10 mg Oral TID WC       Labs, Imaging and Studies reviewed:    Results       Procedure Component Value Units Date/Time    MRSA by PCR [[id number]] Collected: 09/25/24 1035    Order Status: Completed Specimen: Nasal Updated: 09/25/24 1624     Specimen Description .NASAL SWAB     MRSA, DNA, Nasal Not Detected     Comment:       MRSA DNA not detected by nucleic acid amplification.        Results should be used as an

## 2024-09-26 NOTE — PROGRESS NOTES
GOALS (current status in bold):  Short-term Goals  Timeframe for Short-term Goals: LOS or until goals are met  Goal 1: Pt will tolerate a soft and bite sized diet and thin liquid without overt s/sx of penetration/aspiration in 100% of trials Goal met; Discontinue, soft and bite sized diet appears to be pt's baseline  Goal 2: Pt/caregivers will demonstrate understanding of SLP recommendations/POC Goal met; Discontinue, Pt/fiance at bedside educated on recommendations/POC/safe swallow strategies      EDUCATION: Recommendations/POC    PAIN RATING (0-10 Scale): 0  Time in/Time out: SLP Individual Minutes  Time In: 1119  Time Out: 1133  Minutes: 14    Visit number: 2    Pina Cary MA, CF-SLP 9/26/2024

## 2024-09-26 NOTE — ACP (ADVANCE CARE PLANNING)
Had a long discussion with patient about current medical condition. We elaborated about her current increased oxygen requirement. When asked if she ever wanted to be intuabted if her conditioned worsened knowing that it could lead to her death, she stated that she would not want to be in the ventilator. When asked about CPR for cardiac arrest she also stated that she would not want to have any heroic or resuscitative measures done to her in that case.     I was in agreement with her decision,  was at bedside.     Code status adjusted in EPIC.    I spent more than 15 minutes performing advance care planning.

## 2024-09-27 LAB
ALBUMIN SERPL-MCNC: 2.9 G/DL (ref 3.4–5)
ALBUMIN/GLOB SERPL: 1.1 {RATIO} (ref 1.1–2.2)
ALP SERPL-CCNC: 87 U/L (ref 40–129)
ALT SERPL-CCNC: 13 U/L (ref 10–40)
ANION GAP SERPL CALCULATED.3IONS-SCNC: 11 MMOL/L (ref 9–17)
AST SERPL-CCNC: 11 U/L (ref 15–37)
BASOPHILS # BLD: 0.02 K/UL
BASOPHILS NFR BLD: 0 % (ref 0–1)
BILIRUB SERPL-MCNC: <0.2 MG/DL (ref 0–1)
BUN SERPL-MCNC: 16 MG/DL (ref 7–20)
CA-I BLD-SCNC: 1.22 MMOL/L (ref 1.15–1.33)
CALCIUM SERPL-MCNC: 8.8 MG/DL (ref 8.3–10.6)
CHLORIDE SERPL-SCNC: 107 MMOL/L (ref 99–110)
CO2 SERPL-SCNC: 25 MMOL/L (ref 21–32)
CREAT SERPL-MCNC: 0.7 MG/DL (ref 0.6–1.2)
EOSINOPHIL # BLD: 0 K/UL
EOSINOPHILS RELATIVE PERCENT: 0 % (ref 0–3)
ERYTHROCYTE [DISTWIDTH] IN BLOOD BY AUTOMATED COUNT: 14.1 % (ref 11.7–14.9)
GFR, ESTIMATED: 88 ML/MIN/1.73M2
GLUCOSE BLD-MCNC: 194 MG/DL (ref 74–99)
GLUCOSE BLD-MCNC: 210 MG/DL (ref 74–99)
GLUCOSE BLD-MCNC: 262 MG/DL (ref 74–99)
GLUCOSE BLD-MCNC: 297 MG/DL (ref 74–99)
GLUCOSE SERPL-MCNC: 196 MG/DL (ref 74–99)
HCT VFR BLD AUTO: 28.9 % (ref 37–47)
HGB BLD-MCNC: 9 G/DL (ref 12.5–16)
IMM GRANULOCYTES # BLD AUTO: 0.1 K/UL
IMM GRANULOCYTES NFR BLD: 1 %
L PNEUMO1 AG UR QL IA.RAPID: NEGATIVE
LYMPHOCYTES NFR BLD: 0.32 K/UL
LYMPHOCYTES RELATIVE PERCENT: 2 % (ref 24–44)
MAGNESIUM SERPL-MCNC: 2.2 MG/DL (ref 1.8–2.4)
MCH RBC QN AUTO: 31.3 PG (ref 27–31)
MCHC RBC AUTO-ENTMCNC: 31.1 G/DL (ref 32–36)
MCV RBC AUTO: 100.3 FL (ref 78–100)
MONOCYTES NFR BLD: 0.21 K/UL
MONOCYTES NFR BLD: 2 % (ref 0–4)
NEUTROPHILS NFR BLD: 95 % (ref 36–66)
NEUTS SEG NFR BLD: 12.82 K/UL
PHOSPHATE SERPL-MCNC: 4.5 MG/DL (ref 2.5–4.9)
PLATELET # BLD AUTO: 451 K/UL (ref 140–440)
PMV BLD AUTO: 9.9 FL (ref 7.5–11.1)
POTASSIUM SERPL-SCNC: 4.5 MMOL/L (ref 3.5–5.1)
PROT SERPL-MCNC: 5.5 G/DL (ref 6.4–8.2)
RBC # BLD AUTO: 2.88 M/UL (ref 4.2–5.4)
S PNEUM AG SPEC QL: NEGATIVE
SODIUM SERPL-SCNC: 144 MMOL/L (ref 136–145)
SPECIMEN SOURCE: NORMAL
WBC OTHER # BLD: 13.5 K/UL (ref 4–10.5)

## 2024-09-27 PROCEDURE — 80053 COMPREHEN METABOLIC PANEL: CPT

## 2024-09-27 PROCEDURE — 82330 ASSAY OF CALCIUM: CPT

## 2024-09-27 PROCEDURE — 6370000000 HC RX 637 (ALT 250 FOR IP): Performed by: PHYSICIAN ASSISTANT

## 2024-09-27 PROCEDURE — 99232 SBSQ HOSP IP/OBS MODERATE 35: CPT | Performed by: INTERNAL MEDICINE

## 2024-09-27 PROCEDURE — 2000000000 HC ICU R&B

## 2024-09-27 PROCEDURE — 2580000003 HC RX 258: Performed by: STUDENT IN AN ORGANIZED HEALTH CARE EDUCATION/TRAINING PROGRAM

## 2024-09-27 PROCEDURE — 83735 ASSAY OF MAGNESIUM: CPT

## 2024-09-27 PROCEDURE — 94761 N-INVAS EAR/PLS OXIMETRY MLT: CPT

## 2024-09-27 PROCEDURE — 6360000002 HC RX W HCPCS: Performed by: STUDENT IN AN ORGANIZED HEALTH CARE EDUCATION/TRAINING PROGRAM

## 2024-09-27 PROCEDURE — 85025 COMPLETE CBC W/AUTO DIFF WBC: CPT

## 2024-09-27 PROCEDURE — 84439 ASSAY OF FREE THYROXINE: CPT

## 2024-09-27 PROCEDURE — 2700000000 HC OXYGEN THERAPY PER DAY

## 2024-09-27 PROCEDURE — 84100 ASSAY OF PHOSPHORUS: CPT

## 2024-09-27 PROCEDURE — 6370000000 HC RX 637 (ALT 250 FOR IP): Performed by: STUDENT IN AN ORGANIZED HEALTH CARE EDUCATION/TRAINING PROGRAM

## 2024-09-27 PROCEDURE — 82962 GLUCOSE BLOOD TEST: CPT

## 2024-09-27 RX ORDER — IPRATROPIUM BROMIDE AND ALBUTEROL SULFATE 2.5; .5 MG/3ML; MG/3ML
1 SOLUTION RESPIRATORY (INHALATION) EVERY 4 HOURS PRN
Status: DISCONTINUED | OUTPATIENT
Start: 2024-09-27 | End: 2024-09-28

## 2024-09-27 RX ADMIN — PIPERACILLIN AND TAZOBACTAM 4500 MG: 4; .5 INJECTION, POWDER, FOR SOLUTION INTRAVENOUS at 06:01

## 2024-09-27 RX ADMIN — DEXAMETHASONE SODIUM PHOSPHATE 4 MG: 4 INJECTION, SOLUTION INTRAMUSCULAR; INTRAVENOUS at 03:46

## 2024-09-27 RX ADMIN — SUCRALFATE 1 G: 1 TABLET ORAL at 23:41

## 2024-09-27 RX ADMIN — POTASSIUM & SODIUM PHOSPHATES POWDER PACK 280-160-250 MG 500 MG: 280-160-250 PACK at 22:13

## 2024-09-27 RX ADMIN — ENOXAPARIN SODIUM 40 MG: 100 INJECTION SUBCUTANEOUS at 08:45

## 2024-09-27 RX ADMIN — POTASSIUM & SODIUM PHOSPHATES POWDER PACK 280-160-250 MG 500 MG: 280-160-250 PACK at 16:59

## 2024-09-27 RX ADMIN — DIPHENHYDRAMINE HYDROCHLORIDE AND LIDOCAINE HYDROCHLORIDE AND ALUMINUM HYDROXIDE AND MAGNESIUM HYDRO 5 ML: KIT at 08:46

## 2024-09-27 RX ADMIN — SODIUM CHLORIDE, PRESERVATIVE FREE 10 ML: 5 INJECTION INTRAVENOUS at 10:25

## 2024-09-27 RX ADMIN — ATORVASTATIN CALCIUM 20 MG: 10 TABLET, FILM COATED ORAL at 22:12

## 2024-09-27 RX ADMIN — MIDODRINE HYDROCHLORIDE 10 MG: 5 TABLET ORAL at 08:44

## 2024-09-27 RX ADMIN — DEXAMETHASONE SODIUM PHOSPHATE 4 MG: 4 INJECTION, SOLUTION INTRAMUSCULAR; INTRAVENOUS at 10:25

## 2024-09-27 RX ADMIN — FERROUS SULFATE TAB 325 MG (65 MG ELEMENTAL FE) 325 MG: 325 (65 FE) TAB at 08:45

## 2024-09-27 RX ADMIN — DEXAMETHASONE SODIUM PHOSPHATE 4 MG: 4 INJECTION, SOLUTION INTRAMUSCULAR; INTRAVENOUS at 16:56

## 2024-09-27 RX ADMIN — DEXAMETHASONE SODIUM PHOSPHATE 4 MG: 4 INJECTION, SOLUTION INTRAMUSCULAR; INTRAVENOUS at 22:16

## 2024-09-27 RX ADMIN — ZINC SULFATE 220 MG (50 MG) CAPSULE 50 MG: CAPSULE at 08:45

## 2024-09-27 RX ADMIN — ASPIRIN 81 MG: 81 TABLET, CHEWABLE ORAL at 09:24

## 2024-09-27 RX ADMIN — INSULIN LISPRO 4 UNITS: 100 INJECTION, SOLUTION INTRAVENOUS; SUBCUTANEOUS at 12:58

## 2024-09-27 RX ADMIN — SUCRALFATE 1 G: 1 TABLET ORAL at 16:59

## 2024-09-27 RX ADMIN — PIPERACILLIN AND TAZOBACTAM 4500 MG: 4; .5 INJECTION, POWDER, FOR SOLUTION INTRAVENOUS at 23:39

## 2024-09-27 RX ADMIN — SUCRALFATE 1 G: 1 TABLET ORAL at 12:50

## 2024-09-27 RX ADMIN — Medication 1 TABLET: at 08:45

## 2024-09-27 RX ADMIN — SUCRALFATE 1 G: 1 TABLET ORAL at 06:02

## 2024-09-27 RX ADMIN — POTASSIUM & SODIUM PHOSPHATES POWDER PACK 280-160-250 MG 500 MG: 280-160-250 PACK at 08:44

## 2024-09-27 RX ADMIN — POTASSIUM & SODIUM PHOSPHATES POWDER PACK 280-160-250 MG 500 MG: 280-160-250 PACK at 12:52

## 2024-09-27 RX ADMIN — SODIUM CHLORIDE, PRESERVATIVE FREE 10 ML: 5 INJECTION INTRAVENOUS at 22:20

## 2024-09-27 RX ADMIN — MIDODRINE HYDROCHLORIDE 10 MG: 5 TABLET ORAL at 12:51

## 2024-09-27 RX ADMIN — PANTOPRAZOLE SODIUM 40 MG: 40 TABLET, DELAYED RELEASE ORAL at 06:02

## 2024-09-27 RX ADMIN — HYDROXYUREA 500 MG: 500 CAPSULE ORAL at 08:45

## 2024-09-27 RX ADMIN — PIPERACILLIN AND TAZOBACTAM 4500 MG: 4; .5 INJECTION, POWDER, FOR SOLUTION INTRAVENOUS at 15:19

## 2024-09-27 RX ADMIN — FOLIC ACID 1 MG: 1 TABLET ORAL at 08:45

## 2024-09-27 RX ADMIN — INSULIN LISPRO 4 UNITS: 100 INJECTION, SOLUTION INTRAVENOUS; SUBCUTANEOUS at 17:20

## 2024-09-27 RX ADMIN — MIDODRINE HYDROCHLORIDE 10 MG: 5 TABLET ORAL at 16:59

## 2024-09-27 RX ADMIN — Medication 100 MG: at 08:45

## 2024-09-27 ASSESSMENT — PAIN SCALES - GENERAL
PAINLEVEL_OUTOF10: 0
PAINLEVEL_OUTOF10: 0

## 2024-09-27 NOTE — PROGRESS NOTES
% 95 (H) 36 - 66 %    Lymphocytes % 2 (L) 24 - 44 %    Monocytes % 2 0 - 4 %    Eosinophils % 0 0 - 3 %    Basophils % 0 0 - 1 %    Immature Granulocytes % 1 (H) 0 %    Neutrophils Absolute 12.82 k/uL    Lymphocytes Absolute 0.32 k/uL    Monocytes Absolute 0.21 k/uL    Eosinophils Absolute 0.00 k/uL    Basophils Absolute 0.02 k/uL    Immature Granulocytes Absolute 0.10 k/uL   Phosphorus    Collection Time: 09/27/24  6:10 AM   Result Value Ref Range    Phosphorus 4.5 2.5 - 4.9 mg/dL   Magnesium    Collection Time: 09/27/24  6:10 AM   Result Value Ref Range    Magnesium 2.2 1.8 - 2.4 mg/dL   POCT Glucose    Collection Time: 09/27/24  8:43 AM   Result Value Ref Range    POC Glucose 194 (H) 74 - 99 mg/dL        Imaging/Diagnostics Last 24 Hours   CTA PULMONARY W CONTRAST    Result Date: 9/25/2024  EXAMINATION: CTA PULMONARY W CONTRAST 9/25/2024 1:04 AM ACCESSION NUMBER: [id number] COMPARISON: None available INDICATION: Fever with new onset hypoxia.  Concern for pneumonia versus PE. Known history of left lower lobe small cell lung carcinoma TECHNIQUE: Multiple contiguous 2D axial CT images of the chest were obtained from the lung apices to the lung bases after the intravenous administration of 100mL Iso-justo 370 per pulmonary angiography protocol.  Coronal reconstructions were performed. Radiation dose reduction techniques were used for this study. Our CT scanners use one or all of the following: Automated exposure control, adjustment of the mA and/or kV according to patient size, iterative reconstruction. FINDINGS: STUDY QUALITY: The exam study quality is good. AIRWAYS: The central airways are patent. LUNGS: Multiple patchy bilateral infiltrates PLEURA: Moderate to large left pleural effusion. HEART: The heart is not enlarged. No calcified coronary atherosclerosis.  No pericardial effusion. THORACIC AORTA: The aorta is normal in caliber. PULMONARY ARTERY: No pulmonary embolus to the segmental level. The main pulmonary  artery is normal in caliber. MEDIASTINUM/ANA LAURA: No mediastinal mass or lymphadenopathy. CHEST WALL: No mass or axillary lymphadenopathy. UPPER ABDOMEN: The visualized upper abdomen is unremarkable. BONES: No suspicious osseous lesion. Other: Left thyroid lobe low-attenuation nodule measuring 2.1 x 1.2 cm in diameter.     1.  No pulmonary embolus. 2.  Left thyroid lobe low-attenuation nodule measuring 2.1 x 1.2 cm in diameter. 3.  Multiple patchy bilateral infiltrates, underlying masses can't be excluded. 4.  Moderate to large left pleural effusion. Electronically signed by Adalgisa Mead      Electronically signed by Trena Ortega MD on 9/27/2024 at 10:41 AM

## 2024-09-27 NOTE — PROGRESS NOTES
Patient Name:  Lin Bates  Patient :  1955  Patient MRN:  [id number]     Primary Oncologist: Burt Birch MD  Referring Provider: Dorothy Terrazas PA    Lin Bates is a 69 y.o. 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     She was found to have hypotensive shock in spite of fluid replacement and required levophed. She was transferred to [facility] ICU for further management.     Interval 24  Pt was seen and examined this morning. Not in any acute distress. No overnight events. She is feeling better today. Requiring less pressor.     Labs today showed Na 144, Cr 0.7, Ca 8.8, Alb 2.9, WBC 13.5, Hb 9, Plt 451      Vital Signs: BP (!) 97/56   Pulse 91   Temp 97.3 °F (36.3 °C) (Oral)   Resp 18   SpO2 93%      Physical Exam:  CONSTITUTIONAL: awake, alert, cooperative, no apparent distress   EYES: pupils equal, round and reactive to light, sclera clear, normal conjunctiva  ENT: Normocephalic, without obvious abnormality, atraumatic  NECK: supple, symmetrical, no jugular venous distension, no carotid bruits   HEMATOLOGIC/LYMPHATIC: no cervical, supraclavicular or axillary lymphadenopathy   LUNGS: VBS, has wheezes, rhonchi +,    CARDIOVASCULAR: regular rate and rhythm, normal S1 and S2, no murmur noted  ABDOMEN: normal bowel sounds x 4, soft, non-distended, non-tender, no masses palpated, no hepatosplenomegaly   MUSCULOSKELETAL: full range of motion noted, tone is normal  NEUROLOGIC: awake, alert, oriented to name, place and time. Motor skills grossly intact.   SKIN: appears intact, normal skin color, normal texture, normal turgor, no jaundice.  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     I reviewed CT images myself. CT findings as well as clinical presentations are most consistent with pneumonia (doubt immunotherapy induced pneumonitis). However, we started decadron 4 mg Q8 since 9/26/24. Will continue current dose for now.      I recommend to continue with current antibiotics and management as per ICU team for sepsis and septic shock. We will hold immunotherapy until she gets better.     Recommend to continue with hydroxyurea for MPL positive MPN.     Low TSH and high free T4 - TSH and T4 were normal on 7/25/24. Could be due to sepsis/acute events. However, in view of being on immunotherapy, she will need non emergent endocrine evaluation     I answered all the questions and concerns for today. Thank you.

## 2024-09-27 NOTE — PROGRESS NOTES
Inpatient Progress Note 9/27/2024        Lin Bates  1955  [id number]      Assessment/Plan:  Lin Bates is a 69 y.o.  female with significant PMH of HTN, HLD, DM-2, history of HPV infection, nicotine use, small cell carcinoma -chemotherapy with cisplatin and etoposide was started since 2/24 , completed radiation therapy on 4/16/2024 , myeloproliferative neoplasm-(based on the bone marrow biopsy on 9/22/2023) , chemoradiation induced esophagitis , currently on immunotherapy who presents with Septic shock (HCC)         Problem list  Acute hypoxic respiratory failure, on nasal cannula  Septic shock  Left-sided pleural effusion  Health care acquired Pneumonia  Pneumonitis from Immunotherapy  DM-2 per history  Essential hypertension per history  HLD  Monoclonal B-cell lymphocytosis  Small cell lung cancer left lower lobe s/p chemotherapy and radiation, currently on immunotherapy  Nicotine use        Neuro: Alert, oriented following commands, plain controlled with current multimodal regimen, maintain PRN regimen as she continues to complain of some cancer pain  Cardio:  Septic shock, hypotensive requiring vasoactive agents, titrate to MAP > 65 mmHg. Monitor vitals and adjust as needed  Resp: Hypoxic, requiring increasing O2 requirement. On NC 6L, noted tachypneic. Continue inhalers, nebs and aggressive pulm hygiene. Radiographic imaging of the chest show evidence of Pneumonia with possible superimposed pneumonitis from immunotherapy. Started on steroids.   GI: ADAT, GI ppx.   : Cr 0.7 , monitor uop, appropriate. Monitor electrolytes and replenish as needed  Heme:  Hgb 9.0, plts 451, DVT ppx: Lovenox  ID: WBC 13.5, improving, on broad spectrum antibiotics. MRSA nares negative, PCR negative, Bcx NGTD. F/u cultures and sensitivities and de-escalate as able.   Endo: POC BG ISS PRN, maintain euglycemia, avoid hypoglycemia.   MSK: DTI prevention, wound care, PT/OT/OOBTC     Tubes/Lines/Drains:  Port

## 2024-09-28 ENCOUNTER — APPOINTMENT (OUTPATIENT)
Dept: GENERAL RADIOLOGY | Age: 69
DRG: 871 | End: 2024-09-28
Attending: STUDENT IN AN ORGANIZED HEALTH CARE EDUCATION/TRAINING PROGRAM
Payer: MEDICARE

## 2024-09-28 LAB
ALBUMIN SERPL-MCNC: 2.7 G/DL (ref 3.4–5)
ALBUMIN/GLOB SERPL: 0.9 {RATIO} (ref 1.1–2.2)
ALP SERPL-CCNC: 77 U/L (ref 40–129)
ALT SERPL-CCNC: 11 U/L (ref 10–40)
ANION GAP SERPL CALCULATED.3IONS-SCNC: 8 MMOL/L (ref 9–17)
AST SERPL-CCNC: 12 U/L (ref 15–37)
BASOPHILS # BLD: 0.01 K/UL
BASOPHILS NFR BLD: 0 % (ref 0–1)
BILIRUB DIRECT SERPL-MCNC: <0.2 MG/DL (ref 0–0.3)
BILIRUB INDIRECT SERPL-MCNC: ABNORMAL MG/DL (ref 0–0.7)
BILIRUB SERPL-MCNC: <0.2 MG/DL (ref 0–1)
BUN SERPL-MCNC: 22 MG/DL (ref 7–20)
CA-I BLD-SCNC: 1.27 MMOL/L (ref 1.15–1.33)
CALCIUM SERPL-MCNC: 8.7 MG/DL (ref 8.3–10.6)
CHLORIDE SERPL-SCNC: 105 MMOL/L (ref 99–110)
CO2 SERPL-SCNC: 27 MMOL/L (ref 21–32)
CREAT SERPL-MCNC: 0.6 MG/DL (ref 0.6–1.2)
EOSINOPHIL # BLD: 0 K/UL
EOSINOPHILS RELATIVE PERCENT: 0 % (ref 0–3)
ERYTHROCYTE [DISTWIDTH] IN BLOOD BY AUTOMATED COUNT: 14.3 % (ref 11.7–14.9)
GFR, ESTIMATED: >90 ML/MIN/1.73M2
GLUCOSE BLD-MCNC: 253 MG/DL (ref 74–99)
GLUCOSE BLD-MCNC: 254 MG/DL (ref 74–99)
GLUCOSE BLD-MCNC: 309 MG/DL (ref 74–99)
GLUCOSE BLD-MCNC: 372 MG/DL (ref 74–99)
GLUCOSE SERPL-MCNC: 235 MG/DL (ref 74–99)
HCT VFR BLD AUTO: 27.3 % (ref 37–47)
HGB BLD-MCNC: 8.6 G/DL (ref 12.5–16)
IMM GRANULOCYTES # BLD AUTO: 0.06 K/UL
IMM GRANULOCYTES NFR BLD: 1 %
LYMPHOCYTES NFR BLD: 0.27 K/UL
LYMPHOCYTES RELATIVE PERCENT: 2 % (ref 24–44)
MAGNESIUM SERPL-MCNC: 2.3 MG/DL (ref 1.8–2.4)
MCH RBC QN AUTO: 31.9 PG (ref 27–31)
MCHC RBC AUTO-ENTMCNC: 31.5 G/DL (ref 32–36)
MCV RBC AUTO: 101.1 FL (ref 78–100)
MONOCYTES NFR BLD: 0.34 K/UL
MONOCYTES NFR BLD: 3 % (ref 0–4)
NEUTROPHILS NFR BLD: 94 % (ref 36–66)
NEUTS SEG NFR BLD: 11.28 K/UL
PHOSPHATE SERPL-MCNC: 3.8 MG/DL (ref 2.5–4.9)
PLATELET # BLD AUTO: 421 K/UL (ref 140–440)
PMV BLD AUTO: 10.1 FL (ref 7.5–11.1)
POTASSIUM SERPL-SCNC: 4.5 MMOL/L (ref 3.5–5.1)
PROT SERPL-MCNC: 5.8 G/DL (ref 6.4–8.2)
RBC # BLD AUTO: 2.7 M/UL (ref 4.2–5.4)
SODIUM SERPL-SCNC: 140 MMOL/L (ref 136–145)
WBC OTHER # BLD: 12 K/UL (ref 4–10.5)

## 2024-09-28 PROCEDURE — 84100 ASSAY OF PHOSPHORUS: CPT

## 2024-09-28 PROCEDURE — 2580000003 HC RX 258: Performed by: STUDENT IN AN ORGANIZED HEALTH CARE EDUCATION/TRAINING PROGRAM

## 2024-09-28 PROCEDURE — 2060000000 HC ICU INTERMEDIATE R&B

## 2024-09-28 PROCEDURE — 94761 N-INVAS EAR/PLS OXIMETRY MLT: CPT

## 2024-09-28 PROCEDURE — 6370000000 HC RX 637 (ALT 250 FOR IP): Performed by: INTERNAL MEDICINE

## 2024-09-28 PROCEDURE — 6360000002 HC RX W HCPCS: Performed by: STUDENT IN AN ORGANIZED HEALTH CARE EDUCATION/TRAINING PROGRAM

## 2024-09-28 PROCEDURE — 94640 AIRWAY INHALATION TREATMENT: CPT

## 2024-09-28 PROCEDURE — 97116 GAIT TRAINING THERAPY: CPT

## 2024-09-28 PROCEDURE — 82248 BILIRUBIN DIRECT: CPT

## 2024-09-28 PROCEDURE — 71045 X-RAY EXAM CHEST 1 VIEW: CPT

## 2024-09-28 PROCEDURE — 83735 ASSAY OF MAGNESIUM: CPT

## 2024-09-28 PROCEDURE — 97162 PT EVAL MOD COMPLEX 30 MIN: CPT

## 2024-09-28 PROCEDURE — 6370000000 HC RX 637 (ALT 250 FOR IP): Performed by: STUDENT IN AN ORGANIZED HEALTH CARE EDUCATION/TRAINING PROGRAM

## 2024-09-28 PROCEDURE — 97530 THERAPEUTIC ACTIVITIES: CPT

## 2024-09-28 PROCEDURE — 97166 OT EVAL MOD COMPLEX 45 MIN: CPT

## 2024-09-28 PROCEDURE — 6370000000 HC RX 637 (ALT 250 FOR IP): Performed by: PHYSICIAN ASSISTANT

## 2024-09-28 PROCEDURE — 6370000000 HC RX 637 (ALT 250 FOR IP): Performed by: FAMILY MEDICINE

## 2024-09-28 PROCEDURE — 36591 DRAW BLOOD OFF VENOUS DEVICE: CPT

## 2024-09-28 PROCEDURE — 85025 COMPLETE CBC W/AUTO DIFF WBC: CPT

## 2024-09-28 PROCEDURE — 80053 COMPREHEN METABOLIC PANEL: CPT

## 2024-09-28 PROCEDURE — 2700000000 HC OXYGEN THERAPY PER DAY

## 2024-09-28 PROCEDURE — 82330 ASSAY OF CALCIUM: CPT

## 2024-09-28 PROCEDURE — 82962 GLUCOSE BLOOD TEST: CPT

## 2024-09-28 RX ORDER — INSULIN LISPRO 100 [IU]/ML
0-16 INJECTION, SOLUTION INTRAVENOUS; SUBCUTANEOUS
Status: DISCONTINUED | OUTPATIENT
Start: 2024-09-28 | End: 2024-10-05 | Stop reason: HOSPADM

## 2024-09-28 RX ORDER — IPRATROPIUM BROMIDE AND ALBUTEROL SULFATE 2.5; .5 MG/3ML; MG/3ML
1 SOLUTION RESPIRATORY (INHALATION)
Status: DISCONTINUED | OUTPATIENT
Start: 2024-09-28 | End: 2024-09-29

## 2024-09-28 RX ORDER — INSULIN LISPRO 100 [IU]/ML
0-8 INJECTION, SOLUTION INTRAVENOUS; SUBCUTANEOUS
Status: DISCONTINUED | OUTPATIENT
Start: 2024-09-28 | End: 2024-10-05 | Stop reason: HOSPADM

## 2024-09-28 RX ORDER — INSULIN LISPRO 100 [IU]/ML
4 INJECTION, SOLUTION INTRAVENOUS; SUBCUTANEOUS ONCE
Status: COMPLETED | OUTPATIENT
Start: 2024-09-28 | End: 2024-09-28

## 2024-09-28 RX ORDER — INSULIN GLARGINE 100 [IU]/ML
10 INJECTION, SOLUTION SUBCUTANEOUS NIGHTLY
Status: DISCONTINUED | OUTPATIENT
Start: 2024-09-28 | End: 2024-09-29

## 2024-09-28 RX ADMIN — PIPERACILLIN AND TAZOBACTAM 4500 MG: 4; .5 INJECTION, POWDER, FOR SOLUTION INTRAVENOUS at 16:31

## 2024-09-28 RX ADMIN — PIPERACILLIN AND TAZOBACTAM 4500 MG: 4; .5 INJECTION, POWDER, FOR SOLUTION INTRAVENOUS at 06:58

## 2024-09-28 RX ADMIN — Medication 100 MG: at 10:45

## 2024-09-28 RX ADMIN — ATORVASTATIN CALCIUM 20 MG: 10 TABLET, FILM COATED ORAL at 20:46

## 2024-09-28 RX ADMIN — DEXAMETHASONE SODIUM PHOSPHATE 4 MG: 4 INJECTION, SOLUTION INTRAMUSCULAR; INTRAVENOUS at 16:32

## 2024-09-28 RX ADMIN — IPRATROPIUM BROMIDE AND ALBUTEROL SULFATE 1 DOSE: 2.5; .5 SOLUTION RESPIRATORY (INHALATION) at 20:00

## 2024-09-28 RX ADMIN — ASPIRIN 81 MG: 81 TABLET, CHEWABLE ORAL at 10:45

## 2024-09-28 RX ADMIN — SUCRALFATE 1 G: 1 TABLET ORAL at 06:58

## 2024-09-28 RX ADMIN — DEXAMETHASONE SODIUM PHOSPHATE 4 MG: 4 INJECTION, SOLUTION INTRAMUSCULAR; INTRAVENOUS at 20:52

## 2024-09-28 RX ADMIN — INSULIN LISPRO 4 UNITS: 100 INJECTION, SOLUTION INTRAVENOUS; SUBCUTANEOUS at 20:46

## 2024-09-28 RX ADMIN — FOLIC ACID 1 MG: 1 TABLET ORAL at 10:45

## 2024-09-28 RX ADMIN — IPRATROPIUM BROMIDE AND ALBUTEROL SULFATE 1 DOSE: 2.5; .5 SOLUTION RESPIRATORY (INHALATION) at 12:08

## 2024-09-28 RX ADMIN — ZINC SULFATE 220 MG (50 MG) CAPSULE 50 MG: CAPSULE at 10:45

## 2024-09-28 RX ADMIN — SUCRALFATE 1 G: 1 TABLET ORAL at 13:37

## 2024-09-28 RX ADMIN — Medication 1 TABLET: at 10:45

## 2024-09-28 RX ADMIN — PANTOPRAZOLE SODIUM 40 MG: 40 TABLET, DELAYED RELEASE ORAL at 06:58

## 2024-09-28 RX ADMIN — ENOXAPARIN SODIUM 40 MG: 100 INJECTION SUBCUTANEOUS at 10:45

## 2024-09-28 RX ADMIN — INSULIN GLARGINE 10 UNITS: 100 INJECTION, SOLUTION SUBCUTANEOUS at 20:46

## 2024-09-28 RX ADMIN — INSULIN LISPRO 8 UNITS: 100 INJECTION, SOLUTION INTRAVENOUS; SUBCUTANEOUS at 13:37

## 2024-09-28 RX ADMIN — HYDROXYUREA 500 MG: 500 CAPSULE ORAL at 10:43

## 2024-09-28 RX ADMIN — DEXAMETHASONE SODIUM PHOSPHATE 4 MG: 4 INJECTION, SOLUTION INTRAMUSCULAR; INTRAVENOUS at 11:03

## 2024-09-28 RX ADMIN — MIDODRINE HYDROCHLORIDE 10 MG: 5 TABLET ORAL at 10:44

## 2024-09-28 RX ADMIN — SUCRALFATE 1 G: 1 TABLET ORAL at 22:10

## 2024-09-28 RX ADMIN — INSULIN LISPRO 8 UNITS: 100 INJECTION, SOLUTION INTRAVENOUS; SUBCUTANEOUS at 10:40

## 2024-09-28 RX ADMIN — INSULIN LISPRO 8 UNITS: 100 INJECTION, SOLUTION INTRAVENOUS; SUBCUTANEOUS at 20:46

## 2024-09-28 RX ADMIN — MIDODRINE HYDROCHLORIDE 10 MG: 5 TABLET ORAL at 16:19

## 2024-09-28 RX ADMIN — INSULIN HUMAN 5 UNITS: 100 INJECTION, SUSPENSION SUBCUTANEOUS at 13:37

## 2024-09-28 RX ADMIN — SUCRALFATE 1 G: 1 TABLET ORAL at 17:44

## 2024-09-28 RX ADMIN — SODIUM CHLORIDE, PRESERVATIVE FREE 10 ML: 5 INJECTION INTRAVENOUS at 11:04

## 2024-09-28 RX ADMIN — FERROUS SULFATE TAB 325 MG (65 MG ELEMENTAL FE) 325 MG: 325 (65 FE) TAB at 10:45

## 2024-09-28 RX ADMIN — INSULIN LISPRO 12 UNITS: 100 INJECTION, SOLUTION INTRAVENOUS; SUBCUTANEOUS at 17:44

## 2024-09-28 RX ADMIN — IPRATROPIUM BROMIDE AND ALBUTEROL SULFATE 1 DOSE: 2.5; .5 SOLUTION RESPIRATORY (INHALATION) at 15:37

## 2024-09-28 RX ADMIN — PIPERACILLIN AND TAZOBACTAM 4500 MG: 4; .5 INJECTION, POWDER, FOR SOLUTION INTRAVENOUS at 22:14

## 2024-09-28 RX ADMIN — DEXAMETHASONE SODIUM PHOSPHATE 4 MG: 4 INJECTION, SOLUTION INTRAMUSCULAR; INTRAVENOUS at 03:56

## 2024-09-28 ASSESSMENT — PAIN SCALES - GENERAL
PAINLEVEL_OUTOF10: 0

## 2024-09-28 NOTE — PROGRESS NOTES
V2.0  [facility] [place] Hospitalist Progress Note      Name:  Lin Bates /Age/Sex: 1955  (69 y.o. female)   MRN & CSN:  [id number] & [id number] Encounter Date/Time: 2024 1:20 PM EDT    Location:  -A PCP: Dorothy Terrazas PA       Hospital Day: 4    Assessment and Plan:   Lin Bates is a 69 y.o. female with pmh of monoclonal lymphocytosis hypertension hyperlipidemia diabetes small cell lung cancer status postchemotherapy and radiation therapy on immunotherapy who presents with Septic shock (HCC)      Plan:  Acute hypoxic respiratory in the setting of severe sepsis with left-sided pleural pneumonia.  Currently on antibiotics de-escalate based on culture results..  Currently getting better.  PT OT likely plan for discharge to SNF in the next couple of days  Septic shock requiring pressors in the setting of above wean off as tolerated  Diabetes mellitus type 2 on sliding insulin currently in ICU monitor for hypoglycemia  Benign essential hypertension  Hyperlipidemia on statin  Monoclonal B-cell lymphocytosis with small cell lung cancer of the left lower lobe status postchemotherapy and radiation therapy currently on immunotherapy       Medical Decision Making:  The following items were considered in medical decision making:  Discussion of patient care with other providers  Reviewed clinical lab tests if any  Reviewed radiology tests if any  Reviewed other diagnostic tests/interventions  Independent review of radiologic images if any  Microbiology cultures and other micro tests if any    Estimated time spent for medical decision-making encompassing complexity of the case, history taking, medication review, physical examination, communication with family, RN, , discussion with specialists, and ancillary staff members to provide accurate care for the patient was around 25 minutes.    The billing in this case is level 2 due to the combination of above and specifically because  heart is not enlarged. No calcified coronary atherosclerosis.  No pericardial effusion. THORACIC AORTA: The aorta is normal in caliber. PULMONARY ARTERY: No pulmonary embolus to the segmental level. The main pulmonary artery is normal in caliber. MEDIASTINUM/ANA LAURA: No mediastinal mass or lymphadenopathy. CHEST WALL: No mass or axillary lymphadenopathy. UPPER ABDOMEN: The visualized upper abdomen is unremarkable. BONES: No suspicious osseous lesion. Other: Left thyroid lobe low-attenuation nodule measuring 2.1 x 1.2 cm in diameter.     1.  No pulmonary embolus. 2.  Left thyroid lobe low-attenuation nodule measuring 2.1 x 1.2 cm in diameter. 3.  Multiple patchy bilateral infiltrates, underlying masses can't be excluded. 4.  Moderate to large left pleural effusion. Electronically signed by Adalgisa Mead      Electronically signed by Trena Ortega MD on 9/28/2024 at 12:59 PM

## 2024-09-28 NOTE — PLAN OF CARE
Problem: Discharge Planning  Goal: Discharge to home or other facility with appropriate resources  Outcome: Not Progressing

## 2024-09-28 NOTE — PROGRESS NOTES
Occupational Therapy  [facility] ACUTE CARE OCCUPATIONAL THERAPY EVALUATION    Lin Bates, 1955, 2119/2119-A, 9/28/2024    Discharge Recommendation: home w/ assist prn      History:  Jamul:    Past Medical History:   Diagnosis Date    Cancer (HCC)     History of external beam radiation therapy 03/2024    LEFT LUNG and LN 6,000 cGy in 30 fractions    Hyperlipidemia     Hypertension     Thickened endometrium     Type 2 diabetes mellitus without complication (HCC)          Subjective:  Patient states: \"The only trouble I have is these wires\"  Pain:  denies  Communication with other providers: co-eval w/ PT, handoff to RN  Restrictions: General Precautions, Fall Risk    Home Setup/Prior level of function:  Social/Functional History  Lives With: Significant other  Type of Home: Apartment  Home Layout: One level  Home Access: Level entry  Bathroom Shower/Tub: Tub/Shower unit  Bathroom Toilet: Standard  Bathroom Equipment: Shower chair, Grab bars in shower  Home Equipment: Walker - Rolling  Receives Help From: Family, Friend(s)  ADL Assistance: Independent  Homemaking Assistance: Independent  Ambulation Assistance: Independent  Transfer Assistance: Independent  Active : No  Patient's  Info: S.O. PROVIDES HER TRANSPORTATION     Examination:  Observation: Supine in bed upon arrival, agreeable to therapy eval.  Vision: WFL  Hearing: WFL  Vitals: Stable vitals throughout session on supplemental O2      Body Systems and functions:  ROM: WFL   Strength: B UE grossly 4/5 across all major joints   Sensation: WFL  Tone: Normal  Coordination: WFL  Perception: WNL      Cognitive and Psychosocial Functioning:  Overall cognitive status: alert and oriented, WFL  Affect: Normal       Functional Mobility:  Bed mobility:  supine to/from sitting EOB w/ SBA  Sitting balance:  SBA    Transfers: STS to/from EOB w/ SBA  Standing balance:  SBA w/o AD  Functional Mobility: ambulated long functional distance w/o

## 2024-09-28 NOTE — PLAN OF CARE
Problem: Discharge Planning  Goal: Discharge to home or other facility with appropriate resources  Outcome: Progressing  Flowsheets (Taken 9/28/2024 0800)  Discharge to home or other facility with appropriate resources: Identify barriers to discharge with patient and caregiver     Problem: Skin/Tissue Integrity  Goal: Absence of new skin breakdown  Description: 1.  Monitor for areas of redness and/or skin breakdown  2.  Assess vascular access sites hourly  3.  Every 4-6 hours minimum:  Change oxygen saturation probe site  4.  Every 4-6 hours:  If on nasal continuous positive airway pressure, respiratory therapy assess nares and determine need for appliance change or resting period.  Outcome: Progressing     Problem: Safety - Adult  Goal: Free from fall injury  Outcome: Progressing     Problem: ABCDS Injury Assessment  Goal: Absence of physical injury  Outcome: Progressing     Problem: Chronic Conditions and Co-morbidities  Goal: Patient's chronic conditions and co-morbidity symptoms are monitored and maintained or improved  Outcome: Progressing  Flowsheets (Taken 9/28/2024 0800)  Care Plan - Patient's Chronic Conditions and Co-Morbidity Symptoms are Monitored and Maintained or Improved: Monitor and assess patient's chronic conditions and comorbid symptoms for stability, deterioration, or improvement

## 2024-09-28 NOTE — PROGRESS NOTES
Physical Therapy  Facility/Department: [facility] ICU  Physical Therapy Initial Assessment    Name: Lin Bates  : 1955  MRN: [id number]  Date of Service: 2024    Discharge Recommendations:  24 hour supervision or assist, Home with Home health PT        Functional Outcome Measure:    Acute Care Index of Function (ACIF):    Score: 0.94 (0.71 or greater  = appropriate for home with home PT and 24 hour supervision/assist)        Patient Diagnosis(es): septic shock  Past Medical History:  has a past medical history of Cancer (HCC), History of external beam radiation therapy, Hyperlipidemia, Hypertension, Thickened endometrium, and Type 2 diabetes mellitus without complication (HCC).  Past Surgical History:  has a past surgical history that includes Dental surgery; Dilation and curettage of uterus (N/A, 3/22/2023); CT NEEDLE BIOPSY LUNG PERCUTANEOUS (2024); Port Surgery (N/A, 4/10/2024); and Esophagoscopy (N/A, 2024).    Assessment  Body Structures, Functions, Activity Limitations Requiring Skilled Therapeutic Intervention: Decreased functional mobility ;Decreased endurance;Decreased balance;Decreased strength;Decreased high-level IADLs  Therapy Prognosis: Good  Decision Making: Medium Complexity  Clinical Presentation: evolving  Requires PT Follow-Up: Yes  Activity Tolerance  Activity Tolerance: Patient tolerated evaluation without incident    Plan  Physical Therapy Plan  General Plan: 3-5 times per week  Current Treatment Recommendations: Strengthening, ROM, Balance training, Functional mobility training, Transfer training, ADL/Self-care training, IADL training, Cognitive/Perceptual training, Endurance training, Pain management, Neuromuscular re-education, Stair training, Gait training, Home exercise program, Safety education & training, Patient/Caregiver education & training, Equipment evaluation, education, & procurement, Positioning, Therapeutic activities  Safety Devices  Type of Devices: All  Out: 1442  Total Treatment Time: 20  Timed Code Treatment Minutes: 10        Jerry Dumont PT, DPT  License #: [id number]

## 2024-09-28 NOTE — PROGRESS NOTES
Progress Note( Dr. Soto)  9/28/2024  Subjective:   Admit Date: 9/25/2024  PCP: Dorothy Terrazas PA    Admitted For :Septic shock     Consulted For: Evaluate thyroid functions with appear to be somewhat abnormal   Also reviewed treatment plans of diabetes mellitus    Interval History: Patient feels somewhat better has some shortness of breath  Chest x-ray reviewed bilateral infiltrates l possible pneumonia or due to cancer left worse than the right  Blood glucose are running a bit higher    Denies any chest pains,   Yes mild SOB .   Denies nausea or vomiting.   No new bowel or bladder symptoms.       Intake/Output Summary (Last 24 hours) at 9/28/2024 0946  Last data filed at 9/27/2024 1451  Gross per 24 hour   Intake 240 ml   Output --   Net 240 ml       DATA    CBC:   Recent Labs     09/26/24  0550 09/27/24  0610 09/28/24  0700   WBC 17.9* 13.5* 12.0*   HGB 8.8* 9.0* 8.6*    451* 421    CMP:  Recent Labs     09/26/24  0550 09/27/24  0610 09/28/24  0700    144 140   K 3.4* 4.5 4.5    107 105   CO2 23 25 27   BUN 15 16 22*   CREATININE 0.7 0.7 0.6   CALCIUM 9.0 8.8 8.7   BILITOT <0.2 <0.2 <0.2   ALKPHOS 73 87 77   AST 13* 11* 12*   ALT 12 13 11     Lipids: No results found for: \"CHOL\", \"HDL\", \"TRIG\"  Glucose:  Recent Labs     09/27/24  1659 09/27/24 2009 09/28/24  0842   POCGLU 262* 210* 254*     [id number]:  Lab Results   Component Value Date/Time    LABA1C 6.1 05/18/2024 08:30 PM     High Sensitivity TSH:   Lab Results   Component Value Date/Time    TSHHS 0.864 07/25/2024 01:59 PM     Free T3:   Lab Results   Component Value Date/Time    FT3 2.12 09/26/2024 09:45 AM     Free T4:  Lab Results   Component Value Date/Time    T4FREE 2.4 09/24/2024 02:30 PM       XR CHEST PORTABLE   Final Result   Bilateral pneumonia, pulmonary edema, or ARDS, particularly in the   left lower lobe.         Electronically signed by Corwin Fu           Scheduled Medicines   Medications:    insulin  lispro  0-16 Units SubCUTAneous TID WC    ipratropium 0.5 mg-albuterol 2.5 mg  1 Dose Inhalation Q4H RT    folic acid  1 mg Oral Daily    multivitamin  1 tablet Oral Daily    thiamine  100 mg Oral Daily    zinc sulfate  50 mg Oral Daily    dexAMETHasone (DECADRON) 4 mg in sodium chloride 0.9 % 50 mL IVPB  4 mg IntraVENous Q6H    piperacillin-tazobactam  4,500 mg IntraVENous Q8H    sodium chloride flush  5-40 mL IntraVENous 2 times per day    enoxaparin  40 mg SubCUTAneous Daily    aspirin  81 mg Oral Daily    atorvastatin  20 mg Oral Nightly    ferrous sulfate  325 mg Oral Daily with breakfast    hydroxyurea  500 mg Oral Daily    sucralfate  1 g Oral 4 times per day    pantoprazole  40 mg Oral QAM AC    insulin lispro  0-4 Units SubCUTAneous Nightly    midodrine  10 mg Oral TID WC      Infusions:    norepinephrine Stopped (09/27/24 1600)    sodium chloride      dextrose           Objective:   Vitals: /61   Pulse 88   Temp 97.5 °F (36.4 °C) (Oral)   Resp 24   Wt 65.9 kg (145 lb 4.5 oz)   SpO2 96%   BMI 27.45 kg/m²   General appearance: alert and cooperative with exam  Neck: no JVD or bruit  Thyroid : Normal lobes   Lungs: Has Vesicular Breath sounds somewhat diminished breath sounds  Heart:  regular rate and rhythm  Abdomen: soft, non-tender; bowel sounds normal; no masses,  no organomegaly  Musculoskeletal: Normal  Extremities: extremities normal, , no edema  Neurologic:  Awake, alert, oriented to name, place and time.  Cranial nerves II-XII are grossly intact.  Motor is  intact.  Sensory is intact.,  and gait is abnormal.    Assessment:     Patient Active Problem List:     Thrombocytosis     Leucocytosis     Monoclonal B-cell lymphocytosis     Thickened endometrium     PMB (postmenopausal bleeding)     Lung nodule, solitary     Need for hepatitis B screening test     Small cell lung cancer, left lower lobe (HCC)     Severe anemia     Moderate malnutrition (HCC)     Drug therapy     Septic shock

## 2024-09-28 NOTE — PROGRESS NOTES
Inpatient Progress Note 9/28/2024        Lin Bates  1955  [id number]      Assessment/Plan:  Lin Bates is a 69 y.o.  female with significant PMH of HTN, HLD, DM-2, history of HPV infection, nicotine use, small cell carcinoma -chemotherapy with cisplatin and etoposide was started since 2/24 , completed radiation therapy on 4/16/2024 , myeloproliferative neoplasm-(based on the bone marrow biopsy on 9/22/2023) , chemoradiation induced esophagitis , currently on immunotherapy who presents with Septic shock (HCC)         Problem list  Acute hypoxic respiratory failure, on nasal cannula  Septic shock,off vasopressor  Left-sided pleural effusion  Health care acquired Pneumonia  ?Pneumonitis from Immunotherapy  DM-2 per history  Essential hypertension per history  HLD  Monoclonal B-cell lymphocytosis  Small cell lung cancer left lower lobe s/p chemotherapy and radiation, currently on immunotherapy  Nicotine use        Neuro: Alert, oriented following commands, plain controlled with current multimodal regimen, maintain PRN regimen as she continues to complain of some cancer pain  Cardio:  Off levophed, cont midodrine with holding parameters, goal MAP > 65 mmHg, SBP > 100 mm of hg,  Monitor vitals and adjust as needed  Resp: Hypoxic, requiring increasing O2 requirement. On NC 8L, noted tachypnea and increase in O 2 requirement, ordered CXR, acapella, dee duonebs, Continue inhalers, nebs and aggressive pulm hygiene. Radiographic imaging of the chest 9/25 show evidence of Pneumonia with possible superimposed pneumonitis from immunotherapy. Started on steroids.   GI: ADAT, GI ppx.   : Cr 0.6 , monitor uop,2liters yesterday,  appropriate. Monitor electrolytes and replenish as needed  Heme:  Hgb 8.6, plts 421, DVT ppx: Lovenox  ID: WBC 12.0, improving, on broad spectrum antibiotics. MRSA nares negative, PCR negative, Bcx NGTD. F/u cultures and sensitivities and de-escalate as able.   Endo: POC BG ISS PRN,  peds pts or suitable admitted adults) [[id number]] Collected: 09/25/24 0010    Order Status: Completed Specimen: Nasopharyngeal Swab Updated: 09/25/24 0106     Specimen Description .NASOPHARYNGEAL SWAB     Adenovirus PCR Not Detected     Coronavirus 229E PCR Not Detected     Coronavirus HKU1 PCR Not Detected     Coronavirus NL63 PCR Not Detected     Coronavirus OC43 PCR Not Detected     SARS-CoV-2, PCR Not Detected     Human Metapneumovirus PCR Not Detected     Rhino/Enterovirus PCR Not Detected     Influenza A by PCR Not Detected     Influenza B by PCR Not Detected     Parainfluenza 1 PCR Not Detected     Parainfluenza 2 PCR Not Detected     Parainfluenza 3 PCR Not Detected     Parainfluenza 4 PCR Not Detected     Resp Syncytial Virus PCR Not Detected     Bordetella parapertussis by PCR Not Detected     B Pertussis by PCR Not Detected     Chlamydia pneumoniae By PCR Not Detected     Mycoplasma pneumo by PCR Not Detected     Comment: Performed by multiplexed nucleic acid assay.               No results found.    Recent Results (from the past 24 hour(s))   POCT Glucose    Collection Time: 09/27/24  8:43 AM   Result Value Ref Range    POC Glucose 194 (H) 74 - 99 mg/dL   POCT Glucose    Collection Time: 09/27/24 12:50 PM   Result Value Ref Range    POC Glucose 297 (H) 74 - 99 mg/dL   POCT Glucose    Collection Time: 09/27/24  4:59 PM   Result Value Ref Range    POC Glucose 262 (H) 74 - 99 mg/dL   POCT Glucose    Collection Time: 09/27/24  8:09 PM   Result Value Ref Range    POC Glucose 210 (H) 74 - 99 mg/dL   Calcium, Ionized    Collection Time: 09/28/24  7:00 AM   Result Value Ref Range    Calcium, Ionized 1.27 1.15 - 1.33 mmol/L   Basic Metabolic Panel    Collection Time: 09/28/24  7:00 AM   Result Value Ref Range    Sodium 140 136 - 145 mmol/L    Potassium 4.5 3.5 - 5.1 mmol/L    Chloride 105 99 - 110 mmol/L    CO2 27 21 - 32 mmol/L    Anion Gap 8 (L) 9 - 17 mmol/L    Glucose 235 (H) 74 - 99 mg/dL    BUN 22 (H) 7 -

## 2024-09-28 NOTE — PROGRESS NOTES
Progress Note( Dr. Soto)  9/27/2024  Subjective:   Admit Date: 9/25/2024  PCP: Dorothy Terrazas PA    Admitted For :Septic shock     Consulted For: Evaluate thyroid functions with appear to be somewhat abnormal   Also reviewed treatment plans of diabetes mellitus    Interval History: Patient feels somewhat better    Denies any chest pains,   Yes mild SOB .   Denies nausea or vomiting.   No new bowel or bladder symptoms.       Intake/Output Summary (Last 24 hours) at 9/27/2024 2059  Last data filed at 9/27/2024 1451  Gross per 24 hour   Intake 1340.13 ml   Output 800 ml   Net 540.13 ml       DATA    CBC:   Recent Labs     09/25/24  1015 09/26/24  0550 09/27/24  0610   WBC 22.8* 17.9* 13.5*   HGB 9.4* 8.8* 9.0*    420 451*    CMP:  Recent Labs     09/25/24  1015 09/26/24  0550 09/27/24  0610    143 144   K 3.7 3.4* 4.5    108 107   CO2 23 23 25   BUN 12 15 16   CREATININE 0.7 0.7 0.7   CALCIUM 8.3 9.0 8.8   BILITOT 0.3 <0.2 <0.2   ALKPHOS 83 73 87   AST 21 13* 11*   ALT 17 12 13     Lipids: No results found for: \"CHOL\", \"HDL\", \"TRIG\"  Glucose:  Recent Labs     09/27/24  1250 09/27/24  1659 09/27/24 2009   POCGLU 297* 262* 210*     [id number]:  Lab Results   Component Value Date/Time    LABA1C 6.1 05/18/2024 08:30 PM     High Sensitivity TSH:   Lab Results   Component Value Date/Time    TSHHS 0.864 07/25/2024 01:59 PM     Free T3:   Lab Results   Component Value Date/Time    FT3 2.12 09/26/2024 09:45 AM     Free T4:  Lab Results   Component Value Date/Time    T4FREE 2.4 09/24/2024 02:30 PM       No orders to display        Scheduled Medicines   Medications:    potassium & sodium phosphates  2 packet Oral 4x Daily    folic acid  1 mg Oral Daily    multivitamin  1 tablet Oral Daily    thiamine  100 mg Oral Daily    zinc sulfate  50 mg Oral Daily    dexAMETHasone (DECADRON) 4 mg in sodium chloride 0.9 % 50 mL IVPB  4 mg IntraVENous Q6H    piperacillin-tazobactam  4,500 mg IntraVENous

## 2024-09-29 PROBLEM — J18.9 PNEUMONIA OF BOTH LUNGS DUE TO INFECTIOUS ORGANISM: Status: ACTIVE | Noted: 2024-09-29

## 2024-09-29 LAB
ALBUMIN SERPL-MCNC: 2.7 G/DL (ref 3.4–5)
ALBUMIN/GLOB SERPL: 0.9 {RATIO} (ref 1.1–2.2)
ALP SERPL-CCNC: 66 U/L (ref 40–129)
ALT SERPL-CCNC: 11 U/L (ref 10–40)
ANION GAP SERPL CALCULATED.3IONS-SCNC: 7 MMOL/L (ref 9–17)
AST SERPL-CCNC: 12 U/L (ref 15–37)
BASOPHILS # BLD: 0.01 K/UL
BASOPHILS NFR BLD: 0 % (ref 0–1)
BILIRUB DIRECT SERPL-MCNC: <0.2 MG/DL (ref 0–0.3)
BILIRUB INDIRECT SERPL-MCNC: ABNORMAL MG/DL (ref 0–0.7)
BILIRUB SERPL-MCNC: <0.2 MG/DL (ref 0–1)
BUN SERPL-MCNC: 23 MG/DL (ref 7–20)
CA-I BLD-SCNC: 1.27 MMOL/L (ref 1.15–1.33)
CALCIUM SERPL-MCNC: 8.5 MG/DL (ref 8.3–10.6)
CHLORIDE SERPL-SCNC: 105 MMOL/L (ref 99–110)
CO2 SERPL-SCNC: 28 MMOL/L (ref 21–32)
CREAT SERPL-MCNC: 0.6 MG/DL (ref 0.6–1.2)
EOSINOPHIL # BLD: 0 K/UL
EOSINOPHILS RELATIVE PERCENT: 0 % (ref 0–3)
ERYTHROCYTE [DISTWIDTH] IN BLOOD BY AUTOMATED COUNT: 14.1 % (ref 11.7–14.9)
GFR, ESTIMATED: >90 ML/MIN/1.73M2
GLUCOSE BLD-MCNC: 115 MG/DL (ref 74–99)
GLUCOSE BLD-MCNC: 116 MG/DL (ref 74–99)
GLUCOSE BLD-MCNC: 231 MG/DL (ref 74–99)
GLUCOSE BLD-MCNC: 254 MG/DL (ref 74–99)
GLUCOSE BLD-MCNC: 276 MG/DL (ref 74–99)
GLUCOSE SERPL-MCNC: 192 MG/DL (ref 74–99)
HCT VFR BLD AUTO: 26.2 % (ref 37–47)
HGB BLD-MCNC: 8.4 G/DL (ref 12.5–16)
IMM GRANULOCYTES # BLD AUTO: 0.09 K/UL
IMM GRANULOCYTES NFR BLD: 1 %
LYMPHOCYTES NFR BLD: 0.33 K/UL
LYMPHOCYTES RELATIVE PERCENT: 3 % (ref 24–44)
MAGNESIUM SERPL-MCNC: 2.3 MG/DL (ref 1.8–2.4)
MCH RBC QN AUTO: 31.9 PG (ref 27–31)
MCHC RBC AUTO-ENTMCNC: 32.1 G/DL (ref 32–36)
MCV RBC AUTO: 99.6 FL (ref 78–100)
MICROORGANISM SPEC CULT: NORMAL
MICROSOMAL ANTIBODY: 9 IU/ML
MONOCYTES NFR BLD: 0.42 K/UL
MONOCYTES NFR BLD: 4 % (ref 0–4)
NEUTROPHILS NFR BLD: 92 % (ref 36–66)
NEUTS SEG NFR BLD: 9.12 K/UL
PHOSPHATE SERPL-MCNC: 2.9 MG/DL (ref 2.5–4.9)
PLATELET # BLD AUTO: 390 K/UL (ref 140–440)
PMV BLD AUTO: 9.5 FL (ref 7.5–11.1)
POTASSIUM SERPL-SCNC: 4.2 MMOL/L (ref 3.5–5.1)
PROT SERPL-MCNC: 5.7 G/DL (ref 6.4–8.2)
RBC # BLD AUTO: 2.63 M/UL (ref 4.2–5.4)
SERVICE CMNT-IMP: NORMAL
SODIUM SERPL-SCNC: 140 MMOL/L (ref 136–145)
SPECIMEN DESCRIPTION: NORMAL
T4 FREE SERPL-MCNC: 1.9 NG/DL (ref 0.9–1.8)
THYROGLOBULIN AB: 15 IU/ML
WBC OTHER # BLD: 10 K/UL (ref 4–10.5)

## 2024-09-29 PROCEDURE — 6360000002 HC RX W HCPCS: Performed by: STUDENT IN AN ORGANIZED HEALTH CARE EDUCATION/TRAINING PROGRAM

## 2024-09-29 PROCEDURE — 84100 ASSAY OF PHOSPHORUS: CPT

## 2024-09-29 PROCEDURE — 94761 N-INVAS EAR/PLS OXIMETRY MLT: CPT

## 2024-09-29 PROCEDURE — 83735 ASSAY OF MAGNESIUM: CPT

## 2024-09-29 PROCEDURE — 2500000003 HC RX 250 WO HCPCS: Performed by: FAMILY MEDICINE

## 2024-09-29 PROCEDURE — 99232 SBSQ HOSP IP/OBS MODERATE 35: CPT | Performed by: INTERNAL MEDICINE

## 2024-09-29 PROCEDURE — 2700000000 HC OXYGEN THERAPY PER DAY

## 2024-09-29 PROCEDURE — 2580000003 HC RX 258: Performed by: STUDENT IN AN ORGANIZED HEALTH CARE EDUCATION/TRAINING PROGRAM

## 2024-09-29 PROCEDURE — 6370000000 HC RX 637 (ALT 250 FOR IP): Performed by: INTERNAL MEDICINE

## 2024-09-29 PROCEDURE — 6370000000 HC RX 637 (ALT 250 FOR IP): Performed by: STUDENT IN AN ORGANIZED HEALTH CARE EDUCATION/TRAINING PROGRAM

## 2024-09-29 PROCEDURE — 36415 COLL VENOUS BLD VENIPUNCTURE: CPT

## 2024-09-29 PROCEDURE — 82962 GLUCOSE BLOOD TEST: CPT

## 2024-09-29 PROCEDURE — 6370000000 HC RX 637 (ALT 250 FOR IP): Performed by: PHYSICIAN ASSISTANT

## 2024-09-29 PROCEDURE — 2060000000 HC ICU INTERMEDIATE R&B

## 2024-09-29 PROCEDURE — 85025 COMPLETE CBC W/AUTO DIFF WBC: CPT

## 2024-09-29 PROCEDURE — 82248 BILIRUBIN DIRECT: CPT

## 2024-09-29 PROCEDURE — 80053 COMPREHEN METABOLIC PANEL: CPT

## 2024-09-29 PROCEDURE — 82330 ASSAY OF CALCIUM: CPT

## 2024-09-29 PROCEDURE — 94640 AIRWAY INHALATION TREATMENT: CPT

## 2024-09-29 RX ORDER — GLIPIZIDE 5 MG/1
5 TABLET ORAL
Status: DISCONTINUED | OUTPATIENT
Start: 2024-09-30 | End: 2024-09-29

## 2024-09-29 RX ORDER — GLIPIZIDE 5 MG/1
2.5 TABLET ORAL
Status: DISCONTINUED | OUTPATIENT
Start: 2024-09-29 | End: 2024-10-05 | Stop reason: HOSPADM

## 2024-09-29 RX ORDER — GUAIFENESIN 200 MG/10ML
200 LIQUID ORAL EVERY 4 HOURS PRN
Status: DISCONTINUED | OUTPATIENT
Start: 2024-09-29 | End: 2024-10-02

## 2024-09-29 RX ORDER — IPRATROPIUM BROMIDE AND ALBUTEROL SULFATE 2.5; .5 MG/3ML; MG/3ML
1 SOLUTION RESPIRATORY (INHALATION)
Status: DISCONTINUED | OUTPATIENT
Start: 2024-09-29 | End: 2024-10-05 | Stop reason: HOSPADM

## 2024-09-29 RX ORDER — INSULIN GLARGINE 100 [IU]/ML
15 INJECTION, SOLUTION SUBCUTANEOUS NIGHTLY
Status: DISCONTINUED | OUTPATIENT
Start: 2024-09-29 | End: 2024-10-05 | Stop reason: HOSPADM

## 2024-09-29 RX ORDER — GLIPIZIDE 5 MG/1
2.5 TABLET ORAL
Status: DISCONTINUED | OUTPATIENT
Start: 2024-09-30 | End: 2024-09-29

## 2024-09-29 RX ADMIN — DEXAMETHASONE SODIUM PHOSPHATE 4 MG: 4 INJECTION, SOLUTION INTRAMUSCULAR; INTRAVENOUS at 08:59

## 2024-09-29 RX ADMIN — FERROUS SULFATE TAB 325 MG (65 MG ELEMENTAL FE) 325 MG: 325 (65 FE) TAB at 08:55

## 2024-09-29 RX ADMIN — FOLIC ACID 1 MG: 1 TABLET ORAL at 08:55

## 2024-09-29 RX ADMIN — DEXAMETHASONE SODIUM PHOSPHATE 4 MG: 4 INJECTION, SOLUTION INTRAMUSCULAR; INTRAVENOUS at 02:42

## 2024-09-29 RX ADMIN — INSULIN HUMAN 5 UNITS: 100 INJECTION, SUSPENSION SUBCUTANEOUS at 12:39

## 2024-09-29 RX ADMIN — HYDROXYUREA 500 MG: 500 CAPSULE ORAL at 08:55

## 2024-09-29 RX ADMIN — IPRATROPIUM BROMIDE AND ALBUTEROL SULFATE 1 DOSE: 2.5; .5 SOLUTION RESPIRATORY (INHALATION) at 22:59

## 2024-09-29 RX ADMIN — IPRATROPIUM BROMIDE AND ALBUTEROL SULFATE 1 DOSE: 2.5; .5 SOLUTION RESPIRATORY (INHALATION) at 11:29

## 2024-09-29 RX ADMIN — GUAIFENESIN 200 MG: 200 SOLUTION ORAL at 16:38

## 2024-09-29 RX ADMIN — PIPERACILLIN AND TAZOBACTAM 4500 MG: 4; .5 INJECTION, POWDER, FOR SOLUTION INTRAVENOUS at 12:29

## 2024-09-29 RX ADMIN — ASPIRIN 81 MG: 81 TABLET, CHEWABLE ORAL at 08:57

## 2024-09-29 RX ADMIN — PANTOPRAZOLE SODIUM 40 MG: 40 TABLET, DELAYED RELEASE ORAL at 05:45

## 2024-09-29 RX ADMIN — ZINC SULFATE 220 MG (50 MG) CAPSULE 50 MG: CAPSULE at 08:55

## 2024-09-29 RX ADMIN — IPRATROPIUM BROMIDE AND ALBUTEROL SULFATE 1 DOSE: 2.5; .5 SOLUTION RESPIRATORY (INHALATION) at 15:50

## 2024-09-29 RX ADMIN — METFORMIN HYDROCHLORIDE 500 MG: 500 TABLET, FILM COATED ORAL at 12:29

## 2024-09-29 RX ADMIN — GUAIFENESIN 200 MG: 200 SOLUTION ORAL at 20:32

## 2024-09-29 RX ADMIN — INSULIN GLARGINE 15 UNITS: 100 INJECTION, SOLUTION SUBCUTANEOUS at 20:32

## 2024-09-29 RX ADMIN — INSULIN LISPRO 2 UNITS: 100 INJECTION, SOLUTION INTRAVENOUS; SUBCUTANEOUS at 02:42

## 2024-09-29 RX ADMIN — SUCRALFATE 1 G: 1 TABLET ORAL at 12:31

## 2024-09-29 RX ADMIN — MIDODRINE HYDROCHLORIDE 10 MG: 5 TABLET ORAL at 16:41

## 2024-09-29 RX ADMIN — IPRATROPIUM BROMIDE AND ALBUTEROL SULFATE 1 DOSE: 2.5; .5 SOLUTION RESPIRATORY (INHALATION) at 08:09

## 2024-09-29 RX ADMIN — GLIPIZIDE 2.5 MG: 5 TABLET ORAL at 12:30

## 2024-09-29 RX ADMIN — MIDODRINE HYDROCHLORIDE 10 MG: 5 TABLET ORAL at 12:29

## 2024-09-29 RX ADMIN — IPRATROPIUM BROMIDE AND ALBUTEROL SULFATE 1 DOSE: 2.5; .5 SOLUTION RESPIRATORY (INHALATION) at 20:31

## 2024-09-29 RX ADMIN — SUCRALFATE 1 G: 1 TABLET ORAL at 05:45

## 2024-09-29 RX ADMIN — INSULIN LISPRO 8 UNITS: 100 INJECTION, SOLUTION INTRAVENOUS; SUBCUTANEOUS at 08:55

## 2024-09-29 RX ADMIN — ATORVASTATIN CALCIUM 20 MG: 10 TABLET, FILM COATED ORAL at 20:32

## 2024-09-29 RX ADMIN — Medication 100 MG: at 08:55

## 2024-09-29 RX ADMIN — DEXAMETHASONE SODIUM PHOSPHATE 4 MG: 4 INJECTION, SOLUTION INTRAMUSCULAR; INTRAVENOUS at 22:56

## 2024-09-29 RX ADMIN — SODIUM CHLORIDE, PRESERVATIVE FREE 10 ML: 5 INJECTION INTRAVENOUS at 08:57

## 2024-09-29 RX ADMIN — PIPERACILLIN AND TAZOBACTAM 4500 MG: 4; .5 INJECTION, POWDER, FOR SOLUTION INTRAVENOUS at 05:46

## 2024-09-29 RX ADMIN — INSULIN LISPRO 8 UNITS: 100 INJECTION, SOLUTION INTRAVENOUS; SUBCUTANEOUS at 12:30

## 2024-09-29 RX ADMIN — ENOXAPARIN SODIUM 40 MG: 100 INJECTION SUBCUTANEOUS at 08:55

## 2024-09-29 RX ADMIN — Medication 1 TABLET: at 08:55

## 2024-09-29 RX ADMIN — MIDODRINE HYDROCHLORIDE 10 MG: 5 TABLET ORAL at 08:55

## 2024-09-29 NOTE — PROGRESS NOTES
Progress Note( Dr. Soto)  9/29/2024  Subjective:   Admit Date: 9/25/2024  PCP: Dorothy Terrazas PA    Admitted For :Septic shock     Consulted For: Evaluate thyroid functions with appear to be somewhat abnormal   Also reviewed treatment plans of diabetes mellitus    Interval History: Patient feels somewhat better has some shortness of breath  Chest x-ray reviewed bilateral infiltrates l possible pneumonia or due to cancer left worse than the right  Blood glucose are running a bit higher    Denies any chest pains,   Yes mild SOB .   Denies nausea or vomiting.   No new bowel or bladder symptoms.       Intake/Output Summary (Last 24 hours) at 9/29/2024 1141  Last data filed at 9/29/2024 0310  Gross per 24 hour   Intake 1570.19 ml   Output --   Net 1570.19 ml       DATA    CBC:   Recent Labs     09/27/24  0610 09/28/24  0700 09/29/24  0520   WBC 13.5* 12.0* 10.0   HGB 9.0* 8.6* 8.4*   * 421 390    CMP:  Recent Labs     09/27/24  0610 09/28/24  0700 09/29/24  0520    140 140   K 4.5 4.5 4.2    105 105   CO2 25 27 28   BUN 16 22* 23*   CREATININE 0.7 0.6 0.6   CALCIUM 8.8 8.7 8.5   BILITOT <0.2 <0.2 <0.2   ALKPHOS 87 77 66   AST 11* 12* 12*   ALT 13 11 11     Lipids: No results found for: \"CHOL\", \"HDL\", \"TRIG\"  Glucose:  Recent Labs     09/28/24 2031 09/29/24  0208 09/29/24  0853   POCGLU 372* 231* 254*     [id number]:  Lab Results   Component Value Date/Time    LABA1C 6.1 05/18/2024 08:30 PM     High Sensitivity TSH:   Lab Results   Component Value Date/Time    TSHHS 0.864 07/25/2024 01:59 PM     Free T3:   Lab Results   Component Value Date/Time    FT3 2.12 09/26/2024 09:45 AM     Free T4:  Lab Results   Component Value Date/Time    T4FREE 2.4 09/24/2024 02:30 PM       XR CHEST PORTABLE   Final Result   Bilateral pneumonia, pulmonary edema, or ARDS, particularly in the   left lower lobe.         Electronically signed by Corwin Fu           Scheduled Medicines   Medications:

## 2024-09-29 NOTE — PROGRESS NOTES
Monocytes Absolute 0.42 k/uL    Eosinophils Absolute 0.00 k/uL    Basophils Absolute 0.01 k/uL    Immature Granulocytes Absolute 0.09 k/uL   Hepatic Function Panel    Collection Time: 09/29/24  5:20 AM   Result Value Ref Range    Albumin 2.7 (L) 3.4 - 5.0 g/dL    Alkaline Phosphatase 66 40 - 129 U/L    ALT 11 10 - 40 U/L    AST 12 (L) 15 - 37 U/L    Total Bilirubin <0.2 0.0 - 1.0 mg/dL    Bilirubin, Direct <0.2 0.0 - 0.3 mg/dL    Bilirubin, Indirect Can not be calculated 0.0 - 0.7 mg/dL    Total Protein 5.7 (L) 6.4 - 8.2 g/dL    Albumin/Globulin Ratio 0.9 (L) 1.1 - 2.2   Phosphorus    Collection Time: 09/29/24  5:20 AM   Result Value Ref Range    Phosphorus 2.9 2.5 - 4.9 mg/dL   Magnesium    Collection Time: 09/29/24  5:20 AM   Result Value Ref Range    Magnesium 2.3 1.8 - 2.4 mg/dL   POCT Glucose    Collection Time: 09/29/24  8:53 AM   Result Value Ref Range    POC Glucose 254 (H) 74 - 99 mg/dL        Imaging/Diagnostics Last 24 Hours   CTA PULMONARY W CONTRAST    Result Date: 9/25/2024  EXAMINATION: CTA PULMONARY W CONTRAST 9/25/2024 1:04 AM ACCESSION NUMBER: [id number] COMPARISON: None available INDICATION: Fever with new onset hypoxia.  Concern for pneumonia versus PE. Known history of left lower lobe small cell lung carcinoma TECHNIQUE: Multiple contiguous 2D axial CT images of the chest were obtained from the lung apices to the lung bases after the intravenous administration of 100mL Iso-justo 370 per pulmonary angiography protocol.  Coronal reconstructions were performed. Radiation dose reduction techniques were used for this study. Our CT scanners use one or all of the following: Automated exposure control, adjustment of the mA and/or kV according to patient size, iterative reconstruction. FINDINGS: STUDY QUALITY: The exam study quality is good. AIRWAYS: The central airways are patent. LUNGS: Multiple patchy bilateral infiltrates PLEURA: Moderate to large left pleural effusion. HEART: The heart is not  enlarged. No calcified coronary atherosclerosis.  No pericardial effusion. THORACIC AORTA: The aorta is normal in caliber. PULMONARY ARTERY: No pulmonary embolus to the segmental level. The main pulmonary artery is normal in caliber. MEDIASTINUM/ANA LAURA: No mediastinal mass or lymphadenopathy. CHEST WALL: No mass or axillary lymphadenopathy. UPPER ABDOMEN: The visualized upper abdomen is unremarkable. BONES: No suspicious osseous lesion. Other: Left thyroid lobe low-attenuation nodule measuring 2.1 x 1.2 cm in diameter.     1.  No pulmonary embolus. 2.  Left thyroid lobe low-attenuation nodule measuring 2.1 x 1.2 cm in diameter. 3.  Multiple patchy bilateral infiltrates, underlying masses can't be excluded. 4.  Moderate to large left pleural effusion. Electronically signed by Adalgisa Mead      Electronically signed by Trena Ortega MD on 9/29/2024 at 10:55 AM

## 2024-09-29 NOTE — PLAN OF CARE
Problem: Discharge Planning  Goal: Discharge to home or other facility with appropriate resources  9/29/2024 0324 by Chaitanya Carmen RN  Outcome: Progressing  Flowsheets (Taken 9/28/2024 2000)  Discharge to home or other facility with appropriate resources:   Identify barriers to discharge with patient and caregiver   Arrange for needed discharge resources and transportation as appropriate   Identify discharge learning needs (meds, wound care, etc)  9/28/2024 1734 by Migdalia Stevenson RN  Outcome: Progressing  Flowsheets (Taken 9/28/2024 0800)  Discharge to home or other facility with appropriate resources: Identify barriers to discharge with patient and caregiver     Problem: Skin/Tissue Integrity  Goal: Absence of new skin breakdown  Description: 1.  Monitor for areas of redness and/or skin breakdown  2.  Assess vascular access sites hourly  3.  Every 4-6 hours minimum:  Change oxygen saturation probe site  4.  Every 4-6 hours:  If on nasal continuous positive airway pressure, respiratory therapy assess nares and determine need for appliance change or resting period.  9/29/2024 0324 by Chaitanya Carmen RN  Outcome: Progressing  9/28/2024 1734 by Migdalia Stevenson RN  Outcome: Progressing     Problem: Safety - Adult  Goal: Free from fall injury  9/29/2024 0324 by Chaitanya Carmen RN  Outcome: Progressing  9/28/2024 1734 by Migdalia Stevenson RN  Outcome: Progressing     Problem: ABCDS Injury Assessment  Goal: Absence of physical injury  9/29/2024 0324 by Chaitanya Carmen RN  Outcome: Progressing  9/28/2024 1734 by Migdalia Stevenson RN  Outcome: Progressing     Problem: Chronic Conditions and Co-morbidities  Goal: Patient's chronic conditions and co-morbidity symptoms are monitored and maintained or improved  9/29/2024 0324 by Chaitanya Carmen RN  Outcome: Progressing  Flowsheets (Taken 9/28/2024 2000)  Care Plan - Patient's Chronic Conditions and Co-Morbidity Symptoms are Monitored and Maintained or

## 2024-09-29 NOTE — PROGRESS NOTES
Patient Name:  Lin Bates  Patient :  1955  Patient MRN:  [id number]     Primary Oncologist: Burt Birch MD  Referring Provider: Dorothy Terrazas PA    Lin Bates is a 69 y.o. female with medical history significant for MPL positive myeloproliferative neoplasm, small monoclonal B cell lymphocytosis, limited stage LORI small cell lung cancer, s/p definitive CRT, prophylactic whole brain radiation therapy, currently on maintenance immunotherapy with durvalumab (received her second cycle on 24), presented to [facility] on 24 with SOB for 2 days, fever, productive cough.     She was found to have hypotensive shock in spite of fluid replacement and required levophed. She was transferred to [facility] ICU for further management.     Interval history   24  Pt was seen and examined this morning. Not in any acute distress. No overnight events. She is feeling better today. Requiring less pressor.     Labs today showed Na 144, Cr 0.7, Ca 8.8, Alb 2.9, WBC 13.5, Hb 9, Plt 451    2024, patient seen and examined.  Reported that she has been having coughing spells, dry cough.  No fever.  No bleeding.  Is on 5 L of oxygen.  2024, chest x-ray with bilateral pneumonia, pulmonary edema or ARDS particularly in the left lower lobe  2024 sodium of 140 potassium of 4.2 BUN of 23 creatinine 0.7.  Albumin of 2.7 LFTs within normal limits.  CBC with WBC of 10 hemoglobin of 8.4 hematocrit 26.2 MCV of 99.6 and platelets of 390        Vital Signs: /67   Pulse 95   Temp 97.9 °F (36.6 °C) (Oral)   Resp (!) 34   Ht 1.549 m (5' 1\")   Wt 65.9 kg (145 lb 4.5 oz)   SpO2 93%   BMI 27.45 kg/m²      Physical Exam:  CONSTITUTIONAL: awake, alert, on nasal cannula, in mild respiratory distress  EYES: Pallor but no icterus  LUNGS: Bilateral wheeze and coarse breath sounds  CARDIOVASCULAR: S and S2  ABDOMEN: Soft nontender nondistended bowel sounds positive  NEUROLOGIC: Grossly intact  EXTREMITIES: no LE

## 2024-09-30 LAB
ALBUMIN SERPL-MCNC: 2.9 G/DL (ref 3.4–5)
ALBUMIN/GLOB SERPL: 1 {RATIO} (ref 1.1–2.2)
ALP SERPL-CCNC: 61 U/L (ref 40–129)
ALT SERPL-CCNC: 11 U/L (ref 10–40)
ANION GAP SERPL CALCULATED.3IONS-SCNC: 8 MMOL/L (ref 9–17)
AST SERPL-CCNC: 14 U/L (ref 15–37)
BASOPHILS # BLD: 0.02 K/UL
BASOPHILS NFR BLD: 0 % (ref 0–1)
BILIRUB DIRECT SERPL-MCNC: <0.2 MG/DL (ref 0–0.3)
BILIRUB INDIRECT SERPL-MCNC: ABNORMAL MG/DL (ref 0–0.7)
BILIRUB SERPL-MCNC: <0.2 MG/DL (ref 0–1)
BUN SERPL-MCNC: 20 MG/DL (ref 7–20)
CA-I BLD-SCNC: 1.27 MMOL/L (ref 1.15–1.33)
CALCIUM SERPL-MCNC: 8.7 MG/DL (ref 8.3–10.6)
CHLORIDE SERPL-SCNC: 106 MMOL/L (ref 99–110)
CO2 SERPL-SCNC: 29 MMOL/L (ref 21–32)
CREAT SERPL-MCNC: 0.6 MG/DL (ref 0.6–1.2)
EOSINOPHIL # BLD: 0 K/UL
EOSINOPHILS RELATIVE PERCENT: 0 % (ref 0–3)
ERYTHROCYTE [DISTWIDTH] IN BLOOD BY AUTOMATED COUNT: 14.4 % (ref 11.7–14.9)
GFR, ESTIMATED: >90 ML/MIN/1.73M2
GLUCOSE BLD-MCNC: 111 MG/DL (ref 74–99)
GLUCOSE BLD-MCNC: 120 MG/DL (ref 74–99)
GLUCOSE BLD-MCNC: 136 MG/DL (ref 74–99)
GLUCOSE BLD-MCNC: 137 MG/DL (ref 74–99)
GLUCOSE BLD-MCNC: 155 MG/DL (ref 74–99)
GLUCOSE SERPL-MCNC: 147 MG/DL (ref 74–99)
HCT VFR BLD AUTO: 29.5 % (ref 37–47)
HGB BLD-MCNC: 9.3 G/DL (ref 12.5–16)
IMM GRANULOCYTES # BLD AUTO: 0.06 K/UL
IMM GRANULOCYTES NFR BLD: 1 %
LYMPHOCYTES NFR BLD: 0.43 K/UL
LYMPHOCYTES RELATIVE PERCENT: 4 % (ref 24–44)
MAGNESIUM SERPL-MCNC: 2.3 MG/DL (ref 1.8–2.4)
MCH RBC QN AUTO: 32.2 PG (ref 27–31)
MCHC RBC AUTO-ENTMCNC: 31.5 G/DL (ref 32–36)
MCV RBC AUTO: 102.1 FL (ref 78–100)
MONOCYTES NFR BLD: 0.76 K/UL
MONOCYTES NFR BLD: 6 % (ref 0–4)
NEUTROPHILS NFR BLD: 89 % (ref 36–66)
NEUTS SEG NFR BLD: 10.65 K/UL
PHOSPHATE SERPL-MCNC: 3.1 MG/DL (ref 2.5–4.9)
PLATELET # BLD AUTO: 454 K/UL (ref 140–440)
PMV BLD AUTO: 10.2 FL (ref 7.5–11.1)
POTASSIUM SERPL-SCNC: 3.9 MMOL/L (ref 3.5–5.1)
PROT SERPL-MCNC: 5.8 G/DL (ref 6.4–8.2)
RBC # BLD AUTO: 2.89 M/UL (ref 4.2–5.4)
SODIUM SERPL-SCNC: 143 MMOL/L (ref 136–145)
WBC OTHER # BLD: 11.9 K/UL (ref 4–10.5)

## 2024-09-30 PROCEDURE — 6370000000 HC RX 637 (ALT 250 FOR IP): Performed by: INTERNAL MEDICINE

## 2024-09-30 PROCEDURE — 94761 N-INVAS EAR/PLS OXIMETRY MLT: CPT

## 2024-09-30 PROCEDURE — 2060000000 HC ICU INTERMEDIATE R&B

## 2024-09-30 PROCEDURE — 6360000002 HC RX W HCPCS: Performed by: STUDENT IN AN ORGANIZED HEALTH CARE EDUCATION/TRAINING PROGRAM

## 2024-09-30 PROCEDURE — 6370000000 HC RX 637 (ALT 250 FOR IP): Performed by: STUDENT IN AN ORGANIZED HEALTH CARE EDUCATION/TRAINING PROGRAM

## 2024-09-30 PROCEDURE — 36415 COLL VENOUS BLD VENIPUNCTURE: CPT

## 2024-09-30 PROCEDURE — 82248 BILIRUBIN DIRECT: CPT

## 2024-09-30 PROCEDURE — 82962 GLUCOSE BLOOD TEST: CPT

## 2024-09-30 PROCEDURE — 94150 VITAL CAPACITY TEST: CPT

## 2024-09-30 PROCEDURE — 2700000000 HC OXYGEN THERAPY PER DAY

## 2024-09-30 PROCEDURE — 99232 SBSQ HOSP IP/OBS MODERATE 35: CPT | Performed by: INTERNAL MEDICINE

## 2024-09-30 PROCEDURE — 84100 ASSAY OF PHOSPHORUS: CPT

## 2024-09-30 PROCEDURE — 82330 ASSAY OF CALCIUM: CPT

## 2024-09-30 PROCEDURE — 6370000000 HC RX 637 (ALT 250 FOR IP): Performed by: PHYSICIAN ASSISTANT

## 2024-09-30 PROCEDURE — 85025 COMPLETE CBC W/AUTO DIFF WBC: CPT

## 2024-09-30 PROCEDURE — 83735 ASSAY OF MAGNESIUM: CPT

## 2024-09-30 PROCEDURE — 2580000003 HC RX 258: Performed by: STUDENT IN AN ORGANIZED HEALTH CARE EDUCATION/TRAINING PROGRAM

## 2024-09-30 PROCEDURE — 94640 AIRWAY INHALATION TREATMENT: CPT

## 2024-09-30 PROCEDURE — 80053 COMPREHEN METABOLIC PANEL: CPT

## 2024-09-30 RX ADMIN — IPRATROPIUM BROMIDE AND ALBUTEROL SULFATE 1 DOSE: 2.5; .5 SOLUTION RESPIRATORY (INHALATION) at 16:13

## 2024-09-30 RX ADMIN — SODIUM CHLORIDE, PRESERVATIVE FREE 10 ML: 5 INJECTION INTRAVENOUS at 21:45

## 2024-09-30 RX ADMIN — PIPERACILLIN AND TAZOBACTAM 4500 MG: 4; .5 INJECTION, POWDER, FOR SOLUTION INTRAVENOUS at 23:54

## 2024-09-30 RX ADMIN — GLIPIZIDE 2.5 MG: 5 TABLET ORAL at 05:34

## 2024-09-30 RX ADMIN — HYDROXYUREA 500 MG: 500 CAPSULE ORAL at 08:43

## 2024-09-30 RX ADMIN — SODIUM CHLORIDE: 9 INJECTION, SOLUTION INTRAVENOUS at 15:27

## 2024-09-30 RX ADMIN — PANTOPRAZOLE SODIUM 40 MG: 40 TABLET, DELAYED RELEASE ORAL at 05:52

## 2024-09-30 RX ADMIN — IPRATROPIUM BROMIDE AND ALBUTEROL SULFATE 1 DOSE: 2.5; .5 SOLUTION RESPIRATORY (INHALATION) at 08:00

## 2024-09-30 RX ADMIN — SUCRALFATE 1 G: 1 TABLET ORAL at 05:33

## 2024-09-30 RX ADMIN — PIPERACILLIN AND TAZOBACTAM 4500 MG: 4; .5 INJECTION, POWDER, FOR SOLUTION INTRAVENOUS at 00:05

## 2024-09-30 RX ADMIN — Medication 100 MG: at 08:44

## 2024-09-30 RX ADMIN — SUCRALFATE 1 G: 1 TABLET ORAL at 12:00

## 2024-09-30 RX ADMIN — FERROUS SULFATE TAB 325 MG (65 MG ELEMENTAL FE) 325 MG: 325 (65 FE) TAB at 08:44

## 2024-09-30 RX ADMIN — INSULIN GLARGINE 15 UNITS: 100 INJECTION, SOLUTION SUBCUTANEOUS at 21:43

## 2024-09-30 RX ADMIN — IPRATROPIUM BROMIDE AND ALBUTEROL SULFATE 1 DOSE: 2.5; .5 SOLUTION RESPIRATORY (INHALATION) at 04:22

## 2024-09-30 RX ADMIN — ASPIRIN 81 MG: 81 TABLET, CHEWABLE ORAL at 08:44

## 2024-09-30 RX ADMIN — FOLIC ACID 1 MG: 1 TABLET ORAL at 08:44

## 2024-09-30 RX ADMIN — DEXAMETHASONE SODIUM PHOSPHATE 4 MG: 4 INJECTION, SOLUTION INTRAMUSCULAR; INTRAVENOUS at 08:42

## 2024-09-30 RX ADMIN — Medication 1 TABLET: at 08:44

## 2024-09-30 RX ADMIN — DEXAMETHASONE SODIUM PHOSPHATE 4 MG: 4 INJECTION, SOLUTION INTRAMUSCULAR; INTRAVENOUS at 15:29

## 2024-09-30 RX ADMIN — PIPERACILLIN AND TAZOBACTAM 4500 MG: 4; .5 INJECTION, POWDER, FOR SOLUTION INTRAVENOUS at 14:09

## 2024-09-30 RX ADMIN — SODIUM CHLORIDE, PRESERVATIVE FREE 10 ML: 5 INJECTION INTRAVENOUS at 08:57

## 2024-09-30 RX ADMIN — PIPERACILLIN AND TAZOBACTAM 4500 MG: 4; .5 INJECTION, POWDER, FOR SOLUTION INTRAVENOUS at 05:33

## 2024-09-30 RX ADMIN — IPRATROPIUM BROMIDE AND ALBUTEROL SULFATE 1 DOSE: 2.5; .5 SOLUTION RESPIRATORY (INHALATION) at 11:33

## 2024-09-30 RX ADMIN — SUCRALFATE 1 G: 1 TABLET ORAL at 23:59

## 2024-09-30 RX ADMIN — SUCRALFATE 1 G: 1 TABLET ORAL at 18:15

## 2024-09-30 RX ADMIN — ATORVASTATIN CALCIUM 20 MG: 10 TABLET, FILM COATED ORAL at 21:44

## 2024-09-30 RX ADMIN — ENOXAPARIN SODIUM 40 MG: 100 INJECTION SUBCUTANEOUS at 08:43

## 2024-09-30 RX ADMIN — ZINC SULFATE 220 MG (50 MG) CAPSULE 50 MG: CAPSULE at 08:44

## 2024-09-30 RX ADMIN — IPRATROPIUM BROMIDE AND ALBUTEROL SULFATE 1 DOSE: 2.5; .5 SOLUTION RESPIRATORY (INHALATION) at 19:40

## 2024-09-30 ASSESSMENT — PAIN SCALES - GENERAL: PAINLEVEL_OUTOF10: 0

## 2024-09-30 NOTE — PROGRESS NOTES
Spiritual Health History and Assessment/Progress Note  [facility]    Spiritual/Emotional Needs,  ,  ,      Name: Lin Bates MRN: [id number]    Age: 69 y.o.     Sex: female   Language: English   Bahai: Synagogue   Septic shock (HCC)     Date: 9/30/2024            Total Time Calculated: 7 min              Spiritual Assessment began in [facility] ICU        Referral/Consult From: Rounding   Encounter Overview/Reason: Spiritual/Emotional Needs  Service Provided For: Patient, Significant other    Emelia, Belief, Meaning:   Patient identifies as spiritual, is connected with a emelia tradition or spiritual practice, and has beliefs or practices that help with coping during difficult times  Family/Friends Other: unsure      Importance and Influence:  Patient has spiritual/personal beliefs that influence decisions regarding their health  Family/Friends have spiritual/personal beliefs that influence decisions regarding the patient's health    Community:  Patient feels well-supported. Support system includes: Friends  Family/Friends feel well-supported. Support system includes: Friends    Assessment and Plan of Care:     Patient Interventions include: Facilitated expression of thoughts and feelings  Family/Friends Interventions include: Facilitated expression of thoughts and feelings    Patient Plan of Care: Spiritual Care available upon further referral  Family/Friends Plan of Care: Spiritual Care available upon further referral    Electronically signed by Chaplain Jessica on 9/30/2024 at 12:04 PM

## 2024-09-30 NOTE — PROGRESS NOTES
V2.0  [facility] [place]      Name:  Lin Bates /Age/Sex: 1955  (69 y.o. female)   MRN & CSN:  [id number] & [id number] Encounter Date/Time: 2024 1:20 PM EDT    Location:  -A PCP: Dorothy Terrazas PA       Hospital Day: 6    Assessment and Plan:   Lin Bates is a 69 y.o. female with pmh of monoclonal lymphocytosis hypertension hyperlipidemia diabetes small cell lung cancer status postchemotherapy and radiation therapy on immunotherapy who presents with Septic shock (HCC)      Plan:  Acute hypoxic respiratory in the setting of severe sepsis with left-sided pleural pneumonia.  Currently on IV antibiotic Zosyn.  Changed to Augmentin at the time of discharge to complete the course of total of 10 days of antibiotics.  Likely patient will be going home.  Patient requesting a CT chest because of cough and shortness of breath today.  If stable consider discharge tomorrow  Septic shock requiring pressors in the setting of above weaned off as tolerated  Diabetes mellitus type 2 on sliding insulin on sliding scale 3 monitor for hypoglycemia  Benign essential hypertension  Hyperlipidemia on statin  Monoclonal B-cell lymphocytosis with small cell lung cancer of the left lower lobe status postchemotherapy and radiation therapy currently on immunotherapy       Medical Decision Making:  The following items were considered in medical decision making:  Discussion of patient care with other providers  Reviewed clinical lab tests if any  Reviewed radiology tests if any  Reviewed other diagnostic tests/interventions  Independent review of radiologic images if any  Microbiology cultures and other micro tests if any    Estimated time spent for medical decision-making encompassing complexity of the case, history taking, medication review, physical examination, communication with family, RN, , discussion with specialists, and ancillary staff members to provide accurate

## 2024-09-30 NOTE — PROGRESS NOTES
Patient Name:  Lin Bates  Patient :  1955  Patient MRN:  [id number]     Primary Oncologist: Burt Birch MD  Referring Provider: Dorothy Terrazas PA    Lin Bates is a 69 y.o. female with medical history significant for MPL positive myeloproliferative neoplasm, small monoclonal B cell lymphocytosis, limited stage LORI small cell lung cancer, s/p definitive CRT, prophylactic whole brain radiation therapy, currently on maintenance immunotherapy with durvalumab (received her second cycle on 24), presented to [facility] on 24 with SOB for 2 days, fever, productive cough.     She was found to have hypotensive shock in spite of fluid replacement and required levophed. She was transferred to [facility] ICU for further management.     Interval history   24  Pt was seen and examined this morning. Not in any acute distress. No overnight events. Still has coughing spells. It is dry cough.     2024, chest x-ray with bilateral pneumonia, pulmonary edema or ARDS particularly in the left lower lobe    2024 sodium of 143 potassium of 3.9, BUN of 20 creatinine 0.6.  Albumin of 2.9. LFTs within normal limits.  CBC with WBC of 11.9 hemoglobin of 9.3 hematocrit 29.5, MCV of 102.1 and platelets of 454.      Vital Signs: BP (!) 151/71   Pulse 93   Temp 97.8 °F (36.6 °C) (Oral)   Resp 30   Ht 1.549 m (5' 1\")   Wt 65.9 kg (145 lb 4.5 oz)   SpO2 95%   BMI 27.45 kg/m²      Physical Exam:  CONSTITUTIONAL: awake, alert, on nasal cannula, in mild respiratory distress  EYES: Pallor but no icterus  LUNGS: Bilateral wheeze and coarse breath sounds  CARDIOVASCULAR: S and S2  ABDOMEN: Soft nontender nondistended bowel sounds positive  NEUROLOGIC: Grossly intact  EXTREMITIES: no LE edema      Labs:  Hematology:  Lab Results   Component Value Date    WBC 11.9 (H) 2024    RBC 2.89 (L) 2024    HGB 9.3 (L) 2024    HCT 29.5 (L) 2024    .1 (H) 2024    MCH 32.2 (H) 2024

## 2024-09-30 NOTE — PROGRESS NOTES
Progress Note( Dr. Soto)  9/30/2024  Subjective:   Admit Date: 9/25/2024  PCP: Dorothy Terrazas PA    Admitted For :Septic shock     Consulted For: Evaluate thyroid functions with appear to be somewhat abnormal   Also reviewed treatment plans of diabetes mellitus    Interval History: Patient feels somewhat better has some shortness of breath  Chest x-ray reviewed bilateral infiltrates l possible pneumonia or due to cancer left worse than the right  Blood glucose are much better  Serum free T4 is getting better on his own without any intervention    Denies any chest pains,   Yes mild SOB .   Denies nausea or vomiting.   No new bowel or bladder symptoms.     No intake or output data in the 24 hours ending 09/30/24 0841      DATA    CBC:   Recent Labs     09/28/24  0700 09/29/24  0520 09/30/24  0550   WBC 12.0* 10.0 11.9*   HGB 8.6* 8.4* 9.3*    390 454*    CMP:  Recent Labs     09/28/24  0700 09/29/24  0520 09/30/24  0550    140 143   K 4.5 4.2 3.9    105 106   CO2 27 28 29   BUN 22* 23* 20   CREATININE 0.6 0.6 0.6   CALCIUM 8.7 8.5 8.7   BILITOT <0.2 <0.2 <0.2   ALKPHOS 77 66 61   AST 12* 12* 14*   ALT 11 11 11     Lipids: No results found for: \"CHOL\", \"HDL\", \"TRIG\"  Glucose:  Recent Labs     09/29/24  1636 09/29/24 2028 09/30/24  0339   POCGLU 116* 115* 137*     [id number]:  Lab Results   Component Value Date/Time    LABA1C 6.1 05/18/2024 08:30 PM     High Sensitivity TSH:   Lab Results   Component Value Date/Time    TSHHS 0.864 07/25/2024 01:59 PM     Free T3:   Lab Results   Component Value Date/Time    FT3 2.12 09/26/2024 09:45 AM     Free T4:  Lab Results   Component Value Date/Time    T4FREE 1.9 09/27/2024 06:10 AM       XR CHEST PORTABLE   Final Result   Bilateral pneumonia, pulmonary edema, or ARDS, particularly in the   left lower lobe.         Electronically signed by Corwin Fu           Scheduled Medicines   Medications:    ipratropium 0.5 mg-albuterol 2.5 mg  1

## 2024-10-01 ENCOUNTER — APPOINTMENT (OUTPATIENT)
Dept: CT IMAGING | Age: 69
DRG: 871 | End: 2024-10-01
Attending: STUDENT IN AN ORGANIZED HEALTH CARE EDUCATION/TRAINING PROGRAM
Payer: MEDICARE

## 2024-10-01 LAB
ABSOLUTE BANDS: 0.13 K/UL
ALBUMIN SERPL-MCNC: 2.9 G/DL (ref 3.4–5)
ALBUMIN/GLOB SERPL: 1 {RATIO} (ref 1.1–2.2)
ALP SERPL-CCNC: 63 U/L (ref 40–129)
ALT SERPL-CCNC: 10 U/L (ref 10–40)
ANION GAP SERPL CALCULATED.3IONS-SCNC: 7 MMOL/L (ref 9–17)
AST SERPL-CCNC: 15 U/L (ref 15–37)
BANDS: 1 %
BASOPHILS # BLD: 0 K/UL
BASOPHILS NFR BLD: 0 % (ref 0–1)
BILIRUB DIRECT SERPL-MCNC: <0.2 MG/DL (ref 0–0.3)
BILIRUB INDIRECT SERPL-MCNC: ABNORMAL MG/DL (ref 0–0.7)
BILIRUB SERPL-MCNC: <0.2 MG/DL (ref 0–1)
BUN SERPL-MCNC: 14 MG/DL (ref 7–20)
CA-I BLD-SCNC: 1.23 MMOL/L (ref 1.15–1.33)
CALCIUM SERPL-MCNC: 8.6 MG/DL (ref 8.3–10.6)
CHLORIDE SERPL-SCNC: 102 MMOL/L (ref 99–110)
CO2 SERPL-SCNC: 34 MMOL/L (ref 21–32)
CREAT SERPL-MCNC: 0.7 MG/DL (ref 0.6–1.2)
EOSINOPHIL # BLD: 0.13 K/UL
EOSINOPHILS RELATIVE PERCENT: 1 % (ref 0–3)
ERYTHROCYTE [DISTWIDTH] IN BLOOD BY AUTOMATED COUNT: 14.4 % (ref 11.7–14.9)
GFR, ESTIMATED: 81 ML/MIN/1.73M2
GLUCOSE BLD-MCNC: 106 MG/DL (ref 74–99)
GLUCOSE BLD-MCNC: 145 MG/DL (ref 74–99)
GLUCOSE BLD-MCNC: 169 MG/DL (ref 74–99)
GLUCOSE BLD-MCNC: 270 MG/DL (ref 74–99)
GLUCOSE BLD-MCNC: 76 MG/DL (ref 74–99)
GLUCOSE BLD-MCNC: 76 MG/DL (ref 74–99)
GLUCOSE SERPL-MCNC: 67 MG/DL (ref 74–99)
HCT VFR BLD AUTO: 29.9 % (ref 37–47)
HGB BLD-MCNC: 9.5 G/DL (ref 12.5–16)
LYMPHOCYTES NFR BLD: 1.26 K/UL
LYMPHOCYTES RELATIVE PERCENT: 10 % (ref 24–44)
MAGNESIUM SERPL-MCNC: 2.1 MG/DL (ref 1.8–2.4)
MCH RBC QN AUTO: 31.6 PG (ref 27–31)
MCHC RBC AUTO-ENTMCNC: 31.8 G/DL (ref 32–36)
MCV RBC AUTO: 99.3 FL (ref 78–100)
MICROORGANISM SPEC CULT: NORMAL
MONOCYTES NFR BLD: 1.38 K/UL
MONOCYTES NFR BLD: 11 % (ref 0–4)
NEUTROPHILS NFR BLD: 77 % (ref 36–66)
NEUTS SEG NFR BLD: 9.41 K/UL
PHOSPHATE SERPL-MCNC: 2.7 MG/DL (ref 2.5–4.9)
PLATELET # BLD AUTO: 456 K/UL (ref 140–440)
PLATELET ESTIMATE: NORMAL
PMV BLD AUTO: 9.5 FL (ref 7.5–11.1)
POTASSIUM SERPL-SCNC: 3.1 MMOL/L (ref 3.5–5.1)
PROT SERPL-MCNC: 6 G/DL (ref 6.4–8.2)
RBC # BLD AUTO: 3.01 M/UL (ref 4.2–5.4)
RBC # BLD: ABNORMAL 10*6/UL
RBC # BLD: ABNORMAL 10*6/UL
SERVICE CMNT-IMP: NORMAL
SODIUM SERPL-SCNC: 143 MMOL/L (ref 136–145)
SPECIMEN DESCRIPTION: NORMAL
WBC # BLD: NORMAL 10*3/UL
WBC OTHER # BLD: 12.3 K/UL (ref 4–10.5)

## 2024-10-01 PROCEDURE — 6360000002 HC RX W HCPCS: Performed by: STUDENT IN AN ORGANIZED HEALTH CARE EDUCATION/TRAINING PROGRAM

## 2024-10-01 PROCEDURE — 85025 COMPLETE CBC W/AUTO DIFF WBC: CPT

## 2024-10-01 PROCEDURE — 2060000000 HC ICU INTERMEDIATE R&B

## 2024-10-01 PROCEDURE — 6370000000 HC RX 637 (ALT 250 FOR IP): Performed by: PHYSICIAN ASSISTANT

## 2024-10-01 PROCEDURE — 6370000000 HC RX 637 (ALT 250 FOR IP): Performed by: STUDENT IN AN ORGANIZED HEALTH CARE EDUCATION/TRAINING PROGRAM

## 2024-10-01 PROCEDURE — 82248 BILIRUBIN DIRECT: CPT

## 2024-10-01 PROCEDURE — 82962 GLUCOSE BLOOD TEST: CPT

## 2024-10-01 PROCEDURE — 83735 ASSAY OF MAGNESIUM: CPT

## 2024-10-01 PROCEDURE — 2580000003 HC RX 258: Performed by: STUDENT IN AN ORGANIZED HEALTH CARE EDUCATION/TRAINING PROGRAM

## 2024-10-01 PROCEDURE — 2500000003 HC RX 250 WO HCPCS: Performed by: FAMILY MEDICINE

## 2024-10-01 PROCEDURE — 94150 VITAL CAPACITY TEST: CPT

## 2024-10-01 PROCEDURE — 94640 AIRWAY INHALATION TREATMENT: CPT

## 2024-10-01 PROCEDURE — 84100 ASSAY OF PHOSPHORUS: CPT

## 2024-10-01 PROCEDURE — 6370000000 HC RX 637 (ALT 250 FOR IP): Performed by: INTERNAL MEDICINE

## 2024-10-01 PROCEDURE — 80053 COMPREHEN METABOLIC PANEL: CPT

## 2024-10-01 PROCEDURE — 99232 SBSQ HOSP IP/OBS MODERATE 35: CPT | Performed by: INTERNAL MEDICINE

## 2024-10-01 PROCEDURE — 94669 MECHANICAL CHEST WALL OSCILL: CPT

## 2024-10-01 PROCEDURE — 82330 ASSAY OF CALCIUM: CPT

## 2024-10-01 PROCEDURE — 2700000000 HC OXYGEN THERAPY PER DAY

## 2024-10-01 PROCEDURE — 94761 N-INVAS EAR/PLS OXIMETRY MLT: CPT

## 2024-10-01 RX ADMIN — SODIUM CHLORIDE, PRESERVATIVE FREE 10 ML: 5 INJECTION INTRAVENOUS at 22:51

## 2024-10-01 RX ADMIN — SODIUM CHLORIDE 25 ML/HR: 9 INJECTION, SOLUTION INTRAVENOUS at 16:07

## 2024-10-01 RX ADMIN — IPRATROPIUM BROMIDE AND ALBUTEROL SULFATE 1 DOSE: 2.5; .5 SOLUTION RESPIRATORY (INHALATION) at 08:11

## 2024-10-01 RX ADMIN — SUCRALFATE 1 G: 1 TABLET ORAL at 23:58

## 2024-10-01 RX ADMIN — GUAIFENESIN 200 MG: 200 SOLUTION ORAL at 02:53

## 2024-10-01 RX ADMIN — DEXAMETHASONE SODIUM PHOSPHATE 4 MG: 4 INJECTION, SOLUTION INTRAMUSCULAR; INTRAVENOUS at 16:08

## 2024-10-01 RX ADMIN — PANTOPRAZOLE SODIUM 40 MG: 40 TABLET, DELAYED RELEASE ORAL at 05:22

## 2024-10-01 RX ADMIN — INSULIN LISPRO 8 UNITS: 100 INJECTION, SOLUTION INTRAVENOUS; SUBCUTANEOUS at 18:00

## 2024-10-01 RX ADMIN — MIDODRINE HYDROCHLORIDE 10 MG: 5 TABLET ORAL at 11:42

## 2024-10-01 RX ADMIN — ZINC SULFATE 220 MG (50 MG) CAPSULE 50 MG: CAPSULE at 11:39

## 2024-10-01 RX ADMIN — PIPERACILLIN AND TAZOBACTAM 4500 MG: 4; .5 INJECTION, POWDER, FOR SOLUTION INTRAVENOUS at 05:29

## 2024-10-01 RX ADMIN — PIPERACILLIN AND TAZOBACTAM 4500 MG: 4; .5 INJECTION, POWDER, FOR SOLUTION INTRAVENOUS at 14:37

## 2024-10-01 RX ADMIN — FERROUS SULFATE TAB 325 MG (65 MG ELEMENTAL FE) 325 MG: 325 (65 FE) TAB at 11:39

## 2024-10-01 RX ADMIN — DEXAMETHASONE SODIUM PHOSPHATE 4 MG: 4 INJECTION, SOLUTION INTRAMUSCULAR; INTRAVENOUS at 05:48

## 2024-10-01 RX ADMIN — PIPERACILLIN AND TAZOBACTAM 4500 MG: 4; .5 INJECTION, POWDER, FOR SOLUTION INTRAVENOUS at 22:57

## 2024-10-01 RX ADMIN — IPRATROPIUM BROMIDE AND ALBUTEROL SULFATE 1 DOSE: 2.5; .5 SOLUTION RESPIRATORY (INHALATION) at 16:03

## 2024-10-01 RX ADMIN — IPRATROPIUM BROMIDE AND ALBUTEROL SULFATE 1 DOSE: 2.5; .5 SOLUTION RESPIRATORY (INHALATION) at 02:03

## 2024-10-01 RX ADMIN — SUCRALFATE 1 G: 1 TABLET ORAL at 14:23

## 2024-10-01 RX ADMIN — Medication 100 MG: at 11:39

## 2024-10-01 RX ADMIN — FOLIC ACID 1 MG: 1 TABLET ORAL at 11:39

## 2024-10-01 RX ADMIN — ATORVASTATIN CALCIUM 20 MG: 10 TABLET, FILM COATED ORAL at 22:46

## 2024-10-01 RX ADMIN — GUAIFENESIN 200 MG: 200 SOLUTION ORAL at 15:58

## 2024-10-01 RX ADMIN — SUCRALFATE 1 G: 1 TABLET ORAL at 05:22

## 2024-10-01 RX ADMIN — Medication 1 TABLET: at 11:39

## 2024-10-01 RX ADMIN — DEXAMETHASONE SODIUM PHOSPHATE 4 MG: 4 INJECTION, SOLUTION INTRAMUSCULAR; INTRAVENOUS at 00:01

## 2024-10-01 RX ADMIN — HYDROXYUREA 500 MG: 500 CAPSULE ORAL at 11:30

## 2024-10-01 RX ADMIN — IPRATROPIUM BROMIDE AND ALBUTEROL SULFATE 1 DOSE: 2.5; .5 SOLUTION RESPIRATORY (INHALATION) at 20:03

## 2024-10-01 RX ADMIN — ASPIRIN 81 MG: 81 TABLET, CHEWABLE ORAL at 11:39

## 2024-10-01 RX ADMIN — SODIUM CHLORIDE, PRESERVATIVE FREE 10 ML: 5 INJECTION INTRAVENOUS at 11:40

## 2024-10-01 RX ADMIN — IPRATROPIUM BROMIDE AND ALBUTEROL SULFATE 1 DOSE: 2.5; .5 SOLUTION RESPIRATORY (INHALATION) at 12:14

## 2024-10-01 RX ADMIN — ENOXAPARIN SODIUM 40 MG: 100 INJECTION SUBCUTANEOUS at 11:39

## 2024-10-01 RX ADMIN — DEXAMETHASONE SODIUM PHOSPHATE 4 MG: 4 INJECTION, SOLUTION INTRAMUSCULAR; INTRAVENOUS at 22:59

## 2024-10-01 RX ADMIN — MIDODRINE HYDROCHLORIDE 10 MG: 5 TABLET ORAL at 18:00

## 2024-10-01 RX ADMIN — SODIUM CHLORIDE 25 ML/HR: 9 INJECTION, SOLUTION INTRAVENOUS at 14:36

## 2024-10-01 RX ADMIN — METFORMIN HYDROCHLORIDE 500 MG: 500 TABLET, FILM COATED ORAL at 18:00

## 2024-10-01 RX ADMIN — SUCRALFATE 1 G: 1 TABLET ORAL at 18:00

## 2024-10-01 ASSESSMENT — PAIN SCALES - GENERAL
PAINLEVEL_OUTOF10: 0

## 2024-10-01 NOTE — CARE COORDINATION
Chart reviewed. PT/OT rec home; HHC PT recommended. Patient has refused PT/OT in the past If agreeable - will need order for HHC/home PT. Casie Aguilera RN

## 2024-10-01 NOTE — PROGRESS NOTES
Patient Name:  Lin Bates  Patient :  1955  Patient MRN:  [id number]     Primary Oncologist: Burt Birch MD  Referring Provider: Dorothy Terrazas PA    Lin Bates is a 69 y.o. 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     She was found to have hypotensive shock in spite of fluid replacement and required levophed. She was transferred to [facility] ICU for further management.     Interval history   10/1/24  Pt was seen and examined this morning. Not in any acute distress. No overnight events. Still has coughing spells. It is dry cough. She is still on 5L O2 via NC.     2024, chest x-ray with bilateral pneumonia, pulmonary edema or ARDS particularly in the left lower lobe    Labs today showed sodium of 143 potassium of 3.1, BUN of 14 creatinine 0.7.  Albumin of 2.9. LFTs within normal limits.  CBC with WBC of 12.3, hemoglobin of 9.5, hematocrit 29.9, MCV of 99.3 and platelets of 456.      Vital Signs: BP (!) 147/66   Pulse 96   Temp 97.6 °F (36.4 °C) (Oral)   Resp (!) 32   Ht 1.549 m (5' 1\")   Wt 65.9 kg (145 lb 4.5 oz)   SpO2 94%   BMI 27.45 kg/m²      Physical Exam:  CONSTITUTIONAL: awake, alert, on nasal cannula, in mild respiratory distress  EYES: Pallor but no icterus  LUNGS: Bilateral wheeze and coarse breath sounds  CARDIOVASCULAR: S and S2  ABDOMEN: Soft nontender nondistended bowel sounds positive  NEUROLOGIC: Grossly intact  EXTREMITIES: no LE edema      Labs:  Hematology:  Lab Results   Component Value Date    WBC 12.3 (H) 10/01/2024    RBC 3.01 (L) 10/01/2024    HGB 9.5 (L) 10/01/2024    HCT 29.9 (L) 10/01/2024    MCV 99.3  10/01/2024    MCH 31.6 (H) 10/01/2024    MCHC 31.8 (L) 10/01/2024    RDW 14.4 10/01/2024     (H) 10/01/2024    MPV 9.5 10/01/2024    BANDSPCT 10 05/24/2024    BASOPCT PENDING 10/01/2024    LYMPHOPCT PENDING 10/01/2024    MONOPCT PENDING 10/01/2024    BANDABS 0.57 05/24/2024    EOSABS PENDING 10/01/2024    BASOSABS PENDING 10/01/2024    LYMPHSABS PENDING 10/01/2024    MONOSABS PENDING 10/01/2024    DIFFTYPE AUTOMATED DIFFERENTIAL 08/27/2024    ANISOCYTOSIS 1+ 05/24/2024    POLYCHROM 1+ 05/24/2024    WBCMORP PENDING 10/01/2024    PLTM ADEQUATE 04/21/2024     No results found for: \"ESR\"  Chemistry:  Lab Results   Component Value Date     10/01/2024    K 3.1 (L) 10/01/2024     10/01/2024    CO2 34 (H) 10/01/2024    BUN 14 10/01/2024    CREATININE 0.7 10/01/2024    GLUCOSE 67 (L) 10/01/2024    CALCIUM 8.6 10/01/2024    BILITOT <0.2 10/01/2024    ALKPHOS 63 10/01/2024    AST 15 10/01/2024    ALT 10 10/01/2024    LABGLOM 81 10/01/2024    GFRAA >60 03/09/2020    GLOB 3.1 09/25/2024    PHOS 2.7 10/01/2024    MG 2.1 10/01/2024    POCGLU 76 10/01/2024     Lab Results   Component Value Date    LDH 90 (L) 07/25/2024     No results found for: \"LD\"  Lab Results   Component Value Date    TSHHS 0.864 07/25/2024    T4FREE 1.9 (H) 09/27/2024    FT3 2.12 (L) 09/26/2024     Immunology:  No results found for: \"SPEP\", \"ALBUMINELP\", \"LABALPH\", \"LABBETA\", \"GAMGLOB\"  No results found for: \"KAPPAUVOL\", \"LAMBDAUVOL\", \"KLFLCR\"  No results found for: \"B2M\"  Coagulation Panel:  Lab Results   Component Value Date    PROTIME 16.7 (H) 09/25/2024    INR 1.3 09/25/2024    APTT 32.4 09/25/2024    DDIMER 4.47 (H) 09/25/2024     Anemia Panel:  Lab Results   Component Value Date    HECWFKIY29 >2000 (H) 05/18/2024    FOLATE 9.1 05/18/2024     Tumor Markers:  No results found for: \"\", \"CEA\", \"\", \"LABCA2\", \"PSA\"     Observations:  No data recorded     Assessment:  Limited stage small cell lung cancer  MPL positive

## 2024-10-01 NOTE — PROGRESS NOTES
Progress Note( Dr. Soto)  10/1/2024  Subjective:   Admit Date: 9/25/2024  PCP: Dorothy Terrazas PA    Admitted For :Septic shock     Consulted For: Evaluate thyroid functions with appear to be somewhat abnormal   Also reviewed treatment plans of diabetes mellitus    Interval History: Patient feels somewhat better has some shortness of breath  Chest x-ray reviewed bilateral infiltrates l possible pneumonia or due to cancer left worse than the right  Blood glucose are much better  Serum free T4 is getting better on his own without any intervention    Denies any chest pains,   Yes mild SOB .   Denies nausea or vomiting.   No new bowel or bladder symptoms.     No intake or output data in the 24 hours ending 10/01/24 0858      DATA    CBC:   Recent Labs     09/29/24  0520 09/30/24  0550 10/01/24  0540   WBC 10.0 11.9* 12.3*   HGB 8.4* 9.3* 9.5*    454* 456*    CMP:  Recent Labs     09/29/24  0520 09/30/24  0550 10/01/24  0540    143 143   K 4.2 3.9 3.1*    106 102   CO2 28 29 34*   BUN 23* 20 14   CREATININE 0.6 0.6 0.7   CALCIUM 8.5 8.7 8.6   BILITOT <0.2 <0.2 <0.2   ALKPHOS 66 61 63   AST 12* 14* 15   ALT 11 11 10     Lipids: No results found for: \"CHOL\", \"HDL\", \"TRIG\"  Glucose:  Recent Labs     10/01/24  0007 10/01/24  0527 10/01/24  0800   POCGLU 106* 76 76     [id number]:  Lab Results   Component Value Date/Time    LABA1C 6.1 05/18/2024 08:30 PM     High Sensitivity TSH:   Lab Results   Component Value Date/Time    TSHHS 0.864 07/25/2024 01:59 PM     Free T3:   Lab Results   Component Value Date/Time    FT3 2.12 09/26/2024 09:45 AM     Free T4:  Lab Results   Component Value Date/Time    T4FREE 1.9 09/27/2024 06:10 AM       XR CHEST PORTABLE   Final Result   Bilateral pneumonia, pulmonary edema, or ARDS, particularly in the   left lower lobe.         Electronically signed by Corwin Eickler      CT CHEST WO CONTRAST    (Results Pending)        Scheduled Medicines   Medications:

## 2024-10-01 NOTE — PROGRESS NOTES
V2.0  [facility] [place] Hospitalist Progress Note      Name:  Lin Bates /Age/Sex: 1955  (69 y.o. female)   MRN & CSN:  [id number] & [id number] Encounter Date/Time: 10/1/2024 1:20 PM EDT    Location:  -A PCP: Dorothy Terrazas PA       Hospital Day: 7    Assessment and Plan:   Lin Bates is a 69 y.o. female with pmh of monoclonal lymphocytosis hypertension hyperlipidemia diabetes small cell lung cancer status postchemotherapy and radiation therapy on immunotherapy who presents with Septic shock (HCC)      Plan:  Acute hypoxic respiratory in the setting of pneumonia.  Reviewed CTA chest from admission which shows multiple patchy bilateral infiltrates.  Considering infiltrates due to bacterial pneumonia.  Low concern for immunotherapy induced pneumonitis per oncology.  However treating with IV antibiotics as well as steroids.  Oxygen requirement improving.  Continue to wean down as able  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 status postchemotherapy and radiation therapy currently on immunotherapy      Diet ADULT DIET; Dysphagia - Soft and Bite Sized   DVT Prophylaxis [] Lovenox, []  Heparin, [] SCDs, [] Ambulation,  [] Eliquis, [] Xarelto  [] Coumadin   Code Status Limited   Disposition From: Home  Expected Disposition: Likely home with home health care as per the patient  Estimated Date of Discharge: Pending placement  Patient requires continued admission due to pending placement   Surrogate Decision Maker/ POA      Subjective:     Chief Complaint: No chief complaint on file.    Seen and examined in the morning.  Complaining of mild shortness of breath.  Ambulating well.    Review of Systems:    Review of Systems    Negative except  above    Objective:     Intake/Output Summary (Last 24 hours) at 10/1/2024 1655  Last data filed at 10/1/2024 1130  Gross per 24 hour   Intake 240 ml   Output 375 ml   Net -135 ml        Vitals:   Vitals:    10/01/24 1602   BP:    Pulse: (!) 108   Resp: 20   Temp:    SpO2: 96%       Physical Exam:   Physical Exam  Vitals reviewed.   Constitutional:       Appearance: Normal appearance. She is normal weight.   HENT:      Head: Normocephalic.      Right Ear: Tympanic membrane normal.      Left Ear: Tympanic membrane normal.      Nose: Nose normal.      Mouth/Throat:      Mouth: Mucous membranes are moist.   Eyes:      Conjunctiva/sclera: Conjunctivae normal.      Pupils: Pupils are equal, round, and reactive to light.   Cardiovascular:      Rate and Rhythm: Normal rate and regular rhythm.      Pulses: Normal pulses.      Heart sounds: Normal heart sounds. No murmur heard.  Pulmonary:      Effort: Respiratory distress present.      Breath sounds: Rales present. No wheezing or rhonchi.   Abdominal:      General: Abdomen is flat. Bowel sounds are normal. There is no distension.      Palpations: Abdomen is soft.      Tenderness: There is no abdominal tenderness.   Musculoskeletal:         General: No deformity. Normal range of motion.      Cervical back: Normal range of motion and neck supple.      Right lower leg: No edema.      Left lower leg: No edema.   Skin:     Coloration: Skin is not jaundiced or pale.   Neurological:      General: No focal deficit present.      Mental Status: She is alert and oriented to person, place, and time. Mental status is at baseline.      Motor: Weakness present.   Psychiatric:         Mood and Affect: Mood normal.         Behavior: Behavior normal.            Medications:   Medications:    ipratropium 0.5 mg-albuterol 2.5 mg  1 Dose Inhalation Q4H WA RT    [Held by provider] insulin glargine  15 Units SubCUTAneous Nightly    metFORMIN  500 mg Oral BID WC    glipiZIDE  2.5 mg Oral QAM AC

## 2024-10-02 ENCOUNTER — APPOINTMENT (OUTPATIENT)
Dept: GENERAL RADIOLOGY | Age: 69
DRG: 871 | End: 2024-10-02
Attending: STUDENT IN AN ORGANIZED HEALTH CARE EDUCATION/TRAINING PROGRAM
Payer: MEDICARE

## 2024-10-02 ENCOUNTER — APPOINTMENT (OUTPATIENT)
Dept: CT IMAGING | Age: 69
DRG: 871 | End: 2024-10-02
Attending: STUDENT IN AN ORGANIZED HEALTH CARE EDUCATION/TRAINING PROGRAM
Payer: MEDICARE

## 2024-10-02 LAB
ALBUMIN SERPL-MCNC: 2.7 G/DL (ref 3.4–5)
ALBUMIN/GLOB SERPL: 1 {RATIO} (ref 1.1–2.2)
ALP SERPL-CCNC: 54 U/L (ref 40–129)
ALT SERPL-CCNC: 8 U/L (ref 10–40)
ANION GAP SERPL CALCULATED.3IONS-SCNC: 5 MMOL/L (ref 9–17)
AST SERPL-CCNC: 10 U/L (ref 15–37)
BASOPHILS # BLD: 0.01 K/UL
BASOPHILS NFR BLD: 0 % (ref 0–1)
BILIRUB DIRECT SERPL-MCNC: <0.2 MG/DL (ref 0–0.3)
BILIRUB INDIRECT SERPL-MCNC: ABNORMAL MG/DL (ref 0–0.7)
BILIRUB SERPL-MCNC: <0.2 MG/DL (ref 0–1)
BUN SERPL-MCNC: 14 MG/DL (ref 7–20)
CA-I BLD-SCNC: 1.11 MMOL/L (ref 1.15–1.33)
CALCIUM SERPL-MCNC: 8.6 MG/DL (ref 8.3–10.6)
CHLORIDE SERPL-SCNC: 98 MMOL/L (ref 99–110)
CO2 SERPL-SCNC: 37 MMOL/L (ref 21–32)
CREAT SERPL-MCNC: 0.6 MG/DL (ref 0.6–1.2)
EOSINOPHIL # BLD: 0.01 K/UL
EOSINOPHILS RELATIVE PERCENT: 0 % (ref 0–3)
ERYTHROCYTE [DISTWIDTH] IN BLOOD BY AUTOMATED COUNT: 14.9 % (ref 11.7–14.9)
GFR, ESTIMATED: >90 ML/MIN/1.73M2
GLUCOSE BLD-MCNC: 265 MG/DL (ref 74–99)
GLUCOSE BLD-MCNC: 297 MG/DL (ref 74–99)
GLUCOSE BLD-MCNC: 67 MG/DL (ref 74–99)
GLUCOSE BLD-MCNC: 80 MG/DL (ref 74–99)
GLUCOSE SERPL-MCNC: 161 MG/DL (ref 74–99)
HCT VFR BLD AUTO: 26.9 % (ref 37–47)
HGB BLD-MCNC: 8.4 G/DL (ref 12.5–16)
IMM GRANULOCYTES # BLD AUTO: 0.05 K/UL
IMM GRANULOCYTES NFR BLD: 1 %
LYMPHOCYTES NFR BLD: 0.48 K/UL
LYMPHOCYTES RELATIVE PERCENT: 6 % (ref 24–44)
MAGNESIUM SERPL-MCNC: 2.1 MG/DL (ref 1.8–2.4)
MCH RBC QN AUTO: 31.3 PG (ref 27–31)
MCHC RBC AUTO-ENTMCNC: 31.2 G/DL (ref 32–36)
MCV RBC AUTO: 100.4 FL (ref 78–100)
MONOCYTES NFR BLD: 0.62 K/UL
MONOCYTES NFR BLD: 8 % (ref 0–4)
NEUTROPHILS NFR BLD: 85 % (ref 36–66)
NEUTS SEG NFR BLD: 6.39 K/UL
PHOSPHATE SERPL-MCNC: 3.1 MG/DL (ref 2.5–4.9)
PLATELET # BLD AUTO: 371 K/UL (ref 140–440)
PMV BLD AUTO: 9.9 FL (ref 7.5–11.1)
POTASSIUM SERPL-SCNC: 4 MMOL/L (ref 3.5–5.1)
PROT SERPL-MCNC: 5.4 G/DL (ref 6.4–8.2)
RBC # BLD AUTO: 2.68 M/UL (ref 4.2–5.4)
SODIUM SERPL-SCNC: 141 MMOL/L (ref 136–145)
T4 FREE SERPL-MCNC: 1.5 NG/DL (ref 0.9–1.8)
WBC OTHER # BLD: 7.6 K/UL (ref 4–10.5)

## 2024-10-02 PROCEDURE — 2580000003 HC RX 258: Performed by: STUDENT IN AN ORGANIZED HEALTH CARE EDUCATION/TRAINING PROGRAM

## 2024-10-02 PROCEDURE — 6370000000 HC RX 637 (ALT 250 FOR IP): Performed by: STUDENT IN AN ORGANIZED HEALTH CARE EDUCATION/TRAINING PROGRAM

## 2024-10-02 PROCEDURE — 83735 ASSAY OF MAGNESIUM: CPT

## 2024-10-02 PROCEDURE — 82962 GLUCOSE BLOOD TEST: CPT

## 2024-10-02 PROCEDURE — 84439 ASSAY OF FREE THYROXINE: CPT

## 2024-10-02 PROCEDURE — 6370000000 HC RX 637 (ALT 250 FOR IP): Performed by: INTERNAL MEDICINE

## 2024-10-02 PROCEDURE — 6360000002 HC RX W HCPCS: Performed by: INTERNAL MEDICINE

## 2024-10-02 PROCEDURE — 1200000000 HC SEMI PRIVATE

## 2024-10-02 PROCEDURE — 71045 X-RAY EXAM CHEST 1 VIEW: CPT

## 2024-10-02 PROCEDURE — 99232 SBSQ HOSP IP/OBS MODERATE 35: CPT | Performed by: INTERNAL MEDICINE

## 2024-10-02 PROCEDURE — 6370000000 HC RX 637 (ALT 250 FOR IP): Performed by: PHYSICIAN ASSISTANT

## 2024-10-02 PROCEDURE — 85025 COMPLETE CBC W/AUTO DIFF WBC: CPT

## 2024-10-02 PROCEDURE — 6360000002 HC RX W HCPCS: Performed by: STUDENT IN AN ORGANIZED HEALTH CARE EDUCATION/TRAINING PROGRAM

## 2024-10-02 PROCEDURE — 80053 COMPREHEN METABOLIC PANEL: CPT

## 2024-10-02 PROCEDURE — 2700000000 HC OXYGEN THERAPY PER DAY

## 2024-10-02 PROCEDURE — 82248 BILIRUBIN DIRECT: CPT

## 2024-10-02 PROCEDURE — 36591 DRAW BLOOD OFF VENOUS DEVICE: CPT

## 2024-10-02 PROCEDURE — 2500000003 HC RX 250 WO HCPCS: Performed by: FAMILY MEDICINE

## 2024-10-02 PROCEDURE — 71250 CT THORAX DX C-: CPT

## 2024-10-02 PROCEDURE — 94761 N-INVAS EAR/PLS OXIMETRY MLT: CPT

## 2024-10-02 PROCEDURE — 82330 ASSAY OF CALCIUM: CPT

## 2024-10-02 PROCEDURE — 84100 ASSAY OF PHOSPHORUS: CPT

## 2024-10-02 PROCEDURE — 94640 AIRWAY INHALATION TREATMENT: CPT

## 2024-10-02 RX ORDER — GUAIFENESIN 600 MG/1
600 TABLET, EXTENDED RELEASE ORAL 2 TIMES DAILY
Status: DISCONTINUED | OUTPATIENT
Start: 2024-10-02 | End: 2024-10-02

## 2024-10-02 RX ORDER — GUAIFENESIN/DEXTROMETHORPHAN 100-10MG/5
5 SYRUP ORAL EVERY 4 HOURS PRN
Status: DISCONTINUED | OUTPATIENT
Start: 2024-10-02 | End: 2024-10-05 | Stop reason: HOSPADM

## 2024-10-02 RX ORDER — FUROSEMIDE 10 MG/ML
40 INJECTION INTRAMUSCULAR; INTRAVENOUS 2 TIMES DAILY
Status: DISCONTINUED | OUTPATIENT
Start: 2024-10-02 | End: 2024-10-03

## 2024-10-02 RX ADMIN — IPRATROPIUM BROMIDE AND ALBUTEROL SULFATE 1 DOSE: 2.5; .5 SOLUTION RESPIRATORY (INHALATION) at 19:48

## 2024-10-02 RX ADMIN — SUCRALFATE 1 G: 1 TABLET ORAL at 23:33

## 2024-10-02 RX ADMIN — ATORVASTATIN CALCIUM 20 MG: 10 TABLET, FILM COATED ORAL at 22:13

## 2024-10-02 RX ADMIN — Medication 1 TABLET: at 09:52

## 2024-10-02 RX ADMIN — SUCRALFATE 1 G: 1 TABLET ORAL at 07:10

## 2024-10-02 RX ADMIN — Medication 100 MG: at 09:52

## 2024-10-02 RX ADMIN — INSULIN LISPRO 8 UNITS: 100 INJECTION, SOLUTION INTRAVENOUS; SUBCUTANEOUS at 22:13

## 2024-10-02 RX ADMIN — PIPERACILLIN AND TAZOBACTAM 4500 MG: 4; .5 INJECTION, POWDER, FOR SOLUTION INTRAVENOUS at 07:08

## 2024-10-02 RX ADMIN — PANTOPRAZOLE SODIUM 40 MG: 40 TABLET, DELAYED RELEASE ORAL at 07:11

## 2024-10-02 RX ADMIN — SODIUM CHLORIDE 25 ML: 9 INJECTION, SOLUTION INTRAVENOUS at 13:44

## 2024-10-02 RX ADMIN — DEXAMETHASONE SODIUM PHOSPHATE 4 MG: 4 INJECTION, SOLUTION INTRAMUSCULAR; INTRAVENOUS at 07:10

## 2024-10-02 RX ADMIN — MIDODRINE HYDROCHLORIDE 10 MG: 5 TABLET ORAL at 13:48

## 2024-10-02 RX ADMIN — METFORMIN HYDROCHLORIDE 500 MG: 500 TABLET, FILM COATED ORAL at 09:52

## 2024-10-02 RX ADMIN — FERROUS SULFATE TAB 325 MG (65 MG ELEMENTAL FE) 325 MG: 325 (65 FE) TAB at 09:52

## 2024-10-02 RX ADMIN — HYDROXYUREA 500 MG: 500 CAPSULE ORAL at 10:43

## 2024-10-02 RX ADMIN — MIDODRINE HYDROCHLORIDE 10 MG: 5 TABLET ORAL at 17:19

## 2024-10-02 RX ADMIN — PIPERACILLIN AND TAZOBACTAM 4500 MG: 4; .5 INJECTION, POWDER, FOR SOLUTION INTRAVENOUS at 13:48

## 2024-10-02 RX ADMIN — IPRATROPIUM BROMIDE AND ALBUTEROL SULFATE 1 DOSE: 2.5; .5 SOLUTION RESPIRATORY (INHALATION) at 08:01

## 2024-10-02 RX ADMIN — GUAIFENESIN 200 MG: 200 SOLUTION ORAL at 00:21

## 2024-10-02 RX ADMIN — FOLIC ACID 1 MG: 1 TABLET ORAL at 09:52

## 2024-10-02 RX ADMIN — SUCRALFATE 1 G: 1 TABLET ORAL at 13:49

## 2024-10-02 RX ADMIN — DEXAMETHASONE SODIUM PHOSPHATE 4 MG: 4 INJECTION, SOLUTION INTRAMUSCULAR; INTRAVENOUS at 22:16

## 2024-10-02 RX ADMIN — ZINC SULFATE 220 MG (50 MG) CAPSULE 50 MG: CAPSULE at 09:52

## 2024-10-02 RX ADMIN — MIDODRINE HYDROCHLORIDE 10 MG: 5 TABLET ORAL at 09:52

## 2024-10-02 RX ADMIN — INSULIN LISPRO 8 UNITS: 100 INJECTION, SOLUTION INTRAVENOUS; SUBCUTANEOUS at 13:48

## 2024-10-02 RX ADMIN — DEXAMETHASONE SODIUM PHOSPHATE 4 MG: 4 INJECTION, SOLUTION INTRAMUSCULAR; INTRAVENOUS at 13:45

## 2024-10-02 RX ADMIN — ENOXAPARIN SODIUM 40 MG: 100 INJECTION SUBCUTANEOUS at 09:52

## 2024-10-02 RX ADMIN — FUROSEMIDE 40 MG: 10 INJECTION, SOLUTION INTRAMUSCULAR; INTRAVENOUS at 17:20

## 2024-10-02 RX ADMIN — ASPIRIN 81 MG: 81 TABLET, CHEWABLE ORAL at 09:52

## 2024-10-02 RX ADMIN — SODIUM CHLORIDE 25 ML: 9 INJECTION, SOLUTION INTRAVENOUS at 13:47

## 2024-10-02 RX ADMIN — SUCRALFATE 1 G: 1 TABLET ORAL at 17:20

## 2024-10-02 RX ADMIN — IPRATROPIUM BROMIDE AND ALBUTEROL SULFATE 1 DOSE: 2.5; .5 SOLUTION RESPIRATORY (INHALATION) at 15:14

## 2024-10-02 RX ADMIN — GLIPIZIDE 2.5 MG: 5 TABLET ORAL at 09:53

## 2024-10-02 ASSESSMENT — PAIN SCALES - GENERAL
PAINLEVEL_OUTOF10: 0

## 2024-10-02 NOTE — CARE COORDINATION
[facility] Liaison spoke with pt and pt is agreeable to c at discharge. Demo info reviewed.  Please place inpatient consult to home health needs order in Epic at NH.

## 2024-10-02 NOTE — PROGRESS NOTES
V2.0  [facility] [place] Hospitalist Progress Note      Name:  Lin Bates /Age/Sex: 1955  (69 y.o. female)   MRN & CSN:  [id number] & [id number] Encounter Date/Time: 10/2/2024 1:20 PM EDT    Location:  -A PCP: Dorothy Terrazas PA       Hospital Day: 8    Assessment and Plan:   Lin Bates is a 69 y.o. female with pmh of monoclonal lymphocytosis hypertension hyperlipidemia diabetes small cell lung cancer status postchemotherapy and radiation therapy on immunotherapy who presents with Septic shock (HCC)      Plan:  Acute hypoxic respiratory in the setting of pneumonia.  Reviewed CTA chest from admission which shows multiple patchy bilateral infiltrates.  Considering infiltrates due to bacterial pneumonia.  Low concern for immunotherapy induced pneumonitis per oncology.  However treating with IV antibiotics as well as steroids.  Oxygen requirement improving.  Continue to wean down as able.  Chest x-ray obtained today and appears stable.  CT chest ordered on 2024 still pending  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 status postchemotherapy and radiation therapy currently on immunotherapy      Diet ADULT DIET; Dysphagia - Soft and Bite Sized   DVT Prophylaxis [] Lovenox, []  Heparin, [] SCDs, [] Ambulation,  [] Eliquis, [] Xarelto  [] Coumadin   Code Status Limited   Disposition From: Home  Expected Disposition: Likely home with home health care as per the patient  Estimated Date of Discharge: Pending placement  Patient requires continued admission due to pending placement   Surrogate Decision Maker/ POA      Subjective:     Chief Complaint: No chief complaint on file.    Seen and examined in the morning.  Overall doing good.  Oxygen

## 2024-10-02 NOTE — PROGRESS NOTES
Progress Note( Dr. Soto)  10/2/2024  Subjective:   Admit Date: 9/25/2024  PCP: Dorothy Terrazas PA    Admitted For :Septic shock     Consulted For: Evaluate thyroid functions with appear to be somewhat abnormal   Also reviewed treatment plans of diabetes mellitus    Interval History: Patient feels somewhat better has some shortness of breath  Chest x-ray reviewed bilateral infiltrates l possible pneumonia or due to cancer left worse than the right  Blood glucose are much better  Serum free T4 is getting better on his own without any intervention    Denies any chest pains,   Yes mild SOB .   Denies nausea or vomiting.   No new bowel or bladder symptoms.       Intake/Output Summary (Last 24 hours) at 10/2/2024 0855  Last data filed at 10/1/2024 1130  Gross per 24 hour   Intake 240 ml   Output 375 ml   Net -135 ml         DATA    CBC:   Recent Labs     09/30/24  0550 10/01/24  0540 10/02/24  0710   WBC 11.9* 12.3* 7.6   HGB 9.3* 9.5* 8.4*   * 456* 371    CMP:  Recent Labs     09/30/24  0550 10/01/24  0540 10/02/24  0710    143 141   K 3.9 3.1* 4.0    102 98*   CO2 29 34* 37*   BUN 20 14 14   CREATININE 0.6 0.7 0.6   CALCIUM 8.7 8.6 8.6   BILITOT <0.2 <0.2 <0.2   ALKPHOS 61 63 54   AST 14* 15 10*   ALT 11 10 8*     Lipids: No results found for: \"CHOL\", \"HDL\", \"TRIG\"  Glucose:  Recent Labs     10/01/24  1201 10/01/24  1706 10/01/24  2253   POCGLU 169* 270* 145*     [id number]:  Lab Results   Component Value Date/Time    LABA1C 6.1 05/18/2024 08:30 PM     High Sensitivity TSH:   Lab Results   Component Value Date/Time    TSHHS 0.864 07/25/2024 01:59 PM     Free T3:   Lab Results   Component Value Date/Time    FT3 2.12 09/26/2024 09:45 AM     Free T4:  Lab Results   Component Value Date/Time    T4FREE 1.5 10/02/2024 07:10 AM       XR CHEST PORTABLE   Final Result   Bilateral pneumonia, pulmonary edema, or ARDS, particularly in the   left lower lobe.         Electronically signed by

## 2024-10-02 NOTE — CARE COORDINATION
Chart reviewed. Spoke with pt and SO in room. From home with SO. Has PCP and insurance. Uses walker PRN, but normally independent of her ADLs. Agrees with PT/OT rec of HC. Would like to have [facility] as she has had them before. CM requested orders for discharge from attending and made referral to [facility]. SO states that he can drive the pt home and they deny further needs.   Plan discharge home with [facility].   10/02/24 1440   Service Assessment   Patient Orientation Alert and Oriented   Cognition Alert   History Provided By Medical Record;Patient;Significant Other   Primary Caregiver Self   Accompanied By/Relationship S/O   Support Systems Spouse/Significant Other;Home Care Staff   Patient's Healthcare Decision Maker is: Legal Next of Kin   PCP Verified by CM Yes  (seen last month)   Prior Functional Level Independent in ADLs/IADLs   Current Functional Level Independent in ADLs/IADLs   Can patient return to prior living arrangement Yes   Ability to make needs known: Good   Family able to assist with home care needs: Other (comment)  (limited)   Would you like for me to discuss the discharge plan with any other family members/significant others, and if so, who? Yes  (emergency contacts as needed)   Financial Resources Medicare;Medicaid   Community Resources ECF/Home Care   Social/Functional History   Lives With Significant other   Type of Home House   Home Layout One level

## 2024-10-02 NOTE — PROGRESS NOTES
Patient Name:  Lin Bates  Patient :  1955  Patient MRN:  [id number]     Primary Oncologist: Burt Birch MD  Referring Provider: Dorothy Terrazas PA    Lin Bates is a 69 y.o. female with medical history significant for MPL positive myeloproliferative neoplasm, small monoclonal B cell lymphocytosis, limited stage LORI small cell lung cancer, s/p definitive CRT, prophylactic whole brain radiation therapy, currently on maintenance immunotherapy with durvalumab (received her second cycle on 24), presented to [facility] on 24 with SOB for 2 days, fever, productive cough.     She was found to have hypotensive shock in spite of fluid replacement and required levophed. She was transferred to [facility] ICU for further management.     Interval history   10/2/24  Pt was seen and examined this morning. Not in any acute distress. No overnight events. Still has some coughing spells. It is dry cough. She is still on 5L O2 via NC.     2024, chest x-ray with bilateral pneumonia, pulmonary edema or ARDS particularly in the left lower lobe. Hasn't had CT chest yet.     Labs today showed sodium of 141 potassium of 4, BUN of 14 creatinine 0.6.  Albumin of 2.7. LFTs within normal limits.  CBC with WBC of 7.6, hemoglobin of 8.4, hematocrit 26.9, MCV of 100.4 and platelets of 371.      Vital Signs: /69   Pulse 82   Temp 97.6 °F (36.4 °C)   Resp 23   Ht 1.549 m (5' 1\")   Wt 65.8 kg (145 lb 1 oz)   SpO2 96%   BMI 27.41 kg/m²      Physical Exam:  CONSTITUTIONAL: awake, alert, on nasal cannula, in mild respiratory distress  EYES: Pallor but no icterus  LUNGS: Bilateral wheeze and coarse breath sounds  CARDIOVASCULAR: S and S2  ABDOMEN: Soft nontender nondistended bowel sounds positive  NEUROLOGIC: Grossly intact  EXTREMITIES: no LE edema      Labs:  Hematology:  Lab Results   Component Value Date    WBC 7.6 10/02/2024    RBC 2.68 (L) 10/02/2024    HGB 8.4 (L) 10/02/2024    HCT 26.9 (L) 10/02/2024

## 2024-10-03 ENCOUNTER — APPOINTMENT (OUTPATIENT)
Dept: INTERVENTIONAL RADIOLOGY/VASCULAR | Age: 69
DRG: 871 | End: 2024-10-03
Attending: STUDENT IN AN ORGANIZED HEALTH CARE EDUCATION/TRAINING PROGRAM
Payer: MEDICARE

## 2024-10-03 ENCOUNTER — APPOINTMENT (OUTPATIENT)
Dept: GENERAL RADIOLOGY | Age: 69
DRG: 871 | End: 2024-10-03
Attending: STUDENT IN AN ORGANIZED HEALTH CARE EDUCATION/TRAINING PROGRAM
Payer: MEDICARE

## 2024-10-03 LAB
ALBUMIN FLD-MCNC: 1.3 G/DL
ALBUMIN SERPL-MCNC: 2.8 G/DL (ref 3.4–5)
ALBUMIN/GLOB SERPL: 1.1 {RATIO} (ref 1.1–2.2)
ALP SERPL-CCNC: 54 U/L (ref 40–129)
ALT SERPL-CCNC: 7 U/L (ref 10–40)
AMYLASE FLD-CCNC: 7 U/L
ANION GAP SERPL CALCULATED.3IONS-SCNC: 6 MMOL/L (ref 9–17)
APPEARANCE FLD: CLEAR
APPEARANCE FLD: NORMAL
AST SERPL-CCNC: 12 U/L (ref 15–37)
BASOPHILS # BLD: 0.01 K/UL
BASOPHILS NFR BLD: 0 % (ref 0–1)
BILIRUB DIRECT SERPL-MCNC: <0.2 MG/DL (ref 0–0.3)
BILIRUB INDIRECT SERPL-MCNC: ABNORMAL MG/DL (ref 0–0.7)
BILIRUB SERPL-MCNC: <0.2 MG/DL (ref 0–1)
BLASTS NFR FLD: 0 %
BLASTS NFR FLD: 0 %
BODY FLD TYPE: NORMAL
BODY FLD TYPE: NORMAL
BUN SERPL-MCNC: 19 MG/DL (ref 7–20)
CA-I BLD-SCNC: 1.14 MMOL/L (ref 1.15–1.33)
CALCIUM SERPL-MCNC: 8.7 MG/DL (ref 8.3–10.6)
CHLORIDE SERPL-SCNC: 96 MMOL/L (ref 99–110)
CLOT CHECK: NORMAL
CLOT CHECK: NORMAL
CO2 SERPL-SCNC: 38 MMOL/L (ref 21–32)
COLOR FLD: NORMAL
COLOR FLD: YELLOW
CREAT SERPL-MCNC: 0.7 MG/DL (ref 0.6–1.2)
EOSINOPHIL # BLD: 0.01 K/UL
EOSINOPHIL NFR FLD: 0 %
EOSINOPHIL NFR FLD: 0 %
EOSINOPHILS RELATIVE PERCENT: 0 % (ref 0–3)
ERYTHROCYTE [DISTWIDTH] IN BLOOD BY AUTOMATED COUNT: 15.1 % (ref 11.7–14.9)
GFR, ESTIMATED: 88 ML/MIN/1.73M2
GLUCOSE BLD-MCNC: 118 MG/DL (ref 74–99)
GLUCOSE BLD-MCNC: 129 MG/DL (ref 74–99)
GLUCOSE BLD-MCNC: 196 MG/DL (ref 74–99)
GLUCOSE BLD-MCNC: 227 MG/DL (ref 74–99)
GLUCOSE BLD-MCNC: 305 MG/DL (ref 74–99)
GLUCOSE BLD-MCNC: 305 MG/DL (ref 74–99)
GLUCOSE FLD-MCNC: 247 MG/DL
GLUCOSE FLD-MCNC: 248 MG/DL
GLUCOSE SERPL-MCNC: 114 MG/DL (ref 74–99)
HCT VFR BLD AUTO: 26.1 % (ref 37–47)
HGB BLD-MCNC: 8.5 G/DL (ref 12.5–16)
IMM GRANULOCYTES # BLD AUTO: 0.09 K/UL
IMM GRANULOCYTES NFR BLD: 1 %
LDH FLD L TO P-CCNC: 44 U/L
LDH FLD L TO P-CCNC: 49 U/L
LYMPHOCYTES NFR BLD: 0.51 K/UL
LYMPHOCYTES NFR FLD: 51 %
LYMPHOCYTES NFR FLD: 73 %
LYMPHOCYTES RELATIVE PERCENT: 6 % (ref 24–44)
MAGNESIUM SERPL-MCNC: 2 MG/DL (ref 1.8–2.4)
MCH RBC QN AUTO: 32.3 PG (ref 27–31)
MCHC RBC AUTO-ENTMCNC: 32.6 G/DL (ref 32–36)
MCV RBC AUTO: 99.2 FL (ref 78–100)
MESOTHELIAL CELLS BODY FLUID: 4 %
MESOTHELIAL CELLS BODY FLUID: 52 %
MONOCYTES NFR BLD: 0.78 K/UL
MONOCYTES NFR BLD: 9 % (ref 0–4)
MONOCYTES NFR FLD: 25 %
MONOCYTES NFR FLD: 4 %
NEUTROPHILS NFR BLD: 84 % (ref 36–66)
NEUTROPHILS NFR FLD: 2 %
NEUTROPHILS NFR FLD: 45 %
NEUTS SEG NFR BLD: 7.31 K/UL
PH FLUID: 8 (ref 6.5–7.5)
PH FLUID: 8 (ref 6.5–7.5)
PHOSPHATE SERPL-MCNC: 3 MG/DL (ref 2.5–4.9)
PLATELET # BLD AUTO: 368 K/UL (ref 140–440)
PMV BLD AUTO: 9.9 FL (ref 7.5–11.1)
POTASSIUM SERPL-SCNC: 3.6 MMOL/L (ref 3.5–5.1)
PROT FLD-MCNC: 1.7 G/DL
PROT FLD-MCNC: 2.1 G/DL
PROT SERPL-MCNC: 5.5 G/DL (ref 6.4–8.2)
RBC # BLD AUTO: 2.63 M/UL (ref 4.2–5.4)
RBC # FLD: 7000 CELLS/UL
RBC # FLD: <2000 CELLS/UL
SODIUM SERPL-SCNC: 141 MMOL/L (ref 136–145)
SPECIMEN TYPE: ABNORMAL
SPECIMEN TYPE: ABNORMAL
SPECIMEN TYPE: NORMAL
UNIDENT CELLS NFR FLD: 0 %
UNIDENT CELLS NFR FLD: 0 %
WBC # FLD: 30 CELLS/UL
WBC # FLD: 306 CELLS/UL
WBC OTHER # BLD: 8.7 K/UL (ref 4–10.5)

## 2024-10-03 PROCEDURE — 99223 1ST HOSP IP/OBS HIGH 75: CPT | Performed by: INTERNAL MEDICINE

## 2024-10-03 PROCEDURE — 83735 ASSAY OF MAGNESIUM: CPT

## 2024-10-03 PROCEDURE — 82042 OTHER SOURCE ALBUMIN QUAN EA: CPT

## 2024-10-03 PROCEDURE — 99232 SBSQ HOSP IP/OBS MODERATE 35: CPT | Performed by: INTERNAL MEDICINE

## 2024-10-03 PROCEDURE — 87070 CULTURE OTHR SPECIMN AEROBIC: CPT

## 2024-10-03 PROCEDURE — 6360000002 HC RX W HCPCS: Performed by: NURSE PRACTITIONER

## 2024-10-03 PROCEDURE — 85025 COMPLETE CBC W/AUTO DIFF WBC: CPT

## 2024-10-03 PROCEDURE — 88305 TISSUE EXAM BY PATHOLOGIST: CPT

## 2024-10-03 PROCEDURE — 94150 VITAL CAPACITY TEST: CPT

## 2024-10-03 PROCEDURE — 71045 X-RAY EXAM CHEST 1 VIEW: CPT

## 2024-10-03 PROCEDURE — 36591 DRAW BLOOD OFF VENOUS DEVICE: CPT

## 2024-10-03 PROCEDURE — 6370000000 HC RX 637 (ALT 250 FOR IP): Performed by: INTERNAL MEDICINE

## 2024-10-03 PROCEDURE — 6360000002 HC RX W HCPCS: Performed by: STUDENT IN AN ORGANIZED HEALTH CARE EDUCATION/TRAINING PROGRAM

## 2024-10-03 PROCEDURE — 82248 BILIRUBIN DIRECT: CPT

## 2024-10-03 PROCEDURE — 84100 ASSAY OF PHOSPHORUS: CPT

## 2024-10-03 PROCEDURE — 6370000000 HC RX 637 (ALT 250 FOR IP): Performed by: STUDENT IN AN ORGANIZED HEALTH CARE EDUCATION/TRAINING PROGRAM

## 2024-10-03 PROCEDURE — 0W9B3ZZ DRAINAGE OF LEFT PLEURAL CAVITY, PERCUTANEOUS APPROACH: ICD-10-PCS | Performed by: INTERNAL MEDICINE

## 2024-10-03 PROCEDURE — 6370000000 HC RX 637 (ALT 250 FOR IP): Performed by: PREVENTIVE MEDICINE

## 2024-10-03 PROCEDURE — 2060000000 HC ICU INTERMEDIATE R&B

## 2024-10-03 PROCEDURE — 84157 ASSAY OF PROTEIN OTHER: CPT

## 2024-10-03 PROCEDURE — 80053 COMPREHEN METABOLIC PANEL: CPT

## 2024-10-03 PROCEDURE — 94640 AIRWAY INHALATION TREATMENT: CPT

## 2024-10-03 PROCEDURE — 82330 ASSAY OF CALCIUM: CPT

## 2024-10-03 PROCEDURE — 82962 GLUCOSE BLOOD TEST: CPT

## 2024-10-03 PROCEDURE — 2580000003 HC RX 258: Performed by: STUDENT IN AN ORGANIZED HEALTH CARE EDUCATION/TRAINING PROGRAM

## 2024-10-03 PROCEDURE — 87075 CULTR BACTERIA EXCEPT BLOOD: CPT

## 2024-10-03 PROCEDURE — 87205 SMEAR GRAM STAIN: CPT

## 2024-10-03 PROCEDURE — 2709999900 IR CHEST TUBE INSERTION

## 2024-10-03 PROCEDURE — 83986 ASSAY PH BODY FLUID NOS: CPT

## 2024-10-03 PROCEDURE — 82945 GLUCOSE OTHER FLUID: CPT

## 2024-10-03 PROCEDURE — 83615 LACTATE (LD) (LDH) ENZYME: CPT

## 2024-10-03 PROCEDURE — 89051 BODY FLUID CELL COUNT: CPT

## 2024-10-03 PROCEDURE — 0W9930Z DRAINAGE OF RIGHT PLEURAL CAVITY WITH DRAINAGE DEVICE, PERCUTANEOUS APPROACH: ICD-10-PCS | Performed by: INTERNAL MEDICINE

## 2024-10-03 PROCEDURE — 2500000003 HC RX 250 WO HCPCS: Performed by: INTERNAL MEDICINE

## 2024-10-03 PROCEDURE — 2709999900 IR GUIDED THORACENTESIS PLEURAL

## 2024-10-03 PROCEDURE — 6360000002 HC RX W HCPCS: Performed by: INTERNAL MEDICINE

## 2024-10-03 PROCEDURE — 94761 N-INVAS EAR/PLS OXIMETRY MLT: CPT

## 2024-10-03 PROCEDURE — 32557 INSERT CATH PLEURA W/ IMAGE: CPT

## 2024-10-03 PROCEDURE — 2700000000 HC OXYGEN THERAPY PER DAY

## 2024-10-03 PROCEDURE — 2500000003 HC RX 250 WO HCPCS

## 2024-10-03 PROCEDURE — 32555 ASPIRATE PLEURA W/ IMAGING: CPT | Performed by: INTERNAL MEDICINE

## 2024-10-03 PROCEDURE — 82150 ASSAY OF AMYLASE: CPT

## 2024-10-03 PROCEDURE — 88108 CYTOPATH CONCENTRATE TECH: CPT

## 2024-10-03 PROCEDURE — 32555 ASPIRATE PLEURA W/ IMAGING: CPT

## 2024-10-03 PROCEDURE — 6370000000 HC RX 637 (ALT 250 FOR IP): Performed by: PHYSICIAN ASSISTANT

## 2024-10-03 PROCEDURE — 97116 GAIT TRAINING THERAPY: CPT

## 2024-10-03 RX ORDER — LIDOCAINE HYDROCHLORIDE 10 MG/ML
INJECTION, SOLUTION EPIDURAL; INFILTRATION; INTRACAUDAL; PERINEURAL PRN
Status: COMPLETED | OUTPATIENT
Start: 2024-10-03 | End: 2024-10-03

## 2024-10-03 RX ORDER — HYDROMORPHONE HYDROCHLORIDE 1 MG/ML
0.25 INJECTION, SOLUTION INTRAMUSCULAR; INTRAVENOUS; SUBCUTANEOUS
Status: DISCONTINUED | OUTPATIENT
Start: 2024-10-03 | End: 2024-10-05 | Stop reason: HOSPADM

## 2024-10-03 RX ORDER — HYDROMORPHONE HYDROCHLORIDE 1 MG/ML
0.5 INJECTION, SOLUTION INTRAMUSCULAR; INTRAVENOUS; SUBCUTANEOUS
Status: DISCONTINUED | OUTPATIENT
Start: 2024-10-03 | End: 2024-10-05 | Stop reason: HOSPADM

## 2024-10-03 RX ADMIN — FOLIC ACID 1 MG: 1 TABLET ORAL at 10:00

## 2024-10-03 RX ADMIN — IPRATROPIUM BROMIDE AND ALBUTEROL SULFATE 1 DOSE: 2.5; .5 SOLUTION RESPIRATORY (INHALATION) at 20:43

## 2024-10-03 RX ADMIN — DEXAMETHASONE SODIUM PHOSPHATE 4 MG: 4 INJECTION, SOLUTION INTRAMUSCULAR; INTRAVENOUS at 07:26

## 2024-10-03 RX ADMIN — SODIUM CHLORIDE, PRESERVATIVE FREE 10 ML: 5 INJECTION INTRAVENOUS at 10:04

## 2024-10-03 RX ADMIN — GLIPIZIDE 2.5 MG: 5 TABLET ORAL at 10:00

## 2024-10-03 RX ADMIN — Medication 1 TABLET: at 10:00

## 2024-10-03 RX ADMIN — METFORMIN HYDROCHLORIDE 500 MG: 500 TABLET, FILM COATED ORAL at 10:00

## 2024-10-03 RX ADMIN — INSULIN LISPRO 4 UNITS: 100 INJECTION, SOLUTION INTRAVENOUS; SUBCUTANEOUS at 10:11

## 2024-10-03 RX ADMIN — SUCRALFATE 1 G: 1 TABLET ORAL at 06:19

## 2024-10-03 RX ADMIN — FERROUS SULFATE TAB 325 MG (65 MG ELEMENTAL FE) 325 MG: 325 (65 FE) TAB at 10:00

## 2024-10-03 RX ADMIN — MIDODRINE HYDROCHLORIDE 10 MG: 5 TABLET ORAL at 14:30

## 2024-10-03 RX ADMIN — ATORVASTATIN CALCIUM 20 MG: 10 TABLET, FILM COATED ORAL at 23:09

## 2024-10-03 RX ADMIN — SODIUM CHLORIDE, PRESERVATIVE FREE 5 ML: 5 INJECTION INTRAVENOUS at 21:16

## 2024-10-03 RX ADMIN — ENOXAPARIN SODIUM 40 MG: 100 INJECTION SUBCUTANEOUS at 10:01

## 2024-10-03 RX ADMIN — MIDODRINE HYDROCHLORIDE 10 MG: 5 TABLET ORAL at 18:06

## 2024-10-03 RX ADMIN — HYDROMORPHONE HYDROCHLORIDE 0.5 MG: 1 INJECTION, SOLUTION INTRAMUSCULAR; INTRAVENOUS; SUBCUTANEOUS at 21:10

## 2024-10-03 RX ADMIN — GUAIFENESIN SYRUP AND DEXTROMETHORPHAN 5 ML: 100; 10 SYRUP ORAL at 00:36

## 2024-10-03 RX ADMIN — INSULIN LISPRO 12 UNITS: 100 INJECTION, SOLUTION INTRAVENOUS; SUBCUTANEOUS at 18:06

## 2024-10-03 RX ADMIN — IPRATROPIUM BROMIDE AND ALBUTEROL SULFATE 1 DOSE: 2.5; .5 SOLUTION RESPIRATORY (INHALATION) at 07:31

## 2024-10-03 RX ADMIN — MIDODRINE HYDROCHLORIDE 10 MG: 5 TABLET ORAL at 09:59

## 2024-10-03 RX ADMIN — DEXAMETHASONE SODIUM PHOSPHATE 4 MG: 4 INJECTION, SOLUTION INTRAMUSCULAR; INTRAVENOUS at 18:10

## 2024-10-03 RX ADMIN — IPRATROPIUM BROMIDE AND ALBUTEROL SULFATE 1 DOSE: 2.5; .5 SOLUTION RESPIRATORY (INHALATION) at 11:29

## 2024-10-03 RX ADMIN — SUCRALFATE 1 G: 1 TABLET ORAL at 23:09

## 2024-10-03 RX ADMIN — METFORMIN HYDROCHLORIDE 500 MG: 500 TABLET, FILM COATED ORAL at 18:06

## 2024-10-03 RX ADMIN — ACETAMINOPHEN 650 MG: 325 TABLET ORAL at 14:30

## 2024-10-03 RX ADMIN — PANTOPRAZOLE SODIUM 40 MG: 40 TABLET, DELAYED RELEASE ORAL at 06:18

## 2024-10-03 RX ADMIN — SUCRALFATE 1 G: 1 TABLET ORAL at 18:06

## 2024-10-03 RX ADMIN — LIDOCAINE HYDROCHLORIDE 5 ML: 10 INJECTION, SOLUTION EPIDURAL; INFILTRATION; INTRACAUDAL; PERINEURAL at 12:13

## 2024-10-03 RX ADMIN — FUROSEMIDE 40 MG: 10 INJECTION, SOLUTION INTRAMUSCULAR; INTRAVENOUS at 09:59

## 2024-10-03 RX ADMIN — HYDROXYUREA 500 MG: 500 CAPSULE ORAL at 10:01

## 2024-10-03 RX ADMIN — ZINC SULFATE 220 MG (50 MG) CAPSULE 50 MG: CAPSULE at 10:00

## 2024-10-03 RX ADMIN — DEXAMETHASONE SODIUM PHOSPHATE 4 MG: 4 INJECTION, SOLUTION INTRAMUSCULAR; INTRAVENOUS at 23:08

## 2024-10-03 RX ADMIN — Medication 100 MG: at 10:00

## 2024-10-03 RX ADMIN — ASPIRIN 81 MG: 81 TABLET, CHEWABLE ORAL at 10:00

## 2024-10-03 RX ADMIN — SUCRALFATE 1 G: 1 TABLET ORAL at 14:30

## 2024-10-03 ASSESSMENT — PAIN SCALES - GENERAL
PAINLEVEL_OUTOF10: 0
PAINLEVEL_OUTOF10: 7
PAINLEVEL_OUTOF10: 0
PAINLEVEL_OUTOF10: 0
PAINLEVEL_OUTOF10: 3

## 2024-10-03 ASSESSMENT — PAIN DESCRIPTION - FREQUENCY: FREQUENCY: INTERMITTENT

## 2024-10-03 ASSESSMENT — PAIN DESCRIPTION - ORIENTATION
ORIENTATION: RIGHT
ORIENTATION: RIGHT

## 2024-10-03 ASSESSMENT — PAIN DESCRIPTION - DESCRIPTORS
DESCRIPTORS: ACHING;BURNING;SHARP
DESCRIPTORS: ACHING;SORE

## 2024-10-03 ASSESSMENT — PAIN DESCRIPTION - ONSET: ONSET: GRADUAL

## 2024-10-03 ASSESSMENT — PAIN DESCRIPTION - LOCATION
LOCATION: RIB CAGE
LOCATION: RIB CAGE

## 2024-10-03 ASSESSMENT — PAIN DESCRIPTION - DIRECTION: RADIATING_TOWARDS: NO

## 2024-10-03 ASSESSMENT — PAIN DESCRIPTION - PAIN TYPE: TYPE: ACUTE PAIN

## 2024-10-03 NOTE — PROCEDURES
PROCEDURE NOTE  Date: 10/3/2024   Name: Lin Bates  YOB: 1955    Thoracentesis    Date/Time: 10/3/2024 12:34 PM    Performed by: Wilman Chong MD  Authorized by: Wilman Chong MD  Consent: Verbal consent obtained. Written consent obtained.  Risks and benefits: risks, benefits and alternatives were discussed  Consent given by: patient  Patient understanding: patient states understanding of the procedure being performed  Patient consent: the patient's understanding of the procedure matches consent given  Procedure consent: procedure consent matches procedure scheduled  Relevant documents: relevant documents present and verified  Site marked: the operative site was marked  Imaging studies: imaging studies available  Patient identity confirmed: verbally with patient and arm band  Time out: Immediately prior to procedure a \"time out\" was called to verify the correct patient, procedure, equipment, support staff and site/side marked as required.  Procedure purpose: diagnostic and therapeutic  Indications: pleural effusion  Preparation: Patient was prepped and draped in the usual sterile fashion.  Local anesthesia used: yes    Anesthesia:  Local anesthesia used: yes  Local Anesthetic: lidocaine 1% without epinephrine  Anesthetic total: 10 mL    Sedation:  Patient sedated: no    Preparation: skin prepped with chlorhexidine  Patient position: sitting  Ultrasound guidance: yes  Location: left posterior  Intercostal space: 8th  Puncture method: over-the-needle catheter  Needle size: 16  Catheter size: 14 gauge  Number of attempts: 1  Drainage amount: 700 ml  Drainage characteristics: serous  Patient tolerance: patient tolerated the procedure well with no immediate complications  Chest x-ray performed: yes  Chest x-ray interpreted by radiologist.  Comments: Left Pleural fluid analysis

## 2024-10-03 NOTE — PROGRESS NOTES
TRANSFER - OUT REPORT:    Verbal report given to Maryellen on Lin Bates being transferred to [facility]19 for routine progression of patient care       Report consisted of patient's Situation, Background, Assessment and   Recommendations(SBAR).     Information from the following report(s) Nurse Handoff Report was reviewed with the receiving nurse.    250 mL drained from right and 700 mL drained from left thoracentesis. Post chest xray obtained and consult for chest tube placed. Fluid sent to lab.     Opportunity for questions and clarification was provided.      Patient transported with:   Registered Nurse

## 2024-10-03 NOTE — PROGRESS NOTES
TRANSFER - OUT REPORT:    Verbal report given to Maryellen RN on Lin Bates being transferred back to #[id number]/ICU for routine post-op       Report consisted of patient's Situation, Background, Assessment and   Recommendations(SBAR).     Information from the following report(s) Nurse Handoff Report was reviewed with the receiving nurse.    Opportunity for questions and clarification was provided.      Patient transported with:   Monitor/ port O2, Rns.

## 2024-10-03 NOTE — BRIEF OP NOTE
Brief Postoperative Note      Patient: Lin Bates  YOB: 1955  MRN: [id number]    Date of Procedure: 10/3/2024    Successful image guided chest tube placement.      Drains:   Chest Tube Right Midaxillary 1 (Active)   $ Chest tube insertion $ Yes 10/03/24 1325   Chest Tube Airleak No 10/03/24 1325   Status Continuous Suction 10/03/24 1325   Suction -20 cm H2O 10/03/24 1325   Y Connector Used No 10/03/24 1325   Drainage Description Serous 10/03/24 1325   Dressing Status New dressing applied;Clean, dry & intact 10/03/24 1325   Chest Tube Dressing Petroleum Jelly 10/03/24 1325   Site Assessment Clean, dry & intact 10/03/24 1325   Surrounding Skin Clean, dry & intact 10/03/24 1325       Electronically signed by Richard Metz MD on 10/3/2024 at 1:51 PM

## 2024-10-03 NOTE — PROCEDURES
PROCEDURE NOTE  Date: 10/3/2024   Name: Lin Bates  YOB: 1955    Thoracentesis    Date/Time: 10/3/2024 12:30 PM    Performed by: Wilman Chong MD  Authorized by: Wilman Chong MD  Consent: Verbal consent obtained. Written consent obtained.  Risks and benefits: risks, benefits and alternatives were discussed  Consent given by: patient  Patient understanding: patient states understanding of the procedure being performed  Patient consent: the patient's understanding of the procedure matches consent given  Procedure consent: procedure consent matches procedure scheduled  Relevant documents: relevant documents present and verified  Test results: test results available and properly labeled  Site marked: the operative site was marked  Imaging studies: imaging studies available  Patient identity confirmed: verbally with patient and arm band  Time out: Immediately prior to procedure a \"time out\" was called to verify the correct patient, procedure, equipment, support staff and site/side marked as required.  Procedure purpose: diagnostic and therapeutic  Indications: pleural effusion  Preparation: Patient was prepped and draped in the usual sterile fashion.  Local anesthesia used: yes    Anesthesia:  Local anesthesia used: yes  Local Anesthetic: lidocaine 1% without epinephrine  Anesthetic total: 10 mL    Sedation:  Patient sedated: no    Preparation: skin prepped with chlorhexidine  Patient position: sitting  Ultrasound guidance: yes  Location: right posterior  Intercostal space: 8th  Puncture method: over-the-needle catheter  Needle size: 16  Catheter size: 14 gauge  Number of attempts: 1  Drainage amount: 250 ml  Drainage characteristics: serosanguinous  Chest x-ray performed: yes  Chest x-ray interpreted by radiologist.  Chest x-ray findings: pneumothorax  Comments: Right chest tub by Radiology

## 2024-10-03 NOTE — PLAN OF CARE
Problem: Discharge Planning  Goal: Discharge to home or other facility with appropriate resources  10/3/2024 1241 by Maryellen Jackson RN  Outcome: Progressing  10/3/2024 0019 by Jenifer Talavera RN  Outcome: Progressing     Problem: Skin/Tissue Integrity  Goal: Absence of new skin breakdown  Description: 1.  Monitor for areas of redness and/or skin breakdown  2.  Assess vascular access sites hourly  3.  Every 4-6 hours minimum:  Change oxygen saturation probe site  4.  Every 4-6 hours:  If on nasal continuous positive airway pressure, respiratory therapy assess nares and determine need for appliance change or resting period.  10/3/2024 1241 by Maryellen Jackson RN  Outcome: Progressing  10/3/2024 0019 by Jenifer Talavera RN  Outcome: Progressing     Problem: Safety - Adult  Goal: Free from fall injury  10/3/2024 1241 by Maryellen Jackson RN  Outcome: Progressing  10/3/2024 0019 by Jenifer Talavera RN  Outcome: Progressing     Problem: ABCDS Injury Assessment  Goal: Absence of physical injury  10/3/2024 1241 by Maryellen Jackson RN  Outcome: Progressing  10/3/2024 0019 by Jenifer Talavera RN  Outcome: Progressing     Problem: Chronic Conditions and Co-morbidities  Goal: Patient's chronic conditions and co-morbidity symptoms are monitored and maintained or improved  10/3/2024 1241 by Maryellen Jackson RN  Outcome: Progressing  10/3/2024 0019 by Jenifer Talavera RN  Outcome: Progressing     Problem: Pain  Goal: Verbalizes/displays adequate comfort level or baseline comfort level  10/3/2024 1241 by Maryellen Jackson RN  Outcome: Progressing  10/3/2024 0019 by Jenifer Talavera RN  Outcome: Progressing

## 2024-10-03 NOTE — PROGRESS NOTES
Patient Name:  Lin Bates  Patient :  1955  Patient MRN:  [id number]     Primary Oncologist: Burt Birch MD  Referring Provider: Dorothy Terrazas PA    Lin Bates is a 69 y.o. 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     She was found to have hypotensive shock in spite of fluid replacement and required levophed. She was transferred to [facility] ICU for further management.     Interval history   10/3/24  Pt was seen and examined this morning. Not in any acute distress. No overnight events. Overall she is feeling better. She is on 2L O2 via NC only now.     2024, chest x-ray with bilateral pneumonia, pulmonary edema or ARDS particularly in the left lower lobe. CT chest results reviewed.     Labs today showed sodium of 141 potassium of 3.6, BUN of 19 creatinine 0.7.  Albumin of 2.8. LFTs within normal limits.  CBC with WBC of 8.7, hemoglobin of 8.5, hematocrit 26.1, MCV of 99.2 and platelets of 368.      Vital Signs: BP (!) 114/49   Pulse 85   Temp 98 °F (36.7 °C) (Oral)   Resp 22   Ht 1.549 m (5' 1\")   Wt 65.8 kg (145 lb 1 oz)   SpO2 94%   BMI 27.41 kg/m²      Physical Exam:  CONSTITUTIONAL: awake, alert, on nasal cannula, in mild respiratory distress  EYES: Pallor but no icterus  LUNGS: Bilateral wheeze and coarse breath sounds  CARDIOVASCULAR: S and S2  ABDOMEN: Soft nontender nondistended bowel sounds positive  NEUROLOGIC: Grossly intact  EXTREMITIES: no LE edema      Labs:  Hematology:  Lab Results   Component Value Date    WBC 8.7 10/03/2024    RBC 2.63 (L) 10/03/2024    HGB 8.5 (L) 10/03/2024    HCT 26.1 (L) 10/03/2024    MCV  neoplasm  Small clone of monoclonal B cell lymphocytosis    Plan:  She is known to Dr. Birch, with MPL positive myeloproliferative neoplasm, currently on hydroxyurea, small monoclonal B cell lymphocytosis, limited stage LORI small cell lung cancer, s/p definitive CRT, prophylactic whole brain radiation therapy, was on maintenance immunotherapy with durvalumab (received her second cycle on 8/28/24), now presented with sepsis and septic shock.     CT findings as well as clinical presentations are most consistent with pneumonia (doubt immunotherapy induced pneumonitis). However, we started decadron 4 mg Q8 since 9/26/24. Will continue current dose for now and taper gradually.  Strict blood glucose control and PPI recommended.     If he will be discharged home, I will recommend to discharge home with dexamethasone dose to 4 mg TID for a week, f/b 4 mg BID for a week, f/b 4 mg daily for a week.     Antimicrobial regimen per admitting team.  Also recommend inhalers and antitussives.  Wean oxygen as tolerated.  CT chest results reviewed. Overall, she is feeling better. If she is going to be released soon, I will plan for repeat CT chest as an out patient.     Recommend to continue with hydroxyurea for MPL positive MPN. Her platelet count today was 368.     Monitor for any thyroid dysfunction    Continue other medical care.  Discussed with the admitting team.  Discussed with patient's .  Discussed with nursing staff    Continue other medical care.    Thank you for letting us be part of the care and will follow along

## 2024-10-03 NOTE — PROGRESS NOTES
Physical Therapy    Physical Therapy Treatment Note  Name: Lin Bates MRN: [id number] :   1955   Date:  10/3/2024   Admission Date: 2024 Room:  [address] [place], [place] [address]   Restrictions/Precautions:  general precautions, falls  Communication with other providers:  RN approval for therapy session, MD  Subjective:  Patient states: \"I might be going home tomorrow!\"  Pain:   Location, Type, Intensity (0/10 to 10/10):  denies  Objective:    Observation:  Supine in bed with  present upon arrival, agreeable to therapy  Objective Measures:  stable vitals on 2L O2 via nasal cannula    Treatment, including education/measures:  Supine to sit: SBA with HOB elevated. Verbal cues for sequencing  Sit to supine: SBA with HOB elevated. Verbal cues for sequencing    Sit to stand: from EOB to standing, CGA. Verbal cues for sequencing   Stand to sit: from standing to seated EOB, CGA. Verbal cues for sequencing    Gait: ambulated 250ft with single UE support on IV pole, CGA for safety. Pt demonstrated fair pace with equal step length bilaterally. No LOB observed however pt with some postural sway and minor instability. Verbal cues for sequencing and pathway navigation.  Educated pt on PT POC, role of PT, progress towards goals, DME use at home if feeling more unbalanced    Safety: Patient left in bed with alarm, call light within reach, all needs met, gait belt used    Assessment / Impression:    Distance limited d/t MD potentially planning procedure at noon for pt.   Patient's tolerance of treatment:  Good   Adverse Reaction: none  Significant change in status and impact:  improved activity tolerance  Barriers to improvement:  none    Plan for Next Session:    Continue per PT POC    Time in:  1114  Time out:  1128  Timed treatment minutes:  14  Total treatment time:  14    Previously filed items:  Social/Functional History  Lives With: Significant other  Type of Home: House  Home Layout: One level  Home Access: Level  entry  Bathroom Shower/Tub: Tub/Shower unit  Bathroom Toilet: Standard  Bathroom Equipment: Shower chair, Grab bars in shower  Home Equipment: Walker - Rolling  Receives Help From: Family, Friend(s)  ADL Assistance: Independent  Homemaking Assistance: Independent  Ambulation Assistance: Independent  Transfer Assistance: Independent  Active : No  Patient's  Info: S.O. PROVIDES HER TRANSPORTATION     Long Term Goals  Time Frame for Long Term Goals : In one week:  Long Term Goal 1: Pt will ambulate 400 feet independently  Long Term Goal 2: Pt will ascend/descend 6 steps with a handrail independently  Long Term Goal 3: Pt will independently complete 3 sets of 10 reps of BLE AROM exercises       Electronically signed by:    Pauline Guzmán, CHIRAG  10/3/2024, 4:26 PM

## 2024-10-03 NOTE — PROGRESS NOTES
Result Value Ref Range    Magnesium 2.0 1.8 - 2.4 mg/dL   POCT Glucose    Collection Time: 10/03/24  8:37 AM   Result Value Ref Range    POC Glucose 227 (H) 74 - 99 mg/dL   POCT Glucose    Collection Time: 10/03/24 11:43 AM   Result Value Ref Range    POC Glucose 196 (H) 74 - 99 mg/dL   Protein, body fluid    Collection Time: 10/03/24 12:15 PM   Result Value Ref Range    Specimen Type .PLEURAL FLUID     Total Protein, Body Fluid 1.7 g/dL   Lactate dehydrogenase, body fluid    Collection Time: 10/03/24 12:15 PM   Result Value Ref Range    Specimen Type .PLEURAL FLUID     LD, Fluid 49 U/L   Glucose, body fluid    Collection Time: 10/03/24 12:15 PM   Result Value Ref Range    Specimen Type .PLEURAL FLUID     Glucose, Fluid 248 mg/dL   PH, Quant, Fluid    Collection Time: 10/03/24 12:15 PM   Result Value Ref Range    Specimen Type .PLEURAL FLUID     pH, Fluid 8.0 (H) 6.5 - 7.5   Protein, body fluid    Collection Time: 10/03/24 12:15 PM   Result Value Ref Range    Specimen Type .PLEURAL FLUID     Total Protein, Body Fluid 2.1 g/dL   Lactate dehydrogenase, body fluid    Collection Time: 10/03/24 12:15 PM   Result Value Ref Range    Specimen Type .PLEURAL FLUID     LD, Fluid 44 U/L   Amylase, body fluid    Collection Time: 10/03/24 12:15 PM   Result Value Ref Range    Specimen Type .PLEURAL FLUID     Amylase, Fluid 7 U/L   Albumin, Body Fluid    Collection Time: 10/03/24 12:15 PM   Result Value Ref Range    Specimen Type .PLEURAL FLUID     Albumin, Fluid 1.3 g/dL   Glucose, body fluid    Collection Time: 10/03/24 12:15 PM   Result Value Ref Range    Specimen Type .PLEURAL FLUID     Glucose, Fluid 247 mg/dL   PH, Quant, Fluid    Collection Time: 10/03/24 12:15 PM   Result Value Ref Range    Specimen Type .PLEURAL FLUID     pH, Fluid 8.0 (H) 6.5 - 7.5        Imaging/Diagnostics Last 24 Hours   CTA PULMONARY W CONTRAST    Result Date: 9/25/2024  EXAMINATION: CTA PULMONARY W CONTRAST 9/25/2024 1:04 AM ACCESSION NUMBER:

## 2024-10-03 NOTE — PROGRESS NOTES
Comprehensive Nutrition Assessment    Type and Reason for Visit:  Initial, RD Nutrition Re-Screen/LOS    Nutrition Recommendations/Plan:   Continue current diet  Trial low calorie and diabetic oral nutrition supplements   Monitor weights, GI status, po intakes, labs, POC     Malnutrition Assessment:  Malnutrition Status:  At risk for malnutrition (Comment) (10/03/24 7204)    Context:  Acute Illness       Nutrition Assessment:    Admitted w/ septic shock, with PMH HTN, HLD, DM, Hx of HPV infection, nicotine use, small cell carcinoma on chemotherapy with cisplatin and etoposide (since 2/24), completed radiation therapy, myeloproliferative neoplasm, chemoradiation induced esophagitis and currently on immunotherapy. Pt tolerating soft and bite sized diet, states she is eating OK. Documented po intakes show >50% of all meals consumed. Pt does like oral supplements, will order a variety for her to try. Non-significant losses noted over the past year. Pt states her wt has varied but her lowest was 128# and now gaining. Will follow at moderate risk.    Nutrition Related Findings:    no longer requiring pressors. On metformin, glipizide. glucose 114-227 Wound Type: None       Current Nutrition Intake & Therapies:    Average Meal Intake: 51-75%, %  Average Supplements Intake: None Ordered  ADULT DIET; Dysphagia - Soft and Bite Sized    Anthropometric Measures:  Height: 154.9 cm (5' 1\")  Ideal Body Weight (IBW): 105 lbs (48 kg)    Admission Body Weight: 65.9 kg (145 lb 4.5 oz)  Current Body Weight: 65.8 kg (145 lb 1 oz),   IBW. Weight Source: Bed Scale  Current BMI (kg/m2): 27.4  Usual Body Weight: 76.7 kg (169 lb) (10/2023)  % Weight Change (Calculated): -14.2  Weight Adjustment For: No Adjustment                 BMI Categories: Overweight (BMI 25.0-29.9)    Estimated Daily Nutrient Needs:  Energy Requirements Based On: Formula  Weight Used for Energy Requirements: Current  Energy (kcal/day): 9703-4600 (MSJ)  Weight  Used for Protein Requirements: Current  Protein (g/day): 72-86 (1.1-1.3g/kg)  Method Used for Fluid Requirements: 1 ml/kcal  Fluid (ml/day): 1600    Nutrition Diagnosis:   Predicted inadequate energy intake related to catabolic illness as evidenced by intake 51-75%    Nutrition Interventions:   Food and/or Nutrient Delivery: Continue Current Diet, Start Oral Nutrition Supplement  Nutrition Education/Counseling: No recommendation at this time  Coordination of Nutrition Care: Continue to monitor while inpatient, Coordination of Care  Plan of Care discussed with: pt/SO    Goals:     Goals: PO intake 75% or greater, prior to discharge       Nutrition Monitoring and Evaluation:   Behavioral-Environmental Outcomes: None Identified  Food/Nutrient Intake Outcomes: Food and Nutrient Intake, Supplement Intake  Physical Signs/Symptoms Outcomes: Weight, Nutrition Focused Physical Findings, Biochemical Data, Meal Time Behavior    Discharge Planning:    Continue current diet, Continue Oral Nutrition Supplement     Harriet Arango RD  Contact: [phone]

## 2024-10-03 NOTE — PRE SEDATION
Pre-Procedure Note    Patient Name: Lin Bates   YOB: 1955  Room/Bed: 2119/2119-A  Medical Record Number: [id number]  Date: 10/3/2024   Time: 1:50 PM       Indication:  Large expanding pneumothorax after thoracentesis.    Consent: I have discussed with the patient and/or the patient representative the indication, alternatives, and the possible risks and/or complications of the planned procedure and the anesthesia methods. The patient and/or patient representative appear to understand and agree to proceed.    Vital Signs:   Vitals:    10/03/24 1312   BP: (!) 106/39   Pulse: 88   Resp: 18   Temp:    SpO2: 97%       Past Medical History:   has a past medical history of Cancer (HCC), History of external beam radiation therapy, Hyperlipidemia, Hypertension, Thickened endometrium, and Type 2 diabetes mellitus without complication (HCC).    Past Surgical History:   has a past surgical history that includes Dental surgery; Dilation and curettage of uterus (N/A, 3/22/2023); CT NEEDLE BIOPSY LUNG PERCUTANEOUS (1/30/2024); Port Surgery (N/A, 4/10/2024); and Esophagoscopy (N/A, 5/23/2024).    Medications:   Scheduled Meds:    furosemide  40 mg IntraVENous BID    ipratropium 0.5 mg-albuterol 2.5 mg  1 Dose Inhalation Q4H WA RT    [Held by provider] insulin glargine  15 Units SubCUTAneous Nightly    metFORMIN  500 mg Oral BID WC    glipiZIDE  2.5 mg Oral QAM AC    dexAMETHasone (DECADRON) 4 mg in sodium chloride 0.9 % 50 mL IVPB  4 mg IntraVENous Q8H    insulin lispro  0-16 Units SubCUTAneous TID WC    insulin lispro  0-8 Units SubCUTAneous 2 times per day    folic acid  1 mg Oral Daily    multivitamin  1 tablet Oral Daily    thiamine  100 mg Oral Daily    zinc sulfate  50 mg Oral Daily    sodium chloride flush  5-40 mL IntraVENous 2 times per day    enoxaparin  40 mg SubCUTAneous Daily    aspirin  81 mg Oral Daily    atorvastatin  20 mg Oral Nightly    ferrous sulfate  325 mg Oral Daily with breakfast

## 2024-10-03 NOTE — CONSULTS
Pulmonary Consult Note      Reason for Consult:     worsening pulm infiltartes ; pneumonia vs immunotherapy pneumonitis; ? bronchoscopy      Requesting Physician:     Dora Landis MD       Subjective:   CHIEF COMPLAINT :SOB    Patient Active Problem List    Diagnosis Date Noted    Monoclonal B-cell lymphocytosis 12/12/2022     Priority: Medium    Leucocytosis 12/11/2022     Priority: Medium    Thrombocytosis 11/08/2022     Priority: Medium    Pneumonia of both lungs due to infectious organism 09/29/2024    Septic shock (HCC) 09/25/2024    Drug therapy 06/24/2024    Moderate malnutrition (HCC) 05/21/2024    Severe anemia 04/21/2024    Small cell lung cancer, left lower lobe (HCC) 02/02/2024    Need for hepatitis B screening test 02/01/2024    Lung nodule, solitary 04/30/2023    Thickened endometrium 03/22/2023    PMB (postmenopausal bleeding) 03/22/2023        HPI:                The patient is a 69 y.o. female with significant past medical history of HTN, HLD, DM-2, history of HPV infection, nicotine use, small cell carcinoma -chemotherapy with cisplatin and etoposide was started since 2/24 , completed radiation therapy on 4/16/2024 , myeloproliferative neoplasm-(based on the bone marrow biopsy on 9/22/2023) , chemoradiation induced esophagitis , currently on immunotherapy   presents with complaints of SOB and hypotension. She was found to have septic shock sec to bilateral Pneumonia. She was treated with IV fluids and Abx. She has bilateral pleural effusions and suspected bilateral pneumonia. I was called after the CT chest for suspected pneumonia and possible bronch. At this time she is lying in the bed. She is in mild resp distress.    Past Medical History:      Diagnosis Date    Cancer (HCC)     History of external beam radiation therapy 03/2024    LEFT LUNG and LN 6,000 cGy in 30 fractions    Hyperlipidemia     Hypertension     Thickened endometrium     Type 2 diabetes mellitus without complication (HCC)  10/03/2024 03:50 AM    LABGLOM >90 04/24/2024 05:21 AM    GLUCOSE 114 10/03/2024 03:50 AM     Hepatic Function Panel:    Lab Results   Component Value Date/Time    ALKPHOS 54 10/03/2024 03:50 AM    ALT 7 10/03/2024 03:50 AM    AST 12 10/03/2024 03:50 AM    BILITOT <0.2 10/03/2024 03:50 AM    BILIDIR <0.2 10/03/2024 03:50 AM    IBILI Can not be calculated 10/03/2024 03:50 AM     ABG:    Lab Results   Component Value Date/Time    FKC3BLA 21.4 09/25/2024 09:41 AM    PHART 7.367 09/25/2024 09:41 AM    RBT5URZ 38.1 09/25/2024 09:41 AM    PO2ART 67.5 09/25/2024 09:41 AM       Cultures:   Blood Culture:    Sputum Culture:        Radiology Review:  1. Slight increased moderate to large left pleural effusion and increased  moderate to large right pleural effusion.  2. Increased extensive diffuse bilateral interstitial and airspace infiltrates.  3. Bilateral lower lobe nodular opacities, nodular infiltrates versus  neoplasm/malignancy. Recommend follow-up after resolution of infiltrates.  4. Right central venous port good position.  5. Cholelithiasis without CT evidence of acute cholecystitis.             Assessment/Plan       Patient Active Problem List    Diagnosis Date Noted    Monoclonal B-cell lymphocytosis 12/12/2022     Priority: Medium    Leucocytosis 12/11/2022     Priority: Medium    Thrombocytosis 11/08/2022     Priority: Medium    Pneumonia of both lungs due to infectious organism 09/29/2024    Septic shock (HCC) 09/25/2024    Drug therapy 06/24/2024    Moderate malnutrition (HCC) 05/21/2024    Severe anemia 04/21/2024    Small cell lung cancer, left lower lobe (HCC) 02/02/2024    Need for hepatitis B screening test 02/01/2024    Lung nodule, solitary 04/30/2023    Thickened endometrium 03/22/2023    PMB (postmenopausal bleeding) 03/22/2023     Bilateral Pneumonia  Acute Hypoxic resp failure  Bilateral Pleural effusions R > L  Anemia  Myeloproliferative neoplasm  LORI Small Cell Lung ca with mets to brain on

## 2024-10-04 ENCOUNTER — APPOINTMENT (OUTPATIENT)
Dept: GENERAL RADIOLOGY | Age: 69
DRG: 871 | End: 2024-10-04
Attending: STUDENT IN AN ORGANIZED HEALTH CARE EDUCATION/TRAINING PROGRAM
Payer: MEDICARE

## 2024-10-04 ENCOUNTER — APPOINTMENT (OUTPATIENT)
Dept: NON INVASIVE DIAGNOSTICS | Age: 69
DRG: 871 | End: 2024-10-04
Attending: INTERNAL MEDICINE
Payer: MEDICARE

## 2024-10-04 LAB
ALBUMIN SERPL-MCNC: 3 G/DL (ref 3.4–5)
ALBUMIN/GLOB SERPL: 1.4 {RATIO} (ref 1.1–2.2)
ALP SERPL-CCNC: 58 U/L (ref 40–129)
ALT SERPL-CCNC: 11 U/L (ref 10–40)
ANION GAP SERPL CALCULATED.3IONS-SCNC: 7 MMOL/L (ref 9–17)
AST SERPL-CCNC: 10 U/L (ref 15–37)
BASOPHILS # BLD: 0 K/UL
BASOPHILS NFR BLD: 0 % (ref 0–1)
BILIRUB DIRECT SERPL-MCNC: <0.2 MG/DL (ref 0–0.3)
BILIRUB INDIRECT SERPL-MCNC: ABNORMAL MG/DL (ref 0–0.7)
BILIRUB SERPL-MCNC: <0.2 MG/DL (ref 0–1)
BUN SERPL-MCNC: 24 MG/DL (ref 7–20)
CALCIUM SERPL-MCNC: 8.8 MG/DL (ref 8.3–10.6)
CHLORIDE SERPL-SCNC: 94 MMOL/L (ref 99–110)
CO2 SERPL-SCNC: 35 MMOL/L (ref 21–32)
CREAT SERPL-MCNC: 0.6 MG/DL (ref 0.6–1.2)
ECHO AO ROOT DIAM: 2.3 CM
ECHO AO ROOT INDEX: 1.42 CM/M2
ECHO AV AREA PEAK VELOCITY: 1.5 CM2
ECHO AV AREA VTI: 1.6 CM2
ECHO AV AREA/BSA PEAK VELOCITY: 0.9 CM2/M2
ECHO AV AREA/BSA VTI: 1 CM2/M2
ECHO AV MEAN GRADIENT: 10 MMHG
ECHO AV MEAN VELOCITY: 1.5 M/S
ECHO AV PEAK GRADIENT: 21 MMHG
ECHO AV PEAK VELOCITY: 2.3 M/S
ECHO AV VELOCITY RATIO: 0.74
ECHO AV VTI: 42.8 CM
ECHO BSA: 1.65 M2
ECHO EST RA PRESSURE: 3 MMHG
ECHO IVC PROX: 1.2 CM
ECHO LA AREA 4C: 14.6 CM2
ECHO LA DIAMETER INDEX: 1.85 CM/M2
ECHO LA DIAMETER: 3 CM
ECHO LA MAJOR AXIS: 5.2 CM
ECHO LA TO AORTIC ROOT RATIO: 1.3
ECHO LA VOL MOD A4C: 33 ML (ref 22–52)
ECHO LA VOLUME INDEX MOD A4C: 20 ML/M2 (ref 16–34)
ECHO LV E' LATERAL VELOCITY: 10.8 CM/S
ECHO LV E' SEPTAL VELOCITY: 9.4 CM/S
ECHO LV EDV A4C: 31 ML
ECHO LV EDV INDEX A4C: 19 ML/M2
ECHO LV EF PHYSICIAN: 60 %
ECHO LV EJECTION FRACTION A4C: 72 %
ECHO LV ESV A4C: 9 ML
ECHO LV ESV INDEX A4C: 6 ML/M2
ECHO LV FRACTIONAL SHORTENING: 52 % (ref 28–44)
ECHO LV INTERNAL DIMENSION DIASTOLE INDEX: 2.96 CM/M2
ECHO LV INTERNAL DIMENSION DIASTOLIC: 4.8 CM (ref 3.9–5.3)
ECHO LV INTERNAL DIMENSION SYSTOLIC INDEX: 1.42 CM/M2
ECHO LV INTERNAL DIMENSION SYSTOLIC: 2.3 CM
ECHO LV IVSD: 0.7 CM (ref 0.6–0.9)
ECHO LV MASS 2D: 97.4 G (ref 67–162)
ECHO LV MASS INDEX 2D: 60.1 G/M2 (ref 43–95)
ECHO LV POSTERIOR WALL DIASTOLIC: 0.6 CM (ref 0.6–0.9)
ECHO LV RELATIVE WALL THICKNESS RATIO: 0.25
ECHO LVOT AREA: 2 CM2
ECHO LVOT AV VTI INDEX: 0.79
ECHO LVOT DIAM: 1.6 CM
ECHO LVOT MEAN GRADIENT: 6 MMHG
ECHO LVOT PEAK GRADIENT: 12 MMHG
ECHO LVOT PEAK VELOCITY: 1.7 M/S
ECHO LVOT STROKE VOLUME INDEX: 41.9 ML/M2
ECHO LVOT SV: 67.9 ML
ECHO LVOT VTI: 33.8 CM
ECHO MV A VELOCITY: 1.19 M/S
ECHO MV E DECELERATION TIME (DT): 215 MS
ECHO MV E VELOCITY: 1.39 M/S
ECHO MV E/A RATIO: 1.17
ECHO MV E/E' LATERAL: 12.87
ECHO MV E/E' RATIO (AVERAGED): 13.83
ECHO MV E/E' SEPTAL: 14.79
ECHO RIGHT VENTRICULAR SYSTOLIC PRESSURE (RVSP): 41 MMHG
ECHO RV MID DIMENSION: 3 CM
ECHO TV REGURGITANT MAX VELOCITY: 3.07 M/S
ECHO TV REGURGITANT PEAK GRADIENT: 38 MMHG
EOSINOPHIL # BLD: 0 K/UL
EOSINOPHILS RELATIVE PERCENT: 0 % (ref 0–3)
ERYTHROCYTE [DISTWIDTH] IN BLOOD BY AUTOMATED COUNT: 15.6 % (ref 11.7–14.9)
GFR, ESTIMATED: >90 ML/MIN/1.73M2
GLUCOSE BLD-MCNC: 158 MG/DL (ref 74–99)
GLUCOSE BLD-MCNC: 220 MG/DL (ref 74–99)
GLUCOSE BLD-MCNC: 261 MG/DL (ref 74–99)
GLUCOSE BLD-MCNC: 298 MG/DL (ref 74–99)
GLUCOSE BLD-MCNC: 301 MG/DL (ref 74–99)
GLUCOSE SERPL-MCNC: 173 MG/DL (ref 74–99)
HCT VFR BLD AUTO: 28 % (ref 37–47)
HGB BLD-MCNC: 8.9 G/DL (ref 12.5–16)
IMM GRANULOCYTES # BLD AUTO: 0.08 K/UL
IMM GRANULOCYTES NFR BLD: 1 %
LYMPHOCYTES NFR BLD: 0.48 K/UL
LYMPHOCYTES RELATIVE PERCENT: 7 % (ref 24–44)
MAGNESIUM SERPL-MCNC: 1.9 MG/DL (ref 1.8–2.4)
MCH RBC QN AUTO: 31.7 PG (ref 27–31)
MCHC RBC AUTO-ENTMCNC: 31.8 G/DL (ref 32–36)
MCV RBC AUTO: 99.6 FL (ref 78–100)
MONOCYTES NFR BLD: 0.72 K/UL
MONOCYTES NFR BLD: 10 % (ref 0–4)
NEUTROPHILS NFR BLD: 83 % (ref 36–66)
NEUTS SEG NFR BLD: 6.12 K/UL
PHOSPHATE SERPL-MCNC: 3.3 MG/DL (ref 2.5–4.9)
PLATELET # BLD AUTO: 343 K/UL (ref 140–440)
PMV BLD AUTO: 9.8 FL (ref 7.5–11.1)
POTASSIUM SERPL-SCNC: 4.3 MMOL/L (ref 3.5–5.1)
PROT SERPL-MCNC: 5.1 G/DL (ref 6.4–8.2)
RBC # BLD AUTO: 2.81 M/UL (ref 4.2–5.4)
SODIUM SERPL-SCNC: 136 MMOL/L (ref 136–145)
WBC OTHER # BLD: 7.4 K/UL (ref 4–10.5)

## 2024-10-04 PROCEDURE — 94640 AIRWAY INHALATION TREATMENT: CPT

## 2024-10-04 PROCEDURE — 94669 MECHANICAL CHEST WALL OSCILL: CPT

## 2024-10-04 PROCEDURE — 99232 SBSQ HOSP IP/OBS MODERATE 35: CPT | Performed by: INTERNAL MEDICINE

## 2024-10-04 PROCEDURE — 2700000000 HC OXYGEN THERAPY PER DAY

## 2024-10-04 PROCEDURE — 2580000003 HC RX 258: Performed by: STUDENT IN AN ORGANIZED HEALTH CARE EDUCATION/TRAINING PROGRAM

## 2024-10-04 PROCEDURE — 6370000000 HC RX 637 (ALT 250 FOR IP): Performed by: INTERNAL MEDICINE

## 2024-10-04 PROCEDURE — 85025 COMPLETE CBC W/AUTO DIFF WBC: CPT

## 2024-10-04 PROCEDURE — 84100 ASSAY OF PHOSPHORUS: CPT

## 2024-10-04 PROCEDURE — 93306 TTE W/DOPPLER COMPLETE: CPT | Performed by: INTERNAL MEDICINE

## 2024-10-04 PROCEDURE — 83735 ASSAY OF MAGNESIUM: CPT

## 2024-10-04 PROCEDURE — 80053 COMPREHEN METABOLIC PANEL: CPT

## 2024-10-04 PROCEDURE — 93306 TTE W/DOPPLER COMPLETE: CPT

## 2024-10-04 PROCEDURE — 2060000000 HC ICU INTERMEDIATE R&B

## 2024-10-04 PROCEDURE — 6360000002 HC RX W HCPCS: Performed by: STUDENT IN AN ORGANIZED HEALTH CARE EDUCATION/TRAINING PROGRAM

## 2024-10-04 PROCEDURE — 6360000002 HC RX W HCPCS: Performed by: NURSE PRACTITIONER

## 2024-10-04 PROCEDURE — 97530 THERAPEUTIC ACTIVITIES: CPT

## 2024-10-04 PROCEDURE — 82248 BILIRUBIN DIRECT: CPT

## 2024-10-04 PROCEDURE — 97116 GAIT TRAINING THERAPY: CPT

## 2024-10-04 PROCEDURE — 6370000000 HC RX 637 (ALT 250 FOR IP): Performed by: STUDENT IN AN ORGANIZED HEALTH CARE EDUCATION/TRAINING PROGRAM

## 2024-10-04 PROCEDURE — 6370000000 HC RX 637 (ALT 250 FOR IP): Performed by: PHYSICIAN ASSISTANT

## 2024-10-04 PROCEDURE — 82962 GLUCOSE BLOOD TEST: CPT

## 2024-10-04 PROCEDURE — 94761 N-INVAS EAR/PLS OXIMETRY MLT: CPT

## 2024-10-04 PROCEDURE — 71045 X-RAY EXAM CHEST 1 VIEW: CPT

## 2024-10-04 RX ADMIN — INSULIN LISPRO 4 UNITS: 100 INJECTION, SOLUTION INTRAVENOUS; SUBCUTANEOUS at 08:28

## 2024-10-04 RX ADMIN — MIDODRINE HYDROCHLORIDE 10 MG: 5 TABLET ORAL at 12:50

## 2024-10-04 RX ADMIN — SODIUM CHLORIDE, PRESERVATIVE FREE 10 ML: 5 INJECTION INTRAVENOUS at 08:44

## 2024-10-04 RX ADMIN — ZINC SULFATE 220 MG (50 MG) CAPSULE 50 MG: CAPSULE at 08:31

## 2024-10-04 RX ADMIN — HYDROMORPHONE HYDROCHLORIDE 0.5 MG: 1 INJECTION, SOLUTION INTRAMUSCULAR; INTRAVENOUS; SUBCUTANEOUS at 10:20

## 2024-10-04 RX ADMIN — DEXAMETHASONE SODIUM PHOSPHATE 4 MG: 4 INJECTION, SOLUTION INTRAMUSCULAR; INTRAVENOUS at 17:20

## 2024-10-04 RX ADMIN — INSULIN LISPRO 12 UNITS: 100 INJECTION, SOLUTION INTRAVENOUS; SUBCUTANEOUS at 12:50

## 2024-10-04 RX ADMIN — Medication 1 TABLET: at 08:30

## 2024-10-04 RX ADMIN — SODIUM CHLORIDE, PRESERVATIVE FREE 10 ML: 5 INJECTION INTRAVENOUS at 21:15

## 2024-10-04 RX ADMIN — MIDODRINE HYDROCHLORIDE 10 MG: 5 TABLET ORAL at 08:30

## 2024-10-04 RX ADMIN — IPRATROPIUM BROMIDE AND ALBUTEROL SULFATE 1 DOSE: 2.5; .5 SOLUTION RESPIRATORY (INHALATION) at 15:00

## 2024-10-04 RX ADMIN — DEXAMETHASONE SODIUM PHOSPHATE 4 MG: 4 INJECTION, SOLUTION INTRAMUSCULAR; INTRAVENOUS at 21:18

## 2024-10-04 RX ADMIN — SODIUM CHLORIDE: 9 INJECTION, SOLUTION INTRAVENOUS at 21:16

## 2024-10-04 RX ADMIN — INSULIN LISPRO 4 UNITS: 100 INJECTION, SOLUTION INTRAVENOUS; SUBCUTANEOUS at 02:08

## 2024-10-04 RX ADMIN — FOLIC ACID 1 MG: 1 TABLET ORAL at 08:30

## 2024-10-04 RX ADMIN — ASPIRIN 81 MG: 81 TABLET, CHEWABLE ORAL at 08:30

## 2024-10-04 RX ADMIN — IPRATROPIUM BROMIDE AND ALBUTEROL SULFATE 1 DOSE: 2.5; .5 SOLUTION RESPIRATORY (INHALATION) at 07:49

## 2024-10-04 RX ADMIN — DEXAMETHASONE SODIUM PHOSPHATE 4 MG: 4 INJECTION, SOLUTION INTRAMUSCULAR; INTRAVENOUS at 06:35

## 2024-10-04 RX ADMIN — SUCRALFATE 1 G: 1 TABLET ORAL at 12:48

## 2024-10-04 RX ADMIN — IPRATROPIUM BROMIDE AND ALBUTEROL SULFATE 1 DOSE: 2.5; .5 SOLUTION RESPIRATORY (INHALATION) at 11:31

## 2024-10-04 RX ADMIN — FERROUS SULFATE TAB 325 MG (65 MG ELEMENTAL FE) 325 MG: 325 (65 FE) TAB at 08:30

## 2024-10-04 RX ADMIN — INSULIN LISPRO 4 UNITS: 100 INJECTION, SOLUTION INTRAVENOUS; SUBCUTANEOUS at 21:16

## 2024-10-04 RX ADMIN — SUCRALFATE 1 G: 1 TABLET ORAL at 06:06

## 2024-10-04 RX ADMIN — PANTOPRAZOLE SODIUM 40 MG: 40 TABLET, DELAYED RELEASE ORAL at 06:33

## 2024-10-04 RX ADMIN — METFORMIN HYDROCHLORIDE 500 MG: 500 TABLET, FILM COATED ORAL at 08:30

## 2024-10-04 RX ADMIN — GLIPIZIDE 2.5 MG: 5 TABLET ORAL at 08:31

## 2024-10-04 RX ADMIN — Medication 100 MG: at 08:31

## 2024-10-04 RX ADMIN — ATORVASTATIN CALCIUM 20 MG: 10 TABLET, FILM COATED ORAL at 21:14

## 2024-10-04 RX ADMIN — IPRATROPIUM BROMIDE AND ALBUTEROL SULFATE 1 DOSE: 2.5; .5 SOLUTION RESPIRATORY (INHALATION) at 21:31

## 2024-10-04 RX ADMIN — ENOXAPARIN SODIUM 40 MG: 100 INJECTION SUBCUTANEOUS at 08:45

## 2024-10-04 ASSESSMENT — PAIN - FUNCTIONAL ASSESSMENT: PAIN_FUNCTIONAL_ASSESSMENT: PREVENTS OR INTERFERES SOME ACTIVE ACTIVITIES AND ADLS

## 2024-10-04 ASSESSMENT — PAIN DESCRIPTION - LOCATION: LOCATION: ABDOMEN;FLANK

## 2024-10-04 ASSESSMENT — PAIN SCALES - GENERAL
PAINLEVEL_OUTOF10: 0
PAINLEVEL_OUTOF10: 0
PAINLEVEL_OUTOF10: 8
PAINLEVEL_OUTOF10: 0
PAINLEVEL_OUTOF10: 0

## 2024-10-04 ASSESSMENT — PAIN DESCRIPTION - ORIENTATION: ORIENTATION: RIGHT

## 2024-10-04 ASSESSMENT — PAIN DESCRIPTION - DESCRIPTORS: DESCRIPTORS: ACHING

## 2024-10-04 NOTE — PROGRESS NOTES
Chest tube clamped per nursing communication at 1254.    Messaged dr chong in regards to nursing communication. Dr Chong stated that he wanted the chest tube clamped and and the chest xray tomorrow 10/05/2024. Pt chest tube was unclamped. This nurse informed pt and pt fiance that chest tube will be clamped at 0830 in the morning and cest xray to follow around 1030.

## 2024-10-04 NOTE — PROGRESS NOTES
Physical Therapy    Physical Therapy Treatment Note  Name: Lin Bates MRN: [id number] :   1955   Date:  10/4/2024   Admission Date: 2024 Room:  [address] [place] [address]   Restrictions/Precautions:  Restrictions/Precautions  Restrictions/Precautions: Fall Risk         Communication with other providers:  Hand off with Nurse    Subjective:  Patient states:  Pt. Agreeable to work with therapy   Pain:   Location, Type, Intensity (0/10 to 10/10):  Pt. Reports pain however does not rate   Objective:    Observation:  Pt. Sitting EOB upon arrival with tech present.   Objective Measures:  Room air, 91% SpO2   Treatment, including education/measures:  Therapeutic Activity Training:   Therapeutic activity training was instructed today.  Cues were given for safety, sequence, UE/LE placement, awareness, and balance.    Activities performed today included bed mobility training, sup-sit, sit-stand, SPT.    Bed Mobility: Sit to Supine- [place] for LE  Sit to stand transfer: SBA  Stand Pivot transfer: SBA    Sitting balance:SBA at EOB with feet on floor.  Standing balance:CGA with no AD. Pt. With some mild unsteadiness during pericare    Gait Training:  Cues were given for safety, balance, posture, awareness, path.    Amb x150ft, CGA, no AD.     Education:   Pt. Educated on importance of out of bed activity, safe functional mobility    Assessment / Impression:    Pt. Left supine in bed at end of session, all needs met, phone and call light in reach, bed/personal alarm active, and nursing updated.     Patient's tolerance of treatment:  Good   Adverse Reaction: none  Significant change in status and impact:  none  Barriers to improvement:  none  Plan for Next Session:    Cont per POC and progress as able.     Timed Code Treatment Minutes: 23 Minutes  PT Individual Minutes  Time In: 916  Time Out: 939  Minutes: 23              Previously filed items:  Social/Functional History  Lives With: Significant other  Type of Home:

## 2024-10-04 NOTE — PROGRESS NOTES
V2.0  [facility] Hospitalist Progress Note      Name:  Lin Bates /Age/Sex: 1955  (69 y.o. female)   MRN & CSN:  [id number] & [id number] Encounter Date/Time: 10/4/2024 1:20 PM EDT    Location:  [address] [place], [place] [address] PCP: Dorothy Terrazas PA       Hospital Day: 10    Assessment and Plan:   Lin Bates is a 69 y.o. female with pmh of monoclonal lymphocytosis hypertension hyperlipidemia diabetes small cell lung cancer status postchemotherapy and radiation therapy on immunotherapy who presents with Septic shock (HCC)      Plan:  Acute hypoxic respiratory in the setting of pneumonia.  Reviewed CTA chest from admission which shows multiple patchy bilateral infiltrates.  Considering infiltrates due to bacterial pneumonia.  Low concern for immunotherapy induced pneumonitis per oncology.  However treating with IV antibiotics as well as steroids.    Bilateral pleural effusion.  Underwent thoracentesis with pulmonology on 10/3/2024.  Small to moderate right pneumothorax, iatrogenic during thoracentesis.  S/p chest tube insertion by IR on 10/3/2024.  Chest x-ray done this morning shows small residual pneumothorax.  Plan to clamp chest tube tomorrow.  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 status postchemotherapy and radiation therapy currently on immunotherapy      Diet ADULT DIET; Dysphagia - Soft and Bite Sized  ADULT ORAL NUTRITION SUPPLEMENT; Breakfast, Dinner; Low Calorie/High Protein Oral Supplement  ADULT ORAL NUTRITION SUPPLEMENT; Lunch; Diabetic Oral Supplement   DVT Prophylaxis [] Lovenox, []  Heparin, [] SCDs, [] Ambulation,  [] Eliquis, [] Xarelto  [] Coumadin   Code Status Limited   Disposition From: Home  Expected Disposition: Likely home with  the lung apices to the lung bases after the intravenous administration of 100mL Iso-justo 370 per pulmonary angiography protocol.  Coronal reconstructions were performed. Radiation dose reduction techniques were used for this study. Our CT scanners use one or all of the following: Automated exposure control, adjustment of the mA and/or kV according to patient size, iterative reconstruction. FINDINGS: STUDY QUALITY: The exam study quality is good. AIRWAYS: The central airways are patent. LUNGS: Multiple patchy bilateral infiltrates PLEURA: Moderate to large left pleural effusion. HEART: The heart is not enlarged. No calcified coronary atherosclerosis.  No pericardial effusion. THORACIC AORTA: The aorta is normal in caliber. PULMONARY ARTERY: No pulmonary embolus to the segmental level. The main pulmonary artery is normal in caliber. MEDIASTINUM/ANA LAURA: No mediastinal mass or lymphadenopathy. CHEST WALL: No mass or axillary lymphadenopathy. UPPER ABDOMEN: The visualized upper abdomen is unremarkable. BONES: No suspicious osseous lesion. Other: Left thyroid lobe low-attenuation nodule measuring 2.1 x 1.2 cm in diameter.     1.  No pulmonary embolus. 2.  Left thyroid lobe low-attenuation nodule measuring 2.1 x 1.2 cm in diameter. 3.  Multiple patchy bilateral infiltrates, underlying masses can't be excluded. 4.  Moderate to large left pleural effusion. Electronically signed by Adalgisa Mead      Electronically signed by Dora Landis MD on 10/4/2024 at 2:57 PM

## 2024-10-04 NOTE — PROGRESS NOTES
Progress Note( Dr. Soto)  10/3/2024  Subjective:   Admit Date: 9/25/2024  PCP: Dorothy Terrazas PA    Admitted For :Septic shock     Consulted For: Evaluate thyroid functions with appear to be somewhat abnormal   Also reviewed treatment plans of diabetes mellitus    Interval History: Patient feels somewhat better has some shortness of breath  Chest x-ray reviewed bilateral infiltrates l possible pneumonia or due to cancer left worse than the right  Blood glucose are much better  Serum free T4 is getting better on his own without any intervention    Denies any chest pains,   Yes mild SOB .   Denies nausea or vomiting.   No new bowel or bladder symptoms.       Intake/Output Summary (Last 24 hours) at 10/3/2024 2059  Last data filed at 10/3/2024 1900  Gross per 24 hour   Intake 1480 ml   Output 2523 ml   Net -1043 ml         DATA    CBC:   Recent Labs     10/01/24  0540 10/02/24  0710 10/03/24  0350   WBC 12.3* 7.6 8.7   HGB 9.5* 8.4* 8.5*   * 371 368    CMP:  Recent Labs     10/01/24  0540 10/02/24  0710 10/03/24  0350    141 141   K 3.1* 4.0 3.6    98* 96*   CO2 34* 37* 38*   BUN 14 14 19   CREATININE 0.7 0.6 0.7   CALCIUM 8.6 8.6 8.7   BILITOT <0.2 <0.2 <0.2   ALKPHOS 63 54 54   AST 15 10* 12*   ALT 10 8* 7*     Lipids: No results found for: \"CHOL\", \"HDL\", \"TRIG\"  Glucose:  Recent Labs     10/03/24  1143 10/03/24  1632 10/03/24  1742   POCGLU 196* 305* 305*     [id number]:  Lab Results   Component Value Date/Time    LABA1C 6.1 05/18/2024 08:30 PM     High Sensitivity TSH:   Lab Results   Component Value Date/Time    TSHHS 0.864 07/25/2024 01:59 PM     Free T3:   Lab Results   Component Value Date/Time    FT3 2.12 09/26/2024 09:45 AM     Free T4:  Lab Results   Component Value Date/Time    T4FREE 1.5 10/02/2024 07:10 AM       IR CHEST TUBE INSERTION   Final Result   Successful right sided chest tube placement and evacuation of   previously demonstrated expanding right-sided

## 2024-10-04 NOTE — PROGRESS NOTES
Pulmonary and Critical Care  Progress Note      VITALS:  BP (!) 106/48   Pulse 89   Temp 98.1 °F (36.7 °C) (Oral)   Resp 18   Ht 1.549 m (5' 1\")   Wt 63.4 kg (139 lb 11.2 oz)   SpO2 94%   BMI 26.40 kg/m²     Subjective:   CHIEF COMPLAINT :SOB     HPI:                The patient is a 69 y.o. female is lying in the bed. She is in mild resp distress. She has small residual right apical pneumothorax post chest tube    Objective:   PHYSICAL EXAM:    LUNGS:Occasional basal crackles  Abd-soft, BS+,NT  Ext- pedal edema  CVS-s1s2, no murmurs      DATA:    CBC:  Recent Labs     10/02/24  0710 10/03/24  0350 10/04/24  0510   WBC 7.6 8.7 7.4   RBC 2.68* 2.63* 2.81*   HGB 8.4* 8.5* 8.9*   HCT 26.9* 26.1* 28.0*    368 343   .4* 99.2 99.6   MCH 31.3* 32.3* 31.7*   MCHC 31.2* 32.6 31.8*   RDW 14.9 15.1* 15.6*      BMP:  Recent Labs     10/02/24  0710 10/03/24  0350 10/04/24  0510    141 136   K 4.0 3.6 4.3   CL 98* 96* 94*   CO2 37* 38* 35*   BUN 14 19 24*   CREATININE 0.6 0.7 0.6   CALCIUM 8.6 8.7 8.8   GLUCOSE 161* 114* 173*      ABG:  No results for input(s): \"PH\", \"PO2ART\", \"RPC8HXC\", \"HCO3\", \"BEART\", \"O2SAT\" in the last 72 hours.  BNP  No results found for: \"BNP\"   D-Dimer:  Lab Results   Component Value Date    DDIMER 4.47 (H) 09/25/2024      Radiology: Trace residual apical pneumothorax on the right, less than 5%.  Diffuse fibrosis, with progressive interstitial infiltrates, bilaterally, most  confluent within the left lower lobe.      Assessment/Plan     Patient Active Problem List    Diagnosis Date Noted    Monoclonal B-cell lymphocytosis 12/12/2022     Priority: Medium    Leucocytosis 12/11/2022     Priority: Medium    Thrombocytosis 11/08/2022     Priority: Medium    Pneumonia of both lungs due to infectious organism 09/29/2024    Septic shock ([facility]) 09/25/2024    Drug therapy 06/24/2024    Moderate malnutrition ([facility]) 05/21/2024    Severe anemia 04/21/2024    Small cell lung cancer, left lower

## 2024-10-04 NOTE — PLAN OF CARE
Problem: Discharge Planning  Goal: Discharge to home or other facility with appropriate resources  10/4/2024 0000 by Khushi Nolan LPN  Outcome: Progressing  10/3/2024 1241 by Maryellen Jackson RN  Outcome: Progressing     Problem: Skin/Tissue Integrity  Goal: Absence of new skin breakdown  Description: 1.  Monitor for areas of redness and/or skin breakdown  2.  Assess vascular access sites hourly  3.  Every 4-6 hours minimum:  Change oxygen saturation probe site  4.  Every 4-6 hours:  If on nasal continuous positive airway pressure, respiratory therapy assess nares and determine need for appliance change or resting period.  10/4/2024 0000 by Khushi Nolan LPN  Outcome: Progressing  10/3/2024 1241 by Maryellen Jackson RN  Outcome: Progressing     Problem: Safety - Adult  Goal: Free from fall injury  10/4/2024 0000 by Khushi Nolan LPN  Outcome: Progressing  10/3/2024 1241 by Maryellen Jackson RN  Outcome: Progressing     Problem: ABCDS Injury Assessment  Goal: Absence of physical injury  10/4/2024 0000 by Khushi Nolan LPN  Outcome: Progressing  10/3/2024 1241 by Maryellen Jackson RN  Outcome: Progressing     Problem: Chronic Conditions and Co-morbidities  Goal: Patient's chronic conditions and co-morbidity symptoms are monitored and maintained or improved  10/4/2024 0000 by Khushi Nolan LPN  Outcome: Progressing  10/3/2024 1241 by Maryellen Jackson, RN  Outcome: Progressing     Problem: Pain  Goal: Verbalizes/displays adequate comfort level or baseline comfort level  10/4/2024 0000 by Khushi Nolan LPN  Outcome: Progressing  10/3/2024 1241 by Maryellen Jackson RN  Outcome: Progressing     Problem: Nutrition Deficit:  Goal: Optimize nutritional status  Outcome: Progressing

## 2024-10-04 NOTE — PROGRESS NOTES
4 Eyes Skin Assessment     NAME:  Lin Bates  YOB: 1955  MEDICAL RECORD NUMBER:  [id number]    The patient is being assessed for  Transfer to New Unit    I agree that at least one RN has performed a thorough Head to Toe Skin Assessment on the patient. ALL assessment sites listed below have been assessed.      Areas assessed by both nurses:    Head, Face, Ears, Shoulders, Back, Chest, Arms, Elbows, Hands, Sacrum. Buttock, Coccyx, Ischium, Legs. Feet and Heels, and Under Medical Devices         Does the Patient have a Wound? No noted wound(s)       Giuseppe Prevention initiated by RN: No  Wound Care Orders initiated by RN: No    Pressure Injury (Stage 3,4, Unstageable, DTI, NWPT, and Complex wounds) if present, place Wound referral order by RN under : No    New Ostomies, if present place, Ostomy referral order under : No     Nurse 1 eSignature: Electronically signed by Tracy Horta RN on 10/3/24 at 11:55 PM EDT    **SHARE this note so that the co-signing nurse can place an eSignature**    Nurse 2 eSignature: Electronically signed by Khushi Nolan LPN on 10/4/24 at 12:23 AM EDT

## 2024-10-05 ENCOUNTER — APPOINTMENT (OUTPATIENT)
Dept: GENERAL RADIOLOGY | Age: 69
DRG: 871 | End: 2024-10-05
Attending: STUDENT IN AN ORGANIZED HEALTH CARE EDUCATION/TRAINING PROGRAM
Payer: MEDICARE

## 2024-10-05 VITALS
TEMPERATURE: 97.7 F | DIASTOLIC BLOOD PRESSURE: 46 MMHG | BODY MASS INDEX: 26.22 KG/M2 | WEIGHT: 138.89 LBS | SYSTOLIC BLOOD PRESSURE: 110 MMHG | RESPIRATION RATE: 16 BRPM | HEIGHT: 61 IN | OXYGEN SATURATION: 92 % | HEART RATE: 86 BPM

## 2024-10-05 PROBLEM — A41.9 SEPTIC SHOCK (HCC): Status: RESOLVED | Noted: 2024-09-25 | Resolved: 2024-10-05

## 2024-10-05 PROBLEM — R65.21 SEPTIC SHOCK (HCC): Status: RESOLVED | Noted: 2024-09-25 | Resolved: 2024-10-05

## 2024-10-05 PROBLEM — J18.9 PNEUMONIA OF BOTH LUNGS DUE TO INFECTIOUS ORGANISM: Status: RESOLVED | Noted: 2024-09-29 | Resolved: 2024-10-05

## 2024-10-05 LAB
ALBUMIN SERPL-MCNC: 2.9 G/DL (ref 3.4–5)
ALBUMIN/GLOB SERPL: 1.3 {RATIO} (ref 1.1–2.2)
ALP SERPL-CCNC: 67 U/L (ref 40–129)
ALT SERPL-CCNC: 13 U/L (ref 10–40)
ANION GAP SERPL CALCULATED.3IONS-SCNC: 6 MMOL/L (ref 9–17)
AST SERPL-CCNC: 10 U/L (ref 15–37)
BASOPHILS # BLD: 0.01 K/UL
BASOPHILS NFR BLD: 0 % (ref 0–1)
BILIRUB DIRECT SERPL-MCNC: <0.2 MG/DL (ref 0–0.3)
BILIRUB INDIRECT SERPL-MCNC: ABNORMAL MG/DL (ref 0–0.7)
BILIRUB SERPL-MCNC: <0.2 MG/DL (ref 0–1)
BUN SERPL-MCNC: 20 MG/DL (ref 7–20)
CA-I BLD-SCNC: 1.25 MMOL/L (ref 1.15–1.33)
CALCIUM SERPL-MCNC: 8.8 MG/DL (ref 8.3–10.6)
CHLORIDE SERPL-SCNC: 98 MMOL/L (ref 99–110)
CO2 SERPL-SCNC: 35 MMOL/L (ref 21–32)
CREAT SERPL-MCNC: 0.6 MG/DL (ref 0.6–1.2)
EOSINOPHIL # BLD: 0.01 K/UL
EOSINOPHILS RELATIVE PERCENT: 0 % (ref 0–3)
ERYTHROCYTE [DISTWIDTH] IN BLOOD BY AUTOMATED COUNT: 15.5 % (ref 11.7–14.9)
GFR, ESTIMATED: >90 ML/MIN/1.73M2
GLUCOSE BLD-MCNC: 153 MG/DL (ref 74–99)
GLUCOSE BLD-MCNC: 304 MG/DL (ref 74–99)
GLUCOSE BLD-MCNC: 323 MG/DL (ref 74–99)
GLUCOSE SERPL-MCNC: 151 MG/DL (ref 74–99)
HCT VFR BLD AUTO: 27.5 % (ref 37–47)
HGB BLD-MCNC: 8.7 G/DL (ref 12.5–16)
IMM GRANULOCYTES # BLD AUTO: 0.12 K/UL
IMM GRANULOCYTES NFR BLD: 2 %
LYMPHOCYTES NFR BLD: 0.56 K/UL
LYMPHOCYTES RELATIVE PERCENT: 7 % (ref 24–44)
MAGNESIUM SERPL-MCNC: 1.9 MG/DL (ref 1.8–2.4)
MCH RBC QN AUTO: 31.8 PG (ref 27–31)
MCHC RBC AUTO-ENTMCNC: 31.6 G/DL (ref 32–36)
MCV RBC AUTO: 100.4 FL (ref 78–100)
MONOCYTES NFR BLD: 0.96 K/UL
MONOCYTES NFR BLD: 12 % (ref 0–4)
NEUTROPHILS NFR BLD: 79 % (ref 36–66)
NEUTS SEG NFR BLD: 6.35 K/UL
PHOSPHATE SERPL-MCNC: 3.3 MG/DL (ref 2.5–4.9)
PLATELET # BLD AUTO: 299 K/UL (ref 140–440)
PMV BLD AUTO: 10.1 FL (ref 7.5–11.1)
POTASSIUM SERPL-SCNC: 4.4 MMOL/L (ref 3.5–5.1)
PROT SERPL-MCNC: 5.1 G/DL (ref 6.4–8.2)
RBC # BLD AUTO: 2.74 M/UL (ref 4.2–5.4)
SODIUM SERPL-SCNC: 139 MMOL/L (ref 136–145)
WBC OTHER # BLD: 8 K/UL (ref 4–10.5)

## 2024-10-05 PROCEDURE — 6360000002 HC RX W HCPCS: Performed by: STUDENT IN AN ORGANIZED HEALTH CARE EDUCATION/TRAINING PROGRAM

## 2024-10-05 PROCEDURE — 71045 X-RAY EXAM CHEST 1 VIEW: CPT

## 2024-10-05 PROCEDURE — 2580000003 HC RX 258: Performed by: STUDENT IN AN ORGANIZED HEALTH CARE EDUCATION/TRAINING PROGRAM

## 2024-10-05 PROCEDURE — 80053 COMPREHEN METABOLIC PANEL: CPT

## 2024-10-05 PROCEDURE — 6360000002 HC RX W HCPCS: Performed by: NURSE PRACTITIONER

## 2024-10-05 PROCEDURE — 82248 BILIRUBIN DIRECT: CPT

## 2024-10-05 PROCEDURE — 83735 ASSAY OF MAGNESIUM: CPT

## 2024-10-05 PROCEDURE — 84100 ASSAY OF PHOSPHORUS: CPT

## 2024-10-05 PROCEDURE — 82962 GLUCOSE BLOOD TEST: CPT

## 2024-10-05 PROCEDURE — 94640 AIRWAY INHALATION TREATMENT: CPT

## 2024-10-05 PROCEDURE — 85025 COMPLETE CBC W/AUTO DIFF WBC: CPT

## 2024-10-05 PROCEDURE — 6370000000 HC RX 637 (ALT 250 FOR IP): Performed by: INTERNAL MEDICINE

## 2024-10-05 PROCEDURE — 82330 ASSAY OF CALCIUM: CPT

## 2024-10-05 PROCEDURE — 6370000000 HC RX 637 (ALT 250 FOR IP): Performed by: PHYSICIAN ASSISTANT

## 2024-10-05 PROCEDURE — 94150 VITAL CAPACITY TEST: CPT

## 2024-10-05 PROCEDURE — 6370000000 HC RX 637 (ALT 250 FOR IP): Performed by: STUDENT IN AN ORGANIZED HEALTH CARE EDUCATION/TRAINING PROGRAM

## 2024-10-05 PROCEDURE — 94761 N-INVAS EAR/PLS OXIMETRY MLT: CPT

## 2024-10-05 PROCEDURE — 99232 SBSQ HOSP IP/OBS MODERATE 35: CPT | Performed by: INTERNAL MEDICINE

## 2024-10-05 PROCEDURE — 94669 MECHANICAL CHEST WALL OSCILL: CPT

## 2024-10-05 PROCEDURE — 2700000000 HC OXYGEN THERAPY PER DAY

## 2024-10-05 RX ORDER — PEN NEEDLE, DIABETIC 31 GX5/16"
1 NEEDLE, DISPOSABLE MISCELLANEOUS DAILY
Qty: 100 EACH | Refills: 0 | Status: SHIPPED | OUTPATIENT
Start: 2024-10-05 | End: 2024-10-05

## 2024-10-05 RX ORDER — ZINC SULFATE 50(220)MG
50 CAPSULE ORAL DAILY
COMMUNITY
Start: 2024-10-06

## 2024-10-05 RX ORDER — INSULIN GLARGINE 100 [IU]/ML
7 INJECTION, SOLUTION SUBCUTANEOUS NIGHTLY
Qty: 3 ADJUSTABLE DOSE PRE-FILLED PEN SYRINGE | Refills: 1 | Status: SHIPPED | OUTPATIENT
Start: 2024-10-05 | End: 2024-10-05

## 2024-10-05 RX ORDER — DEXAMETHASONE 4 MG/1
TABLET ORAL
Qty: 42 TABLET | Refills: 0 | Status: SHIPPED | OUTPATIENT
Start: 2024-10-05 | End: 2024-10-05

## 2024-10-05 RX ORDER — FOLIC ACID 1 MG/1
1 TABLET ORAL DAILY
Qty: 30 TABLET | Refills: 3 | Status: SHIPPED | OUTPATIENT
Start: 2024-10-06

## 2024-10-05 RX ORDER — BLOOD-GLUCOSE METER
1 KIT MISCELLANEOUS DAILY
Qty: 1 KIT | Refills: 0 | Status: SHIPPED | OUTPATIENT
Start: 2024-10-05 | End: 2024-10-05

## 2024-10-05 RX ORDER — PEN NEEDLE, DIABETIC 31 GX5/16"
1 NEEDLE, DISPOSABLE MISCELLANEOUS DAILY
Qty: 100 EACH | Refills: 0 | Status: SHIPPED | OUTPATIENT
Start: 2024-10-05

## 2024-10-05 RX ORDER — INSULIN LISPRO 100 [IU]/ML
0-8 INJECTION, SOLUTION INTRAVENOUS; SUBCUTANEOUS
Qty: 30 DEVICE | Refills: 3 | Status: SHIPPED | OUTPATIENT
Start: 2024-10-05

## 2024-10-05 RX ORDER — FOLIC ACID 1 MG/1
1 TABLET ORAL DAILY
Qty: 30 TABLET | Refills: 3 | Status: SHIPPED | OUTPATIENT
Start: 2024-10-06 | End: 2024-10-05

## 2024-10-05 RX ORDER — MIDODRINE HYDROCHLORIDE 10 MG/1
10 TABLET ORAL
Qty: 90 TABLET | Refills: 3 | Status: SHIPPED | OUTPATIENT
Start: 2024-10-05 | End: 2024-10-05

## 2024-10-05 RX ORDER — MIDODRINE HYDROCHLORIDE 10 MG/1
10 TABLET ORAL
Qty: 90 TABLET | Refills: 3 | Status: SHIPPED | OUTPATIENT
Start: 2024-10-05

## 2024-10-05 RX ORDER — BENZOCAINE/MENTHOL 6 MG-10 MG
LOZENGE MUCOUS MEMBRANE
Qty: 120 G | Refills: 1 | Status: SHIPPED | OUTPATIENT
Start: 2024-10-05 | End: 2024-10-05

## 2024-10-05 RX ORDER — LANOLIN ALCOHOL/MO/W.PET/CERES
100 CREAM (GRAM) TOPICAL DAILY
Qty: 30 TABLET | Refills: 3 | Status: SHIPPED | OUTPATIENT
Start: 2024-10-06 | End: 2024-10-05

## 2024-10-05 RX ORDER — BENZOCAINE/MENTHOL 6 MG-10 MG
LOZENGE MUCOUS MEMBRANE
Qty: 120 G | Refills: 1 | Status: SHIPPED | OUTPATIENT
Start: 2024-10-05 | End: 2024-10-12

## 2024-10-05 RX ORDER — DEXAMETHASONE 4 MG/1
TABLET ORAL
Qty: 42 TABLET | Refills: 0 | Status: SHIPPED | OUTPATIENT
Start: 2024-10-05 | End: 2024-10-26

## 2024-10-05 RX ORDER — GLUCOSAMINE HCL/CHONDROITIN SU 500-400 MG
CAPSULE ORAL
Qty: 100 STRIP | Refills: 0 | Status: SHIPPED | OUTPATIENT
Start: 2024-10-05

## 2024-10-05 RX ORDER — LANOLIN ALCOHOL/MO/W.PET/CERES
100 CREAM (GRAM) TOPICAL DAILY
Qty: 30 TABLET | Refills: 3 | Status: SHIPPED | OUTPATIENT
Start: 2024-10-06

## 2024-10-05 RX ORDER — ZINC SULFATE 50(220)MG
50 CAPSULE ORAL DAILY
COMMUNITY
Start: 2024-10-06 | End: 2024-10-05

## 2024-10-05 RX ORDER — BLOOD-GLUCOSE METER
1 KIT MISCELLANEOUS DAILY
Qty: 1 KIT | Refills: 0 | Status: SHIPPED | OUTPATIENT
Start: 2024-10-05

## 2024-10-05 RX ORDER — LANCETS 30 GAUGE
1 EACH MISCELLANEOUS DAILY
Qty: 100 EACH | Refills: 5 | Status: SHIPPED | OUTPATIENT
Start: 2024-10-05 | End: 2024-10-05

## 2024-10-05 RX ORDER — INSULIN GLARGINE 100 [IU]/ML
7 INJECTION, SOLUTION SUBCUTANEOUS NIGHTLY
Qty: 45 DEVICE | Refills: 1 | Status: SHIPPED | OUTPATIENT
Start: 2024-10-05

## 2024-10-05 RX ORDER — INSULIN LISPRO 100 [IU]/ML
0-8 INJECTION, SOLUTION INTRAVENOUS; SUBCUTANEOUS
Qty: 3 EACH | Refills: 3 | Status: SHIPPED | OUTPATIENT
Start: 2024-10-05 | End: 2024-10-05

## 2024-10-05 RX ORDER — LANCETS 30 GAUGE
1 EACH MISCELLANEOUS DAILY
Qty: 100 EACH | Refills: 5 | Status: SHIPPED | OUTPATIENT
Start: 2024-10-05

## 2024-10-05 RX ORDER — GLUCOSAMINE HCL/CHONDROITIN SU 500-400 MG
CAPSULE ORAL
Qty: 100 STRIP | Refills: 0 | Status: SHIPPED | OUTPATIENT
Start: 2024-10-05 | End: 2024-10-05

## 2024-10-05 RX ADMIN — SODIUM CHLORIDE, PRESERVATIVE FREE 10 ML: 5 INJECTION INTRAVENOUS at 09:55

## 2024-10-05 RX ADMIN — Medication 1 TABLET: at 09:08

## 2024-10-05 RX ADMIN — ZINC SULFATE 220 MG (50 MG) CAPSULE 50 MG: CAPSULE at 09:08

## 2024-10-05 RX ADMIN — SODIUM CHLORIDE: 9 INJECTION, SOLUTION INTRAVENOUS at 06:38

## 2024-10-05 RX ADMIN — ASPIRIN 81 MG: 81 TABLET, CHEWABLE ORAL at 09:09

## 2024-10-05 RX ADMIN — IPRATROPIUM BROMIDE AND ALBUTEROL SULFATE 1 DOSE: 2.5; .5 SOLUTION RESPIRATORY (INHALATION) at 07:44

## 2024-10-05 RX ADMIN — IPRATROPIUM BROMIDE AND ALBUTEROL SULFATE 1 DOSE: 2.5; .5 SOLUTION RESPIRATORY (INHALATION) at 11:43

## 2024-10-05 RX ADMIN — MIDODRINE HYDROCHLORIDE 10 MG: 5 TABLET ORAL at 09:08

## 2024-10-05 RX ADMIN — INSULIN LISPRO 6 UNITS: 100 INJECTION, SOLUTION INTRAVENOUS; SUBCUTANEOUS at 02:20

## 2024-10-05 RX ADMIN — SUCRALFATE 1 G: 1 TABLET ORAL at 00:10

## 2024-10-05 RX ADMIN — FERROUS SULFATE TAB 325 MG (65 MG ELEMENTAL FE) 325 MG: 325 (65 FE) TAB at 09:09

## 2024-10-05 RX ADMIN — HYDROMORPHONE HYDROCHLORIDE 0.25 MG: 1 INJECTION, SOLUTION INTRAMUSCULAR; INTRAVENOUS; SUBCUTANEOUS at 00:36

## 2024-10-05 RX ADMIN — ENOXAPARIN SODIUM 40 MG: 100 INJECTION SUBCUTANEOUS at 09:08

## 2024-10-05 RX ADMIN — HYDROXYUREA 500 MG: 500 CAPSULE ORAL at 09:07

## 2024-10-05 RX ADMIN — Medication 100 MG: at 09:08

## 2024-10-05 RX ADMIN — MIDODRINE HYDROCHLORIDE 10 MG: 5 TABLET ORAL at 12:51

## 2024-10-05 RX ADMIN — METFORMIN HYDROCHLORIDE 500 MG: 500 TABLET, FILM COATED ORAL at 09:09

## 2024-10-05 RX ADMIN — PANTOPRAZOLE SODIUM 40 MG: 40 TABLET, DELAYED RELEASE ORAL at 06:43

## 2024-10-05 RX ADMIN — SUCRALFATE 1 G: 1 TABLET ORAL at 06:43

## 2024-10-05 RX ADMIN — GLIPIZIDE 2.5 MG: 5 TABLET ORAL at 08:00

## 2024-10-05 RX ADMIN — DEXAMETHASONE SODIUM PHOSPHATE 4 MG: 4 INJECTION, SOLUTION INTRAMUSCULAR; INTRAVENOUS at 06:38

## 2024-10-05 RX ADMIN — SUCRALFATE 1 G: 1 TABLET ORAL at 12:51

## 2024-10-05 RX ADMIN — FOLIC ACID 1 MG: 1 TABLET ORAL at 09:09

## 2024-10-05 RX ADMIN — INSULIN LISPRO 12 UNITS: 100 INJECTION, SOLUTION INTRAVENOUS; SUBCUTANEOUS at 12:51

## 2024-10-05 ASSESSMENT — PAIN SCALES - GENERAL
PAINLEVEL_OUTOF10: 6
PAINLEVEL_OUTOF10: 0

## 2024-10-05 ASSESSMENT — PAIN DESCRIPTION - DESCRIPTORS: DESCRIPTORS: ACHING

## 2024-10-05 ASSESSMENT — PAIN DESCRIPTION - LOCATION: LOCATION: RIB CAGE

## 2024-10-05 ASSESSMENT — PAIN - FUNCTIONAL ASSESSMENT: PAIN_FUNCTIONAL_ASSESSMENT: ACTIVITIES ARE NOT PREVENTED

## 2024-10-05 ASSESSMENT — PAIN SCALES - WONG BAKER
WONGBAKER_NUMERICALRESPONSE: NO HURT
WONGBAKER_NUMERICALRESPONSE: NO HURT

## 2024-10-05 ASSESSMENT — PAIN DESCRIPTION - ORIENTATION: ORIENTATION: RIGHT

## 2024-10-05 NOTE — PROGRESS NOTES
Patient Name:  Lin Bates  Patient :  1955  Patient MRN:  [id number]     Primary Oncologist: Burt Birch MD  Referring Provider: Dorothy Terrazas PA    Lin Bates is a 69 y.o. 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     She was found to have hypotensive shock in spite of fluid replacement and required levophed. She was transferred to [facility] ICU for further management.     Interval history   10/5/24  Pt was seen and examined this morning. Not in any acute distress. She underwent thoracentesis by pulmonary on 10/3/24 and developed right pneumothorax. She is s/p chest tube placement by IR on 10/3/24. Plan for CT removal today if no pneumothorax after clumping.     Labs today showed sodium of 139 potassium of 4.4, BUN of 20, creatinine 0.6, Albumin of 2.9. LFTs within normal limits.  CBC with WBC of 8, hemoglobin of 8.7, hematocrit 27.5, MCV of 100.4 and platelets of 299.      Vital Signs: BP (!) 110/46   Pulse 86   Temp 97.7 °F (36.5 °C) (Oral)   Resp 16   Ht 1.549 m (5' 1\")   Wt 63 kg (138 lb 14.2 oz)   SpO2 92%   BMI 26.24 kg/m²      Physical Exam:  CONSTITUTIONAL: awake, alert, on nasal cannula, in mild respiratory distress  EYES: Pallor but no icterus  LUNGS: Bilateral wheeze and coarse breath sounds, right chest tube noted,   CARDIOVASCULAR: S and S2  ABDOMEN: Soft nontender nondistended bowel sounds positive  NEUROLOGIC: Grossly intact  EXTREMITIES: no LE edema      Labs:  Hematology:  Lab Results   Component Value Date    WBC 8.0 10/05/2024    RBC 2.74 (L) 10/05/2024    HGB 8.7 (L) 10/05/2024    HCT 27.5 (L) 10/05/2024    MCV

## 2024-10-05 NOTE — FLOWSHEET NOTE
Physical Therapy Treatment Note  Name: Lin Bates MRN: [id number] :   1955   Date:  10/5/2024   Admission Date: 2024 Room:  [address] [place] [address]   Restrictions/Precautions:  Restrictions/Precautions  Restrictions/Precautions: Fall Risk         Attempted to work with patient at 1510; however, pt had discharged.     Previously filed items:  Social/Functional History  Lives With: Significant other  Type of Home: House  Home Layout: One level  Home Access: Level entry  Bathroom Shower/Tub: Tub/Shower unit  Bathroom Toilet: Standard  Bathroom Equipment: Shower chair, Grab bars in shower  Home Equipment: Walker - Rolling  Receives Help From: Family, Friend(s)  ADL Assistance: Independent  Homemaking Assistance: Independent  Ambulation Assistance: Independent  Transfer Assistance: Independent  Active : No  Patient's  Info: S.O. PROVIDES HER TRANSPORTATION     Long Term Goals  Time Frame for Long Term Goals : In one week:  Long Term Goal 1: Pt will ambulate 400 feet independently  Long Term Goal 2: Pt will ascend/descend 6 steps with a handrail independently  Long Term Goal 3: Pt will independently complete 3 sets of 10 reps of BLE AROM exercises       Electronically signed by:    Christopher Angeles, [id number]  10/5/2024, 3:16 PM

## 2024-10-05 NOTE — PROGRESS NOTES
Patient Name:  Lin Bates  Patient :  1955  Patient MRN:  [id number]     Primary Oncologist: Burt Birch MD  Referring Provider: Dorothy Terrazas PA    Lin Bates is a 69 y.o. female with medical history significant for MPL positive myeloproliferative neoplasm, small monoclonal B cell lymphocytosis, limited stage LORI small cell lung cancer, s/p definitive CRT, prophylactic whole brain radiation therapy, currently on maintenance immunotherapy with durvalumab (received her second cycle on 24), presented to [facility] on 24 with SOB for 2 days, fever, productive cough.     She was found to have hypotensive shock in spite of fluid replacement and required levophed. She was transferred to [facility] ICU for further management.     Interval history   10/4/24  Pt was seen and examined this morning. Not in any acute distress. She underwent thoracentesis by pulmonary on 10/3/24 and developed right pneumothorax. She is s/p chest tube placement by IR on 10/3/24.     Labs today showed sodium of 136 potassium of 4.3, BUN of 24, creatinine 0.6, Albumin of 3. LFTs within normal limits.  CBC with WBC of 7.4, hemoglobin of 8.9, hematocrit 28, MCV of 99.6 and platelets of 343.      Vital Signs: BP (!) 118/59   Pulse 88   Temp 98.6 °F (37 °C) (Oral)   Resp 18   Ht 1.549 m (5' 1\")   Wt 63 kg (139 lb)   SpO2 97%   BMI 26.26 kg/m²      Physical Exam:  CONSTITUTIONAL: awake, alert, on nasal cannula, in mild respiratory distress  EYES: Pallor but no icterus  LUNGS: Bilateral wheeze and coarse breath sounds, right chest tube noted,   CARDIOVASCULAR: S and S2  ABDOMEN: Soft nontender nondistended bowel sounds positive  NEUROLOGIC: Grossly intact  EXTREMITIES: no LE edema      Labs:  Hematology:  Lab Results   Component Value Date    WBC 7.4 10/04/2024    RBC 2.81 (L) 10/04/2024    HGB 8.9 (L) 10/04/2024    HCT 28.0 (L) 10/04/2024    MCV 99.6 10/04/2024    MCH 31.7 (H) 10/04/2024    MCHC 31.8 (L) 10/04/2024

## 2024-10-05 NOTE — PROGRESS NOTES
Progress Note( Dr. Soto)  10/5/2024  Subjective:   Admit Date: 9/25/2024  PCP: Dorothy Terrazas PA    Admitted For :Septic shock     Consulted For: Evaluate thyroid functions with appear to be somewhat abnormal   Also reviewed treatment plans of diabetes mellitus    Interval History: Patient had bilateral thoracentesis for moderate pleural effusion.  Unfortunately right-sided paracentesis in the pneumothorax and chest tube was placed on the right side  However patient is breathing is much better after removing the extra fluid    Patient feels somewhat better has some shortness of breath  Chest x-ray reviewed bilateral infiltrates l possible pneumonia or due to cancer left worse than the right  Blood glucose are much better  Serum free T4 is getting better on his own without any intervention    Denies any chest pains,   Yes mild SOB .   Denies nausea or vomiting.   No new bowel or bladder symptoms.       Intake/Output Summary (Last 24 hours) at 10/5/2024 0741  Last data filed at 10/5/2024 0036  Gross per 24 hour   Intake 580 ml   Output 400 ml   Net 180 ml         DATA    CBC:   Recent Labs     10/03/24  0350 10/04/24  0510 10/05/24  0640   WBC 8.7 7.4 8.0   HGB 8.5* 8.9* 8.7*    343 299    CMP:  Recent Labs     10/03/24  0350 10/04/24  0510 10/05/24  0640    136 139   K 3.6 4.3 4.4   CL 96* 94* 98*   CO2 38* 35* 35*   BUN 19 24* 20   CREATININE 0.7 0.6 0.6   CALCIUM 8.7 8.8 8.8   BILITOT <0.2 <0.2 <0.2   ALKPHOS 54 58 67   AST 12* 10* 10*   ALT 7* 11 13     Lipids: No results found for: \"CHOL\", \"HDL\", \"TRIG\"  Glucose:  Recent Labs     10/04/24  1632 10/04/24  1928 10/05/24  0211   POCGLU 158* 261* 323*     [id number]:  Lab Results   Component Value Date/Time    LABA1C 6.1 05/18/2024 08:30 PM     High Sensitivity TSH:   Lab Results   Component Value Date/Time    TSHHS 0.864 07/25/2024 01:59 PM     Free T3:   Lab Results   Component Value Date/Time    FT3 2.12 09/26/2024 09:45 AM     Free

## 2024-10-05 NOTE — CARE COORDINATION
Reviewed chart, discussed in IDR , plan home today if Chest Tube removed.  Nursing will need to call/fax [facility].     DISCHARGING NURSE: Please call [facility] ([facility] [phone]) to notify pt was discharged. Also, [facility] fax number is [phone]. Fax the AVS, d/c Summary if available & Inpatient Home Health Needs order to the above number. If the fax fails to send call [facility] to receive a additional fax number to send the mentioned information.

## 2024-10-05 NOTE — PROGRESS NOTES
Progress Note( Dr. Soto)  10/4/2024  Subjective:   Admit Date: 9/25/2024  PCP: Dorothy Terrazas PA    Admitted For :Septic shock     Consulted For: Evaluate thyroid functions with appear to be somewhat abnormal   Also reviewed treatment plans of diabetes mellitus    Interval History: Patient had bilateral thoracentesis for moderate pleural effusion.  Unfortunately right-sided paracentesis in the pneumothorax and chest tube was placed on the right side  However patient is breathing is much better after removing the extra fluid    Patient feels somewhat better has some shortness of breath  Chest x-ray reviewed bilateral infiltrates l possible pneumonia or due to cancer left worse than the right  Blood glucose are much better  Serum free T4 is getting better on his own without any intervention    Denies any chest pains,   Yes mild SOB .   Denies nausea or vomiting.   No new bowel or bladder symptoms.       Intake/Output Summary (Last 24 hours) at 10/4/2024 2111  Last data filed at 10/4/2024 0852  Gross per 24 hour   Intake 150 ml   Output --   Net 150 ml         DATA    CBC:   Recent Labs     10/02/24  0710 10/03/24  0350 10/04/24  0510   WBC 7.6 8.7 7.4   HGB 8.4* 8.5* 8.9*    368 343    CMP:  Recent Labs     10/02/24  0710 10/03/24  0350 10/04/24  0510    141 136   K 4.0 3.6 4.3   CL 98* 96* 94*   CO2 37* 38* 35*   BUN 14 19 24*   CREATININE 0.6 0.7 0.6   CALCIUM 8.6 8.7 8.8   BILITOT <0.2 <0.2 <0.2   ALKPHOS 54 54 58   AST 10* 12* 10*   ALT 8* 7* 11     Lipids: No results found for: \"CHOL\", \"HDL\", \"TRIG\"  Glucose:  Recent Labs     10/04/24  1134 10/04/24  1632 10/04/24  1928   POCGLU 301* 158* 261*     [id number]:  Lab Results   Component Value Date/Time    LABA1C 6.1 05/18/2024 08:30 PM     High Sensitivity TSH:   Lab Results   Component Value Date/Time    TSHHS 0.864 07/25/2024 01:59 PM     Free T3:   Lab Results   Component Value Date/Time    FT3 2.12 09/26/2024 09:45 AM     Free

## 2024-10-05 NOTE — PROGRESS NOTES
Pulmonary and Critical Care  Progress Note      VITALS:  BP (!) 110/46   Pulse 86   Temp 97.7 °F (36.5 °C) (Oral)   Resp 16   Ht 1.549 m (5' 1\")   Wt 63 kg (138 lb 14.2 oz)   SpO2 92%   BMI 26.24 kg/m²     Subjective:   CHIEF COMPLAINT :SOB     HPI:                The patient is a 69 y.o. female is sitting in the bed. She is on room air. She is not in acute resp distress    Objective:   PHYSICAL EXAM:    LUNGS:Occasional basal crackles  Abd-soft, BS+,NT  Ext- pedal edema  CVS-s1s2, no murmurs      DATA:    CBC:  Recent Labs     10/03/24  0350 10/04/24  0510 10/05/24  0640   WBC 8.7 7.4 8.0   RBC 2.63* 2.81* 2.74*   HGB 8.5* 8.9* 8.7*   HCT 26.1* 28.0* 27.5*    343 299   MCV 99.2 99.6 100.4*   MCH 32.3* 31.7* 31.8*   MCHC 32.6 31.8* 31.6*   RDW 15.1* 15.6* 15.5*      BMP:  Recent Labs     10/03/24  0350 10/04/24  0510 10/05/24  0640    136 139   K 3.6 4.3 4.4   CL 96* 94* 98*   CO2 38* 35* 35*   BUN 19 24* 20   CREATININE 0.7 0.6 0.6   CALCIUM 8.7 8.8 8.8   GLUCOSE 114* 173* 151*      ABG:  No results for input(s): \"PH\", \"PO2ART\", \"MCD9ICP\", \"HCO3\", \"BEART\", \"O2SAT\" in the last 72 hours.  BNP  No results found for: \"BNP\"   D-Dimer:  Lab Results   Component Value Date    DDIMER 4.47 (H) 09/25/2024      Radiology: No Pneumothorax post clamping      Assessment/Plan     Patient Active Problem List    Diagnosis Date Noted    Monoclonal B-cell lymphocytosis 12/12/2022     Priority: Medium    Leucocytosis 12/11/2022     Priority: Medium    Thrombocytosis 11/08/2022     Priority: Medium    Pneumonia of both lungs due to infectious organism 09/29/2024    Septic shock (HCC) 09/25/2024    Drug therapy 06/24/2024    Moderate malnutrition (HCC) 05/21/2024    Severe anemia 04/21/2024    Small cell lung cancer, left lower lobe (HCC) 02/02/2024    Need for hepatitis B screening test 02/01/2024    Lung nodule, solitary 04/30/2023    Thickened endometrium 03/22/2023    PMB (postmenopausal bleeding) 03/22/2023      Bilateral Pneumonia  Acute Hypoxic resp failure- improving  Bilateral Pleural effusions s/p thoracentesis- transudate  Right Iatrogenic Pneumothorax s/p right chest tube- resolved  Anemia  Myeloproliferative neoplasm  LORI Small Cell Lung ca with mets to brain on Immunosuppressive therapy  Debility  Cig smoker  Moderate Malnutrition     Diuresis  I/O chart  DC chest tube  Nutritional optimization  Inhalers  ICS  Oob  BIPAP prn  Keep sats > 92%  Await placement  C/w present management    Electronically signed by Wilman Chong MD on 10/5/2024 at 12:11 PM

## 2024-10-06 LAB
MICROORGANISM SPEC CULT: NORMAL
MICROORGANISM SPEC CULT: NORMAL
MICROORGANISM/AGENT SPEC: NORMAL
SERVICE CMNT-IMP: NORMAL
SERVICE CMNT-IMP: NORMAL
SPECIMEN DESCRIPTION: NORMAL
SPECIMEN DESCRIPTION: NORMAL

## 2024-10-07 ENCOUNTER — CARE COORDINATION (OUTPATIENT)
Dept: CASE MANAGEMENT | Age: 69
End: 2024-10-07

## 2024-10-07 NOTE — CARE COORDINATION
Care Transitions Note    Initial Call - Call within 2 business days of discharge: Yes    Patient Current Location:  Home: [address] [place] [id number]    Care Transition Nurse contacted the patient by telephone to perform post hospital discharge assessment, verified name and  as identifiers. Provided introduction to self, and explanation of the Care Transition Nurse role.     Patient: Lin Bates    Patient : 1955   MRN: [id number]    Reason for Admission: DX: Lung CA , Hypotensive shock  [facility] -10/5/24  Discharge Date: 10/5/24  RURS: Readmission Risk Score: 23.7      Last Discharge Facility       Date Complaint Diagnosis Description Type Department Provider    24  Pleural effusion on right ... Admission (Discharged) [facility] 4N Dora Landis MD            Was this an external facility discharge? No    Additional needs identified to be addressed with provider                Method of communication with provider: none.    Patients top risk factors for readmission: medical condition-see above, medication management, and utilization of services    Interventions to address risk factors:   Education: see below  Review of patient management of conditions/medications: see below  Home Health: [facility] [phone]     Care Summary Note: Pt pleasant during call. Reports appetite is good, B/B are at baseline. Pt denies increase SOB, increase cough. Does report tenderness and tightness around chest tube site. Pt educated on S/S of infection: Increase in redness, increase in warmth, odor , purulent drainage and fever. Denies n/v/d and reports being afebrile.All meds reviewed , Pt had concerns regarding Lantus pen and if it was put together correctly. CTN and pt reviewed instructions in box together. Pt still uncertain,. Pt was d/c home with [facility] Pt instructed to ask [facility] nurse about Lantus pen. Pt states she hasn't been contacted by [facility]. CTN placed call to [facility] [phone] spoke with Faith and updated

## 2024-10-08 LAB — NON-GYN CYTOLOGY REPORT: NORMAL

## 2024-10-10 LAB
MICROORGANISM SPEC CULT: NORMAL
SERVICE CMNT-IMP: NORMAL
SPECIMEN DESCRIPTION: NORMAL

## 2024-10-15 ENCOUNTER — CARE COORDINATION (OUTPATIENT)
Dept: CASE MANAGEMENT | Age: 69
End: 2024-10-15

## 2024-10-15 NOTE — CARE COORDINATION
Care Transitions Note    Follow Up Call     Attempted to reach patient for transitions of care follow up.  Unable to reach patient.      Outreach Attempts:   Unable to leave message.     Care Summary Note:   Message received customer is unavailable try later.    Follow Up Appointment:   Future Appointments         Provider Specialty Dept Phone    10/22/2024 2:00 PM SCHEDULE, [facility] Infusion Therapy [phone]    10/22/2024 2:15 PM Burt Birch MD Oncology [phone]    10/23/2024 1:45 PM [facility] [phone]    11/6/2024 3:00 PM Li Hope MD Nephrology [phone]    11/19/2024 2:00 PM SCHEDULE, [facility] Infusion Therapy [phone]    11/20/2024 1:45 PM [facility] [phone]    1/6/2025 3:00 PM Hunter Guo MD; SCHEDULE, [facility]C NURSE Radiation Oncology [phone]            Plan for follow-up call in 2-5 days based on severity of symptoms and risk factors. Plan for next call: symptom management-   self management-     Chastity Mckinnon LPN

## 2024-10-16 ENCOUNTER — TRANSCRIBE ORDERS (OUTPATIENT)
Dept: ADMINISTRATIVE | Age: 69
End: 2024-10-16

## 2024-10-16 DIAGNOSIS — E04.1 THYROID NODULE: Primary | ICD-10-CM

## 2024-10-17 RX ORDER — HYDROXYUREA 500 MG/1
CAPSULE ORAL
Refills: 0 | OUTPATIENT
Start: 2024-10-17

## 2024-10-17 RX ORDER — FAMOTIDINE 20 MG/1
TABLET, FILM COATED ORAL
Refills: 0 | OUTPATIENT
Start: 2024-10-17

## 2024-10-21 ENCOUNTER — HOSPITAL ENCOUNTER (OUTPATIENT)
Dept: ULTRASOUND IMAGING | Age: 69
Discharge: HOME | End: 2024-10-21
Payer: MEDICARE

## 2024-10-21 ENCOUNTER — CARE COORDINATION (OUTPATIENT)
Dept: CASE MANAGEMENT | Age: 69
End: 2024-10-21

## 2024-10-21 DIAGNOSIS — Z79.899 DRUG THERAPY: Primary | ICD-10-CM

## 2024-10-21 DIAGNOSIS — E04.1 THYROID NODULE: ICD-10-CM

## 2024-10-21 DIAGNOSIS — C34.32 SMALL CELL LUNG CANCER, LEFT LOWER LOBE (HCC): ICD-10-CM

## 2024-10-21 PROCEDURE — 76536 US EXAM OF HEAD AND NECK: CPT

## 2024-10-21 NOTE — CARE COORDINATION
Care Transitions Note    Follow Up Call     DX: Lung CA , Hypotensive shock     Patient Current Location:  Home: [address] [place] [id number]    [facility] Care Coordinator contacted the patient by telephone. Verified name and  as identifiers.    Additional needs identified to be addressed with provider   Patient reported that her legs have been feeling weak for the past couple days. Patient stated that she fell to her knees last night while in her kitchen. Patient denied any pain or injury. Writer ask if patient would like to request some PT and she declined. Writer reported the fall to patient's PCP.                  Method of communication with provider: phone.    Care Summary Note: Spoke with Lin Bates who reported that she was doing ok with her breathing. Patient stated that she has notice a new onset of weakness in her legs. Please see yellow box. Patient denied cp, sob, cough, dizziness, headache, n/v, diarrhea, abdominal pains, fever, or chills. Patient report that appetite and fluid intake is good and denied any problems with bowel or bladder. Patient reported that he is taking all medications as ordered. Patient denied any other needs at this time. Patient instructed to continue to monitor s/s, reporting any that may present to MD immediately for early intervention.  Patient is agreeable to f/u calls.     Plan of care updates since last contact:          Advance Care Planning   The patient has the following advanced directives on file:  Advance Directives       Power of  Living Will ACP-Advance Directive ACP-Power of     Not on File Not on File Not on File Not on File            The patient has appointed the following active healthcare agents:    Primary Decision Maker: Matt Bhat - Boyfriend - [phone]          Medication Review:  No changes since last call.     Remote Patient Monitoring:  Offered patient enrollment in the Remote Patient Monitoring (RPM) program for in-home

## 2024-10-22 ENCOUNTER — HOSPITAL ENCOUNTER (OUTPATIENT)
Age: 69
Discharge: HOME | End: 2024-10-22
Payer: MEDICARE

## 2024-10-22 DIAGNOSIS — Z79.899 DRUG THERAPY: ICD-10-CM

## 2024-10-22 DIAGNOSIS — C34.32 SMALL CELL LUNG CANCER, LEFT LOWER LOBE (HCC): ICD-10-CM

## 2024-10-22 LAB
ALBUMIN SERPL-MCNC: 3.5 G/DL (ref 3.4–5)
ALBUMIN/GLOB SERPL: 1.2 {RATIO} (ref 1.1–2.2)
ALP SERPL-CCNC: 83 U/L (ref 40–129)
ALT SERPL-CCNC: 16 U/L (ref 10–40)
ANION GAP SERPL CALCULATED.3IONS-SCNC: 19 MMOL/L (ref 4–16)
AST SERPL-CCNC: 13 U/L (ref 15–37)
BASOPHILS # BLD: 0.03 K/UL
BASOPHILS NFR BLD: 0 % (ref 0–1)
BILIRUB SERPL-MCNC: 0.4 MG/DL (ref 0–1)
BUN SERPL-MCNC: 46 MG/DL (ref 6–23)
CALCIUM SERPL-MCNC: 9.1 MG/DL (ref 8.3–10.6)
CHLORIDE SERPL-SCNC: 97 MMOL/L (ref 99–110)
CO2 SERPL-SCNC: 22 MMOL/L (ref 21–32)
CREAT SERPL-MCNC: 0.9 MG/DL (ref 0.6–1.1)
EOSINOPHIL # BLD: 0.02 K/UL
EOSINOPHILS RELATIVE PERCENT: 0 % (ref 0–3)
ERYTHROCYTE [DISTWIDTH] IN BLOOD BY AUTOMATED COUNT: 15.9 % (ref 11.7–14.9)
GFR, ESTIMATED: 69 ML/MIN/1.73M2
GLUCOSE SERPL-MCNC: 327 MG/DL (ref 70–99)
HCT VFR BLD AUTO: 39.7 % (ref 37–47)
HGB BLD-MCNC: 13 G/DL (ref 12.5–16)
IMM GRANULOCYTES # BLD AUTO: 0.11 K/UL
IMM GRANULOCYTES NFR BLD: 1 %
LYMPHOCYTES NFR BLD: 0.36 K/UL
LYMPHOCYTES RELATIVE PERCENT: 3 % (ref 24–44)
MCH RBC QN AUTO: 31.9 PG (ref 27–31)
MCHC RBC AUTO-ENTMCNC: 32.7 G/DL (ref 32–36)
MCV RBC AUTO: 97.3 FL (ref 78–100)
MONOCYTES NFR BLD: 0.27 K/UL
MONOCYTES NFR BLD: 2 % (ref 0–4)
NEUTROPHILS NFR BLD: 93 % (ref 36–66)
NEUTS SEG NFR BLD: 10.4 K/UL
PLATELET # BLD AUTO: 155 K/UL (ref 140–440)
PMV BLD AUTO: 11.1 FL (ref 7.5–11.1)
POTASSIUM SERPL-SCNC: 5.6 MMOL/L (ref 3.5–5.1)
PROT SERPL-MCNC: 6.4 G/DL (ref 6.4–8.2)
RBC # BLD AUTO: 4.08 M/UL (ref 4.2–5.4)
SODIUM SERPL-SCNC: 138 MMOL/L (ref 135–145)
TSH SERPL DL<=0.05 MIU/L-ACNC: 0.5 UIU/ML (ref 0.27–4.2)
WBC OTHER # BLD: 11.2 K/UL (ref 4–10.5)

## 2024-10-22 PROCEDURE — 84443 ASSAY THYROID STIM HORMONE: CPT

## 2024-10-22 PROCEDURE — 36415 COLL VENOUS BLD VENIPUNCTURE: CPT

## 2024-10-22 PROCEDURE — 85025 COMPLETE CBC W/AUTO DIFF WBC: CPT

## 2024-10-22 PROCEDURE — 80053 COMPREHEN METABOLIC PANEL: CPT

## 2024-10-23 ENCOUNTER — OFFICE VISIT (OUTPATIENT)
Dept: ONCOLOGY | Age: 69
End: 2024-10-23

## 2024-10-23 ENCOUNTER — HOSPITAL ENCOUNTER (OUTPATIENT)
Dept: INFUSION THERAPY | Age: 69
Discharge: HOME | End: 2024-10-23
Payer: MEDICARE

## 2024-10-23 VITALS
SYSTOLIC BLOOD PRESSURE: 125 MMHG | OXYGEN SATURATION: 97 % | BODY MASS INDEX: 22.69 KG/M2 | RESPIRATION RATE: 18 BRPM | TEMPERATURE: 97.7 F | DIASTOLIC BLOOD PRESSURE: 58 MMHG | HEIGHT: 61 IN | WEIGHT: 120.2 LBS | HEART RATE: 75 BPM

## 2024-10-23 VITALS
TEMPERATURE: 97.7 F | OXYGEN SATURATION: 97 % | DIASTOLIC BLOOD PRESSURE: 58 MMHG | SYSTOLIC BLOOD PRESSURE: 125 MMHG | HEART RATE: 75 BPM

## 2024-10-23 DIAGNOSIS — C34.32 SMALL CELL LUNG CANCER, LEFT LOWER LOBE (HCC): Primary | ICD-10-CM

## 2024-10-23 DIAGNOSIS — Z79.899 DRUG THERAPY: ICD-10-CM

## 2024-10-23 PROCEDURE — 96413 CHEMO IV INFUSION 1 HR: CPT

## 2024-10-23 PROCEDURE — 6360000002 HC RX W HCPCS: Performed by: INTERNAL MEDICINE

## 2024-10-23 PROCEDURE — 2580000003 HC RX 258: Performed by: INTERNAL MEDICINE

## 2024-10-23 RX ORDER — SODIUM CHLORIDE 9 MG/ML
5-250 INJECTION, SOLUTION INTRAVENOUS PRN
Status: CANCELLED | OUTPATIENT
Start: 2024-10-23

## 2024-10-23 RX ORDER — ALBUTEROL SULFATE 90 UG/1
4 INHALANT RESPIRATORY (INHALATION) PRN
Status: CANCELLED | OUTPATIENT
Start: 2024-10-23

## 2024-10-23 RX ORDER — DIPHENHYDRAMINE HYDROCHLORIDE 50 MG/ML
50 INJECTION INTRAMUSCULAR; INTRAVENOUS
Status: CANCELLED | OUTPATIENT
Start: 2024-10-23

## 2024-10-23 RX ORDER — ONDANSETRON 2 MG/ML
8 INJECTION INTRAMUSCULAR; INTRAVENOUS
Status: CANCELLED | OUTPATIENT
Start: 2024-10-23

## 2024-10-23 RX ORDER — HEPARIN SODIUM (PORCINE) LOCK FLUSH IV SOLN 100 UNIT/ML 100 UNIT/ML
500 SOLUTION INTRAVENOUS PRN
Status: CANCELLED | OUTPATIENT
Start: 2024-10-23

## 2024-10-23 RX ORDER — ACETAMINOPHEN 325 MG/1
650 TABLET ORAL
Status: CANCELLED | OUTPATIENT
Start: 2024-10-23

## 2024-10-23 RX ORDER — SODIUM CHLORIDE 0.9 % (FLUSH) 0.9 %
5-40 SYRINGE (ML) INJECTION PRN
Status: DISCONTINUED | OUTPATIENT
Start: 2024-10-23 | End: 2024-10-24 | Stop reason: HOSPADM

## 2024-10-23 RX ORDER — EPINEPHRINE 1 MG/ML
0.3 INJECTION, SOLUTION, CONCENTRATE INTRAVENOUS PRN
Status: CANCELLED | OUTPATIENT
Start: 2024-10-23

## 2024-10-23 RX ORDER — MEPERIDINE HYDROCHLORIDE 50 MG/ML
12.5 INJECTION INTRAMUSCULAR; INTRAVENOUS; SUBCUTANEOUS PRN
Status: CANCELLED | OUTPATIENT
Start: 2024-10-23

## 2024-10-23 RX ORDER — FAMOTIDINE 10 MG/ML
20 INJECTION, SOLUTION INTRAVENOUS
Status: CANCELLED | OUTPATIENT
Start: 2024-10-23

## 2024-10-23 RX ORDER — SODIUM CHLORIDE 0.9 % (FLUSH) 0.9 %
5-40 SYRINGE (ML) INJECTION PRN
Status: CANCELLED | OUTPATIENT
Start: 2024-10-23

## 2024-10-23 RX ORDER — SODIUM CHLORIDE 9 MG/ML
INJECTION, SOLUTION INTRAVENOUS CONTINUOUS
Status: CANCELLED | OUTPATIENT
Start: 2024-10-23

## 2024-10-23 RX ADMIN — SODIUM CHLORIDE 1500 MG: 9 INJECTION, SOLUTION INTRAVENOUS at 14:40

## 2024-10-23 RX ADMIN — SODIUM CHLORIDE, PRESERVATIVE FREE 10 ML: 5 INJECTION INTRAVENOUS at 14:41

## 2024-10-23 RX ADMIN — SODIUM CHLORIDE, PRESERVATIVE FREE 20 ML: 5 INJECTION INTRAVENOUS at 15:51

## 2024-10-23 ASSESSMENT — PATIENT HEALTH QUESTIONNAIRE - PHQ9
1. LITTLE INTEREST OR PLEASURE IN DOING THINGS: NOT AT ALL
2. FEELING DOWN, DEPRESSED OR HOPELESS: NOT AT ALL
SUM OF ALL RESPONSES TO PHQ QUESTIONS 1-9: 0
SUM OF ALL RESPONSES TO PHQ QUESTIONS 1-9: 0
SUM OF ALL RESPONSES TO PHQ9 QUESTIONS 1 & 2: 0
SUM OF ALL RESPONSES TO PHQ QUESTIONS 1-9: 0
SUM OF ALL RESPONSES TO PHQ QUESTIONS 1-9: 0

## 2024-10-23 NOTE — PROGRESS NOTES
Wheel chaired to infusion area, here today for chemotherapy. SOB at times Dr. Birch aware per pt lungs sounded good. Right chest mediport accessed, positive blood return noted. Labs reviewed, Labs within parameter limits.  Chemo administered as ordered. Call light within reach. Tolerated infusion without incident.  Discharge instructions provided. RTC 11/20        Status appropriately assessed and documented. All required labs and results reviewed. Treatment approved by provider. Treatment orders and medications verified by 2 Registered Nurses where applicable. Treatment plan was confirmed with patient prior to administration, and educated the need to report any treatment-related symptoms

## 2024-10-23 NOTE — PROGRESS NOTES
MA Rooming Questions  Patient: Lin Bates  MRN: [id number]    Date: 10/23/2024        1. Do you have any new issues?   yes - Patient states feels short of breath but this is not constant. Patients  states this was brought up this morning. Patient states feels really weak. Patient states is having trouble walking; states noticed this last Friday. Patient wants to know why she feels so week.          2. Do you need any refills on medications?    no    3. Have you had any imaging done since your last visit?   no    4. Have you been hospitalized or seen in the emergency room since your last visit here?   no    5. Did the patient have a depression screening completed today? yes    PHQ-9 Total Score: 0 (10/23/2024  1:29 PM)       PHQ-9 Given to (if applicable):               PHQ-9 Score (if applicable):                     [] Positive     []  Negative              Does question #9 need addressed (if applicable)                     [] Yes    []  No               Tracy Car MA      
There is a 1.6 cm left upper lobe irregular cystic lesion with a peripheral 0.8 x 0.3 cm nodular component.  Differential considerations include infectious/inflammatory process or neoplasm.  Borderline endometrial thickening given postmenopausal status.  Consider pelvic ultrasound for further evaluation.    CT chest on 5/3/23 showed mildly increased size of a cavitary lesion in the left upper lobe with wall thickening along its inferior aspect. Infectious/inflammatory and neoplastic etiologies remain in the differential.  Follow-up chest CT in 3-6 months would be reasonable.    CT abdomen and pelvis on 7/11/23 showed No acute abnormality in the abdomen or pelvis.    CT chest done on 9/5/23 showed rapidly enlarging solid pulmonary nodule within the left upper lobe superior segment which measures 7 mm on today's examination; however, measured 3 mm in May 2023 and was not present in December 2022.  Findings concerning for malignancy versus infectious/inflammatory etiology. Compared to prior CT chest May 3, 2023, overall stable size of cavitary lesion within the left upper lobe with slight interval decreased size of nodular component at the inferior aspect of the lesion.    She had bone marrow biopsy on 9/22/23 and it showed bone marrow with trilineage maturing hematopoiesis and mild increase in megakaryocytes. A monoclonal B-cell population is detected (2% of the cellularity). 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     CT chest done on 12/5/23 showed significantly increased size of multiple nodules in the superior segment of the left lower lobe, now measuring up to 1.9 cm.  Infectious/inflammatory and neoplastic etiologies are in the differential. Thin walled cavitary lesion in the left upper lobe is stable to minimally increased in size from the most recent comparison, although is significantly increased from 12/27/2022.  There is unchanged focal nodular

## 2024-10-29 ENCOUNTER — CARE COORDINATION (OUTPATIENT)
Dept: CASE MANAGEMENT | Age: 69
End: 2024-10-29

## 2024-10-29 NOTE — CARE COORDINATION
Care Transitions Note    Follow Up Call     Attempted to reach patient for transitions of care follow up.  Unable to reach patient.      Outreach Attempts:   Unable to leave message.       Follow Up Appointment:   Future Appointments         Provider Specialty Dept Phone    11/6/2024 3:00 PM Li Hope MD Nephrology [phone]    11/20/2024 1:45 PM SCHEDULE, [facility] MED ONC TREATMENT Infusion Therapy [phone]    11/20/2024 2:45 PM Burt Birch MD Oncology [phone]    12/18/2024 1:45 PM [facility] [phone]    1/6/2025 3:00 PM Hunter Guo MD; SCHEDULE, [facility] RAD ONC NURSE Radiation Oncology [phone]            Plan for follow-up call in 2-5 days based on severity of symptoms and risk factors.    Melanie Leal

## 2024-10-31 ENCOUNTER — CARE COORDINATION (OUTPATIENT)
Dept: CASE MANAGEMENT | Age: 69
End: 2024-10-31

## 2024-10-31 NOTE — CARE COORDINATION
Care Transitions Note    Follow Up Call     Patient Current Location:  Home: [address] [place] [id number]    [facility] Care Coordinator contacted the patient by telephone. Verified name and  as identifiers.    Additional needs identified to be addressed with provider   No needs identified                 Method of communication with provider: none.    Care Summary Note:   Spoke to patient. She stated she is doing good. Denies sob, cough, fever, cp, ha, lh, dizziness, pain, swelling, falls or weakness. Appetite and fluid intake is good. . No elimination problems of bladder or bowel. She is taking medications as prescribed. No questions, needs or concerns at this time.     Plan of care updates since last contact:       Advance Care Planning:   Does patient have an Advance Directive: not on file.    Primary Decision Maker: Matt Bhatfrienleon - [phone]       Medication Review:  No changes since last call.     Remote Patient Monitoring:  Offered patient enrollment in the Remote Patient Monitoring (RPM) program for in-home monitoring: Patient is not eligible for RPM program because: affiliate provider.    Assessments:  Care Transitions Subsequent and Final Call    Subsequent and Final Calls  Do you have any ongoing symptoms?: No  Have your medications changed?: No  Do you have any questions related to your medications?: No  Do you currently have any active services?: No  Do you have any needs or concerns that I can assist you with?: No  Identified Barriers: None  Care Transitions Interventions     Transportation Support: Declined    Meals on Wheels: Declined       Senior Services: Declined     Medication Assistance Program: Declined       Social Work: Declined    Other Interventions:              Follow Up Appointment:   [facility] appointment attended as scheduled   Future Appointments         Provider Specialty Dept Phone    2024 1:45 PM SCHEDULE, [facility] Infusion Therapy

## 2024-11-06 ENCOUNTER — CARE COORDINATION (OUTPATIENT)
Dept: CASE MANAGEMENT | Age: 69
End: 2024-11-06

## 2024-11-06 ENCOUNTER — TELEPHONE (OUTPATIENT)
Dept: ONCOLOGY | Age: 69
End: 2024-11-06

## 2024-11-06 NOTE — CARE COORDINATION
Care Transitions Note    Final Call     Patient Current Location:  Home: [address] [place] [id number]    Care Transition Nurse contacted the patient by telephone. Verified name and  as identifiers.    Patient graduated from the Care Transitions program on 24.  Patient/family verbalizes confidence in the ability to self-manage at this time..      Advance Care Planning:   Does patient have an Advance Directive: patient declined education.    Handoff:   Patient was not referred to the ACM team due to patient declined services.      Care Summary Note: Pt polite and engaged during call. Denies N/V/D, reports B/B are at baseline. No reports of CP, Increase SOB or palpitations. Pt continues w/ intermittent dizziness , Pt encouraged to take her time when getting up , slowly rising w/ no sudden movements and use AD as needed. . Pt VU. Pt reports she is well hydrated , S/S of dehydration reviewed and reinforced. Pt reports BLE weakness is improving , fall education reviewed and reinforced. Pt denies falls or LOB  since last call. Pt with no med changes since last call . Pt updated on CTN program closing and was in agreement. Pt encouraged to monitor VS and symptoms daily and contact PCP /911 if needs present    Assessments:  Care Transitions Subsequent and Final Call    Subsequent and Final Calls  Do you have any ongoing symptoms?: No  Have your medications changed?: No  Do you have any questions related to your medications?: No  Do you currently have any active services?: No  Do you have any needs or concerns that I can assist you with?: No  Identified Barriers: None  Care Transitions Interventions  No Identified Needs     Transportation Support: Declined    Meals on Wheels: Declined       Senior Services: Declined     Medication Assistance Program: Declined       Social Work: Declined    Other Interventions:              Upcoming Appointments:    Future Appointments         Provider Specialty Dept Phone

## 2024-11-06 NOTE — TELEPHONE ENCOUNTER
Mathew from [facility] office called and lvm that patient had abnormal Thyroid u/s and patient declines biopsy.

## 2024-11-13 ENCOUNTER — TELEPHONE (OUTPATIENT)
Dept: ONCOLOGY | Age: 69
End: 2024-11-13

## 2024-11-13 DIAGNOSIS — C34.32 SMALL CELL LUNG CANCER, LEFT LOWER LOBE (HCC): Primary | ICD-10-CM

## 2024-11-13 NOTE — TELEPHONE ENCOUNTER
Patients [facility] nurse called stating patient O2 sat was \"running 89-90% today, baseline is 95-98%\" and patient is feeling \"a little more SOB\" patients BP was 92/50 which \"is low for her\"  but states patient is not complaining of feeling dizzy, lightheaded or unsteady. Per [facility] nurse patient has not been eating well and is not drinking much and per patient \"does not like to drink water\" Patient is taking Midodrine TID per [facility] nurse. Dr. Birch notified and orders received for patient to get a chest xray, if symptoms worsen patient is to go to the ED. [facility] nurse to call patient and inform her of xray order and instructions to go to ED should symptoms worsen. [facility] nurse given clinic number to call should needs arise tomorrow.

## 2024-11-14 ENCOUNTER — HOSPITAL ENCOUNTER (OUTPATIENT)
Dept: GENERAL RADIOLOGY | Age: 69
Discharge: HOME | End: 2024-11-14
Payer: MEDICARE

## 2024-11-14 ENCOUNTER — HOSPITAL ENCOUNTER (OUTPATIENT)
Age: 69
Discharge: HOME | End: 2024-11-14
Payer: MEDICARE

## 2024-11-14 DIAGNOSIS — C34.32 SMALL CELL LUNG CANCER, LEFT LOWER LOBE (HCC): ICD-10-CM

## 2024-11-14 PROCEDURE — 71046 X-RAY EXAM CHEST 2 VIEWS: CPT

## 2024-11-15 NOTE — PROGRESS NOTES
IR Procedure at [facility]: Left message for patient to arrive at 0830 at [facility] on 11/21/2024 for her procedure at 0930. Also went over below instructions and and told patient to take her medications as scheduled.          Follow your directions as prescribed by the doctor for your procedure and medications.  3.   Consult your provider as to when to stop blood thinner  4.   Do not take any pain medication within 6 hours of your procedure  5.   Do not drink any alcoholic beverages or use any street drugs 24 hours before procedure.  6.   Please wear simple, loose fitting clothing to the hospital.  Do not bring valuables (money,             credit cards, checkbooks, etc.)     7.   If you  have a Living Will and Durable Power of  for Healthcare, please bring in a copy.  8.   Please bring picture ID,  insurance card, paperwork from the doctors office            (H & P, Consent,  & card for implantable devices).  9.   Report to the information desk on the ground floor.  10. Take a shower the night before or morning of your procedure, do not apply any lotion, oil or powder.

## 2024-11-17 DIAGNOSIS — C34.32 SMALL CELL LUNG CANCER, LEFT LOWER LOBE (HCC): ICD-10-CM

## 2024-11-17 DIAGNOSIS — Z79.899 DRUG THERAPY: Primary | ICD-10-CM

## 2024-11-17 RX ORDER — FAMOTIDINE 10 MG/ML
20 INJECTION, SOLUTION INTRAVENOUS
OUTPATIENT
Start: 2024-12-18

## 2024-11-17 RX ORDER — FAMOTIDINE 10 MG/ML
20 INJECTION, SOLUTION INTRAVENOUS
OUTPATIENT
Start: 2024-11-20

## 2024-11-17 RX ORDER — SODIUM CHLORIDE 9 MG/ML
5-250 INJECTION, SOLUTION INTRAVENOUS PRN
OUTPATIENT
Start: 2024-11-20

## 2024-11-17 RX ORDER — SODIUM CHLORIDE 0.9 % (FLUSH) 0.9 %
5-40 SYRINGE (ML) INJECTION PRN
OUTPATIENT
Start: 2024-12-18

## 2024-11-17 RX ORDER — SODIUM CHLORIDE 0.9 % (FLUSH) 0.9 %
5-40 SYRINGE (ML) INJECTION PRN
OUTPATIENT
Start: 2024-11-20

## 2024-11-17 RX ORDER — ALBUTEROL SULFATE 90 UG/1
4 INHALANT RESPIRATORY (INHALATION) PRN
OUTPATIENT
Start: 2024-12-18

## 2024-11-17 RX ORDER — HEPARIN SODIUM (PORCINE) LOCK FLUSH IV SOLN 100 UNIT/ML 100 UNIT/ML
500 SOLUTION INTRAVENOUS PRN
OUTPATIENT
Start: 2024-11-20

## 2024-11-17 RX ORDER — ONDANSETRON 2 MG/ML
8 INJECTION INTRAMUSCULAR; INTRAVENOUS
OUTPATIENT
Start: 2024-12-18

## 2024-11-17 RX ORDER — ACETAMINOPHEN 325 MG/1
650 TABLET ORAL
OUTPATIENT
Start: 2024-12-18

## 2024-11-17 RX ORDER — DIPHENHYDRAMINE HYDROCHLORIDE 50 MG/ML
50 INJECTION INTRAMUSCULAR; INTRAVENOUS
OUTPATIENT
Start: 2024-11-20

## 2024-11-17 RX ORDER — EPINEPHRINE 1 MG/ML
0.3 INJECTION, SOLUTION, CONCENTRATE INTRAVENOUS PRN
OUTPATIENT
Start: 2024-11-20

## 2024-11-17 RX ORDER — ACETAMINOPHEN 325 MG/1
650 TABLET ORAL
OUTPATIENT
Start: 2024-11-20

## 2024-11-17 RX ORDER — SODIUM CHLORIDE 9 MG/ML
INJECTION, SOLUTION INTRAVENOUS CONTINUOUS
OUTPATIENT
Start: 2024-11-20

## 2024-11-17 RX ORDER — HEPARIN SODIUM (PORCINE) LOCK FLUSH IV SOLN 100 UNIT/ML 100 UNIT/ML
500 SOLUTION INTRAVENOUS PRN
OUTPATIENT
Start: 2024-12-18

## 2024-11-17 RX ORDER — MEPERIDINE HYDROCHLORIDE 50 MG/ML
12.5 INJECTION INTRAMUSCULAR; INTRAVENOUS; SUBCUTANEOUS PRN
OUTPATIENT
Start: 2024-12-18

## 2024-11-17 RX ORDER — SODIUM CHLORIDE 9 MG/ML
5-250 INJECTION, SOLUTION INTRAVENOUS PRN
OUTPATIENT
Start: 2024-12-18

## 2024-11-17 RX ORDER — ONDANSETRON 2 MG/ML
8 INJECTION INTRAMUSCULAR; INTRAVENOUS
OUTPATIENT
Start: 2024-11-20

## 2024-11-17 RX ORDER — DIPHENHYDRAMINE HYDROCHLORIDE 50 MG/ML
50 INJECTION INTRAMUSCULAR; INTRAVENOUS
OUTPATIENT
Start: 2024-12-18

## 2024-11-17 RX ORDER — MEPERIDINE HYDROCHLORIDE 50 MG/ML
12.5 INJECTION INTRAMUSCULAR; INTRAVENOUS; SUBCUTANEOUS PRN
OUTPATIENT
Start: 2024-11-20

## 2024-11-17 RX ORDER — ALBUTEROL SULFATE 90 UG/1
4 INHALANT RESPIRATORY (INHALATION) PRN
OUTPATIENT
Start: 2024-11-20

## 2024-11-17 RX ORDER — SODIUM CHLORIDE 9 MG/ML
INJECTION, SOLUTION INTRAVENOUS CONTINUOUS
OUTPATIENT
Start: 2024-12-18

## 2024-11-17 RX ORDER — EPINEPHRINE 1 MG/ML
0.3 INJECTION, SOLUTION, CONCENTRATE INTRAVENOUS PRN
OUTPATIENT
Start: 2024-12-18

## 2024-11-19 ENCOUNTER — HOSPITAL ENCOUNTER (OUTPATIENT)
Age: 69
Discharge: HOME | End: 2024-11-19
Payer: MEDICARE

## 2024-11-19 DIAGNOSIS — Z79.899 DRUG THERAPY: ICD-10-CM

## 2024-11-19 DIAGNOSIS — C34.32 SMALL CELL LUNG CANCER, LEFT LOWER LOBE (HCC): ICD-10-CM

## 2024-11-19 LAB
ALBUMIN SERPL-MCNC: 3.1 G/DL (ref 3.4–5)
ALBUMIN/GLOB SERPL: 0.8 {RATIO} (ref 1.1–2.2)
ALP SERPL-CCNC: 96 U/L (ref 40–129)
ALT SERPL-CCNC: <5 U/L (ref 10–40)
ANION GAP SERPL CALCULATED.3IONS-SCNC: 14 MMOL/L (ref 4–16)
AST SERPL-CCNC: 11 U/L (ref 15–37)
BASOPHILS # BLD: 0.07 K/UL
BASOPHILS NFR BLD: 1 % (ref 0–1)
BILIRUB SERPL-MCNC: 0.4 MG/DL (ref 0–1)
BUN SERPL-MCNC: 10 MG/DL (ref 6–23)
CALCIUM SERPL-MCNC: 9.1 MG/DL (ref 8.3–10.6)
CHLORIDE SERPL-SCNC: 101 MMOL/L (ref 99–110)
CO2 SERPL-SCNC: 25 MMOL/L (ref 21–32)
CREAT SERPL-MCNC: 0.7 MG/DL (ref 0.6–1.1)
EOSINOPHIL # BLD: 0.01 K/UL
EOSINOPHILS RELATIVE PERCENT: 0 % (ref 0–3)
ERYTHROCYTE [DISTWIDTH] IN BLOOD BY AUTOMATED COUNT: 15.4 % (ref 11.7–14.9)
GFR, ESTIMATED: >90 ML/MIN/1.73M2
GLUCOSE SERPL-MCNC: 259 MG/DL (ref 70–99)
HCT VFR BLD AUTO: 31.4 % (ref 37–47)
HGB BLD-MCNC: 10.1 G/DL (ref 12.5–16)
IMM GRANULOCYTES # BLD AUTO: 0.13 K/UL
IMM GRANULOCYTES NFR BLD: 2 %
LYMPHOCYTES NFR BLD: 1.23 K/UL
LYMPHOCYTES RELATIVE PERCENT: 15 % (ref 24–44)
MCH RBC QN AUTO: 31.2 PG (ref 27–31)
MCHC RBC AUTO-ENTMCNC: 32.2 G/DL (ref 32–36)
MCV RBC AUTO: 96.9 FL (ref 78–100)
MONOCYTES NFR BLD: 0.72 K/UL
MONOCYTES NFR BLD: 9 % (ref 0–4)
NEUTROPHILS NFR BLD: 73 % (ref 36–66)
NEUTS SEG NFR BLD: 5.92 K/UL
PLATELET # BLD AUTO: 607 K/UL (ref 140–440)
PMV BLD AUTO: 9 FL (ref 7.5–11.1)
POTASSIUM SERPL-SCNC: 3.5 MMOL/L (ref 3.5–5.1)
PROT SERPL-MCNC: 7 G/DL (ref 6.4–8.2)
RBC # BLD AUTO: 3.24 M/UL (ref 4.2–5.4)
SODIUM SERPL-SCNC: 140 MMOL/L (ref 135–145)
WBC OTHER # BLD: 8.1 K/UL (ref 4–10.5)

## 2024-11-19 PROCEDURE — 36415 COLL VENOUS BLD VENIPUNCTURE: CPT

## 2024-11-19 PROCEDURE — 85025 COMPLETE CBC W/AUTO DIFF WBC: CPT

## 2024-11-19 PROCEDURE — 80053 COMPREHEN METABOLIC PANEL: CPT

## 2024-11-20 ENCOUNTER — CLINICAL DOCUMENTATION (OUTPATIENT)
Dept: INFUSION THERAPY | Age: 69
End: 2024-11-20

## 2024-11-20 ENCOUNTER — OFFICE VISIT (OUTPATIENT)
Dept: ONCOLOGY | Age: 69
End: 2024-11-20
Payer: MEDICARE

## 2024-11-20 ENCOUNTER — HOSPITAL ENCOUNTER (OUTPATIENT)
Dept: INFUSION THERAPY | Age: 69
Discharge: HOME | End: 2024-11-20
Payer: MEDICARE

## 2024-11-20 VITALS
HEIGHT: 61 IN | HEART RATE: 101 BPM | RESPIRATION RATE: 18 BRPM | SYSTOLIC BLOOD PRESSURE: 119 MMHG | BODY MASS INDEX: 24.73 KG/M2 | OXYGEN SATURATION: 91 % | WEIGHT: 131 LBS | TEMPERATURE: 97.7 F | DIASTOLIC BLOOD PRESSURE: 51 MMHG

## 2024-11-20 VITALS
HEART RATE: 101 BPM | SYSTOLIC BLOOD PRESSURE: 119 MMHG | WEIGHT: 131 LBS | OXYGEN SATURATION: 91 % | TEMPERATURE: 97.7 F | HEIGHT: 60 IN | RESPIRATION RATE: 18 BRPM | DIASTOLIC BLOOD PRESSURE: 51 MMHG | BODY MASS INDEX: 25.72 KG/M2

## 2024-11-20 DIAGNOSIS — C34.32 SMALL CELL LUNG CANCER, LEFT LOWER LOBE (HCC): Primary | ICD-10-CM

## 2024-11-20 DIAGNOSIS — Z79.899 DRUG THERAPY: ICD-10-CM

## 2024-11-20 PROCEDURE — 1090F PRES/ABSN URINE INCON ASSESS: CPT | Performed by: INTERNAL MEDICINE

## 2024-11-20 PROCEDURE — 96413 CHEMO IV INFUSION 1 HR: CPT

## 2024-11-20 PROCEDURE — 1123F ACP DISCUSS/DSCN MKR DOCD: CPT | Performed by: INTERNAL MEDICINE

## 2024-11-20 PROCEDURE — 2580000003 HC RX 258: Performed by: INTERNAL MEDICINE

## 2024-11-20 PROCEDURE — 3017F COLORECTAL CA SCREEN DOC REV: CPT | Performed by: INTERNAL MEDICINE

## 2024-11-20 PROCEDURE — 1126F AMNT PAIN NOTED NONE PRSNT: CPT | Performed by: INTERNAL MEDICINE

## 2024-11-20 PROCEDURE — 6360000002 HC RX W HCPCS: Performed by: INTERNAL MEDICINE

## 2024-11-20 PROCEDURE — 1036F TOBACCO NON-USER: CPT | Performed by: INTERNAL MEDICINE

## 2024-11-20 PROCEDURE — G8427 DOCREV CUR MEDS BY ELIG CLIN: HCPCS | Performed by: INTERNAL MEDICINE

## 2024-11-20 PROCEDURE — G8399 PT W/DXA RESULTS DOCUMENT: HCPCS | Performed by: INTERNAL MEDICINE

## 2024-11-20 PROCEDURE — G8484 FLU IMMUNIZE NO ADMIN: HCPCS | Performed by: INTERNAL MEDICINE

## 2024-11-20 PROCEDURE — G8417 CALC BMI ABV UP PARAM F/U: HCPCS | Performed by: INTERNAL MEDICINE

## 2024-11-20 PROCEDURE — 1159F MED LIST DOCD IN RCRD: CPT | Performed by: INTERNAL MEDICINE

## 2024-11-20 PROCEDURE — 99214 OFFICE O/P EST MOD 30 MIN: CPT | Performed by: INTERNAL MEDICINE

## 2024-11-20 RX ORDER — HYDROXYUREA 500 MG/1
500 CAPSULE ORAL DAILY
Qty: 30 CAPSULE | Refills: 3 | Status: SHIPPED | OUTPATIENT
Start: 2024-11-20

## 2024-11-20 RX ADMIN — SODIUM CHLORIDE 1500 MG: 9 INJECTION, SOLUTION INTRAVENOUS at 14:13

## 2024-11-20 NOTE — PROGRESS NOTES
Patient to Infusion suite for D1 C4 of treatment.   MP accessed using sterile technique, with brisk blood return.   Pt without any new issues to inform physician of, labs and vitals within treatment range.     Orders released and given.  Pt tolerated well, declined paperwork/with paperwork, left ambulatory.

## 2024-11-20 NOTE — PROGRESS NOTES
Attempted to call patient to see if she would like to come in earlier today d/t openings in AM, no answer. Patient currently still scheduled for 1345.

## 2024-11-20 NOTE — PROGRESS NOTES
Pt tolerated tx without incident. Left via wheelchair pt will stop at  checkout desk to schedule for next OV and receive discharge paperwork.

## 2024-11-20 NOTE — PROGRESS NOTES
MA Rooming Questions  Patient: Lin Bates  MRN: [id number]    Date: 11/20/2024        1. Do you have any new issues?   yes - would like results of her cxr         2. Do you need any refills on medications?    no    3. Have you had any imaging done since your last visit?   yes - cxr 11/14/24 at [facility]    4. Have you been hospitalized or seen in the emergency room since your last visit here?   no    5. Did the patient have a depression screening completed today? No    No data recorded     PHQ-9 Given to (if applicable):               PHQ-9 Score (if applicable):                     [] Positive     []  Negative              Does question #9 need addressed (if applicable)                     [] Yes    []  No               Nurys Joya MA    
lymph nodes.  There is a 1.6 cm left upper lobe irregular cystic lesion with a peripheral 0.8 x 0.3 cm nodular component.  Differential considerations include infectious/inflammatory process or neoplasm.  Borderline endometrial thickening given postmenopausal status.  Consider pelvic ultrasound for further evaluation.    CT chest on 5/3/23 showed mildly increased size of a cavitary lesion in the left upper lobe with wall thickening along its inferior aspect. Infectious/inflammatory and neoplastic etiologies remain in the differential.  Follow-up chest CT in 3-6 months would be reasonable.    CT abdomen and pelvis on 7/11/23 showed No acute abnormality in the abdomen or pelvis.    CT chest done on 9/5/23 showed rapidly enlarging solid pulmonary nodule within the left upper lobe superior segment which measures 7 mm on today's examination; however, measured 3 mm in May 2023 and was not present in December 2022.  Findings concerning for malignancy versus infectious/inflammatory etiology. Compared to prior CT chest May 3, 2023, overall stable size of cavitary lesion within the left upper lobe with slight interval decreased size of nodular component at the inferior aspect of the lesion.    She had bone marrow biopsy on 9/22/23 and it showed bone marrow with trilineage maturing hematopoiesis and mild increase in megakaryocytes. A monoclonal B-cell population is detected (2% of the cellularity). 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     CT chest done on 12/5/23 showed significantly increased size of multiple nodules in the superior segment of the left lower lobe, now measuring up to 1.9 cm.  Infectious/inflammatory and neoplastic etiologies are in the differential. Thin walled cavitary lesion in the left upper lobe is stable to minimally increased in size from the most recent comparison, although is significantly increased from 12/27/2022.  There is unchanged

## 2024-11-21 ENCOUNTER — HOSPITAL ENCOUNTER (OUTPATIENT)
Dept: INTERVENTIONAL RADIOLOGY/VASCULAR | Age: 69
Discharge: HOME | End: 2024-11-21
Payer: MEDICARE

## 2024-11-21 VITALS
DIASTOLIC BLOOD PRESSURE: 65 MMHG | OXYGEN SATURATION: 93 % | RESPIRATION RATE: 16 BRPM | BODY MASS INDEX: 24.73 KG/M2 | SYSTOLIC BLOOD PRESSURE: 116 MMHG | HEART RATE: 89 BPM | WEIGHT: 131 LBS | HEIGHT: 61 IN

## 2024-11-21 DIAGNOSIS — E04.1 THYROID NODULE: ICD-10-CM

## 2024-11-21 PROCEDURE — 2709999900 IR BIOPSY THYROID PERC CORE NEEDLE

## 2024-11-21 PROCEDURE — 10005 FNA BX W/US GDN 1ST LES: CPT

## 2024-11-21 PROCEDURE — 88305 TISSUE EXAM BY PATHOLOGIST: CPT

## 2024-11-21 PROCEDURE — 88177 CYTP FNA EVAL EA ADDL: CPT

## 2024-11-21 PROCEDURE — 88333 PATH CONSLTJ SURG CYTO XM 1: CPT

## 2024-11-21 PROCEDURE — 88173 CYTOPATH EVAL FNA REPORT: CPT

## 2024-11-21 ASSESSMENT — PAIN - FUNCTIONAL ASSESSMENT: PAIN_FUNCTIONAL_ASSESSMENT: NONE - DENIES PAIN

## 2024-11-21 NOTE — PROGRESS NOTES
1003 pt arrived back to IR holding, dressing wnl with no bleeding noted.    Discharge instructions reviewed with patient and family no questions at this time.

## 2024-11-21 NOTE — PROGRESS NOTES
TRANSFER - OUT REPORT:    Verbal report given to Sophia RN and Shayna RN on Lin Bates being transferred to IR holding for routine post-op       Report consisted of patient's Situation, Background, Assessment and   Recommendations(SBAR).     Information from the following report(s) Nurse Handoff Report was reviewed with the receiving nurse.    Opportunity for questions and clarification was provided.      Patient transported with:   Registered Nurse    Successful left thyroid biopsy. 5 FNAs was obtained.

## 2024-11-21 NOTE — BRIEF OP NOTE
Department of Interventional Radiology Post-Procedure Note      Date: 11/21/2024     Interventional Radiologist: Richard Metz    Successful left thyroid nodule biopsy.  Tolerated well.    Full Report to Follow.    Electronically signed by Richard Metz MD on 11/21/2024 at 5:11 PM

## 2024-11-21 NOTE — H&P
Substance and Sexual Activity    Alcohol use: Not Currently     Comment: yearly    Drug use: No    Sexual activity: Not Currently   Other Topics Concern    Not on file   Social History Narrative    Not on file     Social Determinants of Health     Financial Resource Strain: Low Risk  (2/7/2023)    Overall Financial Resource Strain (CARDIA)     Difficulty of Paying Living Expenses: Not hard at all   Food Insecurity: No Food Insecurity (9/26/2024)    Hunger Vital Sign     Worried About Running Out of Food in the Last Year: Never true     Ran Out of Food in the Last Year: Never true   Transportation Needs: No Transportation Needs (9/26/2024)    PRAPARE - Transportation     Lack of Transportation (Medical): No     Lack of Transportation (Non-Medical): No   Physical Activity: Not on file   Stress: Not on file   Social Connections: Not on file   Intimate Partner Violence: Not on file   Housing Stability: Low Risk  (9/26/2024)    Housing Stability Vital Sign     Unable to Pay for Housing in the Last Year: No     Number of Times Moved in the Last Year: 1     Homeless in the Last Year: No       Family History:  Family History   Problem Relation Age of Onset    Cancer Father     Cancer Brother        Allergies:  No Known Allergies    Medications:  Current Outpatient Medications on File Prior to Encounter   Medication Sig Dispense Refill    hydroxyurea (HYDREA) 500 MG chemo capsule Take 1 capsule by mouth daily 30 capsule 3    folic acid (FOLVITE) 1 MG tablet Take 1 tablet by mouth daily 30 tablet 3    zinc sulfate (ZINCATE) 220 (50 Zn)  mg capsule - elemental zinc Take 1 capsule by mouth daily      midodrine (PROAMATINE) 10 MG tablet Take 1 tablet by mouth 3 times daily (with meals) 90 tablet 3    thiamine 100 MG tablet Take 1 tablet by mouth daily 30 tablet 3    sucralfate (CARAFATE) 1 GM/10ML suspension TAKE 10 MLS BY MOUTH 4 TIMES DAILY (BEFORE MEALS AND NIGHTLY) 420 mL 3    DPH-Lido-AlHydr-MgHydr-Simeth (MAGIC

## 2024-11-22 LAB — NON-GYN CYTOLOGY REPORT: NORMAL

## 2024-12-08 ENCOUNTER — APPOINTMENT (OUTPATIENT)
Dept: GENERAL RADIOLOGY | Age: 69
End: 2024-12-08
Payer: MEDICARE

## 2024-12-08 ENCOUNTER — HOSPITAL ENCOUNTER (EMERGENCY)
Age: 69
Discharge: ANOTHER HOSPITAL | End: 2024-12-09
Attending: EMERGENCY MEDICINE
Payer: MEDICARE

## 2024-12-08 ENCOUNTER — APPOINTMENT (OUTPATIENT)
Dept: CT IMAGING | Age: 69
End: 2024-12-08
Payer: MEDICARE

## 2024-12-08 DIAGNOSIS — Z85.9 HISTORY OF CANCER: ICD-10-CM

## 2024-12-08 DIAGNOSIS — R06.00 DYSPNEA, UNSPECIFIED TYPE: ICD-10-CM

## 2024-12-08 DIAGNOSIS — R09.02 HYPOXIA: ICD-10-CM

## 2024-12-08 DIAGNOSIS — J18.9 PNEUMONIA OF LEFT LUNG DUE TO INFECTIOUS ORGANISM, UNSPECIFIED PART OF LUNG: Primary | ICD-10-CM

## 2024-12-08 LAB
ALBUMIN SERPL-MCNC: 2.9 G/DL (ref 3.4–5)
ALBUMIN/GLOB SERPL: 0.7 {RATIO} (ref 1.1–2.2)
ALP SERPL-CCNC: 65 U/L (ref 40–129)
ALT SERPL-CCNC: 5 U/L (ref 10–40)
ANION GAP SERPL CALCULATED.3IONS-SCNC: 13 MMOL/L (ref 4–16)
AST SERPL-CCNC: 10 U/L (ref 15–37)
BASOPHILS # BLD: 0.06 K/UL
BASOPHILS NFR BLD: 1 % (ref 0–1)
BILIRUB SERPL-MCNC: 0.3 MG/DL (ref 0–1)
BUN SERPL-MCNC: 12 MG/DL (ref 6–23)
CALCIUM SERPL-MCNC: 8.7 MG/DL (ref 8.3–10.6)
CHLORIDE SERPL-SCNC: 101 MMOL/L (ref 99–110)
CO2 SERPL-SCNC: 24 MMOL/L (ref 21–32)
CREAT SERPL-MCNC: 0.7 MG/DL (ref 0.6–1.1)
EOSINOPHIL # BLD: 0.08 K/UL
EOSINOPHILS RELATIVE PERCENT: 1 % (ref 0–3)
ERYTHROCYTE [DISTWIDTH] IN BLOOD BY AUTOMATED COUNT: 17.3 % (ref 11.7–14.9)
GFR, ESTIMATED: >90 ML/MIN/1.73M2
GLUCOSE SERPL-MCNC: 135 MG/DL (ref 70–99)
HCT VFR BLD AUTO: 30.6 % (ref 37–47)
HGB BLD-MCNC: 9.6 G/DL (ref 12.5–16)
IMM GRANULOCYTES # BLD AUTO: 0.06 K/UL
IMM GRANULOCYTES NFR BLD: 1 %
LACTATE BLDV-SCNC: 0.9 MMOL/L (ref 0.4–2)
LYMPHOCYTES NFR BLD: 1.43 K/UL
LYMPHOCYTES RELATIVE PERCENT: 12 % (ref 24–44)
MAGNESIUM SERPL-MCNC: 1.7 MG/DL (ref 1.8–2.4)
MCH RBC QN AUTO: 31 PG (ref 27–31)
MCHC RBC AUTO-ENTMCNC: 31.4 G/DL (ref 32–36)
MCV RBC AUTO: 98.7 FL (ref 78–100)
MONOCYTES NFR BLD: 1.14 K/UL
MONOCYTES NFR BLD: 10 % (ref 0–4)
NEUTROPHILS NFR BLD: 76 % (ref 36–66)
NEUTS SEG NFR BLD: 8.85 K/UL
PLATELET # BLD AUTO: 339 K/UL (ref 140–440)
PMV BLD AUTO: 9.6 FL (ref 7.5–11.1)
POTASSIUM SERPL-SCNC: 3.8 MMOL/L (ref 3.5–5.1)
PROT SERPL-MCNC: 6.8 G/DL (ref 6.4–8.2)
RBC # BLD AUTO: 3.1 M/UL (ref 4.2–5.4)
SODIUM SERPL-SCNC: 138 MMOL/L (ref 135–145)
WBC OTHER # BLD: 11.6 K/UL (ref 4–10.5)

## 2024-12-08 PROCEDURE — 99285 EMERGENCY DEPT VISIT HI MDM: CPT

## 2024-12-08 PROCEDURE — 83735 ASSAY OF MAGNESIUM: CPT

## 2024-12-08 PROCEDURE — 6360000002 HC RX W HCPCS: Performed by: EMERGENCY MEDICINE

## 2024-12-08 PROCEDURE — 2580000003 HC RX 258: Performed by: EMERGENCY MEDICINE

## 2024-12-08 PROCEDURE — 71046 X-RAY EXAM CHEST 2 VIEWS: CPT

## 2024-12-08 PROCEDURE — 96367 TX/PROPH/DG ADDL SEQ IV INF: CPT

## 2024-12-08 PROCEDURE — 87040 BLOOD CULTURE FOR BACTERIA: CPT

## 2024-12-08 PROCEDURE — 93005 ELECTROCARDIOGRAM TRACING: CPT | Performed by: EMERGENCY MEDICINE

## 2024-12-08 PROCEDURE — 6360000004 HC RX CONTRAST MEDICATION: Performed by: EMERGENCY MEDICINE

## 2024-12-08 PROCEDURE — 84145 PROCALCITONIN (PCT): CPT

## 2024-12-08 PROCEDURE — 83605 ASSAY OF LACTIC ACID: CPT

## 2024-12-08 PROCEDURE — 80053 COMPREHEN METABOLIC PANEL: CPT

## 2024-12-08 PROCEDURE — 96366 THER/PROPH/DIAG IV INF ADDON: CPT

## 2024-12-08 PROCEDURE — 96365 THER/PROPH/DIAG IV INF INIT: CPT

## 2024-12-08 PROCEDURE — 71275 CT ANGIOGRAPHY CHEST: CPT

## 2024-12-08 PROCEDURE — 85025 COMPLETE CBC W/AUTO DIFF WBC: CPT

## 2024-12-08 RX ORDER — VANCOMYCIN 1.75 G/350ML
20 INJECTION, SOLUTION INTRAVENOUS EVERY 8 HOURS
Status: DISCONTINUED | OUTPATIENT
Start: 2024-12-08 | End: 2024-12-09

## 2024-12-08 RX ORDER — VANCOMYCIN 1.5 G/300ML
25 INJECTION, SOLUTION INTRAVENOUS ONCE
Status: COMPLETED | OUTPATIENT
Start: 2024-12-08 | End: 2024-12-08

## 2024-12-08 RX ORDER — IOPAMIDOL 755 MG/ML
75 INJECTION, SOLUTION INTRAVASCULAR
Status: COMPLETED | OUTPATIENT
Start: 2024-12-08 | End: 2024-12-08

## 2024-12-08 RX ORDER — MAGNESIUM SULFATE IN WATER 40 MG/ML
2000 INJECTION, SOLUTION INTRAVENOUS ONCE
Status: COMPLETED | OUTPATIENT
Start: 2024-12-08 | End: 2024-12-08

## 2024-12-08 RX ADMIN — CEFEPIME 2000 MG: 2 INJECTION, POWDER, FOR SOLUTION INTRAVENOUS at 18:16

## 2024-12-08 RX ADMIN — IOPAMIDOL 75 ML: 755 INJECTION, SOLUTION INTRAVENOUS at 19:45

## 2024-12-08 RX ADMIN — VANCOMYCIN 1500 MG: 1.5 INJECTION, SOLUTION INTRAVENOUS at 18:55

## 2024-12-08 RX ADMIN — MAGNESIUM SULFATE HEPTAHYDRATE 2000 MG: 40 INJECTION, SOLUTION INTRAVENOUS at 21:43

## 2024-12-08 ASSESSMENT — LIFESTYLE VARIABLES
HOW OFTEN DO YOU HAVE A DRINK CONTAINING ALCOHOL: NEVER
HOW MANY STANDARD DRINKS CONTAINING ALCOHOL DO YOU HAVE ON A TYPICAL DAY: PATIENT DOES NOT DRINK

## 2024-12-09 ENCOUNTER — HOSPITAL ENCOUNTER (INPATIENT)
Age: 69
LOS: 3 days | Discharge: HOME HEALTH | DRG: 193 | End: 2024-12-12
Attending: INTERNAL MEDICINE | Admitting: INTERNAL MEDICINE
Payer: MEDICARE

## 2024-12-09 VITALS
RESPIRATION RATE: 21 BRPM | DIASTOLIC BLOOD PRESSURE: 60 MMHG | BODY MASS INDEX: 25.32 KG/M2 | TEMPERATURE: 98.6 F | SYSTOLIC BLOOD PRESSURE: 129 MMHG | WEIGHT: 134 LBS | HEART RATE: 104 BPM | OXYGEN SATURATION: 97 %

## 2024-12-09 PROBLEM — J96.01 ACUTE RESPIRATORY FAILURE WITH HYPOXIA: Status: ACTIVE | Noted: 2024-12-09

## 2024-12-09 LAB
DATE LAST DOSE: NORMAL
EKG ATRIAL RATE: 92 BPM
EKG DIAGNOSIS: NORMAL
EKG P AXIS: 34 DEGREES
EKG P-R INTERVAL: 156 MS
EKG Q-T INTERVAL: 362 MS
EKG QRS DURATION: 70 MS
EKG QTC CALCULATION (BAZETT): 447 MS
EKG R AXIS: 56 DEGREES
EKG T AXIS: 59 DEGREES
EKG VENTRICULAR RATE: 92 BPM
GLUCOSE BLD-MCNC: 136 MG/DL (ref 74–99)
GLUCOSE BLD-MCNC: 255 MG/DL (ref 74–99)
MRSA, DNA, NASAL: NOT DETECTED
PROCALCITONIN SERPL-MCNC: 0.37 NG/ML
SPECIMEN DESCRIPTION: NORMAL
TME LAST DOSE: NORMAL H
VANCOMYCIN DOSE: NORMAL MG
VANCOMYCIN SERPL-MCNC: 13.2 UG/ML (ref 10–20)

## 2024-12-09 PROCEDURE — 87641 MR-STAPH DNA AMP PROBE: CPT

## 2024-12-09 PROCEDURE — 6370000000 HC RX 637 (ALT 250 FOR IP): Performed by: EMERGENCY MEDICINE

## 2024-12-09 PROCEDURE — 6370000000 HC RX 637 (ALT 250 FOR IP): Performed by: STUDENT IN AN ORGANIZED HEALTH CARE EDUCATION/TRAINING PROGRAM

## 2024-12-09 PROCEDURE — 80202 ASSAY OF VANCOMYCIN: CPT

## 2024-12-09 PROCEDURE — 2580000003 HC RX 258: Performed by: EMERGENCY MEDICINE

## 2024-12-09 PROCEDURE — 2700000000 HC OXYGEN THERAPY PER DAY

## 2024-12-09 PROCEDURE — 6360000002 HC RX W HCPCS: Performed by: EMERGENCY MEDICINE

## 2024-12-09 PROCEDURE — 94640 AIRWAY INHALATION TREATMENT: CPT

## 2024-12-09 PROCEDURE — 96368 THER/DIAG CONCURRENT INF: CPT

## 2024-12-09 PROCEDURE — 82962 GLUCOSE BLOOD TEST: CPT

## 2024-12-09 PROCEDURE — 6370000000 HC RX 637 (ALT 250 FOR IP): Performed by: NURSE PRACTITIONER

## 2024-12-09 PROCEDURE — 2580000003 HC RX 258: Performed by: STUDENT IN AN ORGANIZED HEALTH CARE EDUCATION/TRAINING PROGRAM

## 2024-12-09 PROCEDURE — 94761 N-INVAS EAR/PLS OXIMETRY MLT: CPT

## 2024-12-09 PROCEDURE — 6360000002 HC RX W HCPCS: Performed by: STUDENT IN AN ORGANIZED HEALTH CARE EDUCATION/TRAINING PROGRAM

## 2024-12-09 PROCEDURE — 96366 THER/PROPH/DIAG IV INF ADDON: CPT

## 2024-12-09 PROCEDURE — 93010 ELECTROCARDIOGRAM REPORT: CPT | Performed by: INTERNAL MEDICINE

## 2024-12-09 PROCEDURE — 96372 THER/PROPH/DIAG INJ SC/IM: CPT

## 2024-12-09 PROCEDURE — 2500000003 HC RX 250 WO HCPCS: Performed by: EMERGENCY MEDICINE

## 2024-12-09 PROCEDURE — 1200000000 HC SEMI PRIVATE

## 2024-12-09 RX ORDER — LANOLIN ALCOHOL/MO/W.PET/CERES
1000 CREAM (GRAM) TOPICAL DAILY
Status: DISCONTINUED | OUTPATIENT
Start: 2024-12-09 | End: 2024-12-09 | Stop reason: HOSPADM

## 2024-12-09 RX ORDER — ZINC SULFATE 50(220)MG
50 CAPSULE ORAL DAILY
Status: DISCONTINUED | OUTPATIENT
Start: 2024-12-09 | End: 2024-12-09 | Stop reason: HOSPADM

## 2024-12-09 RX ORDER — ATORVASTATIN CALCIUM 10 MG/1
20 TABLET, FILM COATED ORAL NIGHTLY
Status: DISCONTINUED | OUTPATIENT
Start: 2024-12-10 | End: 2024-12-12 | Stop reason: HOSPADM

## 2024-12-09 RX ORDER — GLIPIZIDE 2.5 MG/1
2.5 TABLET, EXTENDED RELEASE ORAL
Status: DISCONTINUED | OUTPATIENT
Start: 2024-12-09 | End: 2024-12-09 | Stop reason: HOSPADM

## 2024-12-09 RX ORDER — POTASSIUM CHLORIDE 7.45 MG/ML
10 INJECTION INTRAVENOUS PRN
Status: DISCONTINUED | OUTPATIENT
Start: 2024-12-09 | End: 2024-12-12 | Stop reason: HOSPADM

## 2024-12-09 RX ORDER — DEXTROSE MONOHYDRATE 100 MG/ML
INJECTION, SOLUTION INTRAVENOUS CONTINUOUS PRN
Status: DISCONTINUED | OUTPATIENT
Start: 2024-12-09 | End: 2024-12-12 | Stop reason: HOSPADM

## 2024-12-09 RX ORDER — ACETAMINOPHEN 325 MG/1
650 TABLET ORAL ONCE
Status: COMPLETED | OUTPATIENT
Start: 2024-12-09 | End: 2024-12-09

## 2024-12-09 RX ORDER — OLANZAPINE 5 MG/1
5 TABLET ORAL NIGHTLY
Status: DISCONTINUED | OUTPATIENT
Start: 2024-12-09 | End: 2024-12-09 | Stop reason: HOSPADM

## 2024-12-09 RX ORDER — ACETAMINOPHEN 650 MG/1
650 SUPPOSITORY RECTAL EVERY 6 HOURS PRN
Status: DISCONTINUED | OUTPATIENT
Start: 2024-12-09 | End: 2024-12-12 | Stop reason: HOSPADM

## 2024-12-09 RX ORDER — MAGNESIUM SULFATE IN WATER 40 MG/ML
2000 INJECTION, SOLUTION INTRAVENOUS PRN
Status: DISCONTINUED | OUTPATIENT
Start: 2024-12-09 | End: 2024-12-12 | Stop reason: HOSPADM

## 2024-12-09 RX ORDER — GLUCAGON 1 MG/ML
1 KIT INJECTION PRN
Status: DISCONTINUED | OUTPATIENT
Start: 2024-12-09 | End: 2024-12-12 | Stop reason: HOSPADM

## 2024-12-09 RX ORDER — ATORVASTATIN CALCIUM 20 MG/1
20 TABLET, FILM COATED ORAL
Status: DISCONTINUED | OUTPATIENT
Start: 2024-12-09 | End: 2024-12-09 | Stop reason: HOSPADM

## 2024-12-09 RX ORDER — ONDANSETRON 2 MG/ML
4 INJECTION INTRAMUSCULAR; INTRAVENOUS EVERY 6 HOURS PRN
Status: DISCONTINUED | OUTPATIENT
Start: 2024-12-09 | End: 2024-12-12 | Stop reason: HOSPADM

## 2024-12-09 RX ORDER — LANOLIN ALCOHOL/MO/W.PET/CERES
1000 CREAM (GRAM) TOPICAL DAILY
Status: DISCONTINUED | OUTPATIENT
Start: 2024-12-10 | End: 2024-12-12 | Stop reason: HOSPADM

## 2024-12-09 RX ORDER — HYDROXYUREA 500 MG/1
500 CAPSULE ORAL DAILY
Status: DISCONTINUED | OUTPATIENT
Start: 2024-12-09 | End: 2024-12-09 | Stop reason: HOSPADM

## 2024-12-09 RX ORDER — INSULIN GLARGINE 100 [IU]/ML
8 INJECTION, SOLUTION SUBCUTANEOUS NIGHTLY
Status: DISCONTINUED | OUTPATIENT
Start: 2024-12-09 | End: 2024-12-12 | Stop reason: HOSPADM

## 2024-12-09 RX ORDER — LIDOCAINE HYDROCHLORIDE 20 MG/ML
15 SOLUTION OROPHARYNGEAL
Status: DISCONTINUED | OUTPATIENT
Start: 2024-12-09 | End: 2024-12-09 | Stop reason: HOSPADM

## 2024-12-09 RX ORDER — SODIUM CHLORIDE 0.9 % (FLUSH) 0.9 %
5-40 SYRINGE (ML) INJECTION PRN
Status: DISCONTINUED | OUTPATIENT
Start: 2024-12-09 | End: 2024-12-12 | Stop reason: HOSPADM

## 2024-12-09 RX ORDER — ONDANSETRON 4 MG/1
4 TABLET, ORALLY DISINTEGRATING ORAL EVERY 8 HOURS PRN
Status: DISCONTINUED | OUTPATIENT
Start: 2024-12-09 | End: 2024-12-12 | Stop reason: HOSPADM

## 2024-12-09 RX ORDER — ACETAMINOPHEN 325 MG/1
650 TABLET ORAL EVERY 6 HOURS PRN
Status: DISCONTINUED | OUTPATIENT
Start: 2024-12-09 | End: 2024-12-12 | Stop reason: HOSPADM

## 2024-12-09 RX ORDER — MECLIZINE HYDROCHLORIDE 25 MG/1
25 TABLET ORAL 2 TIMES DAILY PRN
Status: DISCONTINUED | OUTPATIENT
Start: 2024-12-09 | End: 2024-12-09 | Stop reason: HOSPADM

## 2024-12-09 RX ORDER — VANCOMYCIN 750 MG/150ML
750 INJECTION, SOLUTION INTRAVENOUS EVERY 12 HOURS
Status: DISCONTINUED | OUTPATIENT
Start: 2024-12-09 | End: 2024-12-09 | Stop reason: HOSPADM

## 2024-12-09 RX ORDER — LISINOPRIL 2.5 MG/1
2.5 TABLET ORAL DAILY
Status: DISCONTINUED | OUTPATIENT
Start: 2024-12-09 | End: 2024-12-09 | Stop reason: HOSPADM

## 2024-12-09 RX ORDER — PANTOPRAZOLE SODIUM 40 MG/1
40 TABLET, DELAYED RELEASE ORAL
Status: DISCONTINUED | OUTPATIENT
Start: 2024-12-09 | End: 2024-12-09 | Stop reason: HOSPADM

## 2024-12-09 RX ORDER — PANTOPRAZOLE SODIUM 40 MG/1
40 TABLET, DELAYED RELEASE ORAL
Status: DISCONTINUED | OUTPATIENT
Start: 2024-12-10 | End: 2024-12-12 | Stop reason: HOSPADM

## 2024-12-09 RX ORDER — MIDODRINE HYDROCHLORIDE 5 MG/1
10 TABLET ORAL
Status: DISCONTINUED | OUTPATIENT
Start: 2024-12-09 | End: 2024-12-09 | Stop reason: HOSPADM

## 2024-12-09 RX ORDER — SUCRALFATE 1 G/1
1 TABLET ORAL EVERY 8 HOURS SCHEDULED
Status: DISCONTINUED | OUTPATIENT
Start: 2024-12-10 | End: 2024-12-12 | Stop reason: HOSPADM

## 2024-12-09 RX ORDER — FOLIC ACID 1 MG/1
1 TABLET ORAL DAILY
Status: DISCONTINUED | OUTPATIENT
Start: 2024-12-10 | End: 2024-12-12 | Stop reason: HOSPADM

## 2024-12-09 RX ORDER — LANOLIN ALCOHOL/MO/W.PET/CERES
100 CREAM (GRAM) TOPICAL DAILY
Status: DISCONTINUED | OUTPATIENT
Start: 2024-12-09 | End: 2024-12-09 | Stop reason: HOSPADM

## 2024-12-09 RX ORDER — ASPIRIN 81 MG/1
81 TABLET, CHEWABLE ORAL DAILY
Status: DISCONTINUED | OUTPATIENT
Start: 2024-12-10 | End: 2024-12-12 | Stop reason: HOSPADM

## 2024-12-09 RX ORDER — INSULIN LISPRO 100 [IU]/ML
0-8 INJECTION, SOLUTION INTRAVENOUS; SUBCUTANEOUS
Status: DISCONTINUED | OUTPATIENT
Start: 2024-12-09 | End: 2024-12-09 | Stop reason: HOSPADM

## 2024-12-09 RX ORDER — SODIUM CHLORIDE 9 MG/ML
INJECTION, SOLUTION INTRAVENOUS PRN
Status: DISCONTINUED | OUTPATIENT
Start: 2024-12-09 | End: 2024-12-12 | Stop reason: HOSPADM

## 2024-12-09 RX ORDER — SODIUM CHLORIDE 0.9 % (FLUSH) 0.9 %
5-40 SYRINGE (ML) INJECTION EVERY 12 HOURS SCHEDULED
Status: DISCONTINUED | OUTPATIENT
Start: 2024-12-09 | End: 2024-12-12 | Stop reason: HOSPADM

## 2024-12-09 RX ORDER — ENOXAPARIN SODIUM 100 MG/ML
40 INJECTION SUBCUTANEOUS EVERY EVENING
Status: DISCONTINUED | OUTPATIENT
Start: 2024-12-10 | End: 2024-12-12 | Stop reason: HOSPADM

## 2024-12-09 RX ORDER — GUAIFENESIN 200 MG/10ML
200 LIQUID ORAL ONCE
Status: COMPLETED | OUTPATIENT
Start: 2024-12-09 | End: 2024-12-09

## 2024-12-09 RX ORDER — POLYETHYLENE GLYCOL 3350 17 G/17G
17 POWDER, FOR SOLUTION ORAL DAILY PRN
Status: DISCONTINUED | OUTPATIENT
Start: 2024-12-09 | End: 2024-12-12 | Stop reason: HOSPADM

## 2024-12-09 RX ORDER — IPRATROPIUM BROMIDE AND ALBUTEROL SULFATE 2.5; .5 MG/3ML; MG/3ML
1 SOLUTION RESPIRATORY (INHALATION)
Status: DISCONTINUED | OUTPATIENT
Start: 2024-12-09 | End: 2024-12-09 | Stop reason: HOSPADM

## 2024-12-09 RX ORDER — HYDROXYUREA 500 MG/1
500 CAPSULE ORAL DAILY
Status: DISCONTINUED | OUTPATIENT
Start: 2024-12-10 | End: 2024-12-12 | Stop reason: HOSPADM

## 2024-12-09 RX ORDER — INSULIN LISPRO 100 [IU]/ML
0-4 INJECTION, SOLUTION INTRAVENOUS; SUBCUTANEOUS
Status: DISCONTINUED | OUTPATIENT
Start: 2024-12-09 | End: 2024-12-12 | Stop reason: HOSPADM

## 2024-12-09 RX ORDER — FERROUS SULFATE 325(65) MG
325 TABLET ORAL
Status: DISCONTINUED | OUTPATIENT
Start: 2024-12-10 | End: 2024-12-12 | Stop reason: HOSPADM

## 2024-12-09 RX ORDER — LISINOPRIL 5 MG/1
2.5 TABLET ORAL DAILY
Status: DISCONTINUED | OUTPATIENT
Start: 2024-12-10 | End: 2024-12-12 | Stop reason: HOSPADM

## 2024-12-09 RX ORDER — INSULIN GLARGINE 100 [IU]/ML
7 INJECTION, SOLUTION SUBCUTANEOUS NIGHTLY
Status: DISCONTINUED | OUTPATIENT
Start: 2024-12-09 | End: 2024-12-09 | Stop reason: HOSPADM

## 2024-12-09 RX ORDER — ASPIRIN 81 MG/1
81 TABLET ORAL DAILY
Status: DISCONTINUED | OUTPATIENT
Start: 2024-12-09 | End: 2024-12-09 | Stop reason: HOSPADM

## 2024-12-09 RX ADMIN — ACETAMINOPHEN 650 MG: 325 TABLET ORAL at 00:34

## 2024-12-09 RX ADMIN — INSULIN GLARGINE 8 UNITS: 100 INJECTION, SOLUTION SUBCUTANEOUS at 20:34

## 2024-12-09 RX ADMIN — HYDROXYUREA 500 MG: 500 CAPSULE ORAL at 12:25

## 2024-12-09 RX ADMIN — IPRATROPIUM BROMIDE AND ALBUTEROL SULFATE 1 DOSE: 2.5; .5 SOLUTION RESPIRATORY (INHALATION) at 10:59

## 2024-12-09 RX ADMIN — LISINOPRIL 2.5 MG: 2.5 TABLET ORAL at 12:28

## 2024-12-09 RX ADMIN — GLIPIZIDE 2.5 MG: 2.5 TABLET, FILM COATED, EXTENDED RELEASE ORAL at 12:26

## 2024-12-09 RX ADMIN — CYANOCOBALAMIN TAB 1000 MCG 1000 MCG: 1000 TAB at 12:25

## 2024-12-09 RX ADMIN — Medication 100 MG: at 12:27

## 2024-12-09 RX ADMIN — GUAIFENESIN 200 MG: 100 LIQUID ORAL at 00:34

## 2024-12-09 RX ADMIN — CEFEPIME 2000 MG: 2 INJECTION, POWDER, FOR SOLUTION INTRAVENOUS at 09:57

## 2024-12-09 RX ADMIN — SODIUM CHLORIDE, PRESERVATIVE FREE 10 ML: 5 INJECTION INTRAVENOUS at 20:36

## 2024-12-09 RX ADMIN — MIDODRINE HYDROCHLORIDE 10 MG: 5 TABLET ORAL at 12:24

## 2024-12-09 RX ADMIN — CEFEPIME 2000 MG: 2 INJECTION, POWDER, FOR SOLUTION INTRAVENOUS at 00:34

## 2024-12-09 RX ADMIN — AZITHROMYCIN MONOHYDRATE 500 MG: 500 INJECTION, POWDER, LYOPHILIZED, FOR SOLUTION INTRAVENOUS at 20:35

## 2024-12-09 RX ADMIN — WATER 1000 MG: 1 INJECTION INTRAMUSCULAR; INTRAVENOUS; SUBCUTANEOUS at 20:34

## 2024-12-09 RX ADMIN — VANCOMYCIN 750 MG: 750 INJECTION, SOLUTION INTRAVENOUS at 08:54

## 2024-12-09 RX ADMIN — Medication 50 MG: at 12:25

## 2024-12-09 RX ADMIN — METFORMIN HYDROCHLORIDE 1000 MG: 500 TABLET ORAL at 12:26

## 2024-12-09 RX ADMIN — EMPAGLIFLOZIN 25 MG: 25 TABLET, FILM COATED ORAL at 12:27

## 2024-12-09 RX ADMIN — INSULIN LISPRO 2 UNITS: 100 INJECTION, SOLUTION INTRAVENOUS; SUBCUTANEOUS at 12:00

## 2024-12-09 RX ADMIN — ASPIRIN 81 MG: 81 TABLET, COATED ORAL at 12:26

## 2024-12-09 ASSESSMENT — PAIN SCALES - GENERAL
PAINLEVEL_OUTOF10: 4
PAINLEVEL_OUTOF10: 1
PAINLEVEL_OUTOF10: 0

## 2024-12-09 ASSESSMENT — PAIN DESCRIPTION - PAIN TYPE: TYPE: ACUTE PAIN

## 2024-12-09 ASSESSMENT — PAIN DESCRIPTION - ORIENTATION: ORIENTATION: MID

## 2024-12-09 ASSESSMENT — PAIN DESCRIPTION - ONSET: ONSET: ON-GOING

## 2024-12-09 ASSESSMENT — PAIN DESCRIPTION - LOCATION
LOCATION: THROAT
LOCATION: THROAT

## 2024-12-09 ASSESSMENT — PAIN - FUNCTIONAL ASSESSMENT: PAIN_FUNCTIONAL_ASSESSMENT: PREVENTS OR INTERFERES SOME ACTIVE ACTIVITIES AND ADLS

## 2024-12-09 ASSESSMENT — PAIN DESCRIPTION - DESCRIPTORS: DESCRIPTORS: SHARP

## 2024-12-09 ASSESSMENT — PAIN DESCRIPTION - FREQUENCY: FREQUENCY: CONTINUOUS

## 2024-12-09 NOTE — PROGRESS NOTES
4 Eyes Skin Assessment     NAME:  Lin Bates  YOB: 1955  MEDICAL RECORD NUMBER:  [id number]    The patient is being assessed for  Admission    I agree that at least one RN has performed a thorough Head to Toe Skin Assessment on the patient. ALL assessment sites listed below have been assessed.      Areas assessed by both nurses:    Head, Face, Ears, Shoulders, Back, Chest, Arms, Elbows, Hands, Sacrum. Buttock, Coccyx, Ischium, and Legs. Feet and Heels        Does the Patient have a Wound? No noted wound(s)       Giuseppe Prevention initiated by RN: Yes  Wound Care Orders initiated by RN: No    Pressure Injury (Stage 3,4, Unstageable, DTI, NWPT, and Complex wounds) if present, place Wound referral order by RN under : No    New Ostomies, if present place, Ostomy referral order under : No     Nurse 1 eSignature: Electronically signed by Afia King LPN on 12/9/24 at 5:36 PM EST    **SHARE this note so that the co-signing nurse can place an eSignature**    Nurse 2 eSignature: Electronically signed by Shari Andersen RN on 12/9/24 at 7:30 PM EST

## 2024-12-09 NOTE — ED PROVIDER NOTES
12/8/24:p.m.  Lin Bates was checked out to me by Dr. Simeon. Please see his/her initial documentation for details of the patient's ED presentation, physical exam and completed studies.    In brief, Lin Bates is a 69 y.o. female that presents with complaint of shortness of breath hypoxia history of cancer, cough awaiting labs imaging dispo    I have reviewed and interpreted all of the currently available lab results from this visit (if applicable):  Results for orders placed or performed during the hospital encounter of 12/08/24   Lactate, Sepsis   Result Value Ref Range    Lactic Acid, Sepsis 0.9 0.4 - 2.0 mmol/L   Magnesium   Result Value Ref Range    Magnesium 1.7 (L) 1.8 - 2.4 mg/dL   Comprehensive Metabolic Panel   Result Value Ref Range    Sodium 138 135 - 145 mmol/L    Potassium 3.8 3.5 - 5.1 mmol/L    Chloride 101 99 - 110 mmol/L    CO2 24 21 - 32 mmol/L    Anion Gap 13 4 - 16 mmol/L    Glucose 135 (H) 70 - 99 mg/dL    BUN 12 6 - 23 mg/dL    Creatinine 0.7 0.6 - 1.1 mg/dL    Est, Glom Filt Rate >90 >60 mL/min/1.73m2    Calcium 8.7 8.3 - 10.6 mg/dL    Total Protein 6.8 6.4 - 8.2 g/dL    Albumin 2.9 (L) 3.4 - 5.0 g/dL    Albumin/Globulin Ratio 0.7 (L) 1.1 - 2.2    Total Bilirubin 0.3 0.0 - 1.0 mg/dL    Alkaline Phosphatase 65 40 - 129 U/L    ALT 5 (L) 10 - 40 U/L    AST 10 (L) 15 - 37 U/L   CBC with Auto Differential   Result Value Ref Range    WBC 11.6 (H) 4.0 - 10.5 k/uL    RBC 3.10 (L) 4.20 - 5.40 m/uL    Hemoglobin 9.6 (L) 12.5 - 16.0 g/dL    Hematocrit 30.6 (L) 37.0 - 47.0 %    MCV 98.7 78.0 - 100.0 fL    MCH 31.0 27.0 - 31.0 pg    MCHC 31.4 (L) 32.0 - 36.0 g/dL    RDW 17.3 (H) 11.7 - 14.9 %    Platelets 339 140 - 440 k/uL    MPV 9.6 7.5 - 11.1 fL    Neutrophils % 76 (H) 36 - 66 %    Lymphocytes % 12 (L) 24 - 44 %    Monocytes % 10 (H) 0 - 4 %    Eosinophils % 1 0 - 3 %    Basophils % 1 0 - 1 %    Immature Granulocytes % 1 (H) 0 %    Neutrophils Absolute 8.85 k/uL    Lymphocytes Absolute 1.43 
--      --  313         Time Severe Sepsis Identified: 1712    Fluid Resuscitation Rational: less than 30mL/kg because patient has large pleural effusion and appears euvolemic currently      Repeat lactate level: not indicated due to initial lactate < 2    Reassessment Exam:   Not applicable. Patient does not have septic shock.      History from : Patient      Patient was given the following medications:  Medications   ceFEPIme (MAXIPIME) 2,000 mg in sodium chloride 0.9 % 100 mL IVPB (mini-bag) (0 mg IntraVENous Stopped 12/8/24 1854)   vancomycin (VANCOCIN) 1500 mg in 300 mL IVPB (0 mg IntraVENous Stopped 12/8/24 2124)   magnesium sulfate 2000 mg in 50 mL IVPB premix (0 mg IntraVENous Stopped 12/8/24 2326)   iopamidol (ISOVUE-370) 76 % injection 75 mL (75 mLs IntraVENous Given 12/8/24 1945)   acetaminophen (TYLENOL) tablet 650 mg (650 mg Oral Given 12/9/24 0034)   guaiFENesin (ROBITUSSIN) 100 MG/5ML liquid 200 mg (200 mg Oral Given 12/9/24 0034)         EKG (if obtained): (All EKG's are interpreted by myself in the absence of a cardiologist) sinus rhythm at 92.  Normal axis with good R wave progression.  No ST elevation or depression.  No ectopy.  No acute change from prior tracing.    Chronic conditions affecting care: Small cell lung cancer, HTN, HLD, DM      Records Reviewed : Inpatient Notes patient admitted 9/25 through 10/5 for respiratory failure.  Patient had multifocal pneumonia.  There was apparently a question of immunotherapy induced pneumonitis as well.  Patient completed a course of IV antibiotics as well as steroids.  She also underwent thoracentesis on 10/3 due to bilateral pleural effusion.  She did require chest tube secondary to iatrogenic right pneumo.    Disposition Considerations (tests considered but not done, Shared Decision Making, Pt Expectation of Test or Tx.):     Care endorsed to Dr. Nicole pending final CTA results.  Anticipate transfer to [facility] for admission.  Patient and her

## 2024-12-09 NOTE — ED NOTES
remains at bedside and knowledgeable in regards to pt history. Pt alert and oriented. [place].

## 2024-12-09 NOTE — PROGRESS NOTES
Per [facility] approved formulary policy.     SGLT2's are only formulary with the indication of CKD or CHF therefore:    Please note that the  Empagliflozin (Jardiance) is non-formulary with indications of type 2 diabetes and has been discontinued while inpatient. If you feel the patient needs to continue their home therapy during the inpatient stay, the patient may bring their medication bottle for verification and administration pursuant to our home medication use policy.      Please contact the pharmacy with any questions or concerns.  Thank you.  Elina Lynne, PharmD  12/9/2024 6:38 PM

## 2024-12-09 NOTE — PROGRESS NOTES
PHARMACY VANCOMYCIN MONITORING SERVICE  Pharmacy consulted by Dr. Nicole for monitoring and adjustment.    Indication for treatment: Vancomycin indication: CAP with risk factors  Goal trough: Trough Goal: 15-20 mcg/mL  AUC/GABO: 400-600    Risk Factors for MRSA Identified:   Hospitalization within the past 90 days, Received IV antibiotics within the past 90 days    Pertinent Laboratory Values:   Temp Readings from Last 3 Encounters:   12/08/24 98.6 °F (37 °C) (Oral)   11/20/24 97.7 °F (36.5 °C) (Infrared)   11/20/24 97.7 °F (36.5 °C) (Infrared)     Recent Labs     12/08/24  1650   WBC 11.6*     Recent Labs     12/08/24  1650   BUN 12   CREATININE 0.7     Estimated Creatinine Clearance: 63 mL/min (based on SCr of 0.7 mg/dL).  No intake or output data in the 24 hours ending 12/09/24 0314  Last Encounter Weight:  Wt Readings from Last 3 Encounters:   12/08/24 60.8 kg (134 lb)   11/21/24 59.4 kg (131 lb)   11/20/24 59.4 kg (131 lb)       Pertinent Cultures:   Date    Source    Results  12/08   Blood    In process  12/09   MRSA Nasal   Pending      Vancomycin level:   TROUGH:  No results for input(s): \"VANCOTROUGH\" in the last 72 hours.  RANDOM:  No results for input(s): \"VANCORANDOM\" in the last 72 hours.    Assessment:  HPI: 69 year old female with a history of small cell lung cancer presented to the ED with complaints of shortness of breath, hypoxia and cough  SCr, BUN, and urine output: Scr = 0.7, BUN = 12, No UOP data  Day(s) of therapy: 1 of 7  Vancomycin concentration: TBD    Plan:  Loading dose of vancomycin administered: 1500 mg. Followed by a scheduled dose of vancomycin 750 mg every 12 hours.   Predicted trough of 15.9 and AUC: 498 at steady-state  Pharmacy will continue to monitor patient and adjust therapy as indicated    VANCOMYCIN CONCENTRATION SCHEDULED FOR 12/11 @0600    Thank you for the consult.  Tai Bhatti [facility]  12/9/2024 3:14 AM

## 2024-12-09 NOTE — ED NOTES
Pt with hx of lung cancer currently taking chemo PO. Recently hospitalized for pneumonia for 11 days. Does not wear o2 at home. Currently on 2L NC maintaining above 92%.

## 2024-12-09 NOTE — ED NOTES
Assisted to BR at this time via WC. Pt sat 84% when being placed back on monitor but quickly above 92% when o2 replaced.

## 2024-12-09 NOTE — H&P
History and Physical      Name:  Lin Bates /Age/Sex: 1955  (69 y.o. female)   MRN & CSN:  [id number] & [id number] Encounter Date/Time: 2024 6:21 PM   [facility] PCP: Dorothy Terrazas PA       Hospital Day: 1    Assessment and Plan:     Patient is a 69-year-old female who presented with SOB. Transferred from [place] ED.    # Community-acquired pneumonia of left lower lung with associated large left pleural effusion  # Acute hypoxemic respiratory failure   - Reported worsening SOB with non-productive cough for 2 days. No travels or sick contacts. Not on oxygen at baseline. Had bilateral pleural effusions in 10/2023, had thoracentesis with pulmonology on 10/3, transudate, complicated by small-to-moderate right pneumothorax s/p chest tube insertion by IR on 10/3, removed on10/5.   - SpO2 high 80's on RA, requiring 2L NC in ED. Afebrile, no leukocytosis, LA and PCT normal, CTPE showed LLL consolidation with large circumferential left pleural effusion. MRSA swab negative.   - Started on Rocephin/Flagyl, follow-up cultures. IR consulted to evaluate for diagnostic and therapeutic thoracentesis, appreciate assistance.    # Left lung small cell carcinoma  # AoCD  - Followed by H/O, finished chemotherapy in 2024, on immunotherapy currently, last cycle on .  - Labs stable.    # Hypomagnesemia  - Mild, repleted.  - Follow-up repeat labs.    # T2DM with hyperglycemia  - Last A1c 6.1% in 2024, repeat pending.   - Held metformin and glipizide, continue Jardiance.  - On insulin Lantus 8 u qhs with LCSI.    # Mixed hyperlipidemia  - Continue Lipitor.    # GERD  - Continue PPI and Carafate.     Checklist:  Advanced care planning: full  Diet: diabetic     DVT ppx: Lovenox (held for IR)  Sugar: BG goal of 140-180 while inpatient    Disposition: admit to inpatient.  Estimated discharge: 2-3 day(s).  Current living situation: home.  Expected disposition: home.    Spoke with ED provider    DPH-Lido-AlHydr-MgHydr-Simeth (MAGIC MOUTHWASH) SUSP Swish and spit 5 mLs 4 times daily as needed for Irritation  Patient not taking: Reported on 12/9/2024 5/24/24   Denis Ford MD   Magic Mouthwash (MIRACLE MOUTHWASH) Equal parts of Benadryl, Maalox, 2% Viscous Xylocaine.  Take 5 mL 4 times daily.  Patient not taking: Reported on 12/9/2024 4/29/24   Burt Birch MD       Medications:     Medications:        Infusions:       PRN Meds:       Data:     CBC:   Recent Labs     12/08/24  1650   WBC 11.6*   HGB 9.6*      MCV 98.7   RDW 17.3*   LYMPHOPCT 12*   MONOPCT 10*   EOSPCT 1   BASOPCT 1   MONOSABS 1.14   LYMPHSABS 1.43   EOSABS 0.08   BASOSABS 0.06     CMP:    Recent Labs     12/08/24  1650      K 3.8      CO2 24   BUN 12   CREATININE 0.7   GLUCOSE 135*   CALCIUM 8.7   BILITOT 0.3   ALKPHOS 65   AST 10*   ALT 5*     Lipids: No results found for: \"CHOL\", \"HDL\", \"TRIG\"  Hemoglobin A1C:   Lab Results   Component Value Date/Time    LABA1C 6.1 05/18/2024 08:30 PM     TSH:   Lab Results   Component Value Date/Time    TSH 0.50 10/22/2024 01:02 PM     Troponin:   Lab Results   Component Value Date/Time    TROPONINT <0.010 05/18/2015 05:45 PM     BNP: No results for input(s): \"PROBNP\" in the last 72 hours.  Lactic Acid: No results for input(s): \"LACTA\" in the last 72 hours.  UA:  Lab Results   Component Value Date/Time    NITRU NEGATIVE 05/18/2015 03:40 PM    COLORU YELLOW 05/18/2015 03:40 PM    PHUR 6.0 05/18/2015 03:40 PM    WBCUA 2 TO 3 05/18/2015 03:40 PM    RBCUA NEGATIVE 05/18/2015 03:40 PM    MUCUS NEGATIVE 10/20/2014 09:50 PM    BACTERIA MODERATE 05/18/2015 03:40 PM    CLARITYU HAZY 05/18/2015 03:40 PM    LEUKOCYTESUR TRACE 05/18/2015 03:40 PM    UROBILINOGEN 0.2 05/18/2015 03:40 PM    BILIRUBINUR NEGATIVE 05/18/2015 03:40 PM    BLOODU NEGATIVE 05/18/2015 03:40 PM    GLUCOSEU NEGATIVE 05/18/2015 03:40 PM    KETUA NEGATIVE 05/18/2015 03:40 PM     Urine Cultures: No results found for:

## 2024-12-10 ENCOUNTER — APPOINTMENT (OUTPATIENT)
Dept: INTERVENTIONAL RADIOLOGY/VASCULAR | Age: 69
DRG: 193 | End: 2024-12-10
Attending: INTERNAL MEDICINE
Payer: MEDICARE

## 2024-12-10 ENCOUNTER — APPOINTMENT (OUTPATIENT)
Dept: GENERAL RADIOLOGY | Age: 69
DRG: 193 | End: 2024-12-10
Attending: INTERNAL MEDICINE
Payer: MEDICARE

## 2024-12-10 LAB
ALBUMIN SERPL-MCNC: 3 G/DL (ref 3.4–5)
ALBUMIN/GLOB SERPL: 0.8 {RATIO} (ref 1.1–2.2)
ALP SERPL-CCNC: 62 U/L (ref 40–129)
ALT SERPL-CCNC: <5 U/L (ref 10–40)
ANION GAP SERPL CALCULATED.3IONS-SCNC: 11 MMOL/L (ref 9–17)
AST SERPL-CCNC: 20 U/L (ref 15–37)
BASOPHILS # BLD: 0.09 K/UL
BASOPHILS NFR BLD: 1 % (ref 0–1)
BILIRUB SERPL-MCNC: <0.2 MG/DL (ref 0–1)
BUN SERPL-MCNC: 10 MG/DL (ref 7–20)
CALCIUM SERPL-MCNC: 8.9 MG/DL (ref 8.3–10.6)
CHLORIDE SERPL-SCNC: 104 MMOL/L (ref 99–110)
CO2 SERPL-SCNC: 24 MMOL/L (ref 21–32)
CREAT SERPL-MCNC: 0.6 MG/DL (ref 0.6–1.2)
CRP SERPL HS-MCNC: 4 MG/L (ref 0–5)
EOSINOPHIL # BLD: 0.17 K/UL
EOSINOPHILS RELATIVE PERCENT: 3 % (ref 0–3)
ERYTHROCYTE [DISTWIDTH] IN BLOOD BY AUTOMATED COUNT: 17.4 % (ref 11.7–14.9)
EST. AVERAGE GLUCOSE BLD GHB EST-MCNC: 162 MG/DL
GFR, ESTIMATED: >90 ML/MIN/1.73M2
GLUCOSE BLD-MCNC: 132 MG/DL (ref 74–99)
GLUCOSE BLD-MCNC: 133 MG/DL (ref 74–99)
GLUCOSE BLD-MCNC: 195 MG/DL (ref 74–99)
GLUCOSE BLD-MCNC: 211 MG/DL (ref 74–99)
GLUCOSE SERPL-MCNC: 95 MG/DL (ref 74–99)
HBA1C MFR BLD: 7.3 % (ref 4.2–6.3)
HCT VFR BLD AUTO: 31.4 % (ref 37–47)
HGB BLD-MCNC: 9.9 G/DL (ref 12.5–16)
IMM GRANULOCYTES # BLD AUTO: 0.15 K/UL
IMM GRANULOCYTES NFR BLD: 2 %
INR PPP: 1
LDH FLD L TO P-CCNC: 59 U/L
LDH SERPL-CCNC: 148 U/L (ref 100–190)
LYMPHOCYTES NFR BLD: 1.02 K/UL
LYMPHOCYTES RELATIVE PERCENT: 13 % (ref 24–44)
MCH RBC QN AUTO: 31 PG (ref 27–31)
MCHC RBC AUTO-ENTMCNC: 31.5 G/DL (ref 32–36)
MCV RBC AUTO: 98.4 FL (ref 78–100)
MONOCYTES NFR BLD: 1.35 K/UL
MONOCYTES NFR BLD: 18 % (ref 0–4)
NEUTROPHILS NFR BLD: 64 % (ref 36–66)
NEUTS SEG NFR BLD: 4.85 K/UL
PH FLUID: 8 (ref 6.5–7.5)
PLATELET, FLUORESCENCE: 332 K/UL (ref 140–440)
PMV BLD AUTO: 10.6 FL (ref 7.5–11.1)
POTASSIUM SERPL-SCNC: 3.9 MMOL/L (ref 3.5–5.1)
PROCALCITONIN SERPL-MCNC: 0.33 NG/ML
PROT SERPL-MCNC: 6.7 G/DL (ref 6.4–8.2)
PROTHROMBIN TIME: 13.8 SEC (ref 11.7–14.5)
RBC # BLD AUTO: 3.19 M/UL (ref 4.2–5.4)
SODIUM SERPL-SCNC: 138 MMOL/L (ref 136–145)
SPECIMEN TYPE: ABNORMAL
SPECIMEN TYPE: NORMAL
WBC OTHER # BLD: 7.6 K/UL (ref 4–10.5)

## 2024-12-10 PROCEDURE — 87633 RESP VIRUS 12-25 TARGETS: CPT

## 2024-12-10 PROCEDURE — 82962 GLUCOSE BLOOD TEST: CPT

## 2024-12-10 PROCEDURE — 84145 PROCALCITONIN (PCT): CPT

## 2024-12-10 PROCEDURE — 6370000000 HC RX 637 (ALT 250 FOR IP): Performed by: STUDENT IN AN ORGANIZED HEALTH CARE EDUCATION/TRAINING PROGRAM

## 2024-12-10 PROCEDURE — 87075 CULTR BACTERIA EXCEPT BLOOD: CPT

## 2024-12-10 PROCEDURE — 1200000000 HC SEMI PRIVATE

## 2024-12-10 PROCEDURE — 32555 ASPIRATE PLEURA W/ IMAGING: CPT | Performed by: RADIOLOGY

## 2024-12-10 PROCEDURE — 85025 COMPLETE CBC W/AUTO DIFF WBC: CPT

## 2024-12-10 PROCEDURE — 2709999900 IR GUIDED THORACENTESIS PLEURAL

## 2024-12-10 PROCEDURE — 83615 LACTATE (LD) (LDH) ENZYME: CPT

## 2024-12-10 PROCEDURE — 86140 C-REACTIVE PROTEIN: CPT

## 2024-12-10 PROCEDURE — 2580000003 HC RX 258: Performed by: STUDENT IN AN ORGANIZED HEALTH CARE EDUCATION/TRAINING PROGRAM

## 2024-12-10 PROCEDURE — 71045 X-RAY EXAM CHEST 1 VIEW: CPT

## 2024-12-10 PROCEDURE — 94761 N-INVAS EAR/PLS OXIMETRY MLT: CPT

## 2024-12-10 PROCEDURE — 87205 SMEAR GRAM STAIN: CPT

## 2024-12-10 PROCEDURE — 85610 PROTHROMBIN TIME: CPT

## 2024-12-10 PROCEDURE — 32555 ASPIRATE PLEURA W/ IMAGING: CPT

## 2024-12-10 PROCEDURE — 6360000002 HC RX W HCPCS: Performed by: STUDENT IN AN ORGANIZED HEALTH CARE EDUCATION/TRAINING PROGRAM

## 2024-12-10 PROCEDURE — 36415 COLL VENOUS BLD VENIPUNCTURE: CPT

## 2024-12-10 PROCEDURE — 83036 HEMOGLOBIN GLYCOSYLATED A1C: CPT

## 2024-12-10 PROCEDURE — 6360000002 HC RX W HCPCS: Performed by: RADIOLOGY

## 2024-12-10 PROCEDURE — 87070 CULTURE OTHR SPECIMN AEROBIC: CPT

## 2024-12-10 PROCEDURE — 0W9B3ZX DRAINAGE OF LEFT PLEURAL CAVITY, PERCUTANEOUS APPROACH, DIAGNOSTIC: ICD-10-PCS | Performed by: RADIOLOGY

## 2024-12-10 PROCEDURE — 83986 ASSAY PH BODY FLUID NOS: CPT

## 2024-12-10 PROCEDURE — 80053 COMPREHEN METABOLIC PANEL: CPT

## 2024-12-10 RX ORDER — LIDOCAINE HYDROCHLORIDE 10 MG/ML
INJECTION, SOLUTION EPIDURAL; INFILTRATION; INTRACAUDAL; PERINEURAL PRN
Status: COMPLETED | OUTPATIENT
Start: 2024-12-10 | End: 2024-12-10

## 2024-12-10 RX ADMIN — SODIUM CHLORIDE: 9 INJECTION, SOLUTION INTRAVENOUS at 17:44

## 2024-12-10 RX ADMIN — AZITHROMYCIN MONOHYDRATE 500 MG: 500 INJECTION, POWDER, LYOPHILIZED, FOR SOLUTION INTRAVENOUS at 17:46

## 2024-12-10 RX ADMIN — PANTOPRAZOLE SODIUM 40 MG: 40 TABLET, DELAYED RELEASE ORAL at 05:36

## 2024-12-10 RX ADMIN — SUCRALFATE 1 G: 1 TABLET ORAL at 05:37

## 2024-12-10 RX ADMIN — INSULIN GLARGINE 8 UNITS: 100 INJECTION, SOLUTION SUBCUTANEOUS at 21:26

## 2024-12-10 RX ADMIN — LISINOPRIL 2.5 MG: 5 TABLET ORAL at 09:45

## 2024-12-10 RX ADMIN — FOLIC ACID 1 MG: 1 TABLET ORAL at 09:45

## 2024-12-10 RX ADMIN — ATORVASTATIN CALCIUM 20 MG: 10 TABLET, FILM COATED ORAL at 21:26

## 2024-12-10 RX ADMIN — ASPIRIN 81 MG: 81 TABLET, CHEWABLE ORAL at 09:45

## 2024-12-10 RX ADMIN — Medication 1000 MCG: at 09:45

## 2024-12-10 RX ADMIN — WATER 1000 MG: 1 INJECTION INTRAMUSCULAR; INTRAVENOUS; SUBCUTANEOUS at 17:41

## 2024-12-10 RX ADMIN — HYDROXYUREA 500 MG: 500 CAPSULE ORAL at 12:18

## 2024-12-10 RX ADMIN — SODIUM CHLORIDE, PRESERVATIVE FREE 10 ML: 5 INJECTION INTRAVENOUS at 21:26

## 2024-12-10 RX ADMIN — SODIUM CHLORIDE, PRESERVATIVE FREE 10 ML: 5 INJECTION INTRAVENOUS at 09:45

## 2024-12-10 RX ADMIN — SUCRALFATE 1 G: 1 TABLET ORAL at 21:26

## 2024-12-10 RX ADMIN — SUCRALFATE 1 G: 1 TABLET ORAL at 12:18

## 2024-12-10 RX ADMIN — LIDOCAINE HYDROCHLORIDE 10 ML: 10 INJECTION, SOLUTION EPIDURAL; INFILTRATION; INTRACAUDAL; PERINEURAL at 11:32

## 2024-12-10 RX ADMIN — INSULIN LISPRO 1 UNITS: 100 INJECTION, SOLUTION INTRAVENOUS; SUBCUTANEOUS at 17:30

## 2024-12-10 RX ADMIN — FERROUS SULFATE TAB 325 MG (65 MG ELEMENTAL FE) 325 MG: 325 (65 FE) TAB at 09:45

## 2024-12-10 ASSESSMENT — PAIN SCALES - WONG BAKER: WONGBAKER_NUMERICALRESPONSE: NO HURT

## 2024-12-10 ASSESSMENT — PAIN SCALES - GENERAL: PAINLEVEL_OUTOF10: 2

## 2024-12-10 ASSESSMENT — PAIN DESCRIPTION - LOCATION: LOCATION: RIB CAGE;THROAT

## 2024-12-10 ASSESSMENT — PAIN DESCRIPTION - ORIENTATION: ORIENTATION: LEFT;MID

## 2024-12-10 ASSESSMENT — PAIN DESCRIPTION - DESCRIPTORS: DESCRIPTORS: ACHING

## 2024-12-10 NOTE — CONSULTS
Consult Interventional Radiology    Date:12/10/2024  Name:Lin Bates   :1955   MR#:[id number]    SEX:female     Planned procedure:  thoracentesis  Indication: pleural effuison    H&P Status: H&P was reviewed, the patient was examined and no change has occurred in patient's condition since H&P was completed.    Past Medical History:  Past Medical History:   Diagnosis Date    Cancer (HCC)     History of external beam radiation therapy 2024    LEFT LUNG and LN 6,000 cGy in 30 fractions    Hyperlipidemia     Hypertension     Thickened endometrium     Type 2 diabetes mellitus without complication (HCC)         Past Surgical History:  Past Surgical History:   Procedure Laterality Date    CT NEEDLE BIOPSY LUNG PERCUTANEOUS  2024    CT NEEDLE BIOPSY LUNG PERCUTANEOUS 2024 [facility] CT SCAN    DENTAL SURGERY      's    DILATION AND CURETTAGE OF UTERUS N/A 3/22/2023    DILATATION AND CURETTAGE HYSTEROSCOPY performed by Zac Pretty MD at [facility] OR    ESOPHAGOSCOPY N/A 2024    ESOPHAGOSCOPY DIAGNOSTIC performed by Yenny Lindquist MD at [facility] ENDOSCOPY    IR BIOPSY THYROID PERC CORE NEEDLE  2024    IR BIOPSY THYROID PERC CORE NEEDLE 2024 [facility] SPECIAL PROCEDURES    IR CHEST TUBE INSERTION  10/3/2024    IR CHEST TUBE INSERTION 10/3/2024 [facility] SPECIAL PROCEDURES    PORT SURGERY N/A 4/10/2024    RIGHT SUBCLAVIAN MEDIPORT INSERTION performed by Brian Humphries II, MD at [facility] OR       Social History:  Social History     Socioeconomic History    Marital status:      Spouse name: Not on file    Number of children: Not on file    Years of education: Not on file    Highest education level: Not on file   Occupational History    Not on file   Tobacco Use    Smoking status: Former     Current packs/day: 0.00     Average packs/day: 1 pack/day for 52.1 years (52.1 ttl pk-yrs)     Types: Cigarettes     Start date:      Quit date: 2024     Years since quittin.8    Smokeless

## 2024-12-10 NOTE — PLAN OF CARE
Problem: Chronic Conditions and Co-morbidities  Goal: Patient's chronic conditions and co-morbidity symptoms are monitored and maintained or improved  12/10/2024 0645 by Gabriel Bourne RN  Outcome: Progressing  12/9/2024 1819 by Afia King LPN  Outcome: Progressing     Problem: Discharge Planning  Goal: Discharge to home or other facility with appropriate resources  12/10/2024 0645 by Gabriel Bourne RN  Outcome: Progressing  12/9/2024 1819 by Afia King LPN  Outcome: Progressing     Problem: Safety - Adult  Goal: Free from fall injury  12/10/2024 0645 by Gabriel Bourne RN  Outcome: Progressing  12/9/2024 1819 by Afia King LPN  Outcome: Progressing

## 2024-12-10 NOTE — PROGRESS NOTES
4 Eyes Skin Assessment     NAME:  Lin Bates  YOB: 1955  MEDICAL RECORD NUMBER:  [id number]    The patient is being assessed for  Admission    I agree that at least one RN has performed a thorough Head to Toe Skin Assessment on the patient. ALL assessment sites listed below have been assessed.      Areas assessed by both nurses:    Head, Face, Ears, Shoulders, Back, Chest, Arms, Elbows, Hands, Sacrum. Buttock, Coccyx, Ischium, Legs. Feet and Heels, Under Medical Devices , and Other redness under abdominal folds        Does the Patient have a Wound? No noted wound(s)       Giuseppe Prevention initiated by RN: No  Wound Care Orders initiated by RN: No    Pressure Injury (Stage 3,4, Unstageable, DTI, NWPT, and Complex wounds) if present, place Wound referral order by RN under : No    New Ostomies, if present place, Ostomy referral order under : No     Nurse 1 eSignature: Electronically signed by Gabriel Bourne RN on 12/10/24 at 6:40 AM EST    **SHARE this note so that the co-signing nurse can place an eSignature**    Nurse 2 eSignature: Electronically signed by Khushi Nolan LPN on 12/10/24 at 6:43 AM EST

## 2024-12-10 NOTE — PROGRESS NOTES
Progress Note      Name:  Lin Bates /Age/Sex: 1955  (69 y.o. female)   MRN & CSN:  [id number] & [id number] Encounter Date/Time: 12/10/2024 3:17 PM   [facility] PCP: Dorothy Terrazas PA       Hospital Day: 2    Assessment and Plan:     Patient is a 69-year-old female who presented with SOB. Transferred from [place] ED.    # Community-acquired pneumonia of left lower lung with associated large left pleural effusion  # Acute hypoxemic respiratory failure   - Reported worsening SOB with non-productive cough for 2 days. No travels or sick contacts. Not on oxygen at baseline. Had bilateral pleural effusions in 10/2023, had thoracentesis with pulmonology on 10/3, transudate, complicated by small-to-moderate right pneumothorax s/p chest tube insertion by IR on 10/3, removed on10/5.   Afebrile, no leukocytosis, LA and PCT normal,   CTPE showed LLL consolidation with large circumferential left pleural effusion. MRSA swab negative.   Procal negative  Continue  Rocephin/Flagyl, follow-up cultures. IR consulted to evaluate for diagnostic and therapeutic thoracentesis, appreciate assistance.  12/10 - S/P Thoracentesis - fluid trasudative   On 2 L    # Left lung small cell carcinoma  # AoCD  - Followed by H/O, finished chemotherapy in 2024, on immunotherapy currently, last cycle on .  - Labs stable.    # Hypomagnesemia  - Mild, repleted.  - Follow-up repeat labs.    # T2DM with hyperglycemia  - Last A1c 6.1% in 2024, repeat pending.   - Held metformin and glipizide, continue Jardiance.  - On insulin Lantus 8 u qhs with LCSI.    # Mixed hyperlipidemia  - Continue Lipitor.    # GERD  - Continue PPI and Carafate.     Checklist:  Advanced care planning: full  Diet: diabetic     DVT ppx: Lovenox (held for IR)  Sugar: BG goal of 140-180 while inpatient    Disposition: admit to inpatient.  Estimated discharge: 2-3 day(s).  Current living situation: home.  Expected disposition:

## 2024-12-10 NOTE — CARE COORDINATION
12/10/24 1620   Service Assessment   Patient Orientation Alert and Oriented   Cognition Alert   History Provided By Patient;Significant Other;Medical Record   Primary Caregiver   (Matt galarza)   Support Systems Family Members;Spouse/Significant Other   PCP Verified by CM Yes   Last Visit to PCP Within last 3 months   Prior Functional Level Assistance with the following:   Current Functional Level Assistance with the following:   Can patient return to prior living arrangement Yes   Ability to make needs known: Good   Family able to assist with home care needs: Yes  (Matt able to assist her as needed)   Would you like for me to discuss the discharge plan with any other family members/significant others, and if so, who? Yes  (My Lourdes Gutierrez Perlita)   Financial Resources Medicaid;Medicare  ([employer] Medicare, medicaid)   Community Resources ECF/Home Care  ([facility])   Social/Functional History   Lives With Significant other   Type of Home Apartment   Home Layout One level   Home Equipment Walker - Rolling   Active  No   Patient's  Info Matt takes her to all appointments   Condition of Participation: Discharge Planning   The Patient and/or Patient Representative was provided with a Choice of Provider? Patient   The Patient and/Or Patient Representative agree with the Discharge Plan? Yes   Freedom of Choice list was provided with basic dialogue that supports the patient's individualized plan of care/goals, treatment preferences, and shares the quality data associated with the providers?  Yes     Reviewed chart, discussed in IDR and spoke with pt about discharge needs/plans. Pt lives with lourdes Gutierrez and active with [facility] for nursing.  Called and Confirmed with Gracy at [facility].  Plan is home with s/o and HC. CM available as needed.

## 2024-12-10 NOTE — BRIEF OP NOTE
Department of Interventional Radiology Post-Procedure Note      Date: 12/10/2024     Interventional Radiologist: Elieser    Procedure Performed:  thoracentesis    H&P Status: H&P was reviewed, the patient was examined and no change has occurred in patient's condition since H&P was completed.    Pre-procedure Diagnosis:  pleural effusion    Post-procedure Diagnosis:  Same    Sedation:  none    Contrast used:  none    Estimated blood loss:  Minimal    Preliminary Findings:  successful    Complications:  none    Full Report to Follow.    Electronically signed by Zoë Mon MD on 12/10/2024 at 12:54 PM

## 2024-12-11 LAB
ABSOLUTE BANDS: 0.46 K/UL
ACINETOBACTER CALCOACETICUS-BAUMANNII BY PCR: NOT DETECTED
ADENOVIRUS: NOT DETECTED
ALBUMIN SERPL-MCNC: 2.8 G/DL (ref 3.4–5)
ALBUMIN/GLOB SERPL: 0.8 {RATIO} (ref 1.1–2.2)
ALP SERPL-CCNC: 58 U/L (ref 40–129)
ALT SERPL-CCNC: <5 U/L (ref 10–40)
ANION GAP SERPL CALCULATED.3IONS-SCNC: 8 MMOL/L (ref 9–17)
AST SERPL-CCNC: 15 U/L (ref 15–37)
BANDS: 8 %
BASOPHILS # BLD: 0 K/UL
BASOPHILS NFR BLD: 0 % (ref 0–1)
BILIRUB SERPL-MCNC: 0.2 MG/DL (ref 0–1)
BUN SERPL-MCNC: 11 MG/DL (ref 7–20)
CALCIUM SERPL-MCNC: 8.6 MG/DL (ref 8.3–10.6)
CHLAMYDIA PNEUMONIAE BY PCR: NOT DETECTED
CHLORIDE SERPL-SCNC: 103 MMOL/L (ref 99–110)
CO2 SERPL-SCNC: 27 MMOL/L (ref 21–32)
CORONAVIRUS PCR: DETECTED
CREAT SERPL-MCNC: 0.6 MG/DL (ref 0.6–1.2)
CRP SERPL HS-MCNC: 25.3 MG/L (ref 0–5)
ENTEROBACTER CLOACAE COMPLEX BY PCR: NOT DETECTED
EOSINOPHIL # BLD: 0.06 K/UL
EOSINOPHILS RELATIVE PERCENT: 1 % (ref 0–3)
ERYTHROCYTE [DISTWIDTH] IN BLOOD BY AUTOMATED COUNT: 17.2 % (ref 11.7–14.9)
ESCHERICHIA COLI BY PCR: NOT DETECTED
GFR, ESTIMATED: >90 ML/MIN/1.73M2
GLUCOSE BLD-MCNC: 112 MG/DL (ref 74–99)
GLUCOSE BLD-MCNC: 199 MG/DL (ref 74–99)
GLUCOSE BLD-MCNC: 203 MG/DL (ref 74–99)
GLUCOSE BLD-MCNC: 328 MG/DL (ref 74–99)
GLUCOSE SERPL-MCNC: 119 MG/DL (ref 74–99)
GP B STREP DNA SPT NAA+NON-PRB-NCNCRNG: NOT DETECTED
HAEMOPHILUS INFLUENZAE BY PCR: NOT DETECTED
HCT VFR BLD AUTO: 31.3 % (ref 37–47)
HGB BLD-MCNC: 9.7 G/DL (ref 12.5–16)
HUMAN RHINOVIRUS/ENTEROVIRUS BY PCR: NOT DETECTED
INFLUENZA A BY PCR: NOT DETECTED
INFLUENZA B BY PCR: NOT DETECTED
K AEROGENES DNA BAL NAA+NON-PRB-NCNCRNG: NOT DETECTED
K OXYTOCA DNA SPT NAA+NON-PRB-NCNCRNG: NOT DETECTED
K PNEU GRP DNA SPT NAA+NON-PRB-NCNCRNG: NOT DETECTED
LEGIONELLA PNEUMOPHILIA BY PCR: NOT DETECTED
LYMPHOCYTES NFR BLD: 1.37 K/UL
LYMPHOCYTES RELATIVE PERCENT: 24 % (ref 24–44)
M CATARRHALIS DNA BAL NAA+NON-PRB-NCNCRNG: NOT DETECTED
MCH RBC QN AUTO: 30.3 PG (ref 27–31)
MCHC RBC AUTO-ENTMCNC: 31 G/DL (ref 32–36)
MCV RBC AUTO: 97.8 FL (ref 78–100)
METAPNEUMOVIRUS BY PCR: NOT DETECTED
MONOCYTES NFR BLD: 1.03 K/UL
MONOCYTES NFR BLD: 18 % (ref 0–4)
MYCOPLASMA PNEUMONIAE PCR: NOT DETECTED
NEUTROPHILS NFR BLD: 49 % (ref 36–66)
NEUTS SEG NFR BLD: 2.79 K/UL
P AERUGINOSA DNA SPT NAA+NON-PRB-NCNCRNG: NOT DETECTED
PARAINFLUENZA VIRUS BY PCR: NOT DETECTED
PLATELET # BLD AUTO: 313 K/UL (ref 140–440)
PLATELET ESTIMATE: NORMAL
PMV BLD AUTO: 9.7 FL (ref 7.5–11.1)
POTASSIUM SERPL-SCNC: 3.8 MMOL/L (ref 3.5–5.1)
PROT SERPL-MCNC: 6.3 G/DL (ref 6.4–8.2)
PROTEUS SP DNA BAL NAA+NON-PRB-NCNCRNG: NOT DETECTED
RBC # BLD AUTO: 3.2 M/UL (ref 4.2–5.4)
RBC # BLD: ABNORMAL 10*6/UL
RBC # BLD: ABNORMAL 10*6/UL
RBC # BLD: NORMAL 10*6/UL
RSV BY PCR: NOT DETECTED
S AUREUS DNA SPT NAA+NON-PRB-NCNCRNG: NOT DETECTED
S MARCESCENS DNA SPT NAA+NON-PRB-NCNCRNG: NOT DETECTED
S PNEUM DNA BAL NAA+NON-PRB-NCNCRNG: NOT DETECTED
S PYO DNA SPT NAA+NON-PRB-NCNCRNG: NOT DETECTED
SODIUM SERPL-SCNC: 138 MMOL/L (ref 136–145)
SOURCE: ABNORMAL
WBC # BLD: NORMAL 10*3/UL
WBC OTHER # BLD: 5.7 K/UL (ref 4–10.5)

## 2024-12-11 PROCEDURE — 86140 C-REACTIVE PROTEIN: CPT

## 2024-12-11 PROCEDURE — 85025 COMPLETE CBC W/AUTO DIFF WBC: CPT

## 2024-12-11 PROCEDURE — 2580000003 HC RX 258: Performed by: STUDENT IN AN ORGANIZED HEALTH CARE EDUCATION/TRAINING PROGRAM

## 2024-12-11 PROCEDURE — 36591 DRAW BLOOD OFF VENOUS DEVICE: CPT

## 2024-12-11 PROCEDURE — 82962 GLUCOSE BLOOD TEST: CPT

## 2024-12-11 PROCEDURE — 94761 N-INVAS EAR/PLS OXIMETRY MLT: CPT

## 2024-12-11 PROCEDURE — 6360000002 HC RX W HCPCS: Performed by: STUDENT IN AN ORGANIZED HEALTH CARE EDUCATION/TRAINING PROGRAM

## 2024-12-11 PROCEDURE — 6370000000 HC RX 637 (ALT 250 FOR IP): Performed by: STUDENT IN AN ORGANIZED HEALTH CARE EDUCATION/TRAINING PROGRAM

## 2024-12-11 PROCEDURE — 99223 1ST HOSP IP/OBS HIGH 75: CPT | Performed by: INTERNAL MEDICINE

## 2024-12-11 PROCEDURE — 1200000000 HC SEMI PRIVATE

## 2024-12-11 PROCEDURE — 80053 COMPREHEN METABOLIC PANEL: CPT

## 2024-12-11 PROCEDURE — 2700000000 HC OXYGEN THERAPY PER DAY

## 2024-12-11 RX ADMIN — FOLIC ACID 1 MG: 1 TABLET ORAL at 11:58

## 2024-12-11 RX ADMIN — Medication 1000 MCG: at 11:58

## 2024-12-11 RX ADMIN — PANTOPRAZOLE SODIUM 40 MG: 40 TABLET, DELAYED RELEASE ORAL at 05:30

## 2024-12-11 RX ADMIN — SODIUM CHLORIDE, PRESERVATIVE FREE 10 ML: 5 INJECTION INTRAVENOUS at 11:58

## 2024-12-11 RX ADMIN — SUCRALFATE 1 G: 1 TABLET ORAL at 20:45

## 2024-12-11 RX ADMIN — INSULIN GLARGINE 8 UNITS: 100 INJECTION, SOLUTION SUBCUTANEOUS at 20:45

## 2024-12-11 RX ADMIN — INSULIN LISPRO 1 UNITS: 100 INJECTION, SOLUTION INTRAVENOUS; SUBCUTANEOUS at 17:35

## 2024-12-11 RX ADMIN — FERROUS SULFATE TAB 325 MG (65 MG ELEMENTAL FE) 325 MG: 325 (65 FE) TAB at 11:58

## 2024-12-11 RX ADMIN — INSULIN LISPRO 3 UNITS: 100 INJECTION, SOLUTION INTRAVENOUS; SUBCUTANEOUS at 11:57

## 2024-12-11 RX ADMIN — INSULIN LISPRO 1 UNITS: 100 INJECTION, SOLUTION INTRAVENOUS; SUBCUTANEOUS at 20:46

## 2024-12-11 RX ADMIN — SUCRALFATE 1 G: 1 TABLET ORAL at 15:59

## 2024-12-11 RX ADMIN — AZITHROMYCIN MONOHYDRATE 500 MG: 500 INJECTION, POWDER, LYOPHILIZED, FOR SOLUTION INTRAVENOUS at 20:53

## 2024-12-11 RX ADMIN — ATORVASTATIN CALCIUM 20 MG: 10 TABLET, FILM COATED ORAL at 20:45

## 2024-12-11 RX ADMIN — WATER 1000 MG: 1 INJECTION INTRAMUSCULAR; INTRAVENOUS; SUBCUTANEOUS at 17:36

## 2024-12-11 RX ADMIN — SUCRALFATE 1 G: 1 TABLET ORAL at 05:30

## 2024-12-11 RX ADMIN — SODIUM CHLORIDE, PRESERVATIVE FREE 10 ML: 5 INJECTION INTRAVENOUS at 20:46

## 2024-12-11 RX ADMIN — HYDROXYUREA 500 MG: 500 CAPSULE ORAL at 09:04

## 2024-12-11 RX ADMIN — ASPIRIN 81 MG: 81 TABLET, CHEWABLE ORAL at 11:58

## 2024-12-11 NOTE — PROGRESS NOTES
Progress Note      Name:  Lin Bates /Age/Sex: 1955  (69 y.o. female)   MRN & CSN:  [id number] & [id number] Encounter Date/Time: 2024 5:14 PM   [facility] PCP: Dorothy Terrazas PA       Hospital Day: 3    Assessment and Plan:     Patient is a 69-year-old female who presented with SOB. Transferred from [place] ED.    # Community-acquired pneumonia of left lower lung with associated large left pleural effusion  # Acute hypoxemic respiratory failure   - Reported worsening SOB with non-productive cough for 2 days. No travels or sick contacts. Not on oxygen at baseline. Had bilateral pleural effusions in 10/2023, had thoracentesis with pulmonology on 10/3, transudate, complicated by small-to-moderate right pneumothorax s/p chest tube insertion by IR on 10/3, removed on10/5.   Afebrile, no leukocytosis, LA and PCT normal,   CTPE showed LLL consolidation with large circumferential left pleural effusion. MRSA swab negative.   Procal negative  Continue  Rocephin/Flagyl, follow-up cultures. IR consulted to evaluate for diagnostic and therapeutic thoracentesis, appreciate assistance.  12/10 - S/P Thoracentesis - fluid trasudative   On 2 L   Oncology note reviewed - Plan for PET scan as OP  called pathology lab to add cytology. VM left.     # Left lung small cell carcinoma  # AoCD  - Followed by H/O, finished chemotherapy in 2024, on immunotherapy currently, last cycle on .  - Labs stable.    # Hypomagnesemia  - Mild, repleted.  - Follow-up repeat labs.    # T2DM with hyperglycemia  - Last A1c 6.1% in 2024, repeat pending.   - Held metformin and glipizide, continue Jardiance.  - On insulin Lantus 8 u qhs with LCSI.    # Mixed hyperlipidemia  - Continue Lipitor.    # GERD  - Continue PPI and Carafate.     Checklist:  Advanced care planning: full  Diet: diabetic     DVT ppx: Lovenox (held for IR)  Sugar: BG goal of 140-180 while inpatient    Disposition: admit to  daily  Patient not taking: Reported on 12/9/2024 10/6/24   Dora Landis MD   thiamine 100 MG tablet Take 1 tablet by mouth daily  Patient not taking: Reported on 12/9/2024 10/6/24   Dora Landis MD   DPH-Lido-AlHydr-MgHydr-Simeth (MAGIC MOUTHWASH) SUSP Swish and spit 5 mLs 4 times daily as needed for Irritation  Patient not taking: Reported on 12/9/2024 5/24/24   Denis Ford MD   Magic Mouthwash (MIRACLE MOUTHWASH) Equal parts of Benadryl, Maalox, 2% Viscous Xylocaine.  Take 5 mL 4 times daily.  Patient not taking: Reported on 12/9/2024 4/29/24   Burt Birch MD       Medications:     Medications:    cefTRIAXone (ROCEPHIN) IV  1,000 mg IntraVENous Q24H    azithromycin  500 mg IntraVENous Q24H    aspirin  81 mg Oral Daily    atorvastatin  20 mg Oral Nightly    ferrous sulfate  325 mg Oral Daily with breakfast    folic acid  1 mg Oral Daily    hydroxyurea  500 mg Oral Daily    lisinopril  2.5 mg Oral Daily    pantoprazole  40 mg Oral QAM AC    sucralfate  1 g Oral 3 times per day    vitamin B-12  1,000 mcg Oral Daily    insulin lispro  0-4 Units SubCUTAneous 4x Daily AC & HS    sodium chloride flush  5-40 mL IntraVENous 2 times per day    [Held by provider] enoxaparin  40 mg SubCUTAneous QPM    insulin glargine  8 Units SubCUTAneous Nightly        Infusions:    dextrose      sodium chloride 25 mL/hr at 12/10/24 1744       PRN Meds:   glucose, 4 tablet, PRN  dextrose bolus, 125 mL, PRN   Or  dextrose bolus, 250 mL, PRN  glucagon (rDNA), 1 mg, PRN  dextrose, , Continuous PRN  sodium chloride flush, 5-40 mL, PRN  sodium chloride, , PRN  potassium chloride, 10 mEq, PRN  magnesium sulfate, 2,000 mg, PRN  ondansetron, 4 mg, Q8H PRN   Or  ondansetron, 4 mg, Q6H PRN  melatonin, 3 mg, Nightly PRN  polyethylene glycol, 17 g, Daily PRN  acetaminophen, 650 mg, Q6H PRN   Or  acetaminophen, 650 mg, Q6H PRN        Data:     CBC:   Recent Labs     12/10/24  0326 12/11/24  0545   WBC 7.6 5.7   HGB 9.9* 9.7*   PLT  --

## 2024-12-11 NOTE — PLAN OF CARE
Problem: Chronic Conditions and Co-morbidities  Goal: Patient's chronic conditions and co-morbidity symptoms are monitored and maintained or improved  12/11/2024 0100 by Rekha Penn RN  Outcome: Progressing  12/10/2024 1943 by Shari Andersen RN  Outcome: Progressing     Problem: Discharge Planning  Goal: Discharge to home or other facility with appropriate resources  12/11/2024 0100 by Rekha Penn RN  Outcome: Progressing  12/10/2024 1943 by Shari Andersen RN  Outcome: Progressing     Problem: Safety - Adult  Goal: Free from fall injury  12/11/2024 0100 by Rekha Penn RN  Outcome: Progressing  12/10/2024 1943 by Shari Andersen RN  Outcome: Progressing     Problem: Pain  Goal: Verbalizes/displays adequate comfort level or baseline comfort level  Outcome: Progressing

## 2024-12-11 NOTE — CONSULTS
Out of Food in the Last Year: Never true     Ran Out of Food in the Last Year: Never true   Transportation Needs: No Transportation Needs (12/9/2024)    PRAPARE - Transportation     Lack of Transportation (Medical): No     Lack of Transportation (Non-Medical): No   Housing Stability: Low Risk  (12/9/2024)    Housing Stability Vital Sign     Unable to Pay for Housing in the Last Year: No     Number of Times Moved in the Last Year: 0     Homeless in the Last Year: No       Family History:    Family History   Problem Relation Age of Onset    Cancer Father     Cancer Brother         No Known Allergies    No current facility-administered medications on file prior to encounter.     Current Outpatient Medications on File Prior to Encounter   Medication Sig Dispense Refill    hydroxyurea (HYDREA) 500 MG chemo capsule Take 1 capsule by mouth daily 30 capsule 3    blood glucose monitor strips Test 3 times a day & as needed for symptoms of irregular blood glucose. Dispense sufficient amount for indicated testing frequency plus additional to accommodate PRN testing needs. 100 strip 0    glucose monitoring kit 1 kit by Does not apply route daily 1 kit 0    Insulin Pen Needle ([employer] PEN NEEDLES 31G) 31G X 8 MM MISC 1 each by Does not apply route daily 100 each 0    Lancets MISC 1 each by Does not apply route daily 100 each 5    folic acid (FOLVITE) 1 MG tablet Take 1 tablet by mouth daily 30 tablet 3    midodrine (PROAMATINE) 10 MG tablet Take 1 tablet by mouth 3 times daily (with meals) 90 tablet 3    insulin lispro (HUMALOG,ADMELOG) 100 UNIT/ML SOLN injection vial Inject 0-8 Units into the skin 3 times daily (before meals) **Medium Dose Corrective Algorithm** Glucose: Dose:  No Insulin 200-249 2 Units 250-299 4 Units 300-349 6 Units Over 349 8 Units and notify physician 30 Device 3    insulin glargine (LANTUS SOLOSTAR) 100 UNIT/ML injection pen Inject 7 Units into the skin nightly 45 Device 1    sucralfate (CARAFATE) 1  12/11/2024    BUN 11 12/11/2024    CREATININE 0.6 12/11/2024    GLUCOSE 119 (H) 12/11/2024    CALCIUM 8.6 12/11/2024    BILITOT 0.2 12/11/2024    ALKPHOS 58 12/11/2024    AST 15 12/11/2024    ALT <5 (L) 12/11/2024    LABGLOM >90 12/11/2024    GFRAA >60 03/09/2020    GLOB 3.1 09/25/2024    PHOS 3.3 10/05/2024    MG 1.7 (L) 12/08/2024    POCGLU 328 (H) 12/11/2024     Lab Results   Component Value Date     12/10/2024     No results found for: \"LD\"  Lab Results   Component Value Date    TSHHS 0.864 07/25/2024    T4FREE 1.5 10/02/2024    FT3 2.12 (L) 09/26/2024     Immunology:  No results found for: \"SPEP\", \"ALBUMINELP\", \"LABALPH\", \"LABBETA\", \"GAMGLOB\"  No results found for: \"KAPPAUVOL\", \"LAMBDAUVOL\", \"KLFLCR\"  No results found for: \"B2M\"  Coagulation Panel:  Lab Results   Component Value Date    PROTIME 13.8 12/10/2024    INR 1.0 12/10/2024    APTT 32.4 09/25/2024    DDIMER 4.47 (H) 09/25/2024     Anemia Panel:  Lab Results   Component Value Date    RKIOOUPY42 >2000 (H) 05/18/2024    FOLATE 9.1 05/18/2024     Tumor Markers:  No results found for: \"\", \"CEA\", \"\", \"LABCA2\", \"PSA\"     Observations:  No data recorded     Assessment:  Limited stage small cell lung cancer  MPL positive essential thrombocytosis  Anemia    Plan:  She is known to me for limited stage small cell lung cancer and essential thrombocytosis. She is on maintenance immunotherapy and we will hold it until she gets better. Reviewed her CT scans. Will f/u with pleural fluid cytology. And also plan for out patient PET/CT scan in near future to r/o recurrent small cell lung cancer.    Essential thrombocytosis - her platelet count is normal now. Please continue with hydrea daily.     Anemia - Has been stable lately. Anemia panel on 5/2024 was consistent with AOCD. Will recheck anemia work ups today.     I answered all the questions and concerns for today. Thank you for allowing me to participate in the care of this very pleasant

## 2024-12-11 NOTE — PLAN OF CARE
Problem: Chronic Conditions and Co-morbidities  Goal: Patient's chronic conditions and co-morbidity symptoms are monitored and maintained or improved  12/10/2024 1943 by Shari Andersen RN  Outcome: Progressing  12/10/2024 0645 by Gabriel Bourne RN  Outcome: Progressing     Problem: Discharge Planning  Goal: Discharge to home or other facility with appropriate resources  12/10/2024 1943 by Shari Andersen RN  Outcome: Progressing  12/10/2024 0645 by Gabriel Bourne RN  Outcome: Progressing     Problem: Safety - Adult  Goal: Free from fall injury  12/10/2024 1943 by Shari Andersen RN  Outcome: Progressing  12/10/2024 0645 by Gabriel Bourne RN  Outcome: Progressing

## 2024-12-12 VITALS
WEIGHT: 134 LBS | HEIGHT: 61 IN | RESPIRATION RATE: 18 BRPM | SYSTOLIC BLOOD PRESSURE: 104 MMHG | DIASTOLIC BLOOD PRESSURE: 53 MMHG | TEMPERATURE: 98.5 F | HEART RATE: 97 BPM | OXYGEN SATURATION: 96 % | BODY MASS INDEX: 25.3 KG/M2

## 2024-12-12 LAB
ALBUMIN SERPL-MCNC: 2.9 G/DL (ref 3.4–5)
ALBUMIN/GLOB SERPL: 0.8 {RATIO} (ref 1.1–2.2)
ALP SERPL-CCNC: 64 U/L (ref 40–129)
ALT SERPL-CCNC: <5 U/L (ref 10–40)
ANION GAP SERPL CALCULATED.3IONS-SCNC: 6 MMOL/L (ref 9–17)
AST SERPL-CCNC: 16 U/L (ref 15–37)
BASOPHILS # BLD: 0.08 K/UL
BASOPHILS NFR BLD: 2 % (ref 0–1)
BILIRUB SERPL-MCNC: 0.2 MG/DL (ref 0–1)
BUN SERPL-MCNC: 10 MG/DL (ref 7–20)
CALCIUM SERPL-MCNC: 8.9 MG/DL (ref 8.3–10.6)
CHLORIDE SERPL-SCNC: 102 MMOL/L (ref 99–110)
CO2 SERPL-SCNC: 29 MMOL/L (ref 21–32)
CREAT SERPL-MCNC: 0.7 MG/DL (ref 0.6–1.2)
CRP SERPL HS-MCNC: 26.9 MG/L (ref 0–5)
EOSINOPHIL # BLD: 0.21 K/UL
EOSINOPHILS RELATIVE PERCENT: 4 % (ref 0–3)
ERYTHROCYTE [DISTWIDTH] IN BLOOD BY AUTOMATED COUNT: 17.2 % (ref 11.7–14.9)
GFR, ESTIMATED: 90 ML/MIN/1.73M2
GLUCOSE BLD-MCNC: 112 MG/DL (ref 74–99)
GLUCOSE BLD-MCNC: 176 MG/DL (ref 74–99)
GLUCOSE SERPL-MCNC: 124 MG/DL (ref 74–99)
HCT VFR BLD AUTO: 32.9 % (ref 37–47)
HGB BLD-MCNC: 10.2 G/DL (ref 12.5–16)
IMM GRANULOCYTES # BLD AUTO: 0.05 K/UL
IMM GRANULOCYTES NFR BLD: 1 %
LYMPHOCYTES NFR BLD: 1.08 K/UL
LYMPHOCYTES RELATIVE PERCENT: 20 % (ref 24–44)
MCH RBC QN AUTO: 30.6 PG (ref 27–31)
MCHC RBC AUTO-ENTMCNC: 31 G/DL (ref 32–36)
MCV RBC AUTO: 98.8 FL (ref 78–100)
MONOCYTES NFR BLD: 1.07 K/UL
MONOCYTES NFR BLD: 20 % (ref 0–4)
NEUTROPHILS NFR BLD: 54 % (ref 36–66)
NEUTS SEG NFR BLD: 2.97 K/UL
PLATELET # BLD AUTO: 312 K/UL (ref 140–440)
PMV BLD AUTO: 9.1 FL (ref 7.5–11.1)
POTASSIUM SERPL-SCNC: 4.4 MMOL/L (ref 3.5–5.1)
PROT SERPL-MCNC: 6.6 G/DL (ref 6.4–8.2)
RBC # BLD AUTO: 3.33 M/UL (ref 4.2–5.4)
SODIUM SERPL-SCNC: 137 MMOL/L (ref 136–145)
WBC OTHER # BLD: 5.5 K/UL (ref 4–10.5)

## 2024-12-12 PROCEDURE — 94761 N-INVAS EAR/PLS OXIMETRY MLT: CPT

## 2024-12-12 PROCEDURE — 80053 COMPREHEN METABOLIC PANEL: CPT

## 2024-12-12 PROCEDURE — 36415 COLL VENOUS BLD VENIPUNCTURE: CPT

## 2024-12-12 PROCEDURE — 6370000000 HC RX 637 (ALT 250 FOR IP): Performed by: STUDENT IN AN ORGANIZED HEALTH CARE EDUCATION/TRAINING PROGRAM

## 2024-12-12 PROCEDURE — 85025 COMPLETE CBC W/AUTO DIFF WBC: CPT

## 2024-12-12 PROCEDURE — 86140 C-REACTIVE PROTEIN: CPT

## 2024-12-12 PROCEDURE — 99231 SBSQ HOSP IP/OBS SF/LOW 25: CPT | Performed by: PHYSICIAN ASSISTANT

## 2024-12-12 PROCEDURE — 82962 GLUCOSE BLOOD TEST: CPT

## 2024-12-12 PROCEDURE — 2580000003 HC RX 258: Performed by: STUDENT IN AN ORGANIZED HEALTH CARE EDUCATION/TRAINING PROGRAM

## 2024-12-12 PROCEDURE — 2700000000 HC OXYGEN THERAPY PER DAY

## 2024-12-12 RX ORDER — CEFDINIR 300 MG/1
300 CAPSULE ORAL 2 TIMES DAILY
Qty: 10 CAPSULE | Refills: 0 | Status: SHIPPED | OUTPATIENT
Start: 2024-12-12 | End: 2024-12-17

## 2024-12-12 RX ORDER — GUAIFENESIN 600 MG/1
600 TABLET, EXTENDED RELEASE ORAL 2 TIMES DAILY
Qty: 30 TABLET | Refills: 0 | Status: SHIPPED | OUTPATIENT
Start: 2024-12-12 | End: 2024-12-27

## 2024-12-12 RX ADMIN — SODIUM CHLORIDE, PRESERVATIVE FREE 10 ML: 5 INJECTION INTRAVENOUS at 08:56

## 2024-12-12 RX ADMIN — Medication 1000 MCG: at 08:52

## 2024-12-12 RX ADMIN — SUCRALFATE 1 G: 1 TABLET ORAL at 06:55

## 2024-12-12 RX ADMIN — FERROUS SULFATE TAB 325 MG (65 MG ELEMENTAL FE) 325 MG: 325 (65 FE) TAB at 08:52

## 2024-12-12 RX ADMIN — PANTOPRAZOLE SODIUM 40 MG: 40 TABLET, DELAYED RELEASE ORAL at 06:55

## 2024-12-12 RX ADMIN — SUCRALFATE 1 G: 1 TABLET ORAL at 15:25

## 2024-12-12 RX ADMIN — FOLIC ACID 1 MG: 1 TABLET ORAL at 08:52

## 2024-12-12 RX ADMIN — ASPIRIN 81 MG: 81 TABLET, CHEWABLE ORAL at 08:52

## 2024-12-12 RX ADMIN — HYDROXYUREA 500 MG: 500 CAPSULE ORAL at 08:52

## 2024-12-12 NOTE — PROGRESS NOTES
Pt taken down to car in wheelchair.  driving pt home. Pt given discharge instructions and  picked up RX's from pharmacy downstairs.

## 2024-12-12 NOTE — PROGRESS NOTES
This RN attempted to draw labs from port, unable to collect. Phlebotomy notified that patient was changed to a lab collect. Phlebotomy stated they will be up shortly to draw a.m. labs. Day shift RN made aware.

## 2024-12-12 NOTE — PROGRESS NOTES
Patient Name:  Lin Bates  Patient :  1955  Patient MRN:  [id number]     Primary Oncologist: Burt Birch MD  Referring Provider: Dorothy Terrazas PA     Date of Service: 2024      Reason for Consult: To evaluate the patient with small cell lung cancer.      Chief Complaint:  Shortness of Breath    Patient Active Problem List:     Thrombocytosis     Leucocytosis     Monoclonal B-cell lymphocytosis     Thickened endometrium     PMB (postmenopausal bleeding)     Lung nodule, solitary     Need for hepatitis B screening test     Small cell lung cancer, left lower lobe (HCC)     Severe anemia     Moderate malnutrition (HCC)     Drug therapy     Acute respiratory failure with hypoxia    HPI:   Lin Bates is a 69 y.o. female with medical history significant for MPL positive essential thrombocytosis, on hydrea 500 mg daily, limited stage small cell lung cancer, s/p definitive CRT, s/p WBRT, currently on maintenance immunotherapy (last chemo on 24) with durvalumab, presented to [facility] on 24 with increasing SOB and non productive cough for 2 days.     She was found to have LLL CAP with associated large pleural effusion and admitted to hospital. I was called to evaluate her.     CT scans on admission showed no evidence of pulmonary embolus. Worsening dense nonenhancing consolidative  pneumonia involving most of the left lower lobe and sparing the left apex, with interval increase in size of a now large circumferential left pleural effusion. Interval improvement in the appearance of bilateral apically predominant inflammatory interstitial opacities, with most of the right lung clear. New right paratracheal and prevascular lymphadenopathy. This is worrisome although it could be reactive. Interval resolution of right pleural effusion. Interval improvement in the appearance of a left upper lobe pneumatocele.    She is feeling some better since admission.      Interval History:  2024  MOUTHWASH) SUSP Swish and spit 5 mLs 4 times daily as needed for Irritation (Patient not taking: Reported on 12/9/2024) 3 each 1    Magic Mouthwash (MIRACLE MOUTHWASH) Equal parts of Benadryl, Maalox, 2% Viscous Xylocaine.  Take 5 mL 4 times daily. (Patient not taking: Reported on 12/9/2024) 240 mL 2        Review of Systems:  Constitutional:  No weight loss, No fever, No chills, No night sweats.  Energy level good.  Eyes:  No diplopia, No transient or permanent loss of vision, No scotomata.  ENT / Mouth:  No epistaxis, No dysphagia, No hoarseness, No oral ulcers, No gingival bleeding.  No sore throat, No postnasal drip, No nasal drip, No mouth pain, No sinus pain, No tinnitus, Normal hearing.  Cardiovascular:  No chest pain, No palpitations, No syncope, No upper extremity edema, No lower extremity edema, No calf discomfort.  Respiratory:  Has cough.  No hemoptysis, No pleurisy, No wheezing, Has dyspnea.  Breast:  No breast mass, No pain, No nipple discharge, No change in size, No change in shape.  Gastrointestinal:  No abdominal pain, No abdominal cramping, No nausea, No vomiting, No constipation, No diarrhea, No hematochezia, No melena, No jaundice, No dyspepsia, No dysphagia.  Urinary:  No dysuria, No hematuria, No urinary incontinence.  Gynecological:  No vaginal discharge, No suprapubic pain, No abnormal vaginal bleeding.  (Female Patients Only)  Musculoskeletal:  No muscle pain, No swollen joints, No joint redness, No bone pain, No spine tenderness.  Skin:  No rash, No nodules, No pruritus, No lesions.  Neurologic:  No confusion, No seizures, No syncope, No tremor, No speech change, No headache, No hiccups, No abnormal gait, No sensory changes, No weakness.  Psychiatric:  No depression, No anxiety, Concentration normal.  Endocrine:  No polyuria, No polydipsia, No hot flashes, No thyroid symptoms.  Hematologic:  No epistaxis, No gingival bleeding, No petechiae, No ecchymosis.  Lymphatic:  No lymphadenopathy, No

## 2024-12-12 NOTE — DISCHARGE SUMMARY
12/8/2024  Chest X-ray INDICATION: Chest pain COMPARISON: Chest radiographs 11/14/2024 and 10/5/2024 TECHNIQUE: PA and lateral views of the chest were obtained. FINDINGS: Right chest port with tip terminating near the superior cavoatrial junction, unchanged. There is interval increase in consolidation in the left mid to lower lung zones. Interstitial markings are diffusely increased. Moderate to large left pleural effusion, tracking superiorly at the lung periphery. Osseous structures are unchanged.     1. Moderate to large left pleural effusion, which tracks superiorly and is significantly increased in size from 11/14/2024. 2. Interval increase in consolidation at the left lung base may represent infection, atelectasis, and/or effusion. Electronically signed by Elie Gold MD      CBC:   Recent Labs     12/10/24  0326 12/11/24  0545 12/12/24  0906   WBC 7.6 5.7 5.5   HGB 9.9* 9.7* 10.2*   PLT  --  313 312     BMP:    Recent Labs     12/10/24  0326 12/11/24  0545 12/12/24  0906    138 137   K 3.9 3.8 4.4    103 102   CO2 24 27 29   BUN 10 11 10   CREATININE 0.6 0.6 0.7   GLUCOSE 95 119* 124*     Hepatic:   Recent Labs     12/10/24  0326 12/11/24  0545 12/12/24  0906   AST 20 15 16   ALT <5* <5* <5*   BILITOT <0.2 0.2 0.2   ALKPHOS 62 58 64     Lipids: No results found for: \"CHOL\", \"HDL\", \"TRIG\"  Hemoglobin A1C:   Lab Results   Component Value Date/Time    LABA1C 7.3 12/10/2024 03:26 AM     TSH:   Lab Results   Component Value Date/Time    TSH 0.50 10/22/2024 01:02 PM     Troponin:   Lab Results   Component Value Date/Time    TROPONINT <0.010 05/18/2015 05:45 PM     Lactic Acid: No results for input(s): \"LACTA\" in the last 72 hours.  BNP: No results for input(s): \"PROBNP\" in the last 72 hours.  UA:  Lab Results   Component Value Date/Time    NITRU NEGATIVE 05/18/2015 03:40 PM    COLORU YELLOW 05/18/2015 03:40 PM    PHUR 6.0 05/18/2015 03:40 PM    WBCUA 2 TO 3 05/18/2015 03:40 PM    RBCUA NEGATIVE

## 2024-12-13 ENCOUNTER — CARE COORDINATION (OUTPATIENT)
Dept: CASE MANAGEMENT | Age: 69
End: 2024-12-13

## 2024-12-13 LAB
MICROORGANISM SPEC CULT: NORMAL
MICROORGANISM/AGENT SPEC: NORMAL
SERVICE CMNT-IMP: NORMAL
SPECIMEN DESCRIPTION: NORMAL

## 2024-12-13 NOTE — CARE COORDINATION
Care Transitions Note    Initial Call - Call within 2 business days of discharge: Yes    Attempted to reach patient for transitions of care follow up. Unable to reach patient.    Outreach Attempts:   Multiple attempts to contact patient at phone numbers on file.   HIPAA compliant voicemail left for patient.     Patient: Lin Bates    Patient : 1955   MRN: [id number]    Reason for Admission: Thrombocytosis     Leucocytosis     Monoclonal B-cell lymphocytosis     Thickened endometrium     PMB (postmenopausal bleeding)     Lung nodule, solitary     Need for hepatitis B screening test     Small cell lung cancer, left lower lobe (HCC)     Severe anemia     Moderate malnutrition (HCC)     Drug therapy     Acute respiratory failure with hypoxia  Discharge Date: 24  RURS: Readmission Risk Score: 24.4    Last Discharge Facility       Date Complaint Diagnosis Description Type Department Provider    24   Admission (Discharged) Smooth Moody MD            Was this an external facility discharge? No    Follow Up Appointment:   Patient has hospital follow up appointment scheduled within 7 days of discharge.    Future Appointments         Provider Specialty Dept Phone    2024 1:45 PM SCHEDULE, [facility] Infusion Therapy [phone]    2024 2:00 PM Burt Birch MD Oncology [phone]    2025 3:00 PM Hunter Guo MD; SCHEDULE, [facility] Radiation Oncology [phone]    1/15/2025 1:45 PM SCHEDULE, [facility] Infusion Therapy [phone]            Plan for follow-up on next business day. 2nd attempt for HFU     Marci Peralta RN

## 2024-12-14 LAB
MICROORGANISM SPEC CULT: NORMAL
MICROORGANISM SPEC CULT: NORMAL
SERVICE CMNT-IMP: NORMAL
SERVICE CMNT-IMP: NORMAL
SPECIMEN DESCRIPTION: NORMAL
SPECIMEN DESCRIPTION: NORMAL

## 2024-12-16 ENCOUNTER — CARE COORDINATION (OUTPATIENT)
Dept: CASE MANAGEMENT | Age: 69
End: 2024-12-16

## 2024-12-16 DIAGNOSIS — J18.9 PNEUMONIA OF LEFT LUNG DUE TO INFECTIOUS ORGANISM, UNSPECIFIED PART OF LUNG: Primary | ICD-10-CM

## 2024-12-16 PROCEDURE — 1111F DSCHRG MED/CURRENT MED MERGE: CPT | Performed by: PHYSICIAN ASSISTANT

## 2024-12-16 NOTE — CARE COORDINATION
Care Transitions Note    Initial Call - Call within 2 business days of discharge: Yes    Patient Current Location:  Home: [address] [place] [id number]    Care Transition Nurse contacted the patient by telephone to perform post hospital discharge assessment, verified name and  as identifiers. Provided introduction to self, and explanation of the Care Transition Nurse role.     Patient: Lin Bates    Patient : 1955   MRN: [id number]    Reason for Admission: Pneumonia / Lung CA  Discharge Date: 24  RURS: Readmission Risk Score: 24.4      Last Discharge Facility       Date Complaint Diagnosis Description Type Department Provider    24   Admission (Discharged) [facility] Smooth Huff MD            Was this an external facility discharge? No    Additional needs identified to be addressed with provider   No needs identified             Method of communication with provider: none.    Patients top risk factors for readmission: medical condition-see above    Interventions to address risk factors:   Review of patient management of conditions/medications: see above  Home Health: [facility] [phone]     Care Summary Note: Pt polite during call, lives with spouse in private residence. Denies the use of AD. Denies CP, palpitations or dizziness. Pt reports SOB has improved, productive cough remains with brown sputum. Pt states she is afebrile. Pt meds reviewed in detail, no discrepancies noted. Pt instructed to take antibiotics until regimen is complete. Pt states she is taking Mucinex which is helping,Pt encouraged to increase water intake to help thin out secretions and stay well hydrated. S/S of dehydration reviewed and reinforced. Pt reports appetite is fair, currently denies N/V/D . Reports B/B are at baseline.Pt has an infusion appointment on  and an appointment w/ Dr Birch /Oncologist as well.Pt instructed to contact MD/Avery with increased SOB, increase sputum production, fever or CP

## 2024-12-17 ENCOUNTER — HOSPITAL ENCOUNTER (OUTPATIENT)
Age: 69
Discharge: HOME | End: 2024-12-17
Payer: MEDICARE

## 2024-12-17 DIAGNOSIS — Z79.899 DRUG THERAPY: ICD-10-CM

## 2024-12-17 DIAGNOSIS — C34.32 SMALL CELL LUNG CANCER, LEFT LOWER LOBE (HCC): ICD-10-CM

## 2024-12-17 LAB
ALBUMIN SERPL-MCNC: 2.9 G/DL (ref 3.4–5)
ALBUMIN/GLOB SERPL: 0.6 {RATIO} (ref 1.1–2.2)
ALP SERPL-CCNC: 72 U/L (ref 40–129)
ALT SERPL-CCNC: 7 U/L (ref 10–40)
ANION GAP SERPL CALCULATED.3IONS-SCNC: 17 MMOL/L (ref 4–16)
AST SERPL-CCNC: 10 U/L (ref 15–37)
BASOPHILS # BLD: 0.07 K/UL
BASOPHILS NFR BLD: 1 % (ref 0–1)
BILIRUB SERPL-MCNC: 0.3 MG/DL (ref 0–1)
BUN SERPL-MCNC: 14 MG/DL (ref 6–23)
CALCIUM SERPL-MCNC: 9.1 MG/DL (ref 8.3–10.6)
CHLORIDE SERPL-SCNC: 96 MMOL/L (ref 99–110)
CO2 SERPL-SCNC: 21 MMOL/L (ref 21–32)
CREAT SERPL-MCNC: 0.8 MG/DL (ref 0.6–1.1)
EOSINOPHIL # BLD: 0.02 K/UL
EOSINOPHILS RELATIVE PERCENT: 0 % (ref 0–3)
ERYTHROCYTE [DISTWIDTH] IN BLOOD BY AUTOMATED COUNT: 16.8 % (ref 11.7–14.9)
GFR, ESTIMATED: 80 ML/MIN/1.73M2
GLUCOSE SERPL-MCNC: 183 MG/DL (ref 70–99)
HCT VFR BLD AUTO: 34.5 % (ref 37–47)
HGB BLD-MCNC: 11 G/DL (ref 12.5–16)
IMM GRANULOCYTES # BLD AUTO: 0.07 K/UL
IMM GRANULOCYTES NFR BLD: 1 %
LYMPHOCYTES NFR BLD: 1.02 K/UL
LYMPHOCYTES RELATIVE PERCENT: 12 % (ref 24–44)
MCH RBC QN AUTO: 30.8 PG (ref 27–31)
MCHC RBC AUTO-ENTMCNC: 31.9 G/DL (ref 32–36)
MCV RBC AUTO: 96.6 FL (ref 78–100)
MONOCYTES NFR BLD: 1.02 K/UL
MONOCYTES NFR BLD: 12 % (ref 0–4)
NEUTROPHILS NFR BLD: 74 % (ref 36–66)
NEUTS SEG NFR BLD: 6.25 K/UL
PLATELET # BLD AUTO: 345 K/UL (ref 140–440)
PMV BLD AUTO: 9.1 FL (ref 7.5–11.1)
POTASSIUM SERPL-SCNC: 4.2 MMOL/L (ref 3.5–5.1)
PROT SERPL-MCNC: 7.4 G/DL (ref 6.4–8.2)
RBC # BLD AUTO: 3.57 M/UL (ref 4.2–5.4)
SODIUM SERPL-SCNC: 134 MMOL/L (ref 135–145)
TSH SERPL DL<=0.05 MIU/L-ACNC: 2.55 UIU/ML (ref 0.27–4.2)
WBC OTHER # BLD: 8.5 K/UL (ref 4–10.5)

## 2024-12-17 PROCEDURE — 84443 ASSAY THYROID STIM HORMONE: CPT

## 2024-12-17 PROCEDURE — 80053 COMPREHEN METABOLIC PANEL: CPT

## 2024-12-17 PROCEDURE — 36415 COLL VENOUS BLD VENIPUNCTURE: CPT

## 2024-12-17 PROCEDURE — 85025 COMPLETE CBC W/AUTO DIFF WBC: CPT

## 2024-12-18 ENCOUNTER — HOSPITAL ENCOUNTER (OUTPATIENT)
Dept: INFUSION THERAPY | Age: 69
Discharge: HOME | End: 2024-12-18
Payer: MEDICARE

## 2024-12-18 ENCOUNTER — OFFICE VISIT (OUTPATIENT)
Dept: ONCOLOGY | Age: 69
End: 2024-12-18
Payer: MEDICARE

## 2024-12-18 VITALS
DIASTOLIC BLOOD PRESSURE: 61 MMHG | BODY MASS INDEX: 24.7 KG/M2 | TEMPERATURE: 98 F | RESPIRATION RATE: 18 BRPM | HEIGHT: 61 IN | OXYGEN SATURATION: 96 % | SYSTOLIC BLOOD PRESSURE: 101 MMHG | HEART RATE: 107 BPM | WEIGHT: 130.8 LBS

## 2024-12-18 DIAGNOSIS — Z79.899 DRUG THERAPY: ICD-10-CM

## 2024-12-18 DIAGNOSIS — C34.32 SMALL CELL LUNG CANCER, LEFT LOWER LOBE (HCC): Primary | ICD-10-CM

## 2024-12-18 PROCEDURE — 99203 OFFICE O/P NEW LOW 30 MIN: CPT | Performed by: PHYSICIAN ASSISTANT

## 2024-12-18 PROCEDURE — 6360000002 HC RX W HCPCS: Performed by: INTERNAL MEDICINE

## 2024-12-18 PROCEDURE — 1126F AMNT PAIN NOTED NONE PRSNT: CPT | Performed by: PHYSICIAN ASSISTANT

## 2024-12-18 PROCEDURE — G8484 FLU IMMUNIZE NO ADMIN: HCPCS | Performed by: PHYSICIAN ASSISTANT

## 2024-12-18 PROCEDURE — G8420 CALC BMI NORM PARAMETERS: HCPCS | Performed by: PHYSICIAN ASSISTANT

## 2024-12-18 PROCEDURE — 2500000003 HC RX 250 WO HCPCS: Performed by: INTERNAL MEDICINE

## 2024-12-18 PROCEDURE — G8427 DOCREV CUR MEDS BY ELIG CLIN: HCPCS | Performed by: PHYSICIAN ASSISTANT

## 2024-12-18 PROCEDURE — 1159F MED LIST DOCD IN RCRD: CPT | Performed by: PHYSICIAN ASSISTANT

## 2024-12-18 PROCEDURE — 1111F DSCHRG MED/CURRENT MED MERGE: CPT | Performed by: PHYSICIAN ASSISTANT

## 2024-12-18 PROCEDURE — 3017F COLORECTAL CA SCREEN DOC REV: CPT | Performed by: PHYSICIAN ASSISTANT

## 2024-12-18 PROCEDURE — 1036F TOBACCO NON-USER: CPT | Performed by: PHYSICIAN ASSISTANT

## 2024-12-18 PROCEDURE — 1090F PRES/ABSN URINE INCON ASSESS: CPT | Performed by: PHYSICIAN ASSISTANT

## 2024-12-18 PROCEDURE — 96413 CHEMO IV INFUSION 1 HR: CPT

## 2024-12-18 PROCEDURE — 1123F ACP DISCUSS/DSCN MKR DOCD: CPT | Performed by: PHYSICIAN ASSISTANT

## 2024-12-18 PROCEDURE — 2580000003 HC RX 258: Performed by: INTERNAL MEDICINE

## 2024-12-18 PROCEDURE — G8399 PT W/DXA RESULTS DOCUMENT: HCPCS | Performed by: PHYSICIAN ASSISTANT

## 2024-12-18 RX ORDER — SODIUM CHLORIDE 9 MG/ML
5-250 INJECTION, SOLUTION INTRAVENOUS PRN
Status: DISCONTINUED | OUTPATIENT
Start: 2024-12-18 | End: 2024-12-19 | Stop reason: HOSPADM

## 2024-12-18 RX ORDER — SODIUM CHLORIDE 0.9 % (FLUSH) 0.9 %
5-40 SYRINGE (ML) INJECTION PRN
Status: DISCONTINUED | OUTPATIENT
Start: 2024-12-18 | End: 2024-12-19 | Stop reason: HOSPADM

## 2024-12-18 RX ADMIN — SODIUM CHLORIDE, PRESERVATIVE FREE 20 ML: 5 INJECTION INTRAVENOUS at 16:13

## 2024-12-18 RX ADMIN — SODIUM CHLORIDE 1500 MG: 9 INJECTION, SOLUTION INTRAVENOUS at 15:01

## 2024-12-18 NOTE — PROGRESS NOTES
Patient Name: Lin Bates  Patient : 1955  Patient MRN: [id number]     Primary Oncologist: Burt Birch MD  Referring Provider: Dorothy Terrazas PA     Date of Service: 2024      Chief Complaint:   No chief complaint on file.    Patient Active Problem List:       Leukocytosis       Thrombocytosis    HPI:   Ms. Bates is a 69-year-old very pleasant female with medical history significant for hypertension, hyperlipidemia, diabetes mellitus, history of HPV infection, initially referred to me on 2014, for evaluation of mild leukocytosis.       She was found to have mild leukocytosis on routine blood tests done on 2014, by her primary care physician. She is a chronic smoker and she smokes about 10 cigarettes a day since she was 14.     Laboratory workup on 2014, showed slight elevation in white blood cell count (13). Her hemoglobin, hematocrit, platelet count and LDH were normal. There was no significant immunophenotypic abnormalities in flow cytometry. JAK2  and bcr/abl studies were negative.     She has been followed periodically since then. She missed follow up with me since 2019 until 2022.     Laboratory work ups done on 22 showed leukocytosis (WBC 16.1), thrombocytosis (platelet 651) and flow cytometry showed monoclonal B-cell population (~450 cells/cumm). The immunophenotype of the clonal cells is non-specific. DDX includes peripheral blood involvement by a B-cell lymphoma and monoclonal B-cell lymphocytosis.     The rests of the blood tests, including BCR-ABL1, JAK2 V617F, JAK2 exon 12-13, MPL, CALR, MARIKA, ESR and LDH, were within normal range.      CT scan of the neck, chest, abdomen and pelvis done on 2022 showed no significant pathology in her neck.  No splenomegaly or overt lymphadenopathy.  There are scattered mildly prominent left internal mammary, upper abdominal and left common iliac lymph nodes.  There is a 1.6 cm left upper

## 2024-12-18 NOTE — PROGRESS NOTES
Patient arrived stable via wheelchair from OV with JAJA Villavicencio for D1 C5 of Imfinzi infusion. Patient denied any questions or concerns. Labs reviewed - meets parameters for tx for today.   Right chest port accessed under sterile procedure per clinic protocol. Blood return noted and flushed with 20mL normal saline. Cap applied to port line.  Orders released to pharmacy.    Imfinzi infused aso rdered and without incident Port flushed and deaccessed per protocol. Gauze and bandaid applied over site. Printed AVS given to patient. Patient Dc'd stable via wheelchair.   Pt to RTC 1/6/24 to see Dr. Guo. Patient will be called to schedule next OV with Dr. Birch once PET scan has been scheduled in order to have results completed prior to OV.

## 2024-12-18 NOTE — PROGRESS NOTES
MA Rooming Questions  Patient: Lin Bates  MRN: [id number]    Date: 12/18/2024        1. Do you have any new issues?   no         2. Do you need any refills on medications?    no    3. Have you had any imaging done since your last visit?   yes -     4. Have you been hospitalized or seen in the emergency room since your last visit here?   yes -     5. Did the patient have a depression screening completed today? No    No data recorded     PHQ-9 Given to (if applicable):               PHQ-9 Score (if applicable):                     [] Positive     []  Negative              Does question #9 need addressed (if applicable)                     [] Yes    []  No               Saumya Barroso CMA

## 2024-12-19 ENCOUNTER — TELEPHONE (OUTPATIENT)
Dept: ONCOLOGY | Age: 69
End: 2024-12-19

## 2024-12-19 NOTE — TELEPHONE ENCOUNTER
12/19/24 tried to call pt twice message states pt not available at this time and call again later. The pt has a Pet scan scheduled for 1/27/24 at [facility] arrival time of 8:45 am. Pt is diabetic and sugar must be below 200. NPO 4 hours prior - plain water only. I mailed the appt info.

## 2024-12-20 ENCOUNTER — CARE COORDINATION (OUTPATIENT)
Dept: CASE MANAGEMENT | Age: 69
End: 2024-12-20

## 2024-12-20 NOTE — CARE COORDINATION
have any questions related to your medications?: No  Do you currently have any active services?: No  Do you have any needs or concerns that I can assist you with?: No  Care Transitions Interventions  No Identified Needs     Transportation Support: Declined    Meals on Wheels: Declined       Senior Services: Declined     Medication Assistance Program: Declined       Social Work: Declined    Other Interventions:              Follow Up Appointment:   Reviewed upcoming appointment(s). and SOLANGE appointment attended as scheduled   Future Appointments         Provider Specialty Dept Phone    12/27/2024 9:00 AM [facility] PET MOBILE Radiology [phone]    1/6/2025 3:00 PM [facility] Gu[facility] MD; [facility] On[facility] [phone]    1/15/2025 1:45 PM SCHEDULE, [facility] Infusion Therapy [phone]            LPN Care Coordinator provided contact information.  Plan for follow-up call in 6-10 days based on severity of symptoms and risk factors.  Plan for next call: symptom management-.  self management-.      Vicky Lane LPN

## 2024-12-26 ENCOUNTER — CARE COORDINATION (OUTPATIENT)
Dept: CASE MANAGEMENT | Age: 69
End: 2024-12-26

## 2024-12-26 NOTE — CARE COORDINATION
Maker: Matt Bhatfrienleon - [phone]      Medication Review:  Medications changed since last call, reviewed today.     Remote Patient Monitoring:  Offered patient enrollment in the Remote Patient Monitoring (RPM) program for in-home monitoring: Deferred at this time because  ; will discuss at next outreach.    Assessments:  Care Transitions Subsequent and Final Call    Subsequent and Final Calls  Do you have any ongoing symptoms?: No  Have your medications changed?: No  Do you have any questions related to your medications?: No  Do you currently have any active services?: No  Do you have any needs or concerns that I can assist you with?: No  Care Transitions Interventions  No Identified Needs     Transportation Support: Declined    Meals on Wheels: Declined       Senior Services: Declined     Medication Assistance Program: Declined       Social Work: Declined    Other Interventions:              Follow Up Appointment:   SOLANGE appointment attended as scheduled   Future Appointments         Provider Specialty Dept Phone    12/27/2024 9:00 AM [facility] PET MOBILE Radiology [phone]    1/6/2025 3:00 PM Hunter Guo MD; SCHEDULE, [facility] RAD ONC NURSE Radiation Oncology [phone]    1/15/2025 1:45 PM SCHEDULE, [facility] Infusion Therapy [phone]            LPN Care Coordinator provided contact information.  Plan for follow-up call in 2-5 days based on severity of symptoms and risk factors.  Plan for next call: symptom management-.  self management-.      Vicky Lane LPN

## 2024-12-27 ENCOUNTER — HOSPITAL ENCOUNTER (OUTPATIENT)
Dept: PET IMAGING | Age: 69
Discharge: HOME | End: 2024-12-27
Payer: MEDICARE

## 2024-12-27 DIAGNOSIS — C34.32 SMALL CELL LUNG CANCER, LEFT LOWER LOBE (HCC): ICD-10-CM

## 2024-12-27 PROCEDURE — 3430000000 HC RX DIAGNOSTIC RADIOPHARMACEUTICAL: Performed by: PHYSICIAN ASSISTANT

## 2024-12-27 PROCEDURE — A9609 HC RX DIAGNOSTIC RADIOPHARMACEUTICAL: HCPCS | Performed by: PHYSICIAN ASSISTANT

## 2024-12-27 PROCEDURE — 78815 PET IMAGE W/CT SKULL-THIGH: CPT

## 2024-12-27 PROCEDURE — 2500000003 HC RX 250 WO HCPCS: Performed by: PHYSICIAN ASSISTANT

## 2024-12-27 RX ORDER — SODIUM CHLORIDE 0.9 % (FLUSH) 0.9 %
10 SYRINGE (ML) INJECTION PRN
Status: COMPLETED | OUTPATIENT
Start: 2024-12-27 | End: 2024-12-27

## 2024-12-27 RX ORDER — FLUDEOXYGLUCOSE F 18 200 MCI/ML
12.48 INJECTION, SOLUTION INTRAVENOUS
Status: COMPLETED | OUTPATIENT
Start: 2024-12-27 | End: 2024-12-27

## 2024-12-27 RX ADMIN — SODIUM CHLORIDE, PRESERVATIVE FREE 10 ML: 5 INJECTION INTRAVENOUS at 09:18

## 2024-12-27 RX ADMIN — FLUDEOXYGLUCOSE F 18 12.48 MILLICURIE: 200 INJECTION, SOLUTION INTRAVENOUS at 09:18

## 2024-12-30 ENCOUNTER — CARE COORDINATION (OUTPATIENT)
Dept: CASE MANAGEMENT | Age: 69
End: 2024-12-30

## 2024-12-30 ENCOUNTER — HOSPITAL ENCOUNTER (EMERGENCY)
Age: 69
Discharge: ANOTHER HOSPITAL | End: 2024-12-30
Attending: EMERGENCY MEDICINE
Payer: MEDICARE

## 2024-12-30 ENCOUNTER — APPOINTMENT (OUTPATIENT)
Dept: GENERAL RADIOLOGY | Age: 69
End: 2024-12-30
Payer: MEDICARE

## 2024-12-30 ENCOUNTER — HOSPITAL ENCOUNTER (INPATIENT)
Age: 69
LOS: 9 days | DRG: 208 | End: 2025-01-08
Attending: INTERNAL MEDICINE | Admitting: INTERNAL MEDICINE
Payer: MEDICARE

## 2024-12-30 ENCOUNTER — APPOINTMENT (OUTPATIENT)
Dept: CT IMAGING | Age: 69
End: 2024-12-30
Payer: MEDICARE

## 2024-12-30 ENCOUNTER — APPOINTMENT (OUTPATIENT)
Dept: GENERAL RADIOLOGY | Age: 69
End: 2024-12-30
Attending: EMERGENCY MEDICINE
Payer: MEDICARE

## 2024-12-30 VITALS
HEART RATE: 113 BPM | WEIGHT: 131 LBS | OXYGEN SATURATION: 91 % | DIASTOLIC BLOOD PRESSURE: 73 MMHG | RESPIRATION RATE: 24 BRPM | SYSTOLIC BLOOD PRESSURE: 126 MMHG | HEIGHT: 61 IN | BODY MASS INDEX: 24.73 KG/M2 | TEMPERATURE: 97.5 F

## 2024-12-30 DIAGNOSIS — J90 RECURRENT LEFT PLEURAL EFFUSION: Primary | ICD-10-CM

## 2024-12-30 DIAGNOSIS — R06.02 SHORTNESS OF BREATH: ICD-10-CM

## 2024-12-30 DIAGNOSIS — J96.01 ACUTE RESPIRATORY FAILURE WITH HYPOXIA: ICD-10-CM

## 2024-12-30 DIAGNOSIS — J90 PLEURAL EFFUSION: Primary | ICD-10-CM

## 2024-12-30 LAB
ALBUMIN SERPL-MCNC: 3.2 G/DL (ref 3.4–5)
ALBUMIN/GLOB SERPL: 0.8 {RATIO} (ref 1.1–2.2)
ALP SERPL-CCNC: 88 U/L (ref 40–129)
ALT SERPL-CCNC: <5 U/L (ref 10–40)
ANION GAP SERPL CALCULATED.3IONS-SCNC: 15 MMOL/L (ref 4–16)
AST SERPL-CCNC: 10 U/L (ref 15–37)
BASOPHILS # BLD: 0.05 K/UL
BASOPHILS NFR BLD: 1 % (ref 0–1)
BILIRUB SERPL-MCNC: 0.3 MG/DL (ref 0–1)
BUN SERPL-MCNC: 13 MG/DL (ref 6–23)
CALCIUM SERPL-MCNC: 9.4 MG/DL (ref 8.3–10.6)
CHLORIDE SERPL-SCNC: 102 MMOL/L (ref 99–110)
CO2 SERPL-SCNC: 24 MMOL/L (ref 21–32)
CREAT SERPL-MCNC: 0.8 MG/DL (ref 0.6–1.1)
EOSINOPHIL # BLD: 0.03 K/UL
EOSINOPHILS RELATIVE PERCENT: 0 % (ref 0–3)
ERYTHROCYTE [DISTWIDTH] IN BLOOD BY AUTOMATED COUNT: 16.6 % (ref 11.7–14.9)
GFR, ESTIMATED: 80 ML/MIN/1.73M2
GLUCOSE BLD-MCNC: 154 MG/DL (ref 74–99)
GLUCOSE SERPL-MCNC: 154 MG/DL (ref 70–99)
HCT VFR BLD AUTO: 32.9 % (ref 37–47)
HGB BLD-MCNC: 10.1 G/DL (ref 12.5–16)
IMM GRANULOCYTES # BLD AUTO: 0.02 K/UL
IMM GRANULOCYTES NFR BLD: 0 %
LACTATE BLDV-SCNC: 1.3 MMOL/L (ref 0.4–2)
LYMPHOCYTES NFR BLD: 0.87 K/UL
LYMPHOCYTES RELATIVE PERCENT: 13 % (ref 24–44)
MCH RBC QN AUTO: 30.3 PG (ref 27–31)
MCHC RBC AUTO-ENTMCNC: 30.7 G/DL (ref 32–36)
MCV RBC AUTO: 98.8 FL (ref 78–100)
MONOCYTES NFR BLD: 1.08 K/UL
MONOCYTES NFR BLD: 16 % (ref 0–4)
NEUTROPHILS NFR BLD: 70 % (ref 36–66)
NEUTS SEG NFR BLD: 4.64 K/UL
PLATELET # BLD AUTO: 286 K/UL (ref 140–440)
PMV BLD AUTO: 9.3 FL (ref 7.5–11.1)
POTASSIUM SERPL-SCNC: 3.8 MMOL/L (ref 3.5–5.1)
PROT SERPL-MCNC: 7.4 G/DL (ref 6.4–8.2)
RBC # BLD AUTO: 3.33 M/UL (ref 4.2–5.4)
SODIUM SERPL-SCNC: 141 MMOL/L (ref 135–145)
WBC OTHER # BLD: 6.7 K/UL (ref 4–10.5)

## 2024-12-30 PROCEDURE — 6360000002 HC RX W HCPCS

## 2024-12-30 PROCEDURE — 71045 X-RAY EXAM CHEST 1 VIEW: CPT

## 2024-12-30 PROCEDURE — 80053 COMPREHEN METABOLIC PANEL: CPT

## 2024-12-30 PROCEDURE — 94640 AIRWAY INHALATION TREATMENT: CPT

## 2024-12-30 PROCEDURE — 32554 ASPIRATE PLEURA W/O IMAGING: CPT

## 2024-12-30 PROCEDURE — 83605 ASSAY OF LACTIC ACID: CPT

## 2024-12-30 PROCEDURE — 2500000003 HC RX 250 WO HCPCS

## 2024-12-30 PROCEDURE — 2500000003 HC RX 250 WO HCPCS: Performed by: INTERNAL MEDICINE

## 2024-12-30 PROCEDURE — 6360000002 HC RX W HCPCS: Performed by: INTERNAL MEDICINE

## 2024-12-30 PROCEDURE — 2000000000 HC ICU R&B

## 2024-12-30 PROCEDURE — 71250 CT THORAX DX C-: CPT

## 2024-12-30 PROCEDURE — 93005 ELECTROCARDIOGRAM TRACING: CPT | Performed by: EMERGENCY MEDICINE

## 2024-12-30 PROCEDURE — 85025 COMPLETE CBC W/AUTO DIFF WBC: CPT

## 2024-12-30 PROCEDURE — 6360000002 HC RX W HCPCS: Performed by: EMERGENCY MEDICINE

## 2024-12-30 PROCEDURE — 99285 EMERGENCY DEPT VISIT HI MDM: CPT

## 2024-12-30 PROCEDURE — 82962 GLUCOSE BLOOD TEST: CPT

## 2024-12-30 PROCEDURE — 6370000000 HC RX 637 (ALT 250 FOR IP): Performed by: EMERGENCY MEDICINE

## 2024-12-30 RX ORDER — ACETAMINOPHEN 650 MG/1
650 SUPPOSITORY RECTAL EVERY 6 HOURS PRN
Status: DISCONTINUED | OUTPATIENT
Start: 2024-12-30 | End: 2025-01-08 | Stop reason: HOSPADM

## 2024-12-30 RX ORDER — POTASSIUM CHLORIDE 29.8 MG/ML
20 INJECTION INTRAVENOUS PRN
Status: DISCONTINUED | OUTPATIENT
Start: 2024-12-30 | End: 2025-01-08 | Stop reason: HOSPADM

## 2024-12-30 RX ORDER — DEXTROSE MONOHYDRATE 100 MG/ML
INJECTION, SOLUTION INTRAVENOUS CONTINUOUS PRN
Status: DISCONTINUED | OUTPATIENT
Start: 2024-12-30 | End: 2025-01-08 | Stop reason: HOSPADM

## 2024-12-30 RX ORDER — SODIUM CHLORIDE 0.9 % (FLUSH) 0.9 %
5-40 SYRINGE (ML) INJECTION EVERY 12 HOURS SCHEDULED
Status: DISCONTINUED | OUTPATIENT
Start: 2024-12-30 | End: 2025-01-08 | Stop reason: HOSPADM

## 2024-12-30 RX ORDER — ONDANSETRON 2 MG/ML
4 INJECTION INTRAMUSCULAR; INTRAVENOUS EVERY 6 HOURS PRN
Status: DISCONTINUED | OUTPATIENT
Start: 2024-12-30 | End: 2025-01-08

## 2024-12-30 RX ORDER — IPRATROPIUM BROMIDE AND ALBUTEROL SULFATE 2.5; .5 MG/3ML; MG/3ML
1 SOLUTION RESPIRATORY (INHALATION) ONCE
Status: COMPLETED | OUTPATIENT
Start: 2024-12-30 | End: 2024-12-30

## 2024-12-30 RX ORDER — IPRATROPIUM BROMIDE AND ALBUTEROL SULFATE 2.5; .5 MG/3ML; MG/3ML
1 SOLUTION RESPIRATORY (INHALATION) EVERY 4 HOURS PRN
Status: DISCONTINUED | OUTPATIENT
Start: 2024-12-30 | End: 2025-01-08 | Stop reason: HOSPADM

## 2024-12-30 RX ORDER — ONDANSETRON 4 MG/1
4 TABLET, ORALLY DISINTEGRATING ORAL EVERY 8 HOURS PRN
Status: DISCONTINUED | OUTPATIENT
Start: 2024-12-30 | End: 2025-01-08

## 2024-12-30 RX ORDER — GLUCAGON 1 MG/ML
1 KIT INJECTION PRN
Status: DISCONTINUED | OUTPATIENT
Start: 2024-12-30 | End: 2025-01-08 | Stop reason: HOSPADM

## 2024-12-30 RX ORDER — INSULIN LISPRO 100 [IU]/ML
0-4 INJECTION, SOLUTION INTRAVENOUS; SUBCUTANEOUS
Status: DISCONTINUED | OUTPATIENT
Start: 2024-12-30 | End: 2025-01-08

## 2024-12-30 RX ORDER — MAGNESIUM SULFATE IN WATER 40 MG/ML
2000 INJECTION, SOLUTION INTRAVENOUS PRN
Status: DISCONTINUED | OUTPATIENT
Start: 2024-12-30 | End: 2025-01-08 | Stop reason: HOSPADM

## 2024-12-30 RX ORDER — ACETAMINOPHEN 325 MG/1
650 TABLET ORAL EVERY 4 HOURS PRN
Status: DISCONTINUED | OUTPATIENT
Start: 2024-12-30 | End: 2025-01-08 | Stop reason: HOSPADM

## 2024-12-30 RX ORDER — SENNA AND DOCUSATE SODIUM 50; 8.6 MG/1; MG/1
1 TABLET, FILM COATED ORAL 2 TIMES DAILY
Status: DISCONTINUED | OUTPATIENT
Start: 2024-12-30 | End: 2025-01-08

## 2024-12-30 RX ORDER — INSULIN LISPRO 100 [IU]/ML
0-4 INJECTION, SOLUTION INTRAVENOUS; SUBCUTANEOUS EVERY 4 HOURS
Status: DISCONTINUED | OUTPATIENT
Start: 2024-12-31 | End: 2025-01-01

## 2024-12-30 RX ORDER — ENOXAPARIN SODIUM 100 MG/ML
40 INJECTION SUBCUTANEOUS DAILY
Status: DISCONTINUED | OUTPATIENT
Start: 2024-12-31 | End: 2025-01-07

## 2024-12-30 RX ORDER — MORPHINE SULFATE 2 MG/ML
2 INJECTION, SOLUTION INTRAMUSCULAR; INTRAVENOUS EVERY 4 HOURS PRN
Status: DISCONTINUED | OUTPATIENT
Start: 2024-12-30 | End: 2025-01-08

## 2024-12-30 RX ORDER — SODIUM CHLORIDE 9 MG/ML
INJECTION, SOLUTION INTRAVENOUS PRN
Status: DISCONTINUED | OUTPATIENT
Start: 2024-12-30 | End: 2025-01-08 | Stop reason: HOSPADM

## 2024-12-30 RX ORDER — POLYETHYLENE GLYCOL 3350 17 G/17G
17 POWDER, FOR SOLUTION ORAL DAILY PRN
Status: DISCONTINUED | OUTPATIENT
Start: 2024-12-30 | End: 2025-01-08

## 2024-12-30 RX ORDER — SODIUM CHLORIDE 0.9 % (FLUSH) 0.9 %
5-40 SYRINGE (ML) INJECTION PRN
Status: DISCONTINUED | OUTPATIENT
Start: 2024-12-30 | End: 2025-01-08 | Stop reason: HOSPADM

## 2024-12-30 RX ORDER — LIDOCAINE HYDROCHLORIDE 10 MG/ML
5 INJECTION, SOLUTION EPIDURAL; INFILTRATION; INTRACAUDAL; PERINEURAL ONCE
Status: COMPLETED | OUTPATIENT
Start: 2024-12-30 | End: 2024-12-30

## 2024-12-30 RX ORDER — FAMOTIDINE 20 MG/1
20 TABLET, FILM COATED ORAL 2 TIMES DAILY
Status: DISCONTINUED | OUTPATIENT
Start: 2024-12-30 | End: 2024-12-31

## 2024-12-30 RX ORDER — POTASSIUM CHLORIDE 7.45 MG/ML
10 INJECTION INTRAVENOUS PRN
Status: DISCONTINUED | OUTPATIENT
Start: 2024-12-30 | End: 2025-01-08 | Stop reason: HOSPADM

## 2024-12-30 RX ADMIN — MORPHINE SULFATE 2 MG: 2 INJECTION, SOLUTION INTRAMUSCULAR; INTRAVENOUS at 23:30

## 2024-12-30 RX ADMIN — IPRATROPIUM BROMIDE AND ALBUTEROL SULFATE 1 DOSE: 2.5; .5 SOLUTION RESPIRATORY (INHALATION) at 17:30

## 2024-12-30 RX ADMIN — LIDOCAINE HYDROCHLORIDE 5 ML: 10 INJECTION, SOLUTION EPIDURAL; INFILTRATION; INTRACAUDAL; PERINEURAL at 18:15

## 2024-12-30 RX ADMIN — SODIUM CHLORIDE, PRESERVATIVE FREE 10 ML: 5 INJECTION INTRAVENOUS at 23:32

## 2024-12-30 ASSESSMENT — PAIN - FUNCTIONAL ASSESSMENT: PAIN_FUNCTIONAL_ASSESSMENT: NONE - DENIES PAIN

## 2024-12-30 ASSESSMENT — ENCOUNTER SYMPTOMS: SHORTNESS OF BREATH: 1

## 2024-12-30 ASSESSMENT — PAIN DESCRIPTION - LOCATION: LOCATION: RIB CAGE

## 2024-12-30 ASSESSMENT — PAIN DESCRIPTION - ORIENTATION: ORIENTATION: LEFT

## 2024-12-30 ASSESSMENT — PAIN SCALES - GENERAL
PAINLEVEL_OUTOF10: 0
PAINLEVEL_OUTOF10: 7

## 2024-12-30 ASSESSMENT — PAIN DESCRIPTION - PAIN TYPE: TYPE: ACUTE PAIN

## 2024-12-30 ASSESSMENT — PAIN DESCRIPTION - DESCRIPTORS: DESCRIPTORS: ACHING;DISCOMFORT

## 2024-12-30 ASSESSMENT — PAIN DESCRIPTION - FREQUENCY: FREQUENCY: INTERMITTENT

## 2024-12-30 NOTE — ED TRIAGE NOTES
Patient presented to ED with complaints of sob. States was admitted the beginning of December for fluid on her lungs and had an outpatient PET Scan done on Friday. Says today she was getting sob when she would walk around the house and noticed her oxygen drop while walking. Patient states she is suppose to get set up on oxygen but they have not gotten it yet.

## 2024-12-30 NOTE — CARE COORDINATION
Care Transitions Note    Follow Up Call     Attempted to reach patient for transitions of care follow up.  Unable to reach patient.      Outreach Attempts:   Unable to leave message.     Follow Up Appointment:   Future Appointments         Provider Specialty Dept Phone    1/6/2025 3:00 PM Hunter Guo MD; SCHEDULE, [facility] RAD ONC NURSE Radiation Oncology [phone]    1/15/2025 1:45 PM SCHEDULE, [facility] MED ONC TREATMENT Infusion Therapy [phone]          Plan for follow-up on next business day.  based on severity of symptoms and risk factors.  Plan for next call: symptom management-respiratory status, activity tolerance, mobility, appetite, other  self management-home mgmt needs, did patient get oxygen set up? Any other needs?    Rekha Giron RN

## 2024-12-31 ENCOUNTER — APPOINTMENT (OUTPATIENT)
Dept: GENERAL RADIOLOGY | Age: 69
DRG: 208 | End: 2024-12-31
Attending: INTERNAL MEDICINE
Payer: MEDICARE

## 2024-12-31 ENCOUNTER — CARE COORDINATION (OUTPATIENT)
Dept: CASE MANAGEMENT | Age: 69
End: 2024-12-31

## 2024-12-31 PROBLEM — J98.11 COLLAPSE OF LEFT LUNG: Status: ACTIVE | Noted: 2024-12-31

## 2024-12-31 LAB
ALBUMIN SERPL-MCNC: 3 G/DL (ref 3.4–5)
ALBUMIN/GLOB SERPL: 0.8 {RATIO} (ref 1.1–2.2)
ALP SERPL-CCNC: 81 U/L (ref 40–129)
ALT SERPL-CCNC: <5 U/L (ref 10–40)
ANION GAP SERPL CALCULATED.3IONS-SCNC: 16 MMOL/L (ref 9–17)
APPEARANCE FLD: CLEAR
AST SERPL-CCNC: 18 U/L (ref 15–37)
BASOPHILS # BLD: 0.08 K/UL
BASOPHILS NFR BLD: 0 % (ref 0–1)
BILIRUB SERPL-MCNC: 0.3 MG/DL (ref 0–1)
BODY FLD TYPE: NORMAL
BUN SERPL-MCNC: 13 MG/DL (ref 7–20)
CALCIUM SERPL-MCNC: 9.1 MG/DL (ref 8.3–10.6)
CHLORIDE SERPL-SCNC: 99 MMOL/L (ref 99–110)
CLOT CHECK: NORMAL
CO2 SERPL-SCNC: 19 MMOL/L (ref 21–32)
COLOR FLD: YELLOW
CREAT SERPL-MCNC: 0.8 MG/DL (ref 0.6–1.2)
EKG ATRIAL RATE: 115 BPM
EKG DIAGNOSIS: NORMAL
EKG P AXIS: 50 DEGREES
EKG P-R INTERVAL: 152 MS
EKG Q-T INTERVAL: 340 MS
EKG QRS DURATION: 66 MS
EKG QTC CALCULATION (BAZETT): 470 MS
EKG R AXIS: 42 DEGREES
EKG T AXIS: 70 DEGREES
EKG VENTRICULAR RATE: 115 BPM
EOSINOPHIL # BLD: 0 K/UL
EOSINOPHILS RELATIVE PERCENT: 0 % (ref 0–3)
ERYTHROCYTE [DISTWIDTH] IN BLOOD BY AUTOMATED COUNT: 16.6 % (ref 11.7–14.9)
GFR, ESTIMATED: 76 ML/MIN/1.73M2
GLUCOSE BLD-MCNC: 152 MG/DL (ref 74–99)
GLUCOSE BLD-MCNC: 169 MG/DL (ref 74–99)
GLUCOSE BLD-MCNC: 188 MG/DL (ref 74–99)
GLUCOSE BLD-MCNC: 220 MG/DL (ref 74–99)
GLUCOSE FLD-MCNC: 140 MG/DL
GLUCOSE SERPL-MCNC: 156 MG/DL (ref 74–99)
HCT VFR BLD AUTO: 33.8 % (ref 37–47)
HGB BLD-MCNC: 10.4 G/DL (ref 12.5–16)
IMM GRANULOCYTES # BLD AUTO: 0.17 K/UL
IMM GRANULOCYTES NFR BLD: 1 %
LDH FLD L TO P-CCNC: 56 U/L
LDH SERPL-CCNC: 183 U/L (ref 100–190)
LYMPHOCYTES NFR BLD: 0.61 K/UL
LYMPHOCYTES RELATIVE PERCENT: 3 % (ref 24–44)
MCH RBC QN AUTO: 30.9 PG (ref 27–31)
MCHC RBC AUTO-ENTMCNC: 30.8 G/DL (ref 32–36)
MCV RBC AUTO: 100.3 FL (ref 78–100)
MICROORGANISM/AGENT SPEC: NORMAL
MONOCYTES NFR BLD: 1.37 K/UL
MONOCYTES NFR BLD: 7 % (ref 0–4)
MONOCYTES NFR FLD: 98 %
NEUTROPHILS NFR BLD: 88 % (ref 36–66)
NEUTROPHILS NFR FLD: 2 %
NEUTS SEG NFR BLD: 16.45 K/UL
PH FLUID: 8 (ref 6.5–7.5)
PHOSPHATE SERPL-MCNC: 4.4 MG/DL (ref 2.5–4.9)
PLATELET, FLUORESCENCE: 264 K/UL (ref 140–440)
PMV BLD AUTO: 9.7 FL (ref 7.5–11.1)
POTASSIUM SERPL-SCNC: 4.4 MMOL/L (ref 3.5–5.1)
PROT FLD-MCNC: 4.3 G/DL
PROT SERPL-MCNC: 6.6 G/DL (ref 6.4–8.2)
RBC # BLD AUTO: 3.37 M/UL (ref 4.2–5.4)
RBC # FLD: 5000 CELLS/UL
SODIUM SERPL-SCNC: 134 MMOL/L (ref 136–145)
SPECIMEN DESCRIPTION: NORMAL
SPECIMEN TYPE: ABNORMAL
SPECIMEN TYPE: NORMAL
WBC # FLD: 444 CELLS/UL
WBC OTHER # BLD: 18.7 K/UL (ref 4–10.5)

## 2024-12-31 PROCEDURE — 80053 COMPREHEN METABOLIC PANEL: CPT

## 2024-12-31 PROCEDURE — 71045 X-RAY EXAM CHEST 1 VIEW: CPT

## 2024-12-31 PROCEDURE — 87147 CULTURE TYPE IMMUNOLOGIC: CPT

## 2024-12-31 PROCEDURE — 87186 SC STD MICRODIL/AGAR DIL: CPT

## 2024-12-31 PROCEDURE — 93010 ELECTROCARDIOGRAM REPORT: CPT | Performed by: INTERNAL MEDICINE

## 2024-12-31 PROCEDURE — 88341 IMHCHEM/IMCYTCHM EA ADD ANTB: CPT | Performed by: PATHOLOGY

## 2024-12-31 PROCEDURE — 2000000000 HC ICU R&B

## 2024-12-31 PROCEDURE — 87070 CULTURE OTHR SPECIMN AEROBIC: CPT

## 2024-12-31 PROCEDURE — 83615 LACTATE (LD) (LDH) ENZYME: CPT

## 2024-12-31 PROCEDURE — 82945 GLUCOSE OTHER FLUID: CPT

## 2024-12-31 PROCEDURE — 88342 IMHCHEM/IMCYTCHM 1ST ANTB: CPT | Performed by: PATHOLOGY

## 2024-12-31 PROCEDURE — 6370000000 HC RX 637 (ALT 250 FOR IP): Performed by: INTERNAL MEDICINE

## 2024-12-31 PROCEDURE — 36591 DRAW BLOOD OFF VENOUS DEVICE: CPT

## 2024-12-31 PROCEDURE — 32551 INSERTION OF CHEST TUBE: CPT

## 2024-12-31 PROCEDURE — 2500000003 HC RX 250 WO HCPCS: Performed by: INTERNAL MEDICINE

## 2024-12-31 PROCEDURE — 82962 GLUCOSE BLOOD TEST: CPT

## 2024-12-31 PROCEDURE — 88108 CYTOPATH CONCENTRATE TECH: CPT | Performed by: PATHOLOGY

## 2024-12-31 PROCEDURE — 94761 N-INVAS EAR/PLS OXIMETRY MLT: CPT

## 2024-12-31 PROCEDURE — 2060000000 HC ICU INTERMEDIATE R&B

## 2024-12-31 PROCEDURE — 84100 ASSAY OF PHOSPHORUS: CPT

## 2024-12-31 PROCEDURE — 85025 COMPLETE CBC W/AUTO DIFF WBC: CPT

## 2024-12-31 PROCEDURE — 84157 ASSAY OF PROTEIN OTHER: CPT

## 2024-12-31 PROCEDURE — 0W9B30Z DRAINAGE OF LEFT PLEURAL CAVITY WITH DRAINAGE DEVICE, PERCUTANEOUS APPROACH: ICD-10-PCS | Performed by: INTERNAL MEDICINE

## 2024-12-31 PROCEDURE — 87077 CULTURE AEROBIC IDENTIFY: CPT

## 2024-12-31 PROCEDURE — 89051 BODY FLUID CELL COUNT: CPT

## 2024-12-31 PROCEDURE — 83986 ASSAY PH BODY FLUID NOS: CPT

## 2024-12-31 PROCEDURE — 88305 TISSUE EXAM BY PATHOLOGIST: CPT | Performed by: PATHOLOGY

## 2024-12-31 PROCEDURE — 6360000002 HC RX W HCPCS: Performed by: INTERNAL MEDICINE

## 2024-12-31 PROCEDURE — 2700000000 HC OXYGEN THERAPY PER DAY

## 2024-12-31 PROCEDURE — 87075 CULTR BACTERIA EXCEPT BLOOD: CPT

## 2024-12-31 PROCEDURE — 87205 SMEAR GRAM STAIN: CPT

## 2024-12-31 RX ORDER — MIDODRINE HYDROCHLORIDE 5 MG/1
10 TABLET ORAL
Status: DISCONTINUED | OUTPATIENT
Start: 2024-12-31 | End: 2025-01-08 | Stop reason: HOSPADM

## 2024-12-31 RX ORDER — SODIUM CHLORIDE 0.9 % (FLUSH) 0.9 %
5-40 SYRINGE (ML) INJECTION EVERY 12 HOURS SCHEDULED
Status: DISCONTINUED | OUTPATIENT
Start: 2024-12-31 | End: 2025-01-08 | Stop reason: HOSPADM

## 2024-12-31 RX ORDER — FOLIC ACID 1 MG/1
1 TABLET ORAL DAILY
Status: DISCONTINUED | OUTPATIENT
Start: 2024-12-31 | End: 2025-01-08

## 2024-12-31 RX ORDER — SODIUM CHLORIDE 0.9 % (FLUSH) 0.9 %
5-40 SYRINGE (ML) INJECTION PRN
Status: DISCONTINUED | OUTPATIENT
Start: 2024-12-31 | End: 2025-01-08 | Stop reason: HOSPADM

## 2024-12-31 RX ORDER — ATORVASTATIN CALCIUM 10 MG/1
20 TABLET, FILM COATED ORAL DAILY
Status: DISCONTINUED | OUTPATIENT
Start: 2024-12-31 | End: 2025-01-08

## 2024-12-31 RX ORDER — PANTOPRAZOLE SODIUM 40 MG/1
40 TABLET, DELAYED RELEASE ORAL
Status: DISCONTINUED | OUTPATIENT
Start: 2024-12-31 | End: 2025-01-08

## 2024-12-31 RX ORDER — OLANZAPINE 5 MG/1
5 TABLET ORAL NIGHTLY
Status: DISCONTINUED | OUTPATIENT
Start: 2024-12-31 | End: 2025-01-08

## 2024-12-31 RX ORDER — HYDROXYUREA 500 MG/1
500 CAPSULE ORAL DAILY
Status: DISCONTINUED | OUTPATIENT
Start: 2024-12-31 | End: 2025-01-02

## 2024-12-31 RX ORDER — SODIUM CHLORIDE 9 MG/ML
INJECTION, SOLUTION INTRAVENOUS PRN
Status: DISCONTINUED | OUTPATIENT
Start: 2024-12-31 | End: 2025-01-08 | Stop reason: HOSPADM

## 2024-12-31 RX ORDER — INSULIN GLARGINE 100 [IU]/ML
7 INJECTION, SOLUTION SUBCUTANEOUS NIGHTLY
Status: DISCONTINUED | OUTPATIENT
Start: 2024-12-31 | End: 2025-01-08

## 2024-12-31 RX ORDER — ASPIRIN 81 MG/1
81 TABLET, CHEWABLE ORAL DAILY
Status: DISCONTINUED | OUTPATIENT
Start: 2024-12-31 | End: 2025-01-08

## 2024-12-31 RX ORDER — LISINOPRIL 5 MG/1
2.5 TABLET ORAL DAILY
Status: DISCONTINUED | OUTPATIENT
Start: 2024-12-31 | End: 2025-01-08 | Stop reason: HOSPADM

## 2024-12-31 RX ORDER — SUCRALFATE 1 G/1
1 TABLET ORAL
Status: DISCONTINUED | OUTPATIENT
Start: 2024-12-31 | End: 2025-01-08 | Stop reason: HOSPADM

## 2024-12-31 RX ADMIN — FOLIC ACID 1 MG: 1 TABLET ORAL at 10:10

## 2024-12-31 RX ADMIN — MIDODRINE HYDROCHLORIDE 10 MG: 5 TABLET ORAL at 17:40

## 2024-12-31 RX ADMIN — LISINOPRIL 2.5 MG: 5 TABLET ORAL at 10:11

## 2024-12-31 RX ADMIN — EMPAGLIFLOZIN 25 MG: 10 TABLET, FILM COATED ORAL at 10:10

## 2024-12-31 RX ADMIN — HYDROXYUREA 500 MG: 500 CAPSULE ORAL at 10:26

## 2024-12-31 RX ADMIN — MIDODRINE HYDROCHLORIDE 10 MG: 5 TABLET ORAL at 13:00

## 2024-12-31 RX ADMIN — ATORVASTATIN CALCIUM 20 MG: 10 TABLET, FILM COATED ORAL at 10:10

## 2024-12-31 RX ADMIN — INSULIN GLARGINE 7 UNITS: 100 INJECTION, SOLUTION SUBCUTANEOUS at 21:53

## 2024-12-31 RX ADMIN — PANTOPRAZOLE SODIUM 40 MG: 40 TABLET, DELAYED RELEASE ORAL at 06:09

## 2024-12-31 RX ADMIN — SODIUM CHLORIDE, PRESERVATIVE FREE 10 ML: 5 INJECTION INTRAVENOUS at 10:42

## 2024-12-31 RX ADMIN — OLANZAPINE 5 MG: 5 TABLET, FILM COATED ORAL at 21:53

## 2024-12-31 RX ADMIN — SUCRALFATE 1 G: 1 TABLET ORAL at 17:39

## 2024-12-31 RX ADMIN — MORPHINE SULFATE 2 MG: 2 INJECTION, SOLUTION INTRAMUSCULAR; INTRAVENOUS at 06:37

## 2024-12-31 RX ADMIN — ENOXAPARIN SODIUM 40 MG: 100 INJECTION SUBCUTANEOUS at 10:11

## 2024-12-31 RX ADMIN — INSULIN LISPRO 1 UNITS: 100 INJECTION, SOLUTION INTRAVENOUS; SUBCUTANEOUS at 21:54

## 2024-12-31 RX ADMIN — SUCRALFATE 1 G: 1 TABLET ORAL at 06:09

## 2024-12-31 RX ADMIN — SODIUM CHLORIDE, PRESERVATIVE FREE 10 ML: 5 INJECTION INTRAVENOUS at 21:55

## 2024-12-31 RX ADMIN — SENNOSIDES AND DOCUSATE SODIUM 1 TABLET: 50; 8.6 TABLET ORAL at 10:10

## 2024-12-31 RX ADMIN — SODIUM CHLORIDE, PRESERVATIVE FREE 10 ML: 5 INJECTION INTRAVENOUS at 10:41

## 2024-12-31 RX ADMIN — SUCRALFATE 1 G: 1 TABLET ORAL at 21:53

## 2024-12-31 RX ADMIN — SENNOSIDES AND DOCUSATE SODIUM 1 TABLET: 50; 8.6 TABLET ORAL at 01:22

## 2024-12-31 RX ADMIN — MIDODRINE HYDROCHLORIDE 10 MG: 5 TABLET ORAL at 10:10

## 2024-12-31 RX ADMIN — FAMOTIDINE 20 MG: 20 TABLET ORAL at 01:20

## 2024-12-31 RX ADMIN — SUCRALFATE 1 G: 1 TABLET ORAL at 10:10

## 2024-12-31 ASSESSMENT — PAIN DESCRIPTION - ORIENTATION: ORIENTATION: LEFT

## 2024-12-31 ASSESSMENT — PAIN SCALES - GENERAL
PAINLEVEL_OUTOF10: 0
PAINLEVEL_OUTOF10: 0
PAINLEVEL_OUTOF10: 7
PAINLEVEL_OUTOF10: 0
PAINLEVEL_OUTOF10: 0

## 2024-12-31 ASSESSMENT — PAIN DESCRIPTION - LOCATION: LOCATION: RIB CAGE

## 2024-12-31 ASSESSMENT — PAIN DESCRIPTION - DESCRIPTORS: DESCRIPTORS: ACHING

## 2024-12-31 ASSESSMENT — PAIN DESCRIPTION - PAIN TYPE: TYPE: ACUTE PAIN

## 2024-12-31 ASSESSMENT — PAIN DESCRIPTION - FREQUENCY: FREQUENCY: INTERMITTENT

## 2024-12-31 NOTE — CARE COORDINATION
AVILA contacted PCP, Dorothy Terrazas PA to discuss home oxygen order that was placed on 12/23. Beba, at office, [phone] reported that the order was written incorrectly and that Mk will be in to correct the mistake on Thursday 1/2. SW added request for home oxygen to physician sticky note. Pt also requested 3-1 bedside commode that was included in sticky note.

## 2024-12-31 NOTE — PROGRESS NOTES
4 Eyes Skin Assessment     NAME:  Lin Bates  YOB: 1955  MEDICAL RECORD NUMBER:  [id number]    The patient is being assessed for  Admission    I agree that at least one RN has performed a thorough Head to Toe Skin Assessment on the patient. ALL assessment sites listed below have been assessed.      Areas assessed by both nurses:    Head, Face, Ears, Shoulders, Back, Chest, Arms, Elbows, Hands, Sacrum. Buttock, Coccyx, Ischium, Legs. Feet and Heels, and Under Medical Devices         Does the Patient have a Wound? No noted wound(s)       Giuseppe Prevention initiated by RN: Yes  Wound Care Orders initiated by RN: Yes    Pressure Injury (Stage 3,4, Unstageable, DTI, NWPT, and Complex wounds) if present, place Wound referral order by RN under : No    New Ostomies, if present place, Ostomy referral order under : No     Nurse 1 eSignature: Electronically signed by Susana Velazquez RN on 12/30/24 at 11:58 PM EST    **SHARE this note so that the co-signing nurse can place an eSignature**    Nurse 2 eSignature: Electronically signed by Rayshawn Alanis RN on 12/31/24 at 4:02 AM EST

## 2024-12-31 NOTE — PROCEDURES
PROCEDURE NOTE  Date: 12/31/2024   Name: Lin Bates  YOB: 1955    Chest Tube Insertion    Date/Time: 12/31/2024 1:21 AM    Performed by: Mikhail Covarrubias MD  Authorized by: Mikhail Covarrubias MD  Consent: Verbal consent obtained. Written consent obtained.  Risks and benefits: risks, benefits and alternatives were discussed  Consent given by: patient  Patient understanding: patient states understanding of the procedure being performed  Time out: Immediately prior to procedure a \"time out\" was called to verify the correct patient, procedure, equipment, support staff and site/side marked as required.  Indications: pleural effusion    Sedation:  Patient sedated: no      Anesthesia:  Local Anesthetic: lidocaine 1% with epinephrine  Preparation: skin prepped with chlorhexidine  Placement location: left lateral  Tube size (Serbian): 10.  Ultrasound guidance: yes  Tube connected to: Heimlich valve  Drainage characteristics: serosanguinous  Drainage amount: 500 ml  Dressing: petrolatum-impregnated gauze  Patient tolerance: patient tolerated the procedure well with no immediate complications

## 2024-12-31 NOTE — PROGRESS NOTES
Inpatient Progress Note 12/31/2024        Lin Bates  1955  [id number]      Assessment/Plan:    Lin Bearden is a 69-year-old female with a past medical history hypertension, hyperlipidemia, lung cancer and type 2 diabetes who presented to [facility] from [facility] for shortness of breath    Problem list  Recurrent left pleural effusion with complete collapse  Left lower lobe small cell lung cancer  Calorie deficit malnutrition Body mass index is 23.59 kg/m².   Acute respiratory failure with hypoxia, now improved    Plan:  -Supplemental oxygen as needed, now on room air.  SpO2 goal >92.   -Aggressive pulmonary hygiene, I-S, bronchodilators as needed  -Chest tube management.  Left large bore chest tube in situ, to atrium with  -20cm suction.  Document I&O's  -Repeat CXR in a.m.  -Consult oncology  -Transfer out of ICU, stable    Tubes/Lines/Drains:    PIV, left chest tube  Code Status:   Full code    Subjective:    Patient seen and examined at bedside.  Awake alert oriented x 3.  Hard of hearing.  Hemodynamically stable.  Tolerating room air.  Left chest tube to atrium with -20 cm suction.  Serosanguineous output.  Cytology and cultures pending. Will transfer       Review of Systems   Respiratory:  Positive for shortness of breath.    All other systems reviewed and are negative.       Physical Exam:            /64   Pulse (!) 108   Temp 98.4 °F (36.9 °C) (Oral)   Resp 18   Ht 1.549 m (5' 0.98\")   Wt 56.6 kg (124 lb 12.5 oz)   SpO2 93%   BMI 23.59 kg/m²     General: NAD  Eyes: EOMI  ENT: neck supple  Cardiovascular: Tachycardic per telemetry  Respiratory: Diminished left in all lobes.  Right clear.  Left chest tube in situ  Gastrointestinal: Soft, non tender  Genitourinary: no suprapubic tenderness  Musculoskeletal: No edema.  Skin: warm, dry  Neuro: Alert.  Psych: Mood appropriate.    Current Medications:   sodium chloride flush  5-40 mL IntraVENous 2 times per day     Value Ref Range    Specimen Type Unknown     LD, Fluid 56 U/L   pH, body fluid    Collection Time: 12/31/24 12:30 AM   Result Value Ref Range    Specimen Type Unknown     pH, Fluid 8.0 (H) 6.5 - 7.5   Protein, body fluid    Collection Time: 12/31/24 12:30 AM   Result Value Ref Range    Specimen Type Unknown     Total Protein, Body Fluid 4.3 g/dL   Comprehensive Metabolic Panel w/ Reflex to MG    Collection Time: 12/31/24  2:20 AM   Result Value Ref Range    Sodium 134 (L) 136 - 145 mmol/L    Potassium 4.4 3.5 - 5.1 mmol/L    Chloride 99 99 - 110 mmol/L    CO2 19 (L) 21 - 32 mmol/L    Anion Gap 16 9 - 17 mmol/L    Glucose 156 (H) 74 - 99 mg/dL    BUN 13 7 - 20 mg/dL    Creatinine 0.8 0.6 - 1.2 mg/dL    Est, Glom Filt Rate 76 >60 mL/min/1.73m2    Calcium 9.1 8.3 - 10.6 mg/dL    Total Protein 6.6 6.4 - 8.2 g/dL    Albumin 3.0 (L) 3.4 - 5.0 g/dL    Albumin/Globulin Ratio 0.8 (L) 1.1 - 2.2    Total Bilirubin 0.3 0.0 - 1.0 mg/dL    Alkaline Phosphatase 81 40 - 129 U/L    ALT <5 (L) 10 - 40 U/L    AST 18 15 - 37 U/L   Phosphorus    Collection Time: 12/31/24  2:20 AM   Result Value Ref Range    Phosphorus 4.4 2.5 - 4.9 mg/dL   Lactate Dehydrogenase    Collection Time: 12/31/24  2:20 AM   Result Value Ref Range     100 - 190 U/L   CBC with Auto Differential    Collection Time: 12/31/24  4:35 AM   Result Value Ref Range    WBC 18.7 (H) 4.0 - 10.5 k/uL    RBC 3.37 (L) 4.20 - 5.40 m/uL    Hemoglobin 10.4 (L) 12.5 - 16.0 g/dL    Hematocrit 33.8 (L) 37.0 - 47.0 %    .3 (H) 78.0 - 100.0 fL    MCH 30.9 27.0 - 31.0 pg    MCHC 30.8 (L) 32.0 - 36.0 g/dL    RDW 16.6 (H) 11.7 - 14.9 %    MPV 9.7 7.5 - 11.1 fL    Platelet, Fluorescence 264 140 - 440 k/uL    Neutrophils % 88 (H) 36 - 66 %    Lymphocytes % 3 (L) 24 - 44 %    Monocytes % 7 (H) 0 - 4 %    Eosinophils % 0 0 - 3 %    Basophils % 0 0 - 1 %    Immature Granulocytes % 1 (H) 0 %    Neutrophils Absolute 16.45 k/uL    Lymphocytes Absolute 0.61 k/uL    Monocytes Absolute

## 2024-12-31 NOTE — H&P
History & Physical- Critical Care      DATE: 24  NAME: Lin Bates  : 1955  MRN: [id number]    Chief Complaint: Shortness of breath.    History of Present Illness:  Lin Bates is a 69 y.o. female  patient with past medical history hypertension and hyperlipidemia, diabetes mellitus, lung carcinoma, presented to the emergency room for evaluation of shortness of breath.  Reported worsening shortness of breath over the last 2 days, no chest pain, no fever or chills or cough.  She was admitted earlier in December treated for pneumonia with antibiotics, had a pleural effusion that was drained.  No nausea vomiting abdominal pain or diarrhea.  No headache dizziness or loss of consciousness.  In the ER at [place] she was hemodynamically stable, required 2 L oxygen.  Chest x-ray showed left white line, thoracentesis attempted by the ED physician but unsuccessful.  Transfer requested to ICU at [facility]    ROS:  Negative except as in HPI    Past Medical, Surgical, Social, Family History:  Past Medical History:   Diagnosis Date    Cancer (HCC)     History of external beam radiation therapy 2024    LEFT LUNG and LN 6,000 cGy in 30 fractions    Hyperlipidemia     Hypertension     Thickened endometrium     Type 2 diabetes mellitus without complication (HCC)      Past Surgical History:   Procedure Laterality Date    CT NEEDLE BIOPSY LUNG PERCUTANEOUS W IMAGING GUIDANCE  2024    CT NEEDLE BIOPSY LUNG PERCUTANEOUS 2024 [facility] CT SCAN    DENTAL SURGERY      's    DILATION AND CURETTAGE OF UTERUS N/A 3/22/2023    DILATATION AND CURETTAGE HYSTEROSCOPY performed by Zac Pretty MD at [facility] OR    ESOPHAGOSCOPY N/A 2024    ESOPHAGOSCOPY DIAGNOSTIC performed by Yenny Lindquist MD at [facility] ENDOSCOPY    IR BIOPSY THYROID PERC CORE NEEDLE  2024    IR BIOPSY THYROID PERC CORE NEEDLE 2024 [facility] SPECIAL PROCEDURES    IR CHEST TUBE INSERTION  10/3/2024    IR CHEST TUBE INSERTION

## 2024-12-31 NOTE — CARE COORDINATION
AVILA introduced self to the pt and her BRIANA. Matt. AVILA discussed CM role to assist with d/c planning. Pt's PCP is Dorothy Terrazas PA. Pt had a recent hospital stay and was discharged on 12/12. Pt lives in 1-story home with Matt. Matt provides transportation. Matt is also pt's primary caregiver. Pt does has [facility]. Pt reports last radiation on 3/24 and is waiting on her recent PET scan results. Pt's PCP ordered home oxygen on 12/23. SW contacted PCP's office and spoke with Beba. Was informed that the order needed reworded and that JAJA Terrazas, will not be in office to correct until Thursday. Pt plans to return home. Pt sleeps on loveseat and denies needing a medical bed. Pt has a wheeled walker and shower chair. Pt is requesting an order for a 3-1 commode.      12/31/24 1026   Service Assessment   Patient Orientation Alert and Oriented   Cognition Alert   History Provided By Patient;Significant Other;Medical Record   Primary Caregiver Spouse   Accompanied By/Relationship Matt WARREN   Support Systems Spouse/Significant Other;Home Care Staff   Patient's Healthcare Decision Maker is: Legal Next of Kin   PCP Verified by CM Yes  (Dorothy Terrazas PA)   Last Visit to PCP Within last 3 months   Prior Functional Level Assistance with the following:;Bathing;Mobility   Current Functional Level Assistance with the following:;Mobility;Bathing   Can patient return to prior living arrangement Yes   Ability to make needs known: Good   Family able to assist with home care needs: Yes   Would you like for me to discuss the discharge plan with any other family members/significant others, and if so, who? Yes  (Matt WARREN)   Financial Resources Medicare;Medicaid   Community Resources ECF/Home Care   CM/SW Referral DME;Other (see comment)  (Home oxygen)

## 2025-01-01 LAB
ANION GAP SERPL CALCULATED.3IONS-SCNC: 9 MMOL/L (ref 9–17)
BASOPHILS # BLD: 0.06 K/UL
BASOPHILS NFR BLD: 1 % (ref 0–1)
BUN SERPL-MCNC: 28 MG/DL (ref 7–20)
CALCIUM SERPL-MCNC: 9.1 MG/DL (ref 8.3–10.6)
CHLORIDE SERPL-SCNC: 102 MMOL/L (ref 99–110)
CO2 SERPL-SCNC: 27 MMOL/L (ref 21–32)
CREAT SERPL-MCNC: 1.2 MG/DL (ref 0.6–1.2)
EOSINOPHIL # BLD: 0.12 K/UL
EOSINOPHILS RELATIVE PERCENT: 1 % (ref 0–3)
ERYTHROCYTE [DISTWIDTH] IN BLOOD BY AUTOMATED COUNT: 16.3 % (ref 11.7–14.9)
GFR, ESTIMATED: 46 ML/MIN/1.73M2
GLUCOSE BLD-MCNC: 110 MG/DL (ref 74–99)
GLUCOSE BLD-MCNC: 175 MG/DL (ref 74–99)
GLUCOSE BLD-MCNC: 236 MG/DL (ref 74–99)
GLUCOSE BLD-MCNC: 242 MG/DL (ref 74–99)
GLUCOSE SERPL-MCNC: 165 MG/DL (ref 74–99)
HCT VFR BLD AUTO: 30.9 % (ref 37–47)
HGB BLD-MCNC: 9.7 G/DL (ref 12.5–16)
IMM GRANULOCYTES # BLD AUTO: 0.04 K/UL
IMM GRANULOCYTES NFR BLD: 0 %
LYMPHOCYTES NFR BLD: 0.76 K/UL
LYMPHOCYTES RELATIVE PERCENT: 7 % (ref 24–44)
MCH RBC QN AUTO: 30.9 PG (ref 27–31)
MCHC RBC AUTO-ENTMCNC: 31.4 G/DL (ref 32–36)
MCV RBC AUTO: 98.4 FL (ref 78–100)
MONOCYTES NFR BLD: 1.13 K/UL
MONOCYTES NFR BLD: 11 % (ref 0–4)
NEUTROPHILS NFR BLD: 80 % (ref 36–66)
NEUTS SEG NFR BLD: 8.55 K/UL
PLATELET # BLD AUTO: 232 K/UL (ref 140–440)
PMV BLD AUTO: 9.5 FL (ref 7.5–11.1)
POTASSIUM SERPL-SCNC: 4.3 MMOL/L (ref 3.5–5.1)
PROCALCITONIN SERPL-MCNC: 1.03 NG/ML
RBC # BLD AUTO: 3.14 M/UL (ref 4.2–5.4)
SODIUM SERPL-SCNC: 137 MMOL/L (ref 136–145)
WBC OTHER # BLD: 10.7 K/UL (ref 4–10.5)

## 2025-01-01 PROCEDURE — 80048 BASIC METABOLIC PNL TOTAL CA: CPT

## 2025-01-01 PROCEDURE — 6370000000 HC RX 637 (ALT 250 FOR IP): Performed by: INTERNAL MEDICINE

## 2025-01-01 PROCEDURE — 2700000000 HC OXYGEN THERAPY PER DAY

## 2025-01-01 PROCEDURE — 82962 GLUCOSE BLOOD TEST: CPT

## 2025-01-01 PROCEDURE — 2500000003 HC RX 250 WO HCPCS: Performed by: INTERNAL MEDICINE

## 2025-01-01 PROCEDURE — 84145 PROCALCITONIN (PCT): CPT

## 2025-01-01 PROCEDURE — 85025 COMPLETE CBC W/AUTO DIFF WBC: CPT

## 2025-01-01 PROCEDURE — 6360000002 HC RX W HCPCS: Performed by: INTERNAL MEDICINE

## 2025-01-01 PROCEDURE — 94761 N-INVAS EAR/PLS OXIMETRY MLT: CPT

## 2025-01-01 PROCEDURE — 2060000000 HC ICU INTERMEDIATE R&B

## 2025-01-01 RX ADMIN — MIDODRINE HYDROCHLORIDE 10 MG: 5 TABLET ORAL at 08:30

## 2025-01-01 RX ADMIN — SODIUM CHLORIDE, PRESERVATIVE FREE 10 ML: 5 INJECTION INTRAVENOUS at 08:32

## 2025-01-01 RX ADMIN — LISINOPRIL 2.5 MG: 5 TABLET ORAL at 08:31

## 2025-01-01 RX ADMIN — MIDODRINE HYDROCHLORIDE 10 MG: 5 TABLET ORAL at 11:29

## 2025-01-01 RX ADMIN — SENNOSIDES AND DOCUSATE SODIUM 1 TABLET: 50; 8.6 TABLET ORAL at 08:30

## 2025-01-01 RX ADMIN — EMPAGLIFLOZIN 25 MG: 10 TABLET, FILM COATED ORAL at 08:31

## 2025-01-01 RX ADMIN — SUCRALFATE 1 G: 1 TABLET ORAL at 05:22

## 2025-01-01 RX ADMIN — SENNOSIDES AND DOCUSATE SODIUM 1 TABLET: 50; 8.6 TABLET ORAL at 21:57

## 2025-01-01 RX ADMIN — ATORVASTATIN CALCIUM 20 MG: 10 TABLET, FILM COATED ORAL at 08:31

## 2025-01-01 RX ADMIN — HYDROXYUREA 500 MG: 500 CAPSULE ORAL at 08:34

## 2025-01-01 RX ADMIN — INSULIN LISPRO 1 UNITS: 100 INJECTION, SOLUTION INTRAVENOUS; SUBCUTANEOUS at 21:56

## 2025-01-01 RX ADMIN — SUCRALFATE 1 G: 1 TABLET ORAL at 11:28

## 2025-01-01 RX ADMIN — INSULIN LISPRO 1 UNITS: 100 INJECTION, SOLUTION INTRAVENOUS; SUBCUTANEOUS at 14:38

## 2025-01-01 RX ADMIN — INSULIN GLARGINE 7 UNITS: 100 INJECTION, SOLUTION SUBCUTANEOUS at 21:57

## 2025-01-01 RX ADMIN — SODIUM CHLORIDE, PRESERVATIVE FREE 10 ML: 5 INJECTION INTRAVENOUS at 21:58

## 2025-01-01 RX ADMIN — MIDODRINE HYDROCHLORIDE 10 MG: 5 TABLET ORAL at 18:39

## 2025-01-01 RX ADMIN — PANTOPRAZOLE SODIUM 40 MG: 40 TABLET, DELAYED RELEASE ORAL at 05:22

## 2025-01-01 RX ADMIN — SUCRALFATE 1 G: 1 TABLET ORAL at 18:39

## 2025-01-01 RX ADMIN — ENOXAPARIN SODIUM 40 MG: 100 INJECTION SUBCUTANEOUS at 08:30

## 2025-01-01 RX ADMIN — OLANZAPINE 5 MG: 5 TABLET, FILM COATED ORAL at 21:59

## 2025-01-01 RX ADMIN — FOLIC ACID 1 MG: 1 TABLET ORAL at 08:31

## 2025-01-01 NOTE — PROGRESS NOTES
Inpatient Progress Note 1/1/2025        Lin Bates  1955  [id number]      Assessment/Plan:    Lin Bearden is a 69-year-old female with a past medical history hypertension, hyperlipidemia, lung cancer and type 2 diabetes who presented to [facility] from [facility] for shortness of breath    Problem list  Recurrent left pleural effusion with complete collapse  Left lower lobe small cell lung cancer  Calorie deficit malnutrition Body mass index is 23.42 kg/m².   Acute respiratory failure with hypoxia, now improved    Plan:  -Supplemental oxygen as needed, now on room air.  SpO2 goal >92.   -Aggressive pulmonary hygiene, I-S, bronchodilators as needed  -Chest tube management.  Left large bore chest tube in situ, to atrium with  -20cm suction.  Document I&O's  -Repeat CXR in a.m.  -Consult oncology    -Transfer out of ICU, stable    Tubes/Lines/Drains:    PIV, left chest tube  Code Status:   Full code    Subjective:    Patient seen and examined at bedside.  Awake alert oriented x 3.  Hard of hearing.  Hemodynamically stable.  On 2L NC with O2 sat 96%.  Left chest tube to atrium with -20 cm suction.  Serosanguineous output.  Cytology and cultures pending. Will transfer out of ICU      Review of Systems   All other systems reviewed and are negative.       Physical Exam:            BP (!) 94/41   Pulse (!) 101   Temp 98.3 °F (36.8 °C) (Oral)   Resp 23   Ht 1.549 m (5' 0.98\")   Wt 56.2 kg (123 lb 14.4 oz)   SpO2 93%   BMI 23.42 kg/m²     General: NAD  Eyes: EOMI  ENT: neck supple  Cardiovascular: Tachycardic per telemetry  Respiratory: Diminished left in all lobes.  Right clear.  Left chest tube in situ  Gastrointestinal: Soft, non tender  Genitourinary: no suprapubic tenderness  Musculoskeletal: No edema.  Skin: warm, dry  Neuro: Alert.  Psych: Mood appropriate.    Current Medications:   sodium chloride flush  5-40 mL IntraVENous 2 times per day    atorvastatin  20 mg Oral Daily    aspirin

## 2025-01-01 NOTE — CONSULTS
2024    LEFT LUNG and LN 6,000 cGy in 30 fractions    Hyperlipidemia     Hypertension     Thickened endometrium     Type 2 diabetes mellitus without complication (HCC)      PSHX:  has a past surgical history that includes Dental surgery; Dilation and curettage of uterus (N/A, 3/22/2023); CT NEEDLE BIOPSY LUNG PERCUTANEOUS (2024); Port Surgery (N/A, 4/10/2024); Esophagoscopy (N/A, 2024); IR CHEST TUBE INSERTION (10/3/2024); and IR BIOPSY THYROID PERC CORE NEEDLE (2024).  Allergies: No Known Allergies  Fam HX:  family history includes Cancer in her brother and father.  Soc HX:   Social History     Socioeconomic History    Marital status:    Tobacco Use    Smoking status: Former     Current packs/day: 0.00     Average packs/day: 1 pack/day for 52.1 years (52.1 ttl pk-yrs)     Types: Cigarettes     Start date:      Quit date: 2024     Years since quittin.9    Smokeless tobacco: Never    Tobacco comments:     Down to half pack 22   Vaping Use    Vaping status: Never Used   Substance and Sexual Activity    Alcohol use: Not Currently     Comment: yearly    Drug use: No    Sexual activity: Not Currently     Social Determinants of Health     Financial Resource Strain: Low Risk  (2023)    Overall Financial Resource Strain (CARDIA)     Difficulty of Paying Living Expenses: Not hard at all   Food Insecurity: No Food Insecurity (2024)    Hunger Vital Sign     Worried About Running Out of Food in the Last Year: Never true     Ran Out of Food in the Last Year: Never true   Transportation Needs: No Transportation Needs (2024)    PRAPARE - Transportation     Lack of Transportation (Medical): No     Lack of Transportation (Non-Medical): No   Housing Stability: Low Risk  (2024)    Housing Stability Vital Sign     Unable to Pay for Housing in the Last Year: No     Number of Times Moved in the Last Year: 0     Homeless in the Last Year: No       Medications:    Component Value Date/Time    LABA1C 7.3 12/10/2024 03:26 AM     TSH:   Lab Results   Component Value Date/Time    TSH 2.55 12/17/2024 12:57 PM     Troponin:   Lab Results   Component Value Date/Time    TROPONINT <0.010 05/18/2015 05:45 PM     Lactic Acid: No results for input(s): \"LACTA\" in the last 72 hours.  BNP: No results for input(s): \"PROBNP\" in the last 72 hours.  UA:  Lab Results   Component Value Date/Time    NITRU NEGATIVE 05/18/2015 03:40 PM    COLORU YELLOW 05/18/2015 03:40 PM    PHUR 6.0 05/18/2015 03:40 PM    WBCUA 2 TO 3 05/18/2015 03:40 PM    RBCUA NEGATIVE 05/18/2015 03:40 PM    MUCUS NEGATIVE 10/20/2014 09:50 PM    BACTERIA MODERATE 05/18/2015 03:40 PM    CLARITYU HAZY 05/18/2015 03:40 PM    LEUKOCYTESUR TRACE 05/18/2015 03:40 PM    UROBILINOGEN 0.2 05/18/2015 03:40 PM    BILIRUBINUR NEGATIVE 05/18/2015 03:40 PM    BLOODU NEGATIVE 05/18/2015 03:40 PM    GLUCOSEU NEGATIVE 05/18/2015 03:40 PM    KETUA NEGATIVE 05/18/2015 03:40 PM     Urine Cultures: No results found for: \"LABURIN\"  Blood Cultures: No results found for: \"BC\"  No results found for: \"BLOODCULT2\"  Organism: No results found for: \"ORG\"    Imaging/Diagnostics Last 24 Hours   XR CHEST PORTABLE    Result Date: 12/31/2024  AP SEMIUPRIGHT PORTABLE CHEST TIME: 0506 hours CLINICAL INFORMATION: Thoracentesis. COMPARISON: 12/31/2024, 0107 hours. Lungs: Total opacification, left hemithorax. Pulmonary vascular prominence, right lung. Pleura: Marked left pleural effusion with total opacification of the hemithorax. Heart and great vessels: Unable to evaluate due to an obscured left heart border. Bones/joints: Unremarkable. Soft tissues: Unremarkable. Tubes and Devices: A right Port-A-Cath is noted. Left chest tube projects over the hemithorax. Cardiac monitoring leads overlie the chest wall.     1. Total opacification of the left hemithorax by pleural fluid. 2. Therapeutic devices are demonstrated as noted above. 3. Prominent right lung vasculature.  Electronically signed by Jaswant Hilliard    XR CHEST PORTABLE    Result Date: 12/31/2024  Chest X-ray INDICATION: check chest tube placement COMPARISON: Chest radiographs and CT on 12/30/2024 TECHNIQUE: AP/PA view of the chest was obtained. FINDINGS: See below.     Interval placement of a left-sided chest tube which is poorly visualized, and appears to extend horizontally from the chest wall and terminate near the left lung zone. Right chest port with tip at the superior caval junction, unchanged. Similar appearance of the lungs, with opacity throughout the left hemithorax. Electronically signed by Elie Gold MD    XR CHEST PORTABLE    Result Date: 12/30/2024  EXAM: Chest, single view HISTORY: SOB; POST THORACENTESIS LEFT: FLUID very viscous would NOT DRAIN COMPARISON:  12/30/2024 TECHNIQUE: Single view of the chest was obtained. FINDINGS: No significant interval change. The whiteout of the left thorax is again seen. No pneumothorax. Right chest chemotherapy port.     Heart and pulmonary findings as described above. Electronically signed by Santiago Nunez MD    CT CHEST WO CONTRAST    Result Date: 12/30/2024  EXAM: CT of the chest without contrast HISTORY: SOB worse COMPARISON: December 8, 2024 TECHNIQUE: CT of the chest was performed without intravenous contrast medium and reconstructed into axial, coronal and sagittal reconstructions. The lack of intravenous contrast medium limits the evaluation for focal visceral lesions and intravascular pathology. One or more of the following dose-optimizing techniques was utilized for this exam: automated exposure control, adjustment of the mA and/or kV according to patient size, and/or use of iterative reconstruction technique FINDINGS: Normal heart size. No pericardial effusion. The trachea and mainstem bronchi patent bilaterally. Thoracic aorta is nonaneurysmal. Large left pleural effusion and associated complete left lung collapse. Small right pleural effusion. Large

## 2025-01-01 NOTE — PROGRESS NOTES
Attending attestation note    Attending note and attestation:   [x] I personally saw and evaluated the patient.  I discussed the case with the [facility] provider, reviewed the medical record, and agree with the findings and plan as documented in their portion of this note and take responsibility for the patient management.    [] I was present with the [facility] provider during the interview and exam.  I discussed the case with the [facility] provider, reviewed the medical record and agree with the findings and plan as documented in the provider portion of this note.    [x]  I personally interviewed and examined the patient and reviewed the medical record including the radiographs, laboratory data. I agree with the findings and plan as documented in the [facility] provider's portion of this note and take responsibility for the patient management.      Diagnosis:   Recurrent left pleural effusion with complete collapse  Left lower lobe small cell lung cancer  Calorie deficit malnutrition Body mass index is 23.59 kg/m².   Acute respiratory failure with hypoxia, now improved    Attending note, comments:    Patient was seen and evaluated at bedside, no acute events overnight. Chest tube in place, noted to have airleak that is coming from the dressing. Otherwise she denies any complaints this morning. She is alert, oriented following commands. Pain control with current multimodal regimen, adjust as needed. HDS monitor. On NC, titrate off as able. Continue inhalers, nebs and aggressive pulm toileting. Chest tube in place. CTS consulted per Primary hospitalist for management may need pleurex catheter. ADAT. GI ppx. Cr 0.8. monitor uop. Monitor electrolytes and replenish as needed. Hgb 10.4, plts 88. DVT pp: Lovenox. WBC 18.8, likely reactive. Monitor. No indication for Abx at this time. POC BG ISS PRN. Maintain euglycemia, avoid hypoglycemia. DTI prevention.       No further critical care needs. [facility]

## 2025-01-01 NOTE — PROGRESS NOTES
Attempted to call Dr. Birch for consult but was unable to reach. Phone service stated he was unavailable. Will try again later.    Dr. Birch called back and stated he will see her in the morning.

## 2025-01-01 NOTE — PLAN OF CARE
Problem: Chronic Conditions and Co-morbidities  Goal: Patient's chronic conditions and co-morbidity symptoms are monitored and maintained or improved  Outcome: Progressing  Flowsheets (Taken 12/31/2024 2000)  Care Plan - Patient's Chronic Conditions and Co-Morbidity Symptoms are Monitored and Maintained or Improved: Monitor and assess patient's chronic conditions and comorbid symptoms for stability, deterioration, or improvement     Problem: Discharge Planning  Goal: Discharge to home or other facility with appropriate resources  Outcome: Progressing  Flowsheets (Taken 12/31/2024 2000)  Discharge to home or other facility with appropriate resources: Identify barriers to discharge with patient and caregiver     Problem: Pain  Goal: Verbalizes/displays adequate comfort level or baseline comfort level  Outcome: Progressing     Problem: Safety - Adult  Goal: Free from fall injury  Outcome: Progressing  Flowsheets (Taken 12/31/2024 2000)  Free From Fall Injury: Instruct family/caregiver on patient safety

## 2025-01-02 ENCOUNTER — APPOINTMENT (OUTPATIENT)
Dept: GENERAL RADIOLOGY | Age: 70
DRG: 208 | End: 2025-01-02
Attending: INTERNAL MEDICINE
Payer: MEDICARE

## 2025-01-02 ENCOUNTER — APPOINTMENT (OUTPATIENT)
Dept: CT IMAGING | Age: 70
DRG: 208 | End: 2025-01-02
Attending: INTERNAL MEDICINE
Payer: MEDICARE

## 2025-01-02 LAB
ANION GAP SERPL CALCULATED.3IONS-SCNC: 8 MMOL/L (ref 9–17)
BASOPHILS # BLD: 0.05 K/UL
BASOPHILS NFR BLD: 1 % (ref 0–1)
BNP SERPL-MCNC: 2472 PG/ML (ref 0–125)
BUN SERPL-MCNC: 26 MG/DL (ref 7–20)
CALCIUM SERPL-MCNC: 9.4 MG/DL (ref 8.3–10.6)
CHLORIDE SERPL-SCNC: 102 MMOL/L (ref 99–110)
CO2 SERPL-SCNC: 26 MMOL/L (ref 21–32)
CREAT SERPL-MCNC: 0.9 MG/DL (ref 0.6–1.2)
EOSINOPHIL # BLD: 0.14 K/UL
EOSINOPHILS RELATIVE PERCENT: 2 % (ref 0–3)
ERYTHROCYTE [DISTWIDTH] IN BLOOD BY AUTOMATED COUNT: 16.5 % (ref 11.7–14.9)
GFR, ESTIMATED: 65 ML/MIN/1.73M2
GLUCOSE BLD-MCNC: 211 MG/DL (ref 74–99)
GLUCOSE BLD-MCNC: 212 MG/DL (ref 74–99)
GLUCOSE BLD-MCNC: 277 MG/DL (ref 74–99)
GLUCOSE SERPL-MCNC: 143 MG/DL (ref 74–99)
HCT VFR BLD AUTO: 30.4 % (ref 37–47)
HGB BLD-MCNC: 9.5 G/DL (ref 12.5–16)
IMM GRANULOCYTES # BLD AUTO: 0.05 K/UL
IMM GRANULOCYTES NFR BLD: 1 %
LYMPHOCYTES NFR BLD: 0.7 K/UL
LYMPHOCYTES RELATIVE PERCENT: 8 % (ref 24–44)
MCH RBC QN AUTO: 30.9 PG (ref 27–31)
MCHC RBC AUTO-ENTMCNC: 31.3 G/DL (ref 32–36)
MCV RBC AUTO: 99 FL (ref 78–100)
MONOCYTES NFR BLD: 1.07 K/UL
MONOCYTES NFR BLD: 12 % (ref 0–4)
MRSA, DNA, NASAL: NOT DETECTED
NEUTROPHILS NFR BLD: 77 % (ref 36–66)
NEUTS SEG NFR BLD: 6.65 K/UL
PLATELET # BLD AUTO: 232 K/UL (ref 140–440)
PMV BLD AUTO: 9.3 FL (ref 7.5–11.1)
POTASSIUM SERPL-SCNC: 4 MMOL/L (ref 3.5–5.1)
RBC # BLD AUTO: 3.07 M/UL (ref 4.2–5.4)
SODIUM SERPL-SCNC: 136 MMOL/L (ref 136–145)
SPECIMEN DESCRIPTION: NORMAL
WBC OTHER # BLD: 8.7 K/UL (ref 4–10.5)

## 2025-01-02 PROCEDURE — 2060000000 HC ICU INTERMEDIATE R&B

## 2025-01-02 PROCEDURE — 2580000003 HC RX 258: Performed by: STUDENT IN AN ORGANIZED HEALTH CARE EDUCATION/TRAINING PROGRAM

## 2025-01-02 PROCEDURE — 6360000002 HC RX W HCPCS: Performed by: INTERNAL MEDICINE

## 2025-01-02 PROCEDURE — 6370000000 HC RX 637 (ALT 250 FOR IP): Performed by: STUDENT IN AN ORGANIZED HEALTH CARE EDUCATION/TRAINING PROGRAM

## 2025-01-02 PROCEDURE — 2500000003 HC RX 250 WO HCPCS: Performed by: INTERNAL MEDICINE

## 2025-01-02 PROCEDURE — 6360000002 HC RX W HCPCS: Performed by: STUDENT IN AN ORGANIZED HEALTH CARE EDUCATION/TRAINING PROGRAM

## 2025-01-02 PROCEDURE — 87205 SMEAR GRAM STAIN: CPT

## 2025-01-02 PROCEDURE — 87899 AGENT NOS ASSAY W/OPTIC: CPT

## 2025-01-02 PROCEDURE — 87449 NOS EACH ORGANISM AG IA: CPT

## 2025-01-02 PROCEDURE — 71250 CT THORAX DX C-: CPT

## 2025-01-02 PROCEDURE — APPNB45 APP NON BILLABLE 31-45 MINUTES: Performed by: PHYSICIAN ASSISTANT

## 2025-01-02 PROCEDURE — 99222 1ST HOSP IP/OBS MODERATE 55: CPT | Performed by: THORACIC SURGERY (CARDIOTHORACIC VASCULAR SURGERY)

## 2025-01-02 PROCEDURE — 83880 ASSAY OF NATRIURETIC PEPTIDE: CPT

## 2025-01-02 PROCEDURE — 36415 COLL VENOUS BLD VENIPUNCTURE: CPT

## 2025-01-02 PROCEDURE — 87641 MR-STAPH DNA AMP PROBE: CPT

## 2025-01-02 PROCEDURE — 85025 COMPLETE CBC W/AUTO DIFF WBC: CPT

## 2025-01-02 PROCEDURE — 6370000000 HC RX 637 (ALT 250 FOR IP): Performed by: INTERNAL MEDICINE

## 2025-01-02 PROCEDURE — 2700000000 HC OXYGEN THERAPY PER DAY

## 2025-01-02 PROCEDURE — 94761 N-INVAS EAR/PLS OXIMETRY MLT: CPT

## 2025-01-02 PROCEDURE — 82962 GLUCOSE BLOOD TEST: CPT

## 2025-01-02 PROCEDURE — 71045 X-RAY EXAM CHEST 1 VIEW: CPT

## 2025-01-02 PROCEDURE — 87070 CULTURE OTHR SPECIMN AEROBIC: CPT

## 2025-01-02 PROCEDURE — 80048 BASIC METABOLIC PNL TOTAL CA: CPT

## 2025-01-02 RX ORDER — GUAIFENESIN 200 MG/10ML
200 LIQUID ORAL EVERY 4 HOURS PRN
Status: DISCONTINUED | OUTPATIENT
Start: 2025-01-02 | End: 2025-01-08

## 2025-01-02 RX ORDER — SODIUM CHLORIDE 9 MG/ML
INJECTION, SOLUTION INTRAVENOUS CONTINUOUS
Status: DISCONTINUED | OUTPATIENT
Start: 2025-01-02 | End: 2025-01-02

## 2025-01-02 RX ADMIN — OLANZAPINE 5 MG: 5 TABLET, FILM COATED ORAL at 21:21

## 2025-01-02 RX ADMIN — HYDROXYUREA 500 MG: 100 TABLET, FILM COATED ORAL at 10:04

## 2025-01-02 RX ADMIN — ENOXAPARIN SODIUM 40 MG: 100 INJECTION SUBCUTANEOUS at 09:14

## 2025-01-02 RX ADMIN — SODIUM CHLORIDE, PRESERVATIVE FREE 10 ML: 5 INJECTION INTRAVENOUS at 21:22

## 2025-01-02 RX ADMIN — MIDODRINE HYDROCHLORIDE 10 MG: 5 TABLET ORAL at 11:34

## 2025-01-02 RX ADMIN — SUCRALFATE 1 G: 1 TABLET ORAL at 07:02

## 2025-01-02 RX ADMIN — INSULIN LISPRO 1 UNITS: 100 INJECTION, SOLUTION INTRAVENOUS; SUBCUTANEOUS at 21:21

## 2025-01-02 RX ADMIN — INSULIN GLARGINE 7 UNITS: 100 INJECTION, SOLUTION SUBCUTANEOUS at 21:21

## 2025-01-02 RX ADMIN — EMPAGLIFLOZIN 25 MG: 10 TABLET, FILM COATED ORAL at 09:13

## 2025-01-02 RX ADMIN — FOLIC ACID 1 MG: 1 TABLET ORAL at 09:14

## 2025-01-02 RX ADMIN — SENNOSIDES AND DOCUSATE SODIUM 1 TABLET: 50; 8.6 TABLET ORAL at 09:14

## 2025-01-02 RX ADMIN — MIDODRINE HYDROCHLORIDE 10 MG: 5 TABLET ORAL at 17:13

## 2025-01-02 RX ADMIN — ASPIRIN 81 MG: 81 TABLET, CHEWABLE ORAL at 09:14

## 2025-01-02 RX ADMIN — CEFEPIME 2000 MG: 2 INJECTION, POWDER, FOR SOLUTION INTRAVENOUS at 09:20

## 2025-01-02 RX ADMIN — SODIUM CHLORIDE: 9 INJECTION, SOLUTION INTRAVENOUS at 13:07

## 2025-01-02 RX ADMIN — SODIUM CHLORIDE, PRESERVATIVE FREE 10 ML: 5 INJECTION INTRAVENOUS at 09:15

## 2025-01-02 RX ADMIN — INSULIN LISPRO 1 UNITS: 100 INJECTION, SOLUTION INTRAVENOUS; SUBCUTANEOUS at 17:13

## 2025-01-02 RX ADMIN — SENNOSIDES AND DOCUSATE SODIUM 1 TABLET: 50; 8.6 TABLET ORAL at 21:21

## 2025-01-02 RX ADMIN — ATORVASTATIN CALCIUM 20 MG: 10 TABLET, FILM COATED ORAL at 09:14

## 2025-01-02 RX ADMIN — SUCRALFATE 1 G: 1 TABLET ORAL at 17:13

## 2025-01-02 RX ADMIN — PANTOPRAZOLE SODIUM 40 MG: 40 TABLET, DELAYED RELEASE ORAL at 07:03

## 2025-01-02 RX ADMIN — MIDODRINE HYDROCHLORIDE 10 MG: 5 TABLET ORAL at 09:14

## 2025-01-02 RX ADMIN — INSULIN LISPRO 2 UNITS: 100 INJECTION, SOLUTION INTRAVENOUS; SUBCUTANEOUS at 11:34

## 2025-01-02 RX ADMIN — SODIUM CHLORIDE 1500 MG: 9 INJECTION, SOLUTION INTRAVENOUS at 09:58

## 2025-01-02 RX ADMIN — SUCRALFATE 1 G: 1 TABLET ORAL at 09:14

## 2025-01-02 RX ADMIN — SUCRALFATE 1 G: 1 TABLET ORAL at 21:21

## 2025-01-02 NOTE — CARE COORDINATION
Sw checked in with the pt and her SO. Pt received chest tube on 12/31. Pt and SO are confused in regards to the current medical issues regarding the chest tube. Encouraged pt and SO to inquire as to the results of various recent tests. D/C plan is still home with [facility]. CM will follow.     Sent Perfect Serve to Dr. Desai for him to be aware that the pt needs a bedside commode and possibly home oxygen if needed.

## 2025-01-02 NOTE — CONSULTS
Hematology Oncology Inpatient Consult    Patient Name:  Lin Bates  Patient :  1955  Patient MRN:  [id number]  [facility]   Admitted: 2024 10:39 PM   Hospital Attending Provider: Marcin Desai MD    Primary Oncologist: Dr. Burt Birch MD  PCP:  Dorothy Terrazas PA     Date of Service: 25       Reason for Consult: Small cell lung cancer and essential thrombocytosis.      Chief Complaint:  Shortness of breath    Principal Problem:    Recurrent left pleural effusion  Active Problems:    Small cell lung cancer, left lower lobe (HCC)    Moderate malnutrition (HCC)    Acute respiratory failure with hypoxia    Collapse of left lung  Resolved Problems:    * No resolved hospital problems. *      HPI:   Lin Bates is a 69 y.o. female with medical history significant for hypertension, hyperlipidemia, diabetes mellitus, history of HPV infection who was admitted for SOB and was found to have recurrent left pleural effusion with complete collapse   She is well known to our practice for MPL positive essential thrombocytosis, on hydrea 500 mg daily, and limited stage small cell lung cancer s/p definitive CRT and WBRT, currently on maintenance immunotherapy with durvalumab (last tx on 24). She was recently admitted in 2024 due to LLL CAP with associated large pleural effusion that was drained.      Today, patient is lying in bed sleeping in no acute distress on 4L NC O2.  She awakes easily but states that she is very tired.  Her breathing is a little easier but she still feels short of breath.  She denies fever, chest pain, increased cough, nausea, abdominal pain or dysuria.  She has been ambulating with assistance to the bathroom.  Patient quit smoking in 24.  She denies alcohol or illicit drug use. Patient had benign mammogram 24.      WBC 8.7, Hg 9.5, MCV 99.0, Plt 232    Chest xray, 24:  IMPRESSION:     There is now complete opacification of the left hemithorax,  four nights starting the night before chemotherapy. 16 tablet 0    vitamin B-12 (CYANOCOBALAMIN) 1000 MCG tablet Take 1 tablet by mouth daily 30 tablet 3    JARDIANCE 25 MG tablet       meclizine (ANTIVERT) 25 MG tablet       ferrous sulfate (IRON 325) 325 (65 Fe) MG tablet Take 1 tablet by mouth daily (with breakfast)      aspirin 81 MG EC tablet Take 1 tablet by mouth daily      metFORMIN (GLUCOPHAGE) 1000 MG tablet Take 1 tablet by mouth 2 times daily (with meals)      lisinopril (PRINIVIL;ZESTRIL) 2.5 MG tablet Take 1 tablet by mouth daily      glipiZIDE (GLUCOTROL) 2.5 MG CR tablet Take 1 mg by mouth 2 times daily      atorvastatin (LIPITOR) 20 MG tablet Take 1 tablet by mouth daily          Review of Systems:  Constitutional:  Negative for appetite change, chills, fatigue, fever and unexpected weight change.   HENT:   Negative for lump/mass, mouth sores, nosebleeds and trouble swallowing.    Eyes:  Negative for eye problems and icterus.   Respiratory:  Negative for cough, hemoptysis and shortness of breath.    Cardiovascular:  Negative for chest pain, leg swelling and palpitations.   Gastrointestinal:  Negative for abdominal distention, abdominal pain, blood in stool, constipation, diarrhea, nausea and vomiting.   Genitourinary:  Negative for difficulty urinating, dysuria and hematuria.    Musculoskeletal:  Negative for arthralgias and myalgias.   Skin:  Negative for rash and wound.   Neurological:  Negative for dizziness, headaches and seizures.   Hematological:  Negative for adenopathy. Does not bruise/bleed easily.   Psychiatric/Behavioral:  Negative for depression. The patient is not nervous/anxious.       Vital Signs: BP (!) 84/56   Pulse 92   Temp 97.9 °F (36.6 °C) (Oral)   Resp 17   Ht 1.549 m (5' 0.98\")   Wt 59.6 kg (131 lb 6.3 oz)   SpO2 95%   BMI 24.84 kg/m²      Physical Exam:  CONSTITUTIONAL: awake, alert, cooperative, no apparent distress   EYES: EOM grossly intact, no conjunctival pallor,  no scleral icterus  ENT: Normocephalic, atraumatic, MMM  NECK: supple, symmetrical, no jugular venous distension  HEMATOLOGIC/LYMPHATIC: no cervical, supraclavicular or axillary lymphadenopathy   LUNGS: CTA bilaterally, no wheezes/rhonchi/rales, unlabored on RA   CARDIOVASCULAR: regular rate and rhythm,  no murmur noted  ABDOMEN: Non-tender, non-distended, NABS  MUSCULOSKELETAL: full range of motion noted, tone is normal  NEUROLOGIC: awake, alert, oriented to name, place and time. Fluent speech. Motor skills grossly intact.   SKIN: warm and dry, no jaundice, no bruising or petechiae.  EXTREMITIES: no peripheral edema, no clubbing or cyanosis     Labs:    Lab Results   Component Value Date    WBC 8.7 01/02/2025    HGB 9.5 (L) 01/02/2025    HCT 30.4 (L) 01/02/2025    MCV 99.0 01/02/2025     01/02/2025    LYMPHOPCT 8 (L) 01/02/2025    RBC 3.07 (L) 01/02/2025    MCH 30.9 01/02/2025    MCHC 31.3 (L) 01/02/2025    RDW 16.5 (H) 01/02/2025           Lab Results   Component Value Date    INR 1.0 12/10/2024    PROTIME 13.8 12/10/2024     Lab Results   Component Value Date     01/02/2025    K 4.0 01/02/2025     01/02/2025    CO2 26 01/02/2025    BUN 26 (H) 01/02/2025    CREATININE 0.9 01/02/2025    GLUCOSE 143 (H) 01/02/2025    CALCIUM 9.4 01/02/2025    PHOS 4.4 12/31/2024    MG 1.7 (L) 12/08/2024    BILITOT 0.3 12/31/2024    ALKPHOS 81 12/31/2024    AST 18 12/31/2024    ALT <5 (L) 12/31/2024    LABGLOM 65 01/02/2025    GFRAA >60 03/09/2020    GLOB 3.1 09/25/2024    POCGLU 236 (H) 01/01/2025     Lab Results   Component Value Date    ALKPHOS 81 12/31/2024    ALT <5 (L) 12/31/2024    AST 18 12/31/2024    BILITOT 0.3 12/31/2024     No results found for: \"URICACID\"  Lab Results   Component Value Date     12/31/2024     Lab Results   Component Value Date    IRON 69 05/18/2024    TIBC 187 (L) 05/18/2024    FERRITIN 829 (H) 05/18/2024       Imaging:  XR CHEST PORTABLE   Final Result   Addendum (preliminary)

## 2025-01-02 NOTE — CONSULTS
Cardiothoracic Surgery  IN-PATIENT SERVICE   [facility]    CONSULTATION / HISTORY AND PHYSICAL EXAMINATION            Date:   1/2/2025  Patient name:  Lin Bates  Date of admission:  12/30/2024 10:39 PM  MRN:   [id number]  Account:  [id number]  YOB: 1955  PCP:    Dorothy Terrazas PA  Room:   2124/2124-A  Code Status:    Full Code    Physician Requesting Consult: Marcin Desai MD    Reason for Consult:  chest tube management    Chief Complaint:     SOB    History of Present Illness:     Lin Bates is a 69 y.o. female  patient with past medical history hypertension and hyperlipidemia, diabetes mellitus, lung carcinoma, presented to the emergency room for evaluation of shortness of breath.  Reported worsening shortness of breath over the last 2 days, no chest pain, no fever or chills or cough.  She was admitted earlier in December treated for pneumonia with antibiotics, had a pleural effusion that was drained.  No nausea vomiting abdominal pain or diarrhea.  No headache dizziness or loss of consciousness.  In the ER at [place] she was hemodynamically stable, required 2 L oxygen.  Chest x-ray showed left white line, thoracentesis attempted by the ED physician but unsuccessful.  Transfer requested to ICU at [place] and placed a Left sided chest tube for  recurrent left pleural effusion with complete collapse.    Seeing Oncology for  MPL positive essential thrombocytosis, on hydrea 500 mg daily, and limited stage small cell lung cancer s/p definitive CRT and WBRT, currently on maintenance immunotherapy with durvalumab (last tx on 12/18/24). She was recently admitted in 12/2024 due to LLL CAP with associated large pleural effusion that was drained.     We were consulted for chest tube management    Past Medical History:     Past Medical History:   Diagnosis Date    Cancer (HCC)     History of external beam radiation therapy 03/2024    LEFT  person, place, and time with normal affect  Head:  normocephalic, atraumatic.  Eye: no icterus, redness, pupils equal and reactive, extraocular eye movements intact, conjunctiva clear  Ear: normal external ear, no discharge, hearing intact  Nose:  no drainage noted  Mouth: mucous membranes moist  Neck: supple, no carotid bruits  Lungs: Decreased on the Left  Cardiovascular: normal rate, regular rhythm, no murmur, gallop, rub.  Abdomen: Soft, nontender, nondistended, normal bowel sounds  Neurologic: There are no new focal motor or sensory deficits, normal muscle tone and bulk, no abnormal sensation, normal speech  Skin: No gross lesions, rashes, bruising or bleeding on exposed skin area  Extremities:  peripheral pulses palpable, no pedal edema or calf pain with palpation  Psych: normal affect    Investigations:      Laboratory Testing:  Recent Results (from the past 24 hour(s))   POCT Glucose    Collection Time: 01/01/25 11:37 AM   Result Value Ref Range    POC Glucose 242 (H) 74 - 99 mg/dL   POCT Glucose    Collection Time: 01/01/25  5:39 PM   Result Value Ref Range    POC Glucose 175 (H) 74 - 99 mg/dL   Basic Metabolic Panel w/ Reflex to MG    Collection Time: 01/01/25  5:45 PM   Result Value Ref Range    Sodium 137 136 - 145 mmol/L    Potassium 4.3 3.5 - 5.1 mmol/L    Chloride 102 99 - 110 mmol/L    CO2 27 21 - 32 mmol/L    Anion Gap 9 9 - 17 mmol/L    Glucose 165 (H) 74 - 99 mg/dL    BUN 28 (H) 7 - 20 mg/dL    Creatinine 1.2 0.6 - 1.2 mg/dL    Est, Glom Filt Rate 46 (L) >60 mL/min/1.73m2    Calcium 9.1 8.3 - 10.6 mg/dL   CBC with Auto Differential    Collection Time: 01/01/25  5:45 PM   Result Value Ref Range    WBC 10.7 (H) 4.0 - 10.5 k/uL    RBC 3.14 (L) 4.20 - 5.40 m/uL    Hemoglobin 9.7 (L) 12.5 - 16.0 g/dL    Hematocrit 30.9 (L) 37.0 - 47.0 %    MCV 98.4 78.0 - 100.0 fL    MCH 30.9 27.0 - 31.0 pg    MCHC 31.4 (L) 32.0 - 36.0 g/dL    RDW 16.3 (H) 11.7 - 14.9 %    Platelets 232 140 - 440 k/uL    MPV 9.5 7.5 -  11.1 fL    Neutrophils % 80 (H) 36 - 66 %    Lymphocytes % 7 (L) 24 - 44 %    Monocytes % 11 (H) 0 - 4 %    Eosinophils % 1 0 - 3 %    Basophils % 1 0 - 1 %    Immature Granulocytes % 0 0 %    Neutrophils Absolute 8.55 k/uL    Lymphocytes Absolute 0.76 k/uL    Monocytes Absolute 1.13 k/uL    Eosinophils Absolute 0.12 k/uL    Basophils Absolute 0.06 k/uL    Immature Granulocytes Absolute 0.04 k/uL   Procalcitonin    Collection Time: 01/01/25  5:45 PM   Result Value Ref Range    Procalcitonin 1.03 ng/mL   POCT Glucose    Collection Time: 01/01/25  9:47 PM   Result Value Ref Range    POC Glucose 236 (H) 74 - 99 mg/dL   Basic Metabolic Panel w/ Reflex to MG    Collection Time: 01/02/25  8:20 AM   Result Value Ref Range    Sodium 136 136 - 145 mmol/L    Potassium 4.0 3.5 - 5.1 mmol/L    Chloride 102 99 - 110 mmol/L    CO2 26 21 - 32 mmol/L    Anion Gap 8 (L) 9 - 17 mmol/L    Glucose 143 (H) 74 - 99 mg/dL    BUN 26 (H) 7 - 20 mg/dL    Creatinine 0.9 0.6 - 1.2 mg/dL    Est, Glom Filt Rate 65 >60 mL/min/1.73m2    Calcium 9.4 8.3 - 10.6 mg/dL   CBC with Auto Differential    Collection Time: 01/02/25  8:20 AM   Result Value Ref Range    WBC 8.7 4.0 - 10.5 k/uL    RBC 3.07 (L) 4.20 - 5.40 m/uL    Hemoglobin 9.5 (L) 12.5 - 16.0 g/dL    Hematocrit 30.4 (L) 37.0 - 47.0 %    MCV 99.0 78.0 - 100.0 fL    MCH 30.9 27.0 - 31.0 pg    MCHC 31.3 (L) 32.0 - 36.0 g/dL    RDW 16.5 (H) 11.7 - 14.9 %    Platelets 232 140 - 440 k/uL    MPV 9.3 7.5 - 11.1 fL    Neutrophils % 77 (H) 36 - 66 %    Lymphocytes % 8 (L) 24 - 44 %    Monocytes % 12 (H) 0 - 4 %    Eosinophils % 2 0 - 3 %    Basophils % 1 0 - 1 %    Immature Granulocytes % 1 (H) 0 %    Neutrophils Absolute 6.65 k/uL    Lymphocytes Absolute 0.70 k/uL    Monocytes Absolute 1.07 k/uL    Eosinophils Absolute 0.14 k/uL    Basophils Absolute 0.05 k/uL    Immature Granulocytes Absolute 0.05 k/uL                     Assessment :      Recurrent left pleural effusion    Small cell lung cancer,

## 2025-01-02 NOTE — PROGRESS NOTES
Clinical Pharmacy Progress Note    Vancomycin has been discontinued. Pharmacy will sign off. Please re-consult pharmacy if vancomycin dosing is wanted in the future.    Please call with questions.  Sally Barrios, PharmD, [facility], BCPS  Main Pharmacy: [id number]  1/2/2025 4:29 PM

## 2025-01-02 NOTE — PROGRESS NOTES
V2.0  [facility] [place] Hospitalist Progress Note      Name:  Lin Bates /Age/Sex: 1955  (69 y.o. female)   MRN & CSN:  [id number] & [id number] Encounter Date/Time: 2025 12:27 PM EST    Location:  -A PCP: Dorothy Terrazas PA       Hospital Day: 4      Subjective:     Chief Complaint: SOB    Pt is doing well today. SBP remains low but pt is asymptomatic.   CT chest WO contrast ordered.   Seen by hem/onc and ctx.        Assessment and Plan:   Acute hypoxic respiratory failure 2/2  large left pleural effusion and associated complete left lung collapse.     s/p chest tube in the ICU 2024  Exudative pleural fluid.   Hx of limited small lung cancer completed chemo now on immuno therapy  CXR 2025 changes suggestive of consolidation but per CT chest WO contrast 2025 just large left pleural effusion, changes suggestive of atypical pneumonia vs  interstitial edema vs lymphangitic carcinomatosis   Started abx this morning but dced now given no sign of bacterial pneumonia.   - chest tube management per ctx; consulted  - pt will likely need Pleurx catheter placement before discharge; await oncology and ctx recs  - wean O2 as tolerated   - await fluid studies, cytology, per oncology, will need Pleurx if cytology showing malignancy     limited stage small cell lung cancer   Completed chemo now on immunotherapy  CT chest on admission showed Large mediastinal lymph nodes  1.9 x 1.3 cm in anterior pretracheal location, and 2.0 x 1.4 cm in AP location.  Had recent PET scan now resulted yet  - consult hem/onc     Hypotension. BP has been in the 90's; per chart review baseline 100's  No sign of sepsis, no leukocytosis, afebrile, procalc high 1.03 but likely from cancer.   - Pt on home midodrine 10mg TID: continue  - Hold home lisinopril   - check cortisol tomorrow AM  - will be cautious w fluids given CT findings with volume overload concerns  - LA ordered STAT    essential thrombocytosis   On

## 2025-01-02 NOTE — CONSULTS
PHARMACY VANCOMYCIN MONITORING SERVICE  Pharmacy consulted by Dr. Desai for monitoring and adjustment.    Indication for treatment: Vancomycin indication: HAP  Goal trough: Trough Goal: 15-20 mcg/mL  AUC/GABO: 400-600    Risk Factors for MRSA Identified:   Hospitalization within the past 90 days    Pertinent Laboratory Values:   Temp Readings from Last 3 Encounters:   01/02/25 97.9 °F (36.6 °C) (Oral)   12/30/24 97.5 °F (36.4 °C) (Oral)   12/18/24 98 °F (36.7 °C) (Infrared)     Recent Labs     12/31/24  0435 01/01/25  1745 01/02/25  0820   WBC 18.7* 10.7* 8.7     Recent Labs     12/31/24  0220 01/01/25  1745 01/02/25  0820   BUN 13 28* 26*   CREATININE 0.8 1.2 0.9     Estimated Creatinine Clearance: 49 mL/min (based on SCr of 0.9 mg/dL).    Intake/Output Summary (Last 24 hours) at 1/2/2025 1254  Last data filed at 1/2/2025 0900  Gross per 24 hour   Intake 240 ml   Output 0 ml   Net 240 ml     In: 240   Out: 0     Last Encounter Weight:  Wt Readings from Last 3 Encounters:   01/02/25 59.6 kg (131 lb 6.3 oz)   12/30/24 59.4 kg (131 lb)   12/18/24 59.3 kg (130 lb 12.8 oz)       Pertinent Cultures:   Date    Source    Results  12/31   Pleural fluid   NGTD  01/02   Sputum/Respiratory  ordered  01/02   MRSA Nasal   ordered      Vancomycin level:   TROUGH:  No results for input(s): \"VANCOTROUGH\" in the last 72 hours.  RANDOM:  No results for input(s): \"VANCORANDOM\" in the last 72 hours.    Assessment:  HPI: Patient is 69 yof  SCr, BUN, and urine output: Baseline Scr, elevated BUN   UOP incomplete  Day(s) of therapy: 1  Vancomycin concentration: TBD    Plan:  Will give vancomycin 1500mg IV x 1 followed by vancomycin 1250mg IV q24h. Level before 4th dose.  Pharmacy will continue to monitor patient and adjust therapy as indicated    VANCOMYCIN CONCENTRATION SCHEDULED FOR 1/5 @0600    Thank you for the consult.  Jazzy Chang RPH  1/2/2025 12:54 PM

## 2025-01-02 NOTE — PROGRESS NOTES
Discussed IR consult with Dr. Desai and Dr. Shah, regarding pleur x catheter placement, Will plan on proceeding with procedure Monday 1/6.

## 2025-01-03 ENCOUNTER — APPOINTMENT (OUTPATIENT)
Dept: GENERAL RADIOLOGY | Age: 70
DRG: 208 | End: 2025-01-03
Attending: INTERNAL MEDICINE
Payer: MEDICARE

## 2025-01-03 LAB
ANION GAP SERPL CALCULATED.3IONS-SCNC: 6 MMOL/L (ref 9–17)
BASOPHILS # BLD: 0.05 K/UL
BASOPHILS NFR BLD: 1 % (ref 0–1)
BUN SERPL-MCNC: 20 MG/DL (ref 7–20)
CALCIUM SERPL-MCNC: 9.1 MG/DL (ref 8.3–10.6)
CHLORIDE SERPL-SCNC: 106 MMOL/L (ref 99–110)
CO2 SERPL-SCNC: 28 MMOL/L (ref 21–32)
CORTIS SERPL-MCNC: 22.7 UG/DL
CORTISOL COLLECTION INFO: NORMAL
CREAT SERPL-MCNC: 0.6 MG/DL (ref 0.6–1.2)
EOSINOPHIL # BLD: 0.13 K/UL
EOSINOPHILS RELATIVE PERCENT: 2 % (ref 0–3)
ERYTHROCYTE [DISTWIDTH] IN BLOOD BY AUTOMATED COUNT: 16.4 % (ref 11.7–14.9)
GFR, ESTIMATED: >90 ML/MIN/1.73M2
GLUCOSE BLD-MCNC: 116 MG/DL (ref 74–99)
GLUCOSE BLD-MCNC: 136 MG/DL (ref 74–99)
GLUCOSE BLD-MCNC: 235 MG/DL (ref 74–99)
GLUCOSE BLD-MCNC: 239 MG/DL (ref 74–99)
GLUCOSE SERPL-MCNC: 144 MG/DL (ref 74–99)
HCT VFR BLD AUTO: 29.3 % (ref 37–47)
HGB BLD-MCNC: 9 G/DL (ref 12.5–16)
IMM GRANULOCYTES # BLD AUTO: 0.03 K/UL
IMM GRANULOCYTES NFR BLD: 1 %
L PNEUMO1 AG UR QL IA.RAPID: NEGATIVE
LACTATE BLDV-SCNC: 0.9 MMOL/L (ref 0.4–2)
LYMPHOCYTES NFR BLD: 0.43 K/UL
LYMPHOCYTES RELATIVE PERCENT: 8 % (ref 24–44)
MCH RBC QN AUTO: 30.4 PG (ref 27–31)
MCHC RBC AUTO-ENTMCNC: 30.7 G/DL (ref 32–36)
MCV RBC AUTO: 99 FL (ref 78–100)
MONOCYTES NFR BLD: 0.92 K/UL
MONOCYTES NFR BLD: 16 % (ref 0–4)
NEUTROPHILS NFR BLD: 72 % (ref 36–66)
NEUTS SEG NFR BLD: 4.09 K/UL
NON-GYN CYTOLOGY REPORT: NORMAL
PLATELET # BLD AUTO: 199 K/UL (ref 140–440)
PMV BLD AUTO: 9.3 FL (ref 7.5–11.1)
POTASSIUM SERPL-SCNC: 4.1 MMOL/L (ref 3.5–5.1)
RBC # BLD AUTO: 2.96 M/UL (ref 4.2–5.4)
S PNEUM AG SPEC QL: NEGATIVE
SODIUM SERPL-SCNC: 140 MMOL/L (ref 136–145)
SPECIMEN SOURCE: NORMAL
WBC OTHER # BLD: 5.7 K/UL (ref 4–10.5)

## 2025-01-03 PROCEDURE — 71045 X-RAY EXAM CHEST 1 VIEW: CPT

## 2025-01-03 PROCEDURE — 6360000002 HC RX W HCPCS: Performed by: INTERNAL MEDICINE

## 2025-01-03 PROCEDURE — 6370000000 HC RX 637 (ALT 250 FOR IP): Performed by: INTERNAL MEDICINE

## 2025-01-03 PROCEDURE — 80048 BASIC METABOLIC PNL TOTAL CA: CPT

## 2025-01-03 PROCEDURE — 2500000003 HC RX 250 WO HCPCS: Performed by: INTERNAL MEDICINE

## 2025-01-03 PROCEDURE — 2060000000 HC ICU INTERMEDIATE R&B

## 2025-01-03 PROCEDURE — 83605 ASSAY OF LACTIC ACID: CPT

## 2025-01-03 PROCEDURE — 82962 GLUCOSE BLOOD TEST: CPT

## 2025-01-03 PROCEDURE — 6370000000 HC RX 637 (ALT 250 FOR IP): Performed by: STUDENT IN AN ORGANIZED HEALTH CARE EDUCATION/TRAINING PROGRAM

## 2025-01-03 PROCEDURE — 2700000000 HC OXYGEN THERAPY PER DAY

## 2025-01-03 PROCEDURE — 99232 SBSQ HOSP IP/OBS MODERATE 35: CPT | Performed by: PHYSICIAN ASSISTANT

## 2025-01-03 PROCEDURE — 94761 N-INVAS EAR/PLS OXIMETRY MLT: CPT

## 2025-01-03 PROCEDURE — 82533 TOTAL CORTISOL: CPT

## 2025-01-03 PROCEDURE — 99232 SBSQ HOSP IP/OBS MODERATE 35: CPT | Performed by: THORACIC SURGERY (CARDIOTHORACIC VASCULAR SURGERY)

## 2025-01-03 PROCEDURE — 85025 COMPLETE CBC W/AUTO DIFF WBC: CPT

## 2025-01-03 PROCEDURE — 36591 DRAW BLOOD OFF VENOUS DEVICE: CPT

## 2025-01-03 RX ADMIN — SODIUM CHLORIDE, PRESERVATIVE FREE 10 ML: 5 INJECTION INTRAVENOUS at 08:48

## 2025-01-03 RX ADMIN — INSULIN GLARGINE 7 UNITS: 100 INJECTION, SOLUTION SUBCUTANEOUS at 20:44

## 2025-01-03 RX ADMIN — SUCRALFATE 1 G: 1 TABLET ORAL at 16:26

## 2025-01-03 RX ADMIN — ENOXAPARIN SODIUM 40 MG: 100 INJECTION SUBCUTANEOUS at 08:46

## 2025-01-03 RX ADMIN — SENNOSIDES AND DOCUSATE SODIUM 1 TABLET: 50; 8.6 TABLET ORAL at 08:45

## 2025-01-03 RX ADMIN — OLANZAPINE 5 MG: 5 TABLET, FILM COATED ORAL at 20:43

## 2025-01-03 RX ADMIN — ASPIRIN 81 MG: 81 TABLET, CHEWABLE ORAL at 08:46

## 2025-01-03 RX ADMIN — MIDODRINE HYDROCHLORIDE 10 MG: 5 TABLET ORAL at 11:36

## 2025-01-03 RX ADMIN — INSULIN LISPRO 1 UNITS: 100 INJECTION, SOLUTION INTRAVENOUS; SUBCUTANEOUS at 11:39

## 2025-01-03 RX ADMIN — EMPAGLIFLOZIN 25 MG: 10 TABLET, FILM COATED ORAL at 08:56

## 2025-01-03 RX ADMIN — SUCRALFATE 1 G: 1 TABLET ORAL at 20:43

## 2025-01-03 RX ADMIN — SODIUM CHLORIDE, PRESERVATIVE FREE 10 ML: 5 INJECTION INTRAVENOUS at 20:44

## 2025-01-03 RX ADMIN — FOLIC ACID 1 MG: 1 TABLET ORAL at 08:46

## 2025-01-03 RX ADMIN — INSULIN LISPRO 1 UNITS: 100 INJECTION, SOLUTION INTRAVENOUS; SUBCUTANEOUS at 20:43

## 2025-01-03 RX ADMIN — HYDROXYUREA 500 MG: 100 TABLET, FILM COATED ORAL at 08:47

## 2025-01-03 RX ADMIN — ATORVASTATIN CALCIUM 20 MG: 10 TABLET, FILM COATED ORAL at 08:45

## 2025-01-03 RX ADMIN — SUCRALFATE 1 G: 1 TABLET ORAL at 11:36

## 2025-01-03 RX ADMIN — SUCRALFATE 1 G: 1 TABLET ORAL at 08:46

## 2025-01-03 RX ADMIN — MIDODRINE HYDROCHLORIDE 10 MG: 5 TABLET ORAL at 08:46

## 2025-01-03 RX ADMIN — PANTOPRAZOLE SODIUM 40 MG: 40 TABLET, DELAYED RELEASE ORAL at 09:02

## 2025-01-03 RX ADMIN — MIDODRINE HYDROCHLORIDE 10 MG: 5 TABLET ORAL at 16:26

## 2025-01-03 ASSESSMENT — PAIN SCALES - GENERAL
PAINLEVEL_OUTOF10: 0
PAINLEVEL_OUTOF10: 0

## 2025-01-03 NOTE — PROGRESS NOTES
PA with CT surgery at bedside to remove chest tube. Pt and pt spouse made aware that IR procedure for Pleurex catheter is scheduled for Monday.

## 2025-01-03 NOTE — CARE COORDINATION
AVILA talked with the pt and SO. Pt continues with chest tube. IR consulted for possible pleurx catheter,  Pt is active [facility]. AVILA contacted Estrella RN, home care liaison and advised of intended placement of pleurex catheter. [facility] will continue to plan on following pt at d/c. Discussed with pt while doc in room the possibility of getting into there chair to increase strength in anticipation of returning home. Pt continues to need a order for bed side commode and home o2 eval.  CM will continue to follow.

## 2025-01-03 NOTE — PROGRESS NOTES
Spiritual Health History and Assessment/Progress Note  [facility]    Spiritual/Emotional Needs,  ,  ,      Name: Lin Bates MRN: [id number]    Age: 69 y.o.     Sex: female   Language: English   Rastafari: Hinduism   Recurrent left pleural effusion     Date: 1/3/2025            Total Time Calculated: 6 min              [facility]        Referral/Consult From: Rounding   Encounter Overview/Reason: Spiritual/Emotional Needs  Service Provided For: Patient, Significant other    Emelia, Belief, Meaning:   Patient identifies as spiritual and has beliefs or practices that help with coping during difficult times  Family/Friends identify as spiritual and have beliefs or practices that help with coping during difficult times      Importance and Influence:  Patient has spiritual/personal beliefs that influence decisions regarding their health  Family/Friends have spiritual/personal beliefs that influence decisions regarding the patient's health    Community:  Patient feels well-supported. Support system includes: Spouse/Partner  Family/Friends feel well-supported. Support system includes: Spouse/Partner    Assessment and Plan of Care:     Patient Interventions include: Facilitated expression of thoughts and feelings  Family/Friends Interventions include: Facilitated expression of thoughts and feelings    Patient Plan of Care: Spiritual Care available upon further referral  Family/Friends Plan of Care: Spiritual Care available upon further referral    Electronically signed by Chaplain Jessica on 1/3/2025 at 11:37 AM

## 2025-01-03 NOTE — PLAN OF CARE
Problem: Chronic Conditions and Co-morbidities  Goal: Patient's chronic conditions and co-morbidity symptoms are monitored and maintained or improved  1/2/2025 2338 by Kameron Lane RN  Outcome: Progressing  1/2/2025 1245 by Modesto Vick RN  Outcome: Progressing     Problem: Discharge Planning  Goal: Discharge to home or other facility with appropriate resources  Outcome: Progressing     Problem: Pain  Goal: Verbalizes/displays adequate comfort level or baseline comfort level  1/2/2025 2338 by Kameron Lane RN  Outcome: Progressing  1/2/2025 1245 by Modesto Vick RN  Outcome: Progressing     Problem: Safety - Adult  Goal: Free from fall injury  1/2/2025 2338 by Kameron Lane RN  Outcome: Progressing  1/2/2025 1245 by Modesto Vick RN  Outcome: Progressing

## 2025-01-03 NOTE — PROGRESS NOTES
V2.0  [facility] [place] Hospitalist Progress Note      Name:  Lin Bates /Age/Sex: 1955  (69 y.o. female)   MRN & CSN:  [id number] & [id number] Encounter Date/Time: 1/3/2025 12:27 PM EST    Location:  -A PCP: Dorothy Terrazas PA       Hospital Day: 5      Subjective:     Chief Complaint: SOB    Pt is doing well today. SBP remains low but pt is asymptomatic.   Fluid cytology showing small lung cancer.      Assessment and Plan:   Acute hypoxic respiratory failure 2/2  large left pleural effusion and associated complete left lung collapse.     s/p chest tube in the ICU 2024  Exudative pleural fluid.   Hx of limited small lung cancer completed chemo now on immuno therapy  CXR 2025 changes suggestive of consolidation but per CT chest WO contrast 2025 just large left pleural effusion, changes suggestive of atypical pneumonia vs  interstitial edema vs lymphangitic carcinomatosis   Started abx but dced now given no sign of bacterial pneumonia.   - chest tube management per ctx; consulted; plan to remove today as it is out ; plan to allow fluid to accumlate and to have Pleurx catheter placed before discharge  - wean O2 as tolerated     limited stage small cell lung cancer   Completed chemo now on immunotherapy  CT chest on admission showed Large mediastinal lymph nodes  1.9 x 1.3 cm in anterior pretracheal location, and 2.0 x 1.4 cm in AP location.   Concern for disease progression  Had recent PET scan not resulted yet  - consulted hem/onc     Hypotension. BP has been in the 90's; per chart review baseline 100's  No sign of sepsis, no leukocytosis, afebrile, procalc high 1.03 but likely from cancer.   Cortisol 8AM 22  - Pt on home midodrine 10mg TID: continue  - Hold home lisinopril  - will be cautious w fluids given CT findings with volume overload concerns  - LA ordered 1/2 STAT  not collected yet.    essential thrombocytosis   On hydroxyurea; continue     Chronic anemia   Stable Hgb.

## 2025-01-03 NOTE — PROGRESS NOTES
Hematology Oncology Inpatient Consult    Patient Name:  Lin Bates  Patient :  1955  Patient MRN:  [id number]  [facility]   Admitted: 2024 10:39 PM   Hospital Attending Provider: Marcin Desai MD    Primary Oncologist: Dr. Burt Birch MD  PCP:  Dorothy Terrazas PA     Date of Service: 1/3/25       Reason for Consult: Small cell lung cancer and essential thrombocytosis.      Chief Complaint:  Shortness of breath    Principal Problem:    Recurrent left pleural effusion  Active Problems:    Small cell lung cancer, left lower lobe (HCC)    Moderate malnutrition (HCC)    Acute respiratory failure with hypoxia    Collapse of left lung  Resolved Problems:    * No resolved hospital problems. *      HPI:   Lin Bates is a 69 y.o. female with medical history significant for hypertension, hyperlipidemia, diabetes mellitus, history of HPV infection who was admitted for SOB and was found to have recurrent left pleural effusion with complete collapse   She is well known to our practice for MPL positive essential thrombocytosis, on hydrea 500 mg daily, and limited stage small cell lung cancer s/p definitive CRT and WBRT, currently on maintenance immunotherapy with durvalumab (last tx on 24). She was recently admitted in 2024 due to LLL CAP with associated large pleural effusion that was drained.      Today, patient is lying in bed sleeping in no acute distress on 4L NC O2.  She awakes easily but states that she is very tired.  Her breathing is a little easier but she still feels short of breath.  She denies fever, chest pain, increased cough, nausea, abdominal pain or dysuria.  She has been ambulating with assistance to the bathroom.  Patient quit smoking in 24.  She denies alcohol or illicit drug use. Patient had benign mammogram 24.      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      The rests of the blood tests, including BCR-ABL1, JAK2 V617F, JAK2 exon 12-13, MPL, CALR, MARIKA, ESR and LDH, were within normal range.     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      CT scan of the neck, chest, abdomen and pelvis done on 12/27/2022 showed no significant pathology in her neck.  No splenomegaly or overt lymphadenopathy.  There are scattered mildly prominent left internal mammary, upper abdominal and left common iliac lymph nodes.  There is a 1.6 cm left upper lobe irregular cystic lesion with a peripheral 0.8 x 0.3 cm nodular component.  Differential considerations include infectious/inflammatory process or neoplasm.  Borderline endometrial thickening given postmenopausal status.  Consider pelvic ultrasound for further evaluation.    I recommend for close observation of her monoclonal lymphocytosis. She doesn't have anemia.      I referred her to GYN for evaluation of her borderline endometrial thickening. She was seen by Dr. Pretty and underwent hysteroscopy and D & C on 3/22/23. Pathology only showed benign changes. Recommend to continue to f/u with Dr. Pretty regularly.      CT chest on 5/3/23 showed mildly increased size of a cavitary lesion in the left upper lobe with wall thickening along its inferior aspect. Infectious/inflammatory and neoplastic etiologies remain in the differential.  Follow-up chest CT in 3-6 months would be reasonable.    Reviewed MRI brain and CT scans done on 6/14/23. There is no sign of brain metastasis or progression of disease.      CT abdomen and pelvis on 7/11/23 showed No acute abnormality in the abdomen or pelvis.     CT chest done on 9/5/23 showed rapidly enlarging solid pulmonary nodule within the left upper lobe superior segment which measures 7 mm on

## 2025-01-03 NOTE — PROGRESS NOTES
Cardiothoracic Surgery  IN-PATIENT SERVICE   [facility]    Daily Progress Note            Date:   1/3/2025  Patient name:  Lin Bates  Date of admission:  12/30/2024 10:39 PM  MRN:   [id number]  Account:  [id number]  YOB: 1955  PCP:    Dorothy Terrazas PA  Room:   2124/2124-A  Code Status:    Full Code    Physician Requesting Consult: Marcin Desai MD    Reason for Consult:  chest tube management    Chief Complaint:     SOB    History of Present Illness:     Lin Bates is a 69 y.o. female  patient with past medical history hypertension and hyperlipidemia, diabetes mellitus, lung carcinoma, presented to the emergency room for evaluation of shortness of breath.  Reported worsening shortness of breath over the last 2 days, no chest pain, no fever or chills or cough.  She was admitted earlier in December treated for pneumonia with antibiotics, had a pleural effusion that was drained.  No nausea vomiting abdominal pain or diarrhea.  No headache dizziness or loss of consciousness.  In the ER at [place] she was hemodynamically stable, required 2 L oxygen.  Chest x-ray showed left white line, thoracentesis attempted by the ED physician but unsuccessful.  Transfer requested to ICU at [place] and placed a Left sided chest tube for  recurrent left pleural effusion with complete collapse.    Seeing Oncology for  MPL positive essential thrombocytosis, on hydrea 500 mg daily, and limited stage small cell lung cancer s/p definitive CRT and WBRT, currently on maintenance immunotherapy with durvalumab (last tx on 12/18/24). She was recently admitted in 12/2024 due to LLL CAP with associated large pleural effusion that was drained.     We were consulted for chest tube management. Needs Pleurex when IR coverage back and fluid reaccumulates    Past Medical History:     Past Medical History:   Diagnosis Date    Cancer (HCC)     History of external beam  radiation therapy 03/2024    LEFT LUNG and LN 6,000 cGy in 30 fractions    Hyperlipidemia     Hypertension     Thickened endometrium     Type 2 diabetes mellitus without complication (HCC)         Past Surgical History:     Past Surgical History:   Procedure Laterality Date    CT NEEDLE BIOPSY LUNG PERCUTANEOUS W IMAGING GUIDANCE  1/30/2024    CT NEEDLE BIOPSY LUNG PERCUTANEOUS 1/30/2024 [facility] [place] CT SCAN    DENTAL SURGERY      1970's    DILATION AND CURETTAGE OF UTERUS N/A 3/22/2023    DILATATION AND CURETTAGE HYSTEROSCOPY performed by Zac Pretty MD at [facility] [place] OR    ESOPHAGOSCOPY N/A 5/23/2024    ESOPHAGOSCOPY DIAGNOSTIC performed by Yenny Lindquist MD at [facility] [place] ENDOSCOPY    IR BIOPSY THYROID PERC CORE NEEDLE  11/21/2024    IR BIOPSY THYROID PERC CORE NEEDLE 11/21/2024 [facility] [place] SPECIAL PROCEDURES    IR CHEST TUBE INSERTION  10/3/2024    IR CHEST TUBE INSERTION 10/3/2024 [facility] [place] SPECIAL PROCEDURES    PORT SURGERY N/A 4/10/2024    RIGHT SUBCLAVIAN MEDIPORT INSERTION performed by Brian Humphries II, MD at [facility] [place] OR        Medications Prior to Admission:     Prior to Admission medications    Medication Sig Start Date End Date Taking? Authorizing Provider   hydroxyurea (HYDREA) 500 MG chemo capsule Take 1 capsule by mouth daily 11/20/24   Burt Birch MD   glucose monitoring kit 1 kit by Does not apply route daily 10/5/24   Dora Landis MD   Insulin Pen Needle ([employer] PEN NEEDLES 31G) 31G X 8 MM MISC 1 each by Does not apply route daily 10/5/24   Dora Landis MD   Lancets MISC 1 each by Does not apply route daily 10/5/24   Dora Landis MD   folic acid (FOLVITE) 1 MG tablet Take 1 tablet by mouth daily 10/6/24   Dora Landis MD   midodrine (PROAMATINE) 10 MG tablet Take 1 tablet by mouth 3 times daily (with meals) 10/5/24   Dora Landis MD   insulin lispro (HUMALOG,ADMELOG) 100 UNIT/ML SOLN injection vial Inject 0-8 Units into the skin 3 times daily (before meals) **Medium Dose Corrective Algorithm**

## 2025-01-04 LAB
ANION GAP SERPL CALCULATED.3IONS-SCNC: 8 MMOL/L (ref 9–17)
BASOPHILS # BLD: 0.03 K/UL
BASOPHILS NFR BLD: 1 % (ref 0–1)
BUN SERPL-MCNC: 13 MG/DL (ref 7–20)
CALCIUM SERPL-MCNC: 9.5 MG/DL (ref 8.3–10.6)
CHLORIDE SERPL-SCNC: 103 MMOL/L (ref 99–110)
CO2 SERPL-SCNC: 30 MMOL/L (ref 21–32)
CREAT SERPL-MCNC: 0.5 MG/DL (ref 0.6–1.2)
EOSINOPHIL # BLD: 0.12 K/UL
EOSINOPHILS RELATIVE PERCENT: 3 % (ref 0–3)
ERYTHROCYTE [DISTWIDTH] IN BLOOD BY AUTOMATED COUNT: 16.3 % (ref 11.7–14.9)
GFR, ESTIMATED: >90 ML/MIN/1.73M2
GLUCOSE BLD-MCNC: 122 MG/DL (ref 74–99)
GLUCOSE BLD-MCNC: 139 MG/DL (ref 74–99)
GLUCOSE BLD-MCNC: 194 MG/DL (ref 74–99)
GLUCOSE BLD-MCNC: 217 MG/DL (ref 74–99)
GLUCOSE SERPL-MCNC: 134 MG/DL (ref 74–99)
HCT VFR BLD AUTO: 29.6 % (ref 37–47)
HGB BLD-MCNC: 8.8 G/DL (ref 12.5–16)
IMM GRANULOCYTES # BLD AUTO: 0.02 K/UL
IMM GRANULOCYTES NFR BLD: 1 %
LYMPHOCYTES NFR BLD: 0.52 K/UL
LYMPHOCYTES RELATIVE PERCENT: 12 % (ref 24–44)
MCH RBC QN AUTO: 29.9 PG (ref 27–31)
MCHC RBC AUTO-ENTMCNC: 29.7 G/DL (ref 32–36)
MCV RBC AUTO: 100.7 FL (ref 78–100)
MICROORGANISM SPEC CULT: ABNORMAL
MICROORGANISM/AGENT SPEC: ABNORMAL
MONOCYTES NFR BLD: 0.9 K/UL
MONOCYTES NFR BLD: 21 % (ref 0–4)
NEUTROPHILS NFR BLD: 63 % (ref 36–66)
NEUTS SEG NFR BLD: 2.66 K/UL
PLATELET # BLD AUTO: 200 K/UL (ref 140–440)
PMV BLD AUTO: 9.7 FL (ref 7.5–11.1)
POTASSIUM SERPL-SCNC: 3.8 MMOL/L (ref 3.5–5.1)
RBC # BLD AUTO: 2.94 M/UL (ref 4.2–5.4)
SODIUM SERPL-SCNC: 140 MMOL/L (ref 136–145)
SPECIMEN DESCRIPTION: ABNORMAL
WBC OTHER # BLD: 4.3 K/UL (ref 4–10.5)

## 2025-01-04 PROCEDURE — 82962 GLUCOSE BLOOD TEST: CPT

## 2025-01-04 PROCEDURE — 2500000003 HC RX 250 WO HCPCS: Performed by: INTERNAL MEDICINE

## 2025-01-04 PROCEDURE — 6370000000 HC RX 637 (ALT 250 FOR IP): Performed by: STUDENT IN AN ORGANIZED HEALTH CARE EDUCATION/TRAINING PROGRAM

## 2025-01-04 PROCEDURE — 2060000000 HC ICU INTERMEDIATE R&B

## 2025-01-04 PROCEDURE — 2700000000 HC OXYGEN THERAPY PER DAY

## 2025-01-04 PROCEDURE — 85025 COMPLETE CBC W/AUTO DIFF WBC: CPT

## 2025-01-04 PROCEDURE — 2580000003 HC RX 258: Performed by: STUDENT IN AN ORGANIZED HEALTH CARE EDUCATION/TRAINING PROGRAM

## 2025-01-04 PROCEDURE — 6360000002 HC RX W HCPCS: Performed by: INTERNAL MEDICINE

## 2025-01-04 PROCEDURE — 80048 BASIC METABOLIC PNL TOTAL CA: CPT

## 2025-01-04 PROCEDURE — 6370000000 HC RX 637 (ALT 250 FOR IP): Performed by: INTERNAL MEDICINE

## 2025-01-04 PROCEDURE — 6360000002 HC RX W HCPCS: Performed by: STUDENT IN AN ORGANIZED HEALTH CARE EDUCATION/TRAINING PROGRAM

## 2025-01-04 PROCEDURE — 94761 N-INVAS EAR/PLS OXIMETRY MLT: CPT

## 2025-01-04 RX ADMIN — SUCRALFATE 1 G: 1 TABLET ORAL at 11:55

## 2025-01-04 RX ADMIN — MIDODRINE HYDROCHLORIDE 10 MG: 5 TABLET ORAL at 09:40

## 2025-01-04 RX ADMIN — SODIUM CHLORIDE, PRESERVATIVE FREE 10 ML: 5 INJECTION INTRAVENOUS at 20:36

## 2025-01-04 RX ADMIN — VANCOMYCIN HYDROCHLORIDE 1000 MG: 1 INJECTION, POWDER, LYOPHILIZED, FOR SOLUTION INTRAVENOUS at 21:03

## 2025-01-04 RX ADMIN — SENNOSIDES AND DOCUSATE SODIUM 1 TABLET: 50; 8.6 TABLET ORAL at 20:35

## 2025-01-04 RX ADMIN — INSULIN LISPRO 1 UNITS: 100 INJECTION, SOLUTION INTRAVENOUS; SUBCUTANEOUS at 17:53

## 2025-01-04 RX ADMIN — ENOXAPARIN SODIUM 40 MG: 100 INJECTION SUBCUTANEOUS at 09:41

## 2025-01-04 RX ADMIN — HYDROXYUREA 500 MG: 100 TABLET, FILM COATED ORAL at 09:39

## 2025-01-04 RX ADMIN — INSULIN LISPRO 1 UNITS: 100 INJECTION, SOLUTION INTRAVENOUS; SUBCUTANEOUS at 20:56

## 2025-01-04 RX ADMIN — SODIUM CHLORIDE, PRESERVATIVE FREE 10 ML: 5 INJECTION INTRAVENOUS at 09:53

## 2025-01-04 RX ADMIN — SODIUM CHLORIDE, PRESERVATIVE FREE 10 ML: 5 INJECTION INTRAVENOUS at 09:46

## 2025-01-04 RX ADMIN — SENNOSIDES AND DOCUSATE SODIUM 1 TABLET: 50; 8.6 TABLET ORAL at 09:40

## 2025-01-04 RX ADMIN — VANCOMYCIN HYDROCHLORIDE 1000 MG: 1 INJECTION, POWDER, LYOPHILIZED, FOR SOLUTION INTRAVENOUS at 09:52

## 2025-01-04 RX ADMIN — EMPAGLIFLOZIN 25 MG: 10 TABLET, FILM COATED ORAL at 09:40

## 2025-01-04 RX ADMIN — PANTOPRAZOLE SODIUM 40 MG: 40 TABLET, DELAYED RELEASE ORAL at 06:03

## 2025-01-04 RX ADMIN — SUCRALFATE 1 G: 1 TABLET ORAL at 06:03

## 2025-01-04 RX ADMIN — ASPIRIN 81 MG: 81 TABLET, CHEWABLE ORAL at 09:41

## 2025-01-04 RX ADMIN — MIDODRINE HYDROCHLORIDE 10 MG: 5 TABLET ORAL at 11:55

## 2025-01-04 RX ADMIN — SUCRALFATE 1 G: 1 TABLET ORAL at 17:52

## 2025-01-04 RX ADMIN — MIDODRINE HYDROCHLORIDE 10 MG: 5 TABLET ORAL at 17:52

## 2025-01-04 RX ADMIN — OLANZAPINE 5 MG: 5 TABLET, FILM COATED ORAL at 21:33

## 2025-01-04 RX ADMIN — ATORVASTATIN CALCIUM 20 MG: 10 TABLET, FILM COATED ORAL at 09:40

## 2025-01-04 RX ADMIN — FOLIC ACID 1 MG: 1 TABLET ORAL at 09:40

## 2025-01-04 RX ADMIN — SUCRALFATE 1 G: 1 TABLET ORAL at 20:35

## 2025-01-04 RX ADMIN — INSULIN GLARGINE 7 UNITS: 100 INJECTION, SOLUTION SUBCUTANEOUS at 20:55

## 2025-01-04 ASSESSMENT — PAIN SCALES - GENERAL
PAINLEVEL_OUTOF10: 0
PAINLEVEL_OUTOF10: 0

## 2025-01-04 NOTE — CONSULTS
[address] [place] [address]                              CONSULTATION      PATIENT NAME: GIOVANI FLORES             : 1955  MED REC NO: [id number]                      ROOM: 2124  ACCOUNT NO: [id number]                       ADMIT DATE: 2024  PROVIDER: Felipe Gonzalez MD      CONSULT DATE: 2025    REFERRING PHYSICIAN:  CHALO BUSTOS    HISTORY OF PRESENT ILLNESS:  The patient is a 69-year-old lady with multiple medical problems including small-cell lung cancer, limited disease; recurrent left pleural effusion; diabetes; hyperlipidemia; who was admitted to the hospital because of increasing shortness of breath.  She was recently discharged from the hospital with pneumonia.  The patient had chest tube placed and subsequently it was removed 2 days ago as the chest tube partially came out.  Now, she has complete opacification of the left hemithorax.  The pleural fluid is positive for small cell lung cancer.  In addition, it is also growing MRSA.  The patient is lying comfortably in bed.  She denies any shortness of breath.  She has occasional cough.  She denies any hemoptysis or hematemesis.    PAST MEDICAL HISTORY:  Significant for COPD, small cell carcinoma of the lung, hypertension, hyperlipidemia, diabetes.    PAST SURGICAL HISTORY:  Remarkable for needle biopsy of the lung, dilation and curettage of the uterus, upper endoscopy, chest tube insertion and port surgery.    FAMILY HISTORY:  Reveals that her father had cancer.    SOCIAL HISTORY:  Reveals that she quit smoking, but used to smoke a pack per day for 52 years prior to that.  No history of alcohol or drug abuse.    MEDICATIONS:  Reviewed.    ALLERGIES:  SHE HAS NO KNOWN ALLERGIES.      REVIEW OF SYSTEMS:  10- to 14-point review of systems were reviewed and are negative except for what is mentioned in History of Present Illness.    PHYSICAL  EXAMINATION:  GENERAL:  The patient is awake and responsive, in no acute respiratory distress.  VITAL SIGNS:  Blood pressure is 111/49 mmHg, pulse of 109 per minute, and respiratory rate of 17 per minute.  She is afebrile.  Her saturation is 99% on 3 L nasal cannula.  HEENT:  Essentially unremarkable.  There is no JVD.  NECK:  Supple.  LUNGS:  Reveal absent breath sounds, left side.  HEART:  Showed normal S1 and S2.  ABDOMEN:  Benign.  There is no evidence of any organomegaly.  The bowel sounds are present.  NEUROLOGIC:  Reveals that she is awake and responsive, answering questions appropriately, and moving her extremities.    LABORATORY AND DIAGNOSTIC DATA:  Her CBC showed a white count of 4.3, hemoglobin 8.8, hematocrit 29.6.  Her electrolytes were unremarkable.  Her chest x-ray showed complete opacification of the left hemithorax.  Her pleural fluid is growing MRSA.    IMPRESSION:    1. Metastatic small cell lung cancer.  2. Chronic obstructive pulmonary disease.  3. Methicillin-resistant Staphylococcus aureus empyema, left lung.    PLAN:    1. The patient will need chest tube to drain the left side as she is not a candidate for PleurX catheter at this time until the infection has cleared.  I discussed with Dr. Shah.  He will be placing the chest tube.  2. We will start Stiolto once a day.  3. Repeat CAT scan of the chest after fluid was evacuated and then decide about possible bronchoscopy.  4. Continue vancomycin.  5. Check procalcitonin level.  6. As per orders.          DIANNE FLOWER MD      D:  01/04/2025 11:55:12     T:  01/04/2025 12:22:16     [facility]  Job #:  [id number]     Doc#:  [id number]

## 2025-01-04 NOTE — PROGRESS NOTES
PHARMACY VANCOMYCIN MONITORING SERVICE  Pharmacy consulted by Dr. Desai for monitoring and adjustment.    Indication for treatment: Vancomycin indication: HAP  Goal trough: Trough Goal: 15-20 mcg/mL  AUC/GABO: 400-600    Risk Factors for MRSA Identified:   Hospitalization within the past 90 days    Pertinent Laboratory Values:   Temp Readings from Last 3 Encounters:   01/04/25 98.6 °F (37 °C) (Oral)   12/30/24 97.5 °F (36.4 °C) (Oral)   12/18/24 98 °F (36.7 °C) (Infrared)     Recent Labs     01/02/25  0820 01/03/25  0426 01/04/25  0654   WBC 8.7 5.7 4.3     Recent Labs     01/02/25  0820 01/03/25  0426 01/04/25  0654   BUN 26* 20 13   CREATININE 0.9 0.6 0.5*     Estimated Creatinine Clearance: 87 mL/min (A) (based on SCr of 0.5 mg/dL (L)).    Intake/Output Summary (Last 24 hours) at 1/4/2025 0842  Last data filed at 1/3/2025 1843  Gross per 24 hour   Intake 120 ml   Output 450 ml   Net -330 ml     In: 120   Out: 450 [Urine:450]    Last Encounter Weight:  Wt Readings from Last 3 Encounters:   01/04/25 58.5 kg (128 lb 15.5 oz)   12/30/24 59.4 kg (131 lb)   12/18/24 59.3 kg (130 lb 12.8 oz)       Pertinent Cultures:   Date    Source    Results  12/31   Pleural fluid   MRSA rare growth  01/02   Sputum/Respiratory  Rare GNR, GNC  01/02   MRSA Nasal   ordered      Vancomycin level:   TROUGH:  No results for input(s): \"VANCOTROUGH\" in the last 72 hours.  RANDOM:  No results for input(s): \"VANCORANDOM\" in the last 72 hours.    Assessment:  HPI: Lin Bates is a 69 y.o. female  patient with past medical history hypertension and hyperlipidemia, diabetes mellitus, lung carcinoma, presented to the emergency room for evaluation of shortness of breath.Reported worsening shortness of breath over the last 2 days, no chest pain, no fever or chills or cough.She was admitted earlier in December treated for pneumonia with antibiotics, had a pleural effusion that was drained.No nausea vomiting abdominal pain or diarrhea.  No headache  dizziness or loss of consciousness.In the ER at [place] she was hemodynamically stable, required 2 L oxygen.  Chest x-ray showed left white line, thoracentesis attempted by the ED physician but unsuccessful.   SCr, BUN, and urine output:   Scr-0.5 mg/dL (baseline 0.6)  BUN-13 WNL  UOP documented as 0.3 mL/kg/hr yesterday  Day(s) of therapy: 1 of 7  Vancomycin concentration: TBD    Plan:  Vancomycin restarted, received vancomycin 1500 mg IV loading x once on 1/2, will continue with Vancomycin 1000 mg IV q12h   Predicted ssAUC 520 mg/L*hr and ssTrough 15.5 mg/L   Level 1/6 AM or sooner if clinically needed  Pharmacy will continue to monitor patient and adjust therapy as indicated    VANCOMYCIN CONCENTRATION SCHEDULED FOR 1/6 @0600    Thank you for the consult.  Robert Lyn RPH  1/4/2025 8:42 AM

## 2025-01-04 NOTE — PROGRESS NOTES
Cardiothoracic Surgery  IN-PATIENT SERVICE   [facility]    Daily Progress Note            Date:   1/4/2025  Patient name:  Lin Bates  Date of admission:  12/30/2024 10:39 PM  MRN:   [id number]  Account:  [id number]  YOB: 1955  PCP:    Dorothy Terrazas PA  Room:   [facility]-A  Code Status:    Prior    Physician Requesting Consult: No att. providers found    Reason for Consult:  chest tube management    Chief Complaint:     SOB    History of Present Illness:     Lin Bates is a 69 y.o. female  patient with past medical history hypertension and hyperlipidemia, diabetes mellitus, lung carcinoma, presented to the emergency room for evaluation of shortness of breath.  Reported worsening shortness of breath over the last 2 days, no chest pain, no fever or chills or cough.  She was admitted earlier in December treated for pneumonia with antibiotics, had a pleural effusion that was drained.  No nausea vomiting abdominal pain or diarrhea.  No headache dizziness or loss of consciousness.  In the ER at [place] she was hemodynamically stable, required 2 L oxygen.  Chest x-ray showed left white line, thoracentesis attempted by the ED physician but unsuccessful.  Transfer requested to ICU at [place] and placed a Left sided chest tube for  recurrent left pleural effusion with complete collapse.    Seeing Oncology for  MPL positive essential thrombocytosis, on hydrea 500 mg daily, and limited stage small cell lung cancer s/p definitive CRT and WBRT, currently on maintenance immunotherapy with durvalumab (last tx on 12/18/24). She was recently admitted in 12/2024 due to LLL CAP with associated large pleural effusion that was drained.     We were consulted for chest tube management. Needs Pleurex when IR coverage back and fluid reaccumulates    Past Medical History:     Past Medical History:   Diagnosis Date    Cancer (HCC)     History of external

## 2025-01-04 NOTE — PROGRESS NOTES
[place]  [facility] [place]      Name:  Lin Bates /Age/Sex: 1955  (69 y.o. female)   MRN & CSN:  [id number] & [id number] Encounter Date/Time: 2025 12:27 PM EST    Location:  -A PCP: Dorothy Terrazas PA       Hospital Day: 6      Subjective:     Chief Complaint: SOB    Pleural fluid growing MRSA  Pt is doing well today. SBP better. SOB not worse. Denies cough. Oncology discussed disease progression and prognosis with pt and her spouse; they are thinking about hospice.      Assessment and Plan:   Acute hypoxic respiratory failure 2/2  large left pleural effusion and associated complete left lung collapse.    Uncomplicated parapneumonic effusion with MRSA in pleural fluid from 2024 but per studies no concern for empyema   s/p chest tube in the ICU 2024 removed 1/3/2025  Hx of limited small lung cancer completed chemo now on immuno therapy  CXR 2025 changes suggestive of consolidation but per CT chest WO contrast 2025 just large left pleural effusion, changes suggestive of atypical pneumonia vs  interstitial edema vs lymphangitic carcinomatosis    - discussed w ctx; plan to hold off placing chest tube unless pt becomes SOB and plan to hold off pleurx catheter until infection is cleared  - plan to discharge ID on Monday to assist with abx duration   - wean O2 as tolerated  - start vancomycin   - pulm consulted per CTX recs as pt may need bronchoscopy    limited stage small cell lung cancer   Completed chemo now on immunotherapy  CT chest on admission showed Large mediastinal lymph nodes  1.9 x 1.3 cm in anterior pretracheal location, and 2.0 x 1.4 cm in AP location.   Concern for disease progression  Had recent PET scan not resulted yet  - consulted hem/onc     Hypotension. BP has been in the 90's; per chart review baseline 100's  Improved today  No sign of sepsis, no leukocytosis, afebrile, procalc high 1.03 but likely from cancer.   Cortisol 8AM 22.  (L) 32.0 - 36.0 g/dL    RDW 16.3 (H) 11.7 - 14.9 %    Platelets 200 140 - 440 k/uL    MPV 9.7 7.5 - 11.1 fL    Neutrophils % 63 36 - 66 %    Lymphocytes % 12 (L) 24 - 44 %    Monocytes % 21 (H) 0 - 4 %    Eosinophils % 3 0 - 3 %    Basophils % 1 0 - 1 %    Immature Granulocytes % 1 (H) 0 %    Neutrophils Absolute 2.66 k/uL    Lymphocytes Absolute 0.52 k/uL    Monocytes Absolute 0.90 k/uL    Eosinophils Absolute 0.12 k/uL    Basophils Absolute 0.03 k/uL    Immature Granulocytes Absolute 0.02 k/uL   Basic Metabolic Panel w/ Reflex to MG    Collection Time: 01/04/25  6:54 AM   Result Value Ref Range    Sodium 140 136 - 145 mmol/L    Potassium 3.8 3.5 - 5.1 mmol/L    Chloride 103 99 - 110 mmol/L    CO2 30 21 - 32 mmol/L    Anion Gap 8 (L) 9 - 17 mmol/L    Glucose 134 (H) 74 - 99 mg/dL    BUN 13 7 - 20 mg/dL    Creatinine 0.5 (L) 0.6 - 1.2 mg/dL    Est, Glom Filt Rate >90 >60 mL/min/1.73m2    Calcium 9.5 8.3 - 10.6 mg/dL        Imaging/Diagnostics Last 24 Hours   XR CHEST PORTABLE    Addendum Date: 1/2/2025    Addendum: A chest tube is not definitively identified on this study. Electronically signed by Rayshawn Mittal    Result Date: 1/2/2025  Portable upright chest radiograph, 1 view. CLINICAL INDICATION: Chest tube evaluation. History of lung cancer. COMPARISON: 12/31/2024. FINDINGS: There is complete opacification of the left hemithorax. The cardiomediastinal silhouette is grossly unchanged, although evaluation is limited. Bones are osteopenic. Moderate to severe degenerative change of the glenohumeral joints. A right [id number] remains in place. No pneumothorax. There may be a tiny right pleural effusion. Interstitial opacities project over the right hemithorax.     There is now complete opacification of the left hemithorax, which could be due to combination of airspace consolidation/pneumonia and moderate to large pleural effusion. Progression of underlying malignancy/neoplasm in this patient with history of

## 2025-01-05 LAB
ALBUMIN SERPL-MCNC: 2.7 G/DL (ref 3.4–5)
ALBUMIN/GLOB SERPL: 0.8 {RATIO} (ref 1.1–2.2)
ALP SERPL-CCNC: 73 U/L (ref 40–129)
ALT SERPL-CCNC: 6 U/L (ref 10–40)
ANION GAP SERPL CALCULATED.3IONS-SCNC: 9 MMOL/L (ref 9–17)
AST SERPL-CCNC: 15 U/L (ref 15–37)
BASOPHILS # BLD: 0.04 K/UL
BASOPHILS NFR BLD: 1 % (ref 0–1)
BILIRUB SERPL-MCNC: <0.2 MG/DL (ref 0–1)
BNP SERPL-MCNC: 2002 PG/ML (ref 0–125)
BUN SERPL-MCNC: 10 MG/DL (ref 7–20)
CALCIUM SERPL-MCNC: 9.1 MG/DL (ref 8.3–10.6)
CHLORIDE SERPL-SCNC: 101 MMOL/L (ref 99–110)
CO2 SERPL-SCNC: 29 MMOL/L (ref 21–32)
CREAT SERPL-MCNC: 0.6 MG/DL (ref 0.6–1.2)
EOSINOPHIL # BLD: 0.04 K/UL
EOSINOPHILS RELATIVE PERCENT: 1 % (ref 0–3)
ERYTHROCYTE [DISTWIDTH] IN BLOOD BY AUTOMATED COUNT: 16.3 % (ref 11.7–14.9)
GFR, ESTIMATED: >90 ML/MIN/1.73M2
GLUCOSE BLD-MCNC: 161 MG/DL (ref 74–99)
GLUCOSE BLD-MCNC: 208 MG/DL (ref 74–99)
GLUCOSE SERPL-MCNC: 185 MG/DL (ref 74–99)
HCT VFR BLD AUTO: 29.3 % (ref 37–47)
HGB BLD-MCNC: 8.9 G/DL (ref 12.5–16)
IMM GRANULOCYTES # BLD AUTO: 0.03 K/UL
IMM GRANULOCYTES NFR BLD: 1 %
LYMPHOCYTES NFR BLD: 0.34 K/UL
LYMPHOCYTES RELATIVE PERCENT: 8 % (ref 24–44)
MAGNESIUM SERPL-MCNC: 1.9 MG/DL (ref 1.8–2.4)
MCH RBC QN AUTO: 30.7 PG (ref 27–31)
MCHC RBC AUTO-ENTMCNC: 30.4 G/DL (ref 32–36)
MCV RBC AUTO: 101 FL (ref 78–100)
MICROORGANISM SPEC CULT: ABNORMAL
MONOCYTES NFR BLD: 0.81 K/UL
MONOCYTES NFR BLD: 19 % (ref 0–4)
NEUTROPHILS NFR BLD: 70 % (ref 36–66)
NEUTS SEG NFR BLD: 2.98 K/UL
PHOSPHATE SERPL-MCNC: 3.2 MG/DL (ref 2.5–4.9)
PLATELET # BLD AUTO: 185 K/UL (ref 140–440)
PMV BLD AUTO: 9.5 FL (ref 7.5–11.1)
POTASSIUM SERPL-SCNC: 3.8 MMOL/L (ref 3.5–5.1)
PROCALCITONIN SERPL-MCNC: 0.65 NG/ML
PROT SERPL-MCNC: 6.1 G/DL (ref 6.4–8.2)
RBC # BLD AUTO: 2.9 M/UL (ref 4.2–5.4)
SERVICE CMNT-IMP: ABNORMAL
SODIUM SERPL-SCNC: 139 MMOL/L (ref 136–145)
SPECIMEN DESCRIPTION: ABNORMAL
WBC OTHER # BLD: 4.2 K/UL (ref 4–10.5)

## 2025-01-05 PROCEDURE — 2500000003 HC RX 250 WO HCPCS: Performed by: INTERNAL MEDICINE

## 2025-01-05 PROCEDURE — 6370000000 HC RX 637 (ALT 250 FOR IP): Performed by: INTERNAL MEDICINE

## 2025-01-05 PROCEDURE — 94640 AIRWAY INHALATION TREATMENT: CPT

## 2025-01-05 PROCEDURE — 85025 COMPLETE CBC W/AUTO DIFF WBC: CPT

## 2025-01-05 PROCEDURE — 6370000000 HC RX 637 (ALT 250 FOR IP): Performed by: STUDENT IN AN ORGANIZED HEALTH CARE EDUCATION/TRAINING PROGRAM

## 2025-01-05 PROCEDURE — 6360000002 HC RX W HCPCS: Performed by: STUDENT IN AN ORGANIZED HEALTH CARE EDUCATION/TRAINING PROGRAM

## 2025-01-05 PROCEDURE — 83735 ASSAY OF MAGNESIUM: CPT

## 2025-01-05 PROCEDURE — 2700000000 HC OXYGEN THERAPY PER DAY

## 2025-01-05 PROCEDURE — 94761 N-INVAS EAR/PLS OXIMETRY MLT: CPT

## 2025-01-05 PROCEDURE — 83880 ASSAY OF NATRIURETIC PEPTIDE: CPT

## 2025-01-05 PROCEDURE — 6360000002 HC RX W HCPCS: Performed by: INTERNAL MEDICINE

## 2025-01-05 PROCEDURE — 84100 ASSAY OF PHOSPHORUS: CPT

## 2025-01-05 PROCEDURE — 82962 GLUCOSE BLOOD TEST: CPT

## 2025-01-05 PROCEDURE — 94664 DEMO&/EVAL PT USE INHALER: CPT

## 2025-01-05 PROCEDURE — 84145 PROCALCITONIN (PCT): CPT

## 2025-01-05 PROCEDURE — 80053 COMPREHEN METABOLIC PANEL: CPT

## 2025-01-05 PROCEDURE — 2580000003 HC RX 258: Performed by: STUDENT IN AN ORGANIZED HEALTH CARE EDUCATION/TRAINING PROGRAM

## 2025-01-05 PROCEDURE — 2060000000 HC ICU INTERMEDIATE R&B

## 2025-01-05 RX ADMIN — SODIUM CHLORIDE, PRESERVATIVE FREE 10 ML: 5 INJECTION INTRAVENOUS at 21:33

## 2025-01-05 RX ADMIN — SUCRALFATE 1 G: 1 TABLET ORAL at 11:03

## 2025-01-05 RX ADMIN — OLANZAPINE 5 MG: 5 TABLET, FILM COATED ORAL at 21:39

## 2025-01-05 RX ADMIN — INSULIN LISPRO 1 UNITS: 100 INJECTION, SOLUTION INTRAVENOUS; SUBCUTANEOUS at 21:35

## 2025-01-05 RX ADMIN — PANTOPRAZOLE SODIUM 40 MG: 40 TABLET, DELAYED RELEASE ORAL at 07:49

## 2025-01-05 RX ADMIN — VANCOMYCIN HYDROCHLORIDE 1000 MG: 1 INJECTION, POWDER, LYOPHILIZED, FOR SOLUTION INTRAVENOUS at 11:09

## 2025-01-05 RX ADMIN — SODIUM CHLORIDE, PRESERVATIVE FREE 10 ML: 5 INJECTION INTRAVENOUS at 11:01

## 2025-01-05 RX ADMIN — TIOTROPIUM BROMIDE AND OLODATEROL 2 PUFF: 3.124; 2.736 SPRAY, METERED RESPIRATORY (INHALATION) at 07:38

## 2025-01-05 RX ADMIN — IPRATROPIUM BROMIDE AND ALBUTEROL SULFATE 1 DOSE: 2.5; .5 SOLUTION RESPIRATORY (INHALATION) at 02:21

## 2025-01-05 RX ADMIN — ENOXAPARIN SODIUM 40 MG: 100 INJECTION SUBCUTANEOUS at 11:03

## 2025-01-05 RX ADMIN — INSULIN GLARGINE 7 UNITS: 100 INJECTION, SOLUTION SUBCUTANEOUS at 21:35

## 2025-01-05 RX ADMIN — ATORVASTATIN CALCIUM 20 MG: 10 TABLET, FILM COATED ORAL at 11:02

## 2025-01-05 RX ADMIN — SUCRALFATE 1 G: 1 TABLET ORAL at 07:49

## 2025-01-05 RX ADMIN — IPRATROPIUM BROMIDE AND ALBUTEROL SULFATE 1 DOSE: 2.5; .5 SOLUTION RESPIRATORY (INHALATION) at 08:44

## 2025-01-05 RX ADMIN — ASPIRIN 81 MG: 81 TABLET, CHEWABLE ORAL at 11:03

## 2025-01-05 RX ADMIN — SENNOSIDES AND DOCUSATE SODIUM 1 TABLET: 50; 8.6 TABLET ORAL at 21:32

## 2025-01-05 RX ADMIN — VANCOMYCIN HYDROCHLORIDE 750 MG: 750 INJECTION, POWDER, LYOPHILIZED, FOR SOLUTION INTRAVENOUS at 21:48

## 2025-01-05 RX ADMIN — IPRATROPIUM BROMIDE AND ALBUTEROL SULFATE 1 DOSE: 2.5; .5 SOLUTION RESPIRATORY (INHALATION) at 14:40

## 2025-01-05 RX ADMIN — EMPAGLIFLOZIN 25 MG: 10 TABLET, FILM COATED ORAL at 11:03

## 2025-01-05 RX ADMIN — INSULIN LISPRO 1 UNITS: 100 INJECTION, SOLUTION INTRAVENOUS; SUBCUTANEOUS at 18:15

## 2025-01-05 RX ADMIN — HYDROXYUREA 500 MG: 100 TABLET, FILM COATED ORAL at 11:09

## 2025-01-05 RX ADMIN — IPRATROPIUM BROMIDE AND ALBUTEROL SULFATE 1 DOSE: 2.5; .5 SOLUTION RESPIRATORY (INHALATION) at 22:05

## 2025-01-05 RX ADMIN — SUCRALFATE 1 G: 1 TABLET ORAL at 21:32

## 2025-01-05 RX ADMIN — SUCRALFATE 1 G: 1 TABLET ORAL at 18:15

## 2025-01-05 RX ADMIN — FOLIC ACID 1 MG: 1 TABLET ORAL at 11:03

## 2025-01-05 ASSESSMENT — PAIN SCALES - WONG BAKER
WONGBAKER_NUMERICALRESPONSE: NO HURT
WONGBAKER_NUMERICALRESPONSE: NO HURT

## 2025-01-05 ASSESSMENT — PAIN SCALES - GENERAL
PAINLEVEL_OUTOF10: 0
PAINLEVEL_OUTOF10: 0

## 2025-01-05 ASSESSMENT — PAIN DESCRIPTION - LOCATION: LOCATION: RIB CAGE

## 2025-01-05 ASSESSMENT — PAIN DESCRIPTION - DESCRIPTORS: DESCRIPTORS: ACHING

## 2025-01-05 ASSESSMENT — PAIN - FUNCTIONAL ASSESSMENT: PAIN_FUNCTIONAL_ASSESSMENT: PREVENTS OR INTERFERES SOME ACTIVE ACTIVITIES AND ADLS

## 2025-01-05 ASSESSMENT — PAIN DESCRIPTION - FREQUENCY: FREQUENCY: INTERMITTENT

## 2025-01-05 ASSESSMENT — PAIN DESCRIPTION - PAIN TYPE: TYPE: ACUTE PAIN

## 2025-01-05 ASSESSMENT — PAIN DESCRIPTION - ONSET: ONSET: ON-GOING

## 2025-01-05 ASSESSMENT — PAIN DESCRIPTION - ORIENTATION: ORIENTATION: LEFT

## 2025-01-05 NOTE — PROGRESS NOTES
Pulmonary and Critical Care  Progress Note    Subjective:   The patient is comfortable in bed.On 3 L N/C.  Shortness of breath none at rest.  Chest pain none.  Addressing respiratory complaints Patient is negative for  hemoptysis and cyanosis  CONSTITUTIONAL:  negative for fevers and chills      Past Medical History:     has a past medical history of Cancer (HCC), History of external beam radiation therapy, Hyperlipidemia, Hypertension, Thickened endometrium, and Type 2 diabetes mellitus without complication (HCC).   has a past surgical history that includes Dental surgery; Dilation and curettage of uterus (N/A, 3/22/2023); CT NEEDLE BIOPSY LUNG PERCUTANEOUS (1/30/2024); Port Surgery (N/A, 4/10/2024); Esophagoscopy (N/A, 5/23/2024); IR CHEST TUBE INSERTION (10/3/2024); and IR BIOPSY THYROID PERC CORE NEEDLE (11/21/2024).   reports that she quit smoking about 11 months ago. Her smoking use included cigarettes. She started smoking about 53 years ago. She has a 52.1 pack-year smoking history. She has never used smokeless tobacco. She reports that she does not currently use alcohol. She reports that she does not use drugs.  Family history:  family history includes Cancer in her brother and father.    No Known Allergies  Social History:    Reviewed; no changes    Objective:   PHYSICAL EXAM:        VITALS:  /69   Pulse (!) 111   Temp 97.5 °F (36.4 °C)   Resp 25   Ht 1.549 m (5' 0.98\")   Wt 57.8 kg (127 lb 6.8 oz)   SpO2 92%   BMI 24.09 kg/m²     24HR INTAKE/OUTPUT:    Intake/Output Summary (Last 24 hours) at 1/5/2025 1131  Last data filed at 1/5/2025 0954  Gross per 24 hour   Intake 628.48 ml   Output 850 ml   Net -221.52 ml       CONSTITUTIONAL:  awake  LUNGS:  decreased breath sounds L side.  CARDIOVASCULAR:  normal S1 and S2 and negative JVD  ABD:Abdomen soft, non-tender. BS normal. No masses,  No organomegaly  NEURO:Alert.  DATA:    CBC:  Recent Labs     01/03/25  0426 01/04/25  0654 01/05/25  1030   WBC

## 2025-01-05 NOTE — PROGRESS NOTES
Cardiothoracic Surgery  IN-PATIENT SERVICE   [facility]    Daily Progress Note            Date:   1/5/2025  Patient name:  Lin Bates  Date of admission:  12/30/2024 10:39 PM  MRN:   [id number]  Account:  [id number]  YOB: 1955  PCP:    Dorothy Terrazas PA  Room:   2124/2124-A  Code Status:    Full Code    Physician Requesting Consult: Marcin Desai MD    Reason for Consult:  chest tube management    Chief Complaint:     SOB    History of Present Illness:     Lin Bates is a 69 y.o. female  patient with past medical history hypertension and hyperlipidemia, diabetes mellitus, lung carcinoma, presented to the emergency room for evaluation of shortness of breath.  Reported worsening shortness of breath over the last 2 days, no chest pain, no fever or chills or cough.  She was admitted earlier in December treated for pneumonia with antibiotics, had a pleural effusion that was drained.  No nausea vomiting abdominal pain or diarrhea.  No headache dizziness or loss of consciousness.  In the ER at [place] she was hemodynamically stable, required 2 L oxygen.  Chest x-ray showed left white line, thoracentesis attempted by the ED physician but unsuccessful.  Transfer requested to ICU at [place] and placed a Left sided chest tube for  recurrent left pleural effusion with complete collapse.    Seeing Oncology for  MPL positive essential thrombocytosis, on hydrea 500 mg daily, and limited stage small cell lung cancer s/p definitive CRT and WBRT, currently on maintenance immunotherapy with durvalumab (last tx on 12/18/24). She was recently admitted in 12/2024 due to LLL CAP with associated large pleural effusion that was drained.     We were consulted for chest tube management. Needs Pleurex when IR coverage back and fluid reaccumulates    Past Medical History:     Past Medical History:   Diagnosis Date    Cancer (HCC)     History of external beam

## 2025-01-05 NOTE — PROGRESS NOTES
PHARMACY VANCOMYCIN MONITORING SERVICE  Pharmacy consulted by Dr. Desai for monitoring and adjustment.    Indication for treatment: Vancomycin indication: HAP  Goal trough: Trough Goal: 15-20 mcg/mL  AUC/GABO: 400-600    Risk Factors for MRSA Identified:   Hospitalization within the past 90 days    Pertinent Laboratory Values:   Temp Readings from Last 3 Encounters:   01/05/25 97.9 °F (36.6 °C) (Oral)   12/30/24 97.5 °F (36.4 °C) (Oral)   12/18/24 98 °F (36.7 °C) (Infrared)     Recent Labs     01/03/25  0426 01/04/25  0654 01/05/25  1030   WBC 5.7 4.3 4.2     Recent Labs     01/03/25  0426 01/04/25  0654 01/05/25  1030   BUN 20 13 10   CREATININE 0.6 0.5* 0.6     Estimated Creatinine Clearance: 72 mL/min (based on SCr of 0.6 mg/dL).    Intake/Output Summary (Last 24 hours) at 1/5/2025 1556  Last data filed at 1/5/2025 1200  Gross per 24 hour   Intake 828.48 ml   Output 850 ml   Net -21.52 ml     In: 1068.5   Out: 850 [Urine:850]    Last Encounter Weight:  Wt Readings from Last 3 Encounters:   01/05/25 57.8 kg (127 lb 6.8 oz)   12/30/24 59.4 kg (131 lb)   12/18/24 59.3 kg (130 lb 12.8 oz)       Pertinent Cultures:   Date    Source    Results  12/31   Pleural fluid   MRSA rare growth sensitive to Vancomycin   01/02   Sputum/Respiratory  Rare GNR, GNC  01/02   MRSA Nasal   Negative       Vancomycin level:   TROUGH:  No results for input(s): \"VANCOTROUGH\" in the last 72 hours.  RANDOM:  No results for input(s): \"VANCORANDOM\" in the last 72 hours.    Assessment:  HPI: Lin Bates is a 69 y.o. female  patient with past medical history hypertension and hyperlipidemia, diabetes mellitus, lung carcinoma, presented to the emergency room for evaluation of shortness of breath.Reported worsening shortness of breath over the last 2 days, no chest pain, no fever or chills or cough.She was admitted earlier in December treated for pneumonia with antibiotics, had a pleural effusion that was drained.No nausea vomiting abdominal

## 2025-01-05 NOTE — PROGRESS NOTES
V2.0  [facility] Hospitalist Progress Note      Name:  Lin Bates /Age/Sex: 1955  (69 y.o. female)   MRN & CSN:  [id number] & [id number] Encounter Date/Time: 2025 12:27 PM EST    Location:  -A PCP: Dorothy Terrazas PA       Hospital Day: 7      Subjective:     Chief Complaint: SOB    Pt is doing ok today, noted to have mild tachypnea at rest RR 27 but worsened with SOB with any activity.     Assessment and Plan:   Acute hypoxic respiratory failure 2/2  large left pleural effusion and associated complete left lung collapse.    Uncomplicated parapneumonic effusion with MRSA (sensitivities available) in pleural fluid from 2024 but per studies no concern for empyema   s/p chest tube in the ICU 2024 removed 1/3/2025  Hx of limited small lung cancer completed chemo now on immuno therapy  CXR 2025 changes suggestive of consolidation but per CT chest WO contrast 2025 just large left pleural effusion, changes suggestive of atypical pneumonia vs  interstitial edema vs lymphangitic carcinomatosis    - discussed w ctx; plan to hold off placing chest tube unless pt becomes SOB and plan to hold off pleurx catheter until infection is cleared  - plan to discharge ID on Monday to assist with abx duration   - wean O2 as tolerated  - started vancomycin  2025  - ID consulted to assist with abx choice and duration  - pulm consulted per CTX recs as pt may need bronchoscopy    limited stage small cell lung cancer   Completed chemo now on immunotherapy  CT chest on admission showed Large mediastinal lymph nodes  1.9 x 1.3 cm in anterior pretracheal location, and 2.0 x 1.4 cm in AP location.   Concern for disease progression  Had recent PET scan not resulted yet  - consulted hem/onc     Hypotension. BP has been in the 90's; per chart review baseline 100's  Improved   No sign of sepsis, no leukocytosis, afebrile, procalc high 1.03 but likely from cancer.   Cortisol 8AM 22. No lactic   Oral Daily    insulin glargine  7 Units SubCUTAneous Nightly    empagliflozin  25 mg Oral Daily    [Held by provider] lisinopril  2.5 mg Oral Daily    midodrine  10 mg Oral TID WC    OLANZapine  5 mg Oral Nightly    pantoprazole  40 mg Oral QAM AC    sucralfate  1 g Oral 4x Daily AC & HS    sodium chloride flush  5-40 mL IntraVENous 2 times per day    enoxaparin  40 mg SubCUTAneous Daily    sennosides-docusate sodium  1 tablet Oral BID    insulin lispro  0-4 Units SubCUTAneous 4x Daily AC & HS      Infusions:    sodium chloride      sodium chloride      dextrose       PRN Meds: guaiFENesin, 200 mg, Q4H PRN  sodium chloride flush, 5-40 mL, PRN  sodium chloride, , PRN  sodium chloride flush, 5-40 mL, PRN  sodium chloride, , PRN  potassium chloride, 20 mEq, PRN   Or  potassium chloride, 10 mEq, PRN  magnesium sulfate, 2,000 mg, PRN  sodium phosphate 15 mmol in sodium chloride 0.9 % 250 mL IVPB, 15 mmol, PRN  ipratropium 0.5 mg-albuterol 2.5 mg, 1 Dose, Q4H PRN  ondansetron, 4 mg, Q8H PRN   Or  ondansetron, 4 mg, Q6H PRN  polyethylene glycol, 17 g, Daily PRN  acetaminophen, 650 mg, Q4H PRN   Or  acetaminophen, 650 mg, Q6H PRN  glucose, 4 tablet, PRN  dextrose bolus, 125 mL, PRN   Or  dextrose bolus, 250 mL, PRN  glucagon (rDNA), 1 mg, PRN  dextrose, , Continuous PRN  morphine, 2 mg, Q4H PRN        Labs      Recent Results (from the past 24 hour(s))   POCT Glucose    Collection Time: 01/04/25  3:56 PM   Result Value Ref Range    POC Glucose 217 (H) 74 - 99 mg/dL   POCT Glucose    Collection Time: 01/04/25  8:51 PM   Result Value Ref Range    POC Glucose 194 (H) 74 - 99 mg/dL   CBC with Auto Differential    Collection Time: 01/05/25 10:30 AM   Result Value Ref Range    WBC 4.2 4.0 - 10.5 k/uL    RBC 2.90 (L) 4.20 - 5.40 m/uL    Hemoglobin 8.9 (L) 12.5 - 16.0 g/dL    Hematocrit 29.3 (L) 37.0 - 47.0 %    .0 (H) 78.0 - 100.0 fL    MCH 30.7 27.0 - 31.0 pg    MCHC 30.4 (L) 32.0 - 36.0 g/dL    RDW 16.3 (H) 11.7 - 14.9 %

## 2025-01-05 NOTE — PLAN OF CARE
Problem: Chronic Conditions and Co-morbidities  Goal: Patient's chronic conditions and co-morbidity symptoms are monitored and maintained or improved  1/5/2025 0422 by Susana Velazquez RN  Outcome: Progressing  1/4/2025 1507 by Rekha Gale RN  Outcome: Progressing     Problem: Discharge Planning  Goal: Discharge to home or other facility with appropriate resources  1/5/2025 0422 by Susana Velazquez RN  Outcome: Progressing  1/4/2025 1507 by Rekha Gale RN  Outcome: Progressing     Problem: Pain  Goal: Verbalizes/displays adequate comfort level or baseline comfort level  1/5/2025 0422 by Susana Velazquez RN  Outcome: Progressing  1/4/2025 1507 by Rekha Gale RN  Outcome: Progressing     Problem: Safety - Adult  Goal: Free from fall injury  1/5/2025 0422 by Susana Velazquez RN  Outcome: Progressing  1/4/2025 1507 by Rekha Gale RN  Outcome: Progressing

## 2025-01-06 LAB
ANION GAP SERPL CALCULATED.3IONS-SCNC: 6 MMOL/L (ref 9–17)
BASOPHILS # BLD: 0.04 K/UL
BASOPHILS NFR BLD: 1 % (ref 0–1)
BUN SERPL-MCNC: 10 MG/DL (ref 7–20)
CALCIUM SERPL-MCNC: 9.5 MG/DL (ref 8.3–10.6)
CHLORIDE SERPL-SCNC: 101 MMOL/L (ref 99–110)
CO2 SERPL-SCNC: 31 MMOL/L (ref 21–32)
CREAT SERPL-MCNC: 0.6 MG/DL (ref 0.6–1.2)
DATE LAST DOSE: ABNORMAL
DATE LAST DOSE: ABNORMAL
EOSINOPHIL # BLD: 0.05 K/UL
EOSINOPHILS RELATIVE PERCENT: 1 % (ref 0–3)
ERYTHROCYTE [DISTWIDTH] IN BLOOD BY AUTOMATED COUNT: 16.3 % (ref 11.7–14.9)
GFR, ESTIMATED: >90 ML/MIN/1.73M2
GLUCOSE BLD-MCNC: 147 MG/DL (ref 74–99)
GLUCOSE BLD-MCNC: 162 MG/DL (ref 74–99)
GLUCOSE BLD-MCNC: 215 MG/DL (ref 74–99)
GLUCOSE BLD-MCNC: 262 MG/DL (ref 74–99)
GLUCOSE SERPL-MCNC: 209 MG/DL (ref 74–99)
HCT VFR BLD AUTO: 30.2 % (ref 37–47)
HGB BLD-MCNC: 9.1 G/DL (ref 12.5–16)
IMM GRANULOCYTES # BLD AUTO: 0.02 K/UL
IMM GRANULOCYTES NFR BLD: 0 %
LYMPHOCYTES NFR BLD: 0.31 K/UL
LYMPHOCYTES RELATIVE PERCENT: 7 % (ref 24–44)
MCH RBC QN AUTO: 30.6 PG (ref 27–31)
MCHC RBC AUTO-ENTMCNC: 30.1 G/DL (ref 32–36)
MCV RBC AUTO: 101.7 FL (ref 78–100)
MONOCYTES NFR BLD: 0.99 K/UL
MONOCYTES NFR BLD: 21 % (ref 0–4)
NEUTROPHILS NFR BLD: 70 % (ref 36–66)
NEUTS SEG NFR BLD: 3.3 K/UL
PLATELET # BLD AUTO: 180 K/UL (ref 140–440)
PMV BLD AUTO: 9.6 FL (ref 7.5–11.1)
POTASSIUM SERPL-SCNC: 4 MMOL/L (ref 3.5–5.1)
RBC # BLD AUTO: 2.97 M/UL (ref 4.2–5.4)
SODIUM SERPL-SCNC: 138 MMOL/L (ref 136–145)
TME LAST DOSE: ABNORMAL H
TME LAST DOSE: ABNORMAL H
VANCOMYCIN DOSE: ABNORMAL MG
VANCOMYCIN DOSE: ABNORMAL MG
VANCOMYCIN SERPL-MCNC: 21.9 UG/ML (ref 10–20)
VANCOMYCIN SERPL-MCNC: 28.9 UG/ML (ref 10–20)
WBC OTHER # BLD: 4.7 K/UL (ref 4–10.5)

## 2025-01-06 PROCEDURE — 2140000000 HC CCU INTERMEDIATE R&B

## 2025-01-06 PROCEDURE — 6360000002 HC RX W HCPCS: Performed by: STUDENT IN AN ORGANIZED HEALTH CARE EDUCATION/TRAINING PROGRAM

## 2025-01-06 PROCEDURE — 6370000000 HC RX 637 (ALT 250 FOR IP): Performed by: STUDENT IN AN ORGANIZED HEALTH CARE EDUCATION/TRAINING PROGRAM

## 2025-01-06 PROCEDURE — 2580000003 HC RX 258: Performed by: STUDENT IN AN ORGANIZED HEALTH CARE EDUCATION/TRAINING PROGRAM

## 2025-01-06 PROCEDURE — 36591 DRAW BLOOD OFF VENOUS DEVICE: CPT

## 2025-01-06 PROCEDURE — 80048 BASIC METABOLIC PNL TOTAL CA: CPT

## 2025-01-06 PROCEDURE — 2500000003 HC RX 250 WO HCPCS: Performed by: INTERNAL MEDICINE

## 2025-01-06 PROCEDURE — 82962 GLUCOSE BLOOD TEST: CPT

## 2025-01-06 PROCEDURE — 94640 AIRWAY INHALATION TREATMENT: CPT

## 2025-01-06 PROCEDURE — 94761 N-INVAS EAR/PLS OXIMETRY MLT: CPT

## 2025-01-06 PROCEDURE — 6370000000 HC RX 637 (ALT 250 FOR IP): Performed by: INTERNAL MEDICINE

## 2025-01-06 PROCEDURE — 80202 ASSAY OF VANCOMYCIN: CPT

## 2025-01-06 PROCEDURE — G0427 INPT/ED TELECONSULT70: HCPCS | Performed by: INTERNAL MEDICINE

## 2025-01-06 PROCEDURE — 85025 COMPLETE CBC W/AUTO DIFF WBC: CPT

## 2025-01-06 PROCEDURE — 6360000002 HC RX W HCPCS: Performed by: INTERNAL MEDICINE

## 2025-01-06 PROCEDURE — 2700000000 HC OXYGEN THERAPY PER DAY

## 2025-01-06 RX ADMIN — SUCRALFATE 1 G: 1 TABLET ORAL at 06:22

## 2025-01-06 RX ADMIN — EMPAGLIFLOZIN 25 MG: 10 TABLET, FILM COATED ORAL at 09:16

## 2025-01-06 RX ADMIN — INSULIN GLARGINE 7 UNITS: 100 INJECTION, SOLUTION SUBCUTANEOUS at 21:46

## 2025-01-06 RX ADMIN — MORPHINE SULFATE 2 MG: 2 INJECTION, SOLUTION INTRAMUSCULAR; INTRAVENOUS at 19:31

## 2025-01-06 RX ADMIN — OLANZAPINE 5 MG: 5 TABLET, FILM COATED ORAL at 19:32

## 2025-01-06 RX ADMIN — SODIUM CHLORIDE, PRESERVATIVE FREE 10 ML: 5 INJECTION INTRAVENOUS at 09:16

## 2025-01-06 RX ADMIN — PANTOPRAZOLE SODIUM 40 MG: 40 TABLET, DELAYED RELEASE ORAL at 06:22

## 2025-01-06 RX ADMIN — INSULIN LISPRO 2 UNITS: 100 INJECTION, SOLUTION INTRAVENOUS; SUBCUTANEOUS at 21:46

## 2025-01-06 RX ADMIN — IPRATROPIUM BROMIDE AND ALBUTEROL SULFATE 1 DOSE: 2.5; .5 SOLUTION RESPIRATORY (INHALATION) at 04:43

## 2025-01-06 RX ADMIN — TIOTROPIUM BROMIDE AND OLODATEROL 2 PUFF: 3.124; 2.736 SPRAY, METERED RESPIRATORY (INHALATION) at 08:08

## 2025-01-06 RX ADMIN — VANCOMYCIN HYDROCHLORIDE 750 MG: 750 INJECTION, POWDER, LYOPHILIZED, FOR SOLUTION INTRAVENOUS at 09:34

## 2025-01-06 RX ADMIN — ATORVASTATIN CALCIUM 20 MG: 10 TABLET, FILM COATED ORAL at 09:16

## 2025-01-06 RX ADMIN — MIDODRINE HYDROCHLORIDE 10 MG: 5 TABLET ORAL at 17:43

## 2025-01-06 RX ADMIN — FOLIC ACID 1 MG: 1 TABLET ORAL at 09:16

## 2025-01-06 RX ADMIN — SUCRALFATE 1 G: 1 TABLET ORAL at 11:39

## 2025-01-06 RX ADMIN — ASPIRIN 81 MG: 81 TABLET, CHEWABLE ORAL at 09:16

## 2025-01-06 RX ADMIN — SODIUM CHLORIDE, PRESERVATIVE FREE 10 ML: 5 INJECTION INTRAVENOUS at 09:17

## 2025-01-06 RX ADMIN — SUCRALFATE 1 G: 1 TABLET ORAL at 17:43

## 2025-01-06 RX ADMIN — HYDROXYUREA 500 MG: 100 TABLET, FILM COATED ORAL at 09:15

## 2025-01-06 RX ADMIN — ENOXAPARIN SODIUM 40 MG: 100 INJECTION SUBCUTANEOUS at 09:16

## 2025-01-06 RX ADMIN — INSULIN LISPRO 1 UNITS: 100 INJECTION, SOLUTION INTRAVENOUS; SUBCUTANEOUS at 18:05

## 2025-01-06 ASSESSMENT — PAIN SCALES - GENERAL
PAINLEVEL_OUTOF10: 0

## 2025-01-06 ASSESSMENT — PAIN SCALES - WONG BAKER
WONGBAKER_NUMERICALRESPONSE: NO HURT

## 2025-01-06 ASSESSMENT — PAIN DESCRIPTION - PAIN TYPE: TYPE: ACUTE PAIN

## 2025-01-06 ASSESSMENT — PAIN DESCRIPTION - FREQUENCY: FREQUENCY: INTERMITTENT

## 2025-01-06 ASSESSMENT — PAIN DESCRIPTION - ONSET: ONSET: ON-GOING

## 2025-01-06 NOTE — PROGRESS NOTES
PHARMACY VANCOMYCIN MONITORING SERVICE  Pharmacy consulted by Dr. Desai for monitoring and adjustment.    Indication for treatment: Vancomycin indication: HAP  Goal trough: Trough Goal: 15-20 mcg/mL  AUC/GABO: 400-600    Risk Factors for MRSA Identified:   Hospitalization within the past 90 days    Pertinent Laboratory Values:   Temp Readings from Last 3 Encounters:   01/06/25 98.5 °F (36.9 °C) (Oral)   12/30/24 97.5 °F (36.4 °C) (Oral)   12/18/24 98 °F (36.7 °C) (Infrared)     Recent Labs     01/04/25  0654 01/05/25  1030 01/06/25  0940   WBC 4.3 4.2 4.7     Recent Labs     01/04/25  0654 01/05/25  1030 01/06/25  0940   BUN 13 10 10   CREATININE 0.5* 0.6 0.6     Estimated Creatinine Clearance: 73 mL/min (based on SCr of 0.6 mg/dL).    Intake/Output Summary (Last 24 hours) at 1/6/2025 1203  Last data filed at 1/6/2025 0937  Gross per 24 hour   Intake 360 ml   Output 400 ml   Net -40 ml     In: 600   Out: 650 [Urine:650]    Last Encounter Weight:  Wt Readings from Last 3 Encounters:   01/05/25 59.6 kg (131 lb 6.3 oz)   12/30/24 59.4 kg (131 lb)   12/18/24 59.3 kg (130 lb 12.8 oz)       Pertinent Cultures:   Date    Source    Results  12/31   Pleural fluid   MRSA rare growth sensitive to Vancomycin   01/02   Sputum/Respiratory  Rare GNR, GNC  01/02   MRSA Nasal   Negative       Vancomycin level:   TROUGH:  No results for input(s): \"VANCOTROUGH\" in the last 72 hours.  RANDOM:    Recent Labs     01/06/25  0940   VANCORANDOM 21.9*       Assessment:  HPI: Lin Bates is a 69 y.o. female  patient with past medical history hypertension and hyperlipidemia, diabetes mellitus, lung carcinoma, presented to the emergency room for evaluation of shortness of breath.Reported worsening shortness of breath over the last 2 days, no chest pain, no fever or chills or cough.She was admitted earlier in December treated for pneumonia with antibiotics, had a pleural effusion that was drained.No nausea vomiting abdominal pain or diarrhea.   No headache dizziness or loss of consciousness.In the ER at [facility] she was hemodynamically stable, required 2 L oxygen.  Chest x-ray showed left white line, thoracentesis attempted by the ED physician but unsuccessful.   SCr, BUN, and urine output:   Scr-0.6 mg/dL at baseline  BUN- WNL  UOP documented as 0.5 mL/kg/hr yesterday  Day(s) of therapy: 3 - per ID note, plan to continue vancomycin while pt is hospital then plan to d/c with 4 weeks of Zyvox  Vancomycin concentration:   01/06 - 21.9 mg/L, drawn directly after starting vancomycin infusing, unusable level.   01/06 - 28.9 mg/L, 5.5 hours after last dose of vancomycin 750 mg IV q12hr    Plan:  Level from today is elevated, will switch to intermittent dosing and check a vancomycin level tomorrow AM.   Pharmacy will continue to monitor patient and adjust therapy as indicated    VANCOMYCIN CONCENTRATION SCHEDULED FOR 1/7 @0600    Thank you for the consult.  Sally Barrios RPH  1/6/2025 12:03 PM

## 2025-01-06 NOTE — CARE COORDINATION
Chart reviewed. It appears that pt will not be getting a pleurx catheter placed at this time due to concern of an existing infection. Chest tube that pt had is out. Plan remains home with [facility].

## 2025-01-06 NOTE — CONSULTS
Infectious Disease Telemedicine Consult Note  2025   Patient Name: Lin Bates : 1955     Assessment  Acute hypoxic respiratory failure secondary to large left pleural malignant effusion due to small cell lung cancer  Possible MRSA infected exudative pleural effusion  Admitted with a 3-day history of shortness of breath and found to have left lung collapse due to large pleural effusion requiring a chest tube.    Pleural fluid total protein is 4.3 g/dL, Procalcitonin 0.65,  Pleural fluid culture has rare growth of MRSA. Source is unknown since MRSA nares was negative. Possibility of contamination remains but due her immunocompromised state and immune-altering effect of durvalumab treatment is warranted.   Treat with antimicrobial agents for 4 weeks  MPL essential thrombocytosis  Comorbid conditions:     Plan  Therapeutic:  Continue IV vancomycin while in the hospital  May discharge on linezolid 600 mg po bid for 4 weeks  Diagnostic:  Weekly CBC to monitor for bone marrow depression.  CRP   F/u:  Other:   ID outpatient follow up in 1 week    Thank you for allowing me to consult in the care of this patient.  ------------------------  REASON FOR CONSULT: \" hx of small cell lung cancer s/p chemo, recurrent admission with recurrent pleural effusion; had chest tube; fluid exudative with small lung cancer and cx growing MRSA\"  Requested by:  hx of small cell lung cancer s/p chemo, recurrent admission with recurrent pleural effusion; had chest tube; fluid exudative with small lung cancer and cx growing MRSA  Physician location: [facility] working from home  Patient location: [facility] in the hospital   HPI:Patient is a 69 y.o. female living with MPL positive essential thrombocytosis on Hydrea 500 mg daily and limited stage small cell lung cancer status post cART, WBRT and on maintenance immunotherapy with durvalumab (last received on 2024); other medical conditions are: Hypertension,  hyperlipidemia, diabetes mellitus.  She was admitted 12/30/2024 for further evaluation and management of 2-day history of worsening shortness of breath.  She had denied chest pain, fever, chills or cough.  She had a recent hospital admission in December 2024 (12/9/24 through 12/12/2024) for community-acquired pneumonia of the left lower lobe associated with a large left pleural effusion and underwent a thoracentesis on 12/10/2024 that was transudative.  During that admission she received vancomycin, cefepime,ceftriaxone and azithromycin.  She was eventually discharged to receive a 5-day course of cefdinir.  On this current admission she was admitted to the ICU for acute hypoxic respiratory failure due to lung collapse from a large left pleural effusion.  Chest tube was inserted on 12/31/2024 she received IV vancomycin and cefepime along 1/2/2025.  Glucose was 140, LD 56, total protein 4.3, pH 8, , neutrophil: 2%.  Pleural fluid culture was positive for MRSA.  MRSA nares was negative.  She is receiving intravenous vancomycin.  ?  Infectious diseases service was consulted to evaluate the pt, and recommend further investigative and therapeutic measures.  Review and summary of old records:  ROS: Other systems reviewed Including eyes, ENT, respiratory, cardiovascular, GI, , dermatologic, neurologic, psych, hem/lymphatic, musculoskeletal and endocrine were negative other than what is mentioned above.     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

## 2025-01-06 NOTE — PROGRESS NOTES
V2.0  [facility] [place] Hospitalist Progress Note      Name:  Lin Bates /Age/Sex: 1955  (69 y.o. female)   MRN & CSN:  [id number] & [id number] Encounter Date/Time: 2025 12:27 PM EST    Location:  -A PCP: Dorothy Terrazas PA       Hospital Day: 8      Subjective:     Chief Complaint: SOB    Pt is doing ok today, although her breathing is slowly getting worse; had hard time sleeping.    Had long discuss with pt and , she wants to continue treatment and start chemo per oncology recs    Reached out to pulm regarding chest tube plans     Assessment and Plan:   Acute hypoxic respiratory failure 2/2  large left pleural effusion and associated complete left lung collapse.    Uncomplicated parapneumonic effusion with MRSA (sensitivities available) in pleural fluid from 2024 but per studies no concern for empyema   s/p chest tube in the ICU 2024 removed 1/3/2025  Hx of limited small lung cancer completed chemo now on immuno therapy  CXR 2025 changes suggestive of consolidation but per CT chest WO contrast 2025 just large left pleural effusion, changes suggestive of atypical pneumonia vs  interstitial edema vs lymphangitic carcinomatosis    - discussed w ctx; plan to hold off placing chest tube unless pt becomes SOB and plan to hold off pleurx catheter until infection is cleared  - await pulm recs regarding chest tube and bronchoscopy  - ID consulted who provided recs for abx while inpatient and on discharge  - wean O2 as tolerated  - started vancomycin  2025    limited stage small cell lung cancer   Completed chemo now on immunotherapy  CT chest on admission showed Large mediastinal lymph nodes  1.9 x 1.3 cm in anterior pretracheal location, and 2.0 x 1.4 cm in AP location.   Concern for disease progression  Had recent PET scan not resulted yet  - consulted hem/onc  - plan to start chemo once pt's is more stable     Hypotension - resolved.   BP has been in the 90's;  500 mg Oral Daily    sodium chloride flush  5-40 mL IntraVENous 2 times per day    atorvastatin  20 mg Oral Daily    aspirin  81 mg Oral Daily    folic acid  1 mg Oral Daily    insulin glargine  7 Units SubCUTAneous Nightly    empagliflozin  25 mg Oral Daily    [Held by provider] lisinopril  2.5 mg Oral Daily    midodrine  10 mg Oral TID WC    OLANZapine  5 mg Oral Nightly    pantoprazole  40 mg Oral QAM AC    sucralfate  1 g Oral 4x Daily AC & HS    sodium chloride flush  5-40 mL IntraVENous 2 times per day    enoxaparin  40 mg SubCUTAneous Daily    sennosides-docusate sodium  1 tablet Oral BID    insulin lispro  0-4 Units SubCUTAneous 4x Daily AC & HS      Infusions:    sodium chloride      sodium chloride      dextrose       PRN Meds: guaiFENesin, 200 mg, Q4H PRN  sodium chloride flush, 5-40 mL, PRN  sodium chloride, , PRN  sodium chloride flush, 5-40 mL, PRN  sodium chloride, , PRN  potassium chloride, 20 mEq, PRN   Or  potassium chloride, 10 mEq, PRN  magnesium sulfate, 2,000 mg, PRN  sodium phosphate 15 mmol in sodium chloride 0.9 % 250 mL IVPB, 15 mmol, PRN  ipratropium 0.5 mg-albuterol 2.5 mg, 1 Dose, Q4H PRN  ondansetron, 4 mg, Q8H PRN   Or  ondansetron, 4 mg, Q6H PRN  polyethylene glycol, 17 g, Daily PRN  acetaminophen, 650 mg, Q4H PRN   Or  acetaminophen, 650 mg, Q6H PRN  glucose, 4 tablet, PRN  dextrose bolus, 125 mL, PRN   Or  dextrose bolus, 250 mL, PRN  glucagon (rDNA), 1 mg, PRN  dextrose, , Continuous PRN  morphine, 2 mg, Q4H PRN        Labs      Recent Results (from the past 24 hour(s))   POCT Glucose    Collection Time: 01/05/25  5:14 PM   Result Value Ref Range    POC Glucose 161 (H) 74 - 99 mg/dL   POCT Glucose    Collection Time: 01/05/25  8:06 PM   Result Value Ref Range    POC Glucose 208 (H) 74 - 99 mg/dL   POCT Glucose    Collection Time: 01/06/25  8:01 AM   Result Value Ref Range    POC Glucose 147 (H) 74 - 99 mg/dL   CBC with Auto Differential    Collection Time: 01/06/25  9:40 AM    Result Value Ref Range    WBC 4.7 4.0 - 10.5 k/uL    RBC 2.97 (L) 4.20 - 5.40 m/uL    Hemoglobin 9.1 (L) 12.5 - 16.0 g/dL    Hematocrit 30.2 (L) 37.0 - 47.0 %    .7 (H) 78.0 - 100.0 fL    MCH 30.6 27.0 - 31.0 pg    MCHC 30.1 (L) 32.0 - 36.0 g/dL    RDW 16.3 (H) 11.7 - 14.9 %    Platelets 180 140 - 440 k/uL    MPV 9.6 7.5 - 11.1 fL    Neutrophils % 70 (H) 36 - 66 %    Lymphocytes % 7 (L) 24 - 44 %    Monocytes % 21 (H) 0 - 4 %    Eosinophils % 1 0 - 3 %    Basophils % 1 0 - 1 %    Immature Granulocytes % 0 0 %    Neutrophils Absolute 3.30 k/uL    Lymphocytes Absolute 0.31 k/uL    Monocytes Absolute 0.99 k/uL    Eosinophils Absolute 0.05 k/uL    Basophils Absolute 0.04 k/uL    Immature Granulocytes Absolute 0.02 k/uL   Basic Metabolic Panel w/ Reflex to MG    Collection Time: 01/06/25  9:40 AM   Result Value Ref Range    Sodium 138 136 - 145 mmol/L    Potassium 4.0 3.5 - 5.1 mmol/L    Chloride 101 99 - 110 mmol/L    CO2 31 21 - 32 mmol/L    Anion Gap 6 (L) 9 - 17 mmol/L    Glucose 209 (H) 74 - 99 mg/dL    BUN 10 7 - 20 mg/dL    Creatinine 0.6 0.6 - 1.2 mg/dL    Est, Glom Filt Rate >90 >60 mL/min/1.73m2    Calcium 9.5 8.3 - 10.6 mg/dL   Vancomycin,Random    Collection Time: 01/06/25  9:40 AM   Result Value Ref Range    Vancomycin Rm 21.9 (HH) 10 - 20 ug/mL    Vancomycin Random Dose amount Unknown     Vancomycin Random Date last dose UNK^Unknown^L     Vancomycin Random Time last dose UNK^Unknown^L    POCT Glucose    Collection Time: 01/06/25 11:37 AM   Result Value Ref Range    POC Glucose 162 (H) 74 - 99 mg/dL        Imaging/Diagnostics Last 24 Hours   XR CHEST PORTABLE    Addendum Date: 1/2/2025    Addendum: A chest tube is not definitively identified on this study. Electronically signed by Rayshawn Mittal    Result Date: 1/2/2025  Portable upright chest radiograph, 1 view. CLINICAL INDICATION: Chest tube evaluation. History of lung cancer. COMPARISON: 12/31/2024. FINDINGS: There is complete opacification of

## 2025-01-06 NOTE — PROGRESS NOTES
IR consult reviewed with Dr Mon, updated Dr. Desai and the charge nurse ICU, that Dr Mon doesn't want to proceed with pleurx cath placement with the infection in thora fluid.  Completing the IR consult, if we are needed please place a new consult.  Thank you.

## 2025-01-06 NOTE — PROGRESS NOTES
4 Eyes Skin Assessment     NAME:  Lin Bates  YOB: 1955  MEDICAL RECORD NUMBER:  [id number]    The patient is being assessed for  Transfer to New Unit    I agree that at least one RN has performed a thorough Head to Toe Skin Assessment on the patient. ALL assessment sites listed below have been assessed.      Areas assessed by both nurses:    Head, Face, Ears, Shoulders, Back, Chest, Arms, Elbows, Hands, Sacrum. Buttock, Coccyx, Ischium, Legs. Feet and Heels, and Under Medical Devices         Does the Patient have a Wound? No noted wound(s)       Giuseppe Prevention initiated by RN: No  Wound Care Orders initiated by RN: No    Pressure Injury (Stage 3,4, Unstageable, DTI, NWPT, and Complex wounds) if present, place Wound referral order by RN under : No    New Ostomies, if present place, Ostomy referral order under : No     Nurse 1 eSignature: Electronically signed by Anita Angulo RN on 1/6/25 at 3:34 PM EST    **SHARE this note so that the co-signing nurse can place an eSignature**    Nurse 2 eSignature: {Esignature:[id number]}

## 2025-01-07 ENCOUNTER — APPOINTMENT (OUTPATIENT)
Dept: GENERAL RADIOLOGY | Age: 70
DRG: 208 | End: 2025-01-07
Attending: INTERNAL MEDICINE
Payer: MEDICARE

## 2025-01-07 ENCOUNTER — APPOINTMENT (OUTPATIENT)
Dept: CT IMAGING | Age: 70
DRG: 208 | End: 2025-01-07
Attending: INTERNAL MEDICINE
Payer: MEDICARE

## 2025-01-07 PROBLEM — C34.90 SMALL CELL LUNG CANCER IN ADULT (HCC): Status: ACTIVE | Noted: 2025-01-07

## 2025-01-07 PROBLEM — A49.02 MRSA INFECTION: Status: ACTIVE | Noted: 2025-01-07

## 2025-01-07 LAB
ANION GAP SERPL CALCULATED.3IONS-SCNC: 6 MMOL/L (ref 9–17)
ANION GAP SERPL CALCULATED.3IONS-SCNC: 7 MMOL/L (ref 9–17)
ARTERIAL PATENCY WRIST A: ABNORMAL
ARTERIAL PATENCY WRIST A: ABNORMAL
BASOPHILS # BLD: 0.04 K/UL
BASOPHILS NFR BLD: 1 % (ref 0–1)
BODY TEMPERATURE: 37
BODY TEMPERATURE: 37
BUN SERPL-MCNC: 11 MG/DL (ref 7–20)
BUN SERPL-MCNC: 13 MG/DL (ref 7–20)
CALCIUM SERPL-MCNC: 9.5 MG/DL (ref 8.3–10.6)
CALCIUM SERPL-MCNC: 9.6 MG/DL (ref 8.3–10.6)
CHLORIDE SERPL-SCNC: 102 MMOL/L (ref 99–110)
CHLORIDE SERPL-SCNC: 103 MMOL/L (ref 99–110)
CO2 SERPL-SCNC: 31 MMOL/L (ref 21–32)
CO2 SERPL-SCNC: 34 MMOL/L (ref 21–32)
COHGB MFR BLD: 0.2 % (ref 0.5–1.5)
COHGB MFR BLD: 0.4 % (ref 0.5–1.5)
CREAT SERPL-MCNC: 0.6 MG/DL (ref 0.6–1.2)
CREAT SERPL-MCNC: 0.6 MG/DL (ref 0.6–1.2)
DATE LAST DOSE: NORMAL
EOSINOPHIL # BLD: 0.01 K/UL
EOSINOPHILS RELATIVE PERCENT: 0 % (ref 0–3)
ERYTHROCYTE [DISTWIDTH] IN BLOOD BY AUTOMATED COUNT: 16.2 % (ref 11.7–14.9)
GFR, ESTIMATED: >90 ML/MIN/1.73M2
GFR, ESTIMATED: >90 ML/MIN/1.73M2
GLUCOSE BLD-MCNC: 156 MG/DL (ref 74–99)
GLUCOSE SERPL-MCNC: 165 MG/DL (ref 74–99)
GLUCOSE SERPL-MCNC: 184 MG/DL (ref 74–99)
HCO3 VENOUS: 29.2 MMOL/L (ref 22–29)
HCO3 VENOUS: 30.4 MMOL/L (ref 22–29)
HCT VFR BLD AUTO: 31 % (ref 37–47)
HGB BLD-MCNC: 9.2 G/DL (ref 12.5–16)
IMM GRANULOCYTES # BLD AUTO: 0.05 K/UL
IMM GRANULOCYTES NFR BLD: 1 %
INR PPP: 1.1
LACTATE BLDV-SCNC: 1.1 MMOL/L (ref 0.4–2)
LDH FLD L TO P-CCNC: 38 U/L
LDH SERPL-CCNC: 46 U/L (ref 100–190)
LYMPHOCYTES NFR BLD: 0.61 K/UL
LYMPHOCYTES RELATIVE PERCENT: 10 % (ref 24–44)
MCH RBC QN AUTO: 31.4 PG (ref 27–31)
MCHC RBC AUTO-ENTMCNC: 29.7 G/DL (ref 32–36)
MCV RBC AUTO: 105.8 FL (ref 78–100)
METHEMOGLOBIN: 0.3 % (ref 0.5–1.5)
METHEMOGLOBIN: 0.3 % (ref 0.5–1.5)
MONOCYTES NFR BLD: 1.06 K/UL
MONOCYTES NFR BLD: 17 % (ref 0–4)
NEGATIVE BASE EXCESS, VEN: 1.1 MMOL/L (ref 0–3)
NEUTROPHILS NFR BLD: 72 % (ref 36–66)
NEUTS SEG NFR BLD: 4.52 K/UL
OXYHGB MFR BLD: 81.6 %
OXYHGB MFR BLD: 85.1 %
PARTIAL THROMBOPLASTIN TIME: 32 SEC (ref 25.1–37.1)
PCO2 VENOUS: 85.7 MM HG (ref 38–54)
PCO2 VENOUS: 85.8 MM HG (ref 38–54)
PH VENOUS: 7.15 (ref 7.32–7.43)
PH VENOUS: 7.17 (ref 7.32–7.43)
PLATELET # BLD AUTO: 194 K/UL (ref 140–440)
PMV BLD AUTO: 9.9 FL (ref 7.5–11.1)
PO2 VENOUS: 54.5 MM HG (ref 23–48)
PO2 VENOUS: 60.5 MM HG (ref 23–48)
POSITIVE BASE EXCESS, VEN: 0.2 MMOL/L (ref 0–3)
POTASSIUM SERPL-SCNC: 4.2 MMOL/L (ref 3.5–5.1)
POTASSIUM SERPL-SCNC: 4.3 MMOL/L (ref 3.5–5.1)
PROT FLD-MCNC: 3.4 G/DL
PROT SERPL-MCNC: 5.7 G/DL (ref 6.4–8.2)
PROTHROMBIN TIME: 14.3 SEC (ref 11.7–14.5)
RBC # BLD AUTO: 2.93 M/UL (ref 4.2–5.4)
SODIUM SERPL-SCNC: 141 MMOL/L (ref 136–145)
SODIUM SERPL-SCNC: 143 MMOL/L (ref 136–145)
SPECIMEN TYPE: NORMAL
SPECIMEN TYPE: NORMAL
TEXT FOR RESPIRATORY: ABNORMAL
TEXT FOR RESPIRATORY: ABNORMAL
TME LAST DOSE: NORMAL H
VANCOMYCIN DOSE: NORMAL MG
VANCOMYCIN SERPL-MCNC: 14.7 UG/ML (ref 10–20)
WBC OTHER # BLD: 6.3 K/UL (ref 4–10.5)

## 2025-01-07 PROCEDURE — 85730 THROMBOPLASTIN TIME PARTIAL: CPT

## 2025-01-07 PROCEDURE — 2500000003 HC RX 250 WO HCPCS: Performed by: INTERNAL MEDICINE

## 2025-01-07 PROCEDURE — 6360000002 HC RX W HCPCS: Performed by: RADIOLOGY

## 2025-01-07 PROCEDURE — 51702 INSERT TEMP BLADDER CATH: CPT

## 2025-01-07 PROCEDURE — 6370000000 HC RX 637 (ALT 250 FOR IP): Performed by: STUDENT IN AN ORGANIZED HEALTH CARE EDUCATION/TRAINING PROGRAM

## 2025-01-07 PROCEDURE — 84155 ASSAY OF PROTEIN SERUM: CPT

## 2025-01-07 PROCEDURE — 6360000002 HC RX W HCPCS: Performed by: STUDENT IN AN ORGANIZED HEALTH CARE EDUCATION/TRAINING PROGRAM

## 2025-01-07 PROCEDURE — 5A09357 ASSISTANCE WITH RESPIRATORY VENTILATION, LESS THAN 24 CONSECUTIVE HOURS, CONTINUOUS POSITIVE AIRWAY PRESSURE: ICD-10-PCS | Performed by: SPECIALIST

## 2025-01-07 PROCEDURE — 6360000002 HC RX W HCPCS: Performed by: INTERNAL MEDICINE

## 2025-01-07 PROCEDURE — 87075 CULTR BACTERIA EXCEPT BLOOD: CPT

## 2025-01-07 PROCEDURE — 85025 COMPLETE CBC W/AUTO DIFF WBC: CPT

## 2025-01-07 PROCEDURE — 71045 X-RAY EXAM CHEST 1 VIEW: CPT

## 2025-01-07 PROCEDURE — 94002 VENT MGMT INPAT INIT DAY: CPT

## 2025-01-07 PROCEDURE — 94660 CPAP INITIATION&MGMT: CPT

## 2025-01-07 PROCEDURE — 94761 N-INVAS EAR/PLS OXIMETRY MLT: CPT

## 2025-01-07 PROCEDURE — 99232 SBSQ HOSP IP/OBS MODERATE 35: CPT | Performed by: INTERNAL MEDICINE

## 2025-01-07 PROCEDURE — 2709999900 CT GUIDED CHEST TUBE

## 2025-01-07 PROCEDURE — 2580000003 HC RX 258: Performed by: INTERNAL MEDICINE

## 2025-01-07 PROCEDURE — 2700000000 HC OXYGEN THERAPY PER DAY

## 2025-01-07 PROCEDURE — 82805 BLOOD GASES W/O2 SATURATION: CPT

## 2025-01-07 PROCEDURE — 2500000003 HC RX 250 WO HCPCS: Performed by: STUDENT IN AN ORGANIZED HEALTH CARE EDUCATION/TRAINING PROGRAM

## 2025-01-07 PROCEDURE — 5A0935A ASSISTANCE WITH RESPIRATORY VENTILATION, LESS THAN 24 CONSECUTIVE HOURS, HIGH NASAL FLOW/VELOCITY: ICD-10-PCS | Performed by: SPECIALIST

## 2025-01-07 PROCEDURE — 36415 COLL VENOUS BLD VENIPUNCTURE: CPT

## 2025-01-07 PROCEDURE — 32557 INSERT CATH PLEURA W/ IMAGE: CPT | Performed by: RADIOLOGY

## 2025-01-07 PROCEDURE — 83605 ASSAY OF LACTIC ACID: CPT

## 2025-01-07 PROCEDURE — 85610 PROTHROMBIN TIME: CPT

## 2025-01-07 PROCEDURE — 84157 ASSAY OF PROTEIN OTHER: CPT

## 2025-01-07 PROCEDURE — 82962 GLUCOSE BLOOD TEST: CPT

## 2025-01-07 PROCEDURE — 83615 LACTATE (LD) (LDH) ENZYME: CPT

## 2025-01-07 PROCEDURE — 2580000003 HC RX 258: Performed by: STUDENT IN AN ORGANIZED HEALTH CARE EDUCATION/TRAINING PROGRAM

## 2025-01-07 PROCEDURE — 0W9B30Z DRAINAGE OF LEFT PLEURAL CAVITY WITH DRAINAGE DEVICE, PERCUTANEOUS APPROACH: ICD-10-PCS | Performed by: RADIOLOGY

## 2025-01-07 PROCEDURE — 99232 SBSQ HOSP IP/OBS MODERATE 35: CPT | Performed by: PHYSICIAN ASSISTANT

## 2025-01-07 PROCEDURE — 6360000002 HC RX W HCPCS

## 2025-01-07 PROCEDURE — 74018 RADEX ABDOMEN 1 VIEW: CPT

## 2025-01-07 PROCEDURE — 87077 CULTURE AEROBIC IDENTIFY: CPT

## 2025-01-07 PROCEDURE — 3E033XZ INTRODUCTION OF VASOPRESSOR INTO PERIPHERAL VEIN, PERCUTANEOUS APPROACH: ICD-10-PCS | Performed by: SPECIALIST

## 2025-01-07 PROCEDURE — 31500 INSERT EMERGENCY AIRWAY: CPT

## 2025-01-07 PROCEDURE — 87070 CULTURE OTHR SPECIMN AEROBIC: CPT

## 2025-01-07 PROCEDURE — 87205 SMEAR GRAM STAIN: CPT

## 2025-01-07 PROCEDURE — 5A1935Z RESPIRATORY VENTILATION, LESS THAN 24 CONSECUTIVE HOURS: ICD-10-PCS | Performed by: SPECIALIST

## 2025-01-07 PROCEDURE — 80202 ASSAY OF VANCOMYCIN: CPT

## 2025-01-07 PROCEDURE — 0B9J8ZX DRAINAGE OF LEFT LOWER LUNG LOBE, VIA NATURAL OR ARTIFICIAL OPENING ENDOSCOPIC, DIAGNOSTIC: ICD-10-PCS | Performed by: INTERNAL MEDICINE

## 2025-01-07 PROCEDURE — 32551 INSERTION OF CHEST TUBE: CPT

## 2025-01-07 PROCEDURE — 0BH17EZ INSERTION OF ENDOTRACHEAL AIRWAY INTO TRACHEA, VIA NATURAL OR ARTIFICIAL OPENING: ICD-10-PCS | Performed by: INTERNAL MEDICINE

## 2025-01-07 PROCEDURE — 6370000000 HC RX 637 (ALT 250 FOR IP): Performed by: INTERNAL MEDICINE

## 2025-01-07 PROCEDURE — 87186 SC STD MICRODIL/AGAR DIL: CPT

## 2025-01-07 PROCEDURE — 80048 BASIC METABOLIC PNL TOTAL CA: CPT

## 2025-01-07 PROCEDURE — 2000000000 HC ICU R&B

## 2025-01-07 RX ORDER — LIDOCAINE HYDROCHLORIDE 10 MG/ML
INJECTION, SOLUTION EPIDURAL; INFILTRATION; INTRACAUDAL; PERINEURAL PRN
Status: COMPLETED | OUTPATIENT
Start: 2025-01-07 | End: 2025-01-07

## 2025-01-07 RX ORDER — IPRATROPIUM BROMIDE AND ALBUTEROL SULFATE 2.5; .5 MG/3ML; MG/3ML
1 SOLUTION RESPIRATORY (INHALATION) ONCE
Status: DISCONTINUED | OUTPATIENT
Start: 2025-01-07 | End: 2025-01-07

## 2025-01-07 RX ORDER — ENOXAPARIN SODIUM 100 MG/ML
40 INJECTION SUBCUTANEOUS DAILY
Status: DISCONTINUED | OUTPATIENT
Start: 2025-01-08 | End: 2025-01-08

## 2025-01-07 RX ORDER — FENTANYL CITRATE-0.9 % NACL/PF 10 MCG/ML
25-200 PLASTIC BAG, INJECTION (ML) INTRAVENOUS CONTINUOUS
Status: DISCONTINUED | OUTPATIENT
Start: 2025-01-07 | End: 2025-01-08 | Stop reason: HOSPADM

## 2025-01-07 RX ORDER — MIDAZOLAM HYDROCHLORIDE 1 MG/ML
INJECTION, SOLUTION INTRAMUSCULAR; INTRAVENOUS
Status: COMPLETED
Start: 2025-01-07 | End: 2025-01-07

## 2025-01-07 RX ORDER — CHLORHEXIDINE GLUCONATE ORAL RINSE 1.2 MG/ML
15 SOLUTION DENTAL 2 TIMES DAILY
Status: DISCONTINUED | OUTPATIENT
Start: 2025-01-07 | End: 2025-01-08

## 2025-01-07 RX ORDER — PHENYLEPHRINE HYDROCHLORIDE 10 MG/ML
INJECTION INTRAVENOUS
Status: DISPENSED
Start: 2025-01-07 | End: 2025-01-08

## 2025-01-07 RX ORDER — MIDAZOLAM HYDROCHLORIDE 2 MG/2ML
2 INJECTION, SOLUTION INTRAMUSCULAR; INTRAVENOUS
Status: COMPLETED | OUTPATIENT
Start: 2025-01-07 | End: 2025-01-07

## 2025-01-07 RX ADMIN — SUCRALFATE 1 G: 1 TABLET ORAL at 11:47

## 2025-01-07 RX ADMIN — SUCRALFATE 1 G: 1 TABLET ORAL at 06:15

## 2025-01-07 RX ADMIN — LIDOCAINE HYDROCHLORIDE 5 ML: 10 INJECTION, SOLUTION EPIDURAL; INFILTRATION; INTRACAUDAL; PERINEURAL at 15:28

## 2025-01-07 RX ADMIN — ATORVASTATIN CALCIUM 20 MG: 10 TABLET, FILM COATED ORAL at 08:34

## 2025-01-07 RX ADMIN — FOLIC ACID 1 MG: 1 TABLET ORAL at 08:35

## 2025-01-07 RX ADMIN — Medication 25 MCG/HR: at 23:16

## 2025-01-07 RX ADMIN — PHENYLEPHRINE HYDROCHLORIDE 50 MCG/MIN: 10 INJECTION INTRAVENOUS at 23:27

## 2025-01-07 RX ADMIN — VANCOMYCIN HYDROCHLORIDE 1000 MG: 1 INJECTION, POWDER, LYOPHILIZED, FOR SOLUTION INTRAVENOUS at 12:05

## 2025-01-07 RX ADMIN — MIDODRINE HYDROCHLORIDE 10 MG: 5 TABLET ORAL at 11:47

## 2025-01-07 RX ADMIN — PANTOPRAZOLE SODIUM 40 MG: 40 TABLET, DELAYED RELEASE ORAL at 06:15

## 2025-01-07 RX ADMIN — ASPIRIN 81 MG: 81 TABLET, CHEWABLE ORAL at 08:35

## 2025-01-07 RX ADMIN — WATER 125 MG: 1 INJECTION INTRAMUSCULAR; INTRAVENOUS; SUBCUTANEOUS at 18:53

## 2025-01-07 RX ADMIN — ONDANSETRON 4 MG: 2 INJECTION INTRAMUSCULAR; INTRAVENOUS at 17:43

## 2025-01-07 RX ADMIN — MIDAZOLAM HYDROCHLORIDE 2 MG: 2 INJECTION, SOLUTION INTRAMUSCULAR; INTRAVENOUS at 22:50

## 2025-01-07 RX ADMIN — SODIUM CHLORIDE, PRESERVATIVE FREE 10 ML: 5 INJECTION INTRAVENOUS at 08:51

## 2025-01-07 RX ADMIN — EMPAGLIFLOZIN 25 MG: 10 TABLET, FILM COATED ORAL at 08:35

## 2025-01-07 RX ADMIN — MIDAZOLAM HYDROCHLORIDE 2 MG/HR: 5 INJECTION, SOLUTION INTRAMUSCULAR; INTRAVENOUS at 23:53

## 2025-01-07 RX ADMIN — MIDAZOLAM 2 MG: 1 INJECTION INTRAMUSCULAR; INTRAVENOUS at 22:50

## 2025-01-07 RX ADMIN — SUCRALFATE 1 G: 1 TABLET ORAL at 17:46

## 2025-01-07 RX ADMIN — HYDROXYUREA 500 MG: 100 TABLET, FILM COATED ORAL at 08:35

## 2025-01-07 RX ADMIN — SENNOSIDES AND DOCUSATE SODIUM 1 TABLET: 50; 8.6 TABLET ORAL at 08:35

## 2025-01-07 ASSESSMENT — PULMONARY FUNCTION TESTS
PIF_VALUE: 38
PIF_VALUE: 41

## 2025-01-07 ASSESSMENT — PAIN SCALES - GENERAL: PAINLEVEL_OUTOF10: 0

## 2025-01-07 NOTE — CONSULTS
Weekly port needle and dressing dressing change completed per protocol. Patient tolerated well.   Patient continues to meet the following requirement for continued central vascular access device placement: Limited/no vessel suitable for conventional peripheral access    Consult the Vascular Access Team for questions, concerns, or change in patient's condition.

## 2025-01-07 NOTE — PROGRESS NOTES
TRANSFER - OUT REPORT:    Verbal report given to DANI Hopkins on Lin Bates being transferred to 3(unit) for routine post-op.  Left-sided chest tube insertion performed by Dr. Mon.  Vitals WNL.  Pt was comfortable without sedation.  Dressing intact.  Sample obtained and sent to lab.       Report consisted of patient's Situation, Background, Assessment and   Recommendations(SBAR).     Information from the following report(s) Nurse Handoff Report was reviewed with the receiving nurse.    Opportunity for questions and clarification was provided.      Patient transported with:   Monitor  Registered Nurse

## 2025-01-07 NOTE — PROGRESS NOTES
Hematology Oncology Inpatient Consult    Patient Name:  Lin Bates  Patient :  1955  Patient MRN:  [id number]  Room: [address] [place] [address]   Admitted: 2024 10:39 PM   Hospital Attending Provider: Marcin Desai MD    Primary Oncologist: Dr. Burt Birch MD  PCP:  Dortohy Terrazas PA     Date of Service: 25       Reason for Consult: Small cell lung cancer and essential thrombocytosis.      Chief Complaint:  Shortness of breath    Principal Problem:    Recurrent left pleural effusion  Active Problems:    Small cell lung cancer, left lower lobe (HCC)    Moderate malnutrition (HCC)    Acute respiratory failure with hypoxia    Collapse of left lung  Resolved Problems:    * No resolved hospital problems. *      HPI:   Lin Bates is a 69 y.o. female with medical history significant for hypertension, hyperlipidemia, diabetes mellitus, history of HPV infection who was admitted for SOB and was found to have recurrent left pleural effusion with complete collapse   She is well known to our practice for MPL positive essential thrombocytosis, on hydrea 500 mg daily, and limited stage small cell lung cancer s/p definitive CRT and WBRT, currently on maintenance immunotherapy with durvalumab (last tx on 24). She was recently admitted in 2024 due to LLL CAP with associated large pleural effusion that was drained.      Today, patient is lying in bed sleeping in no acute distress on 4L NC O2.  She awakes easily but states that she is very tired.  Her breathing is a little easier but she still feels short of breath.  She denies fever, chest pain, increased cough, nausea, abdominal pain or dysuria.  She has been ambulating with assistance to the bathroom.  Patient quit smoking in 24.  She denies alcohol or illicit drug use. Patient had benign mammogram 24.      WBC 8.7, Hg 9.5, MCV 99.0, Plt 232    Chest xray, 24:  IMPRESSION:     There is now complete opacification of the left hemithorax,  WBC 4.7 01/06/2025    HGB 9.1 (L) 01/06/2025    HCT 30.2 (L) 01/06/2025    .7 (H) 01/06/2025     01/06/2025    LYMPHOPCT 7 (L) 01/06/2025    RBC 2.97 (L) 01/06/2025    MCH 30.6 01/06/2025    MCHC 30.1 (L) 01/06/2025    RDW 16.3 (H) 01/06/2025           Lab Results   Component Value Date    INR 1.1 01/07/2025    PROTIME 14.3 01/07/2025     Lab Results   Component Value Date     01/07/2025    K 4.2 01/07/2025     01/07/2025    CO2 34 (H) 01/07/2025    BUN 11 01/07/2025    CREATININE 0.6 01/07/2025    GLUCOSE 165 (H) 01/07/2025    CALCIUM 9.5 01/07/2025    PHOS 3.2 01/05/2025    MG 1.9 01/05/2025    BILITOT <0.2 01/05/2025    ALKPHOS 73 01/05/2025    AST 15 01/05/2025    ALT 6 (L) 01/05/2025    LABGLOM >90 01/07/2025    GFRAA >60 03/09/2020    GLOB 3.1 09/25/2024    POCGLU 262 (H) 01/06/2025     Lab Results   Component Value Date    ALKPHOS 73 01/05/2025    ALT 6 (L) 01/05/2025    AST 15 01/05/2025    BILITOT <0.2 01/05/2025     No results found for: \"URICACID\"  Lab Results   Component Value Date    LDH 46 (L) 01/07/2025     Lab Results   Component Value Date    IRON 69 05/18/2024    TIBC 187 (L) 05/18/2024    FERRITIN 829 (H) 05/18/2024       Imaging:  XR CHEST PORTABLE   Final Result   FINDINGS/IMPRESSION:      Redemonstration of complete opacification of the left hemithorax, grossly   unchanged from prior. Suspected tiny right pleural effusion is unchanged from   prior. Mild increase interstitial opacities in the right lung is again noted.   The right-sided port catheter is stable in position.      The cardiomediastinal silhouette is obscured.. There are degenerative changes of   the spine and the shoulder joints.              Electronically signed by Samuel Dinero      CT CHEST WO CONTRAST   Final Result   Interval worsened vascular congestion/pulmonary edema of the right chest.   Otherwise examination is not significantly changed.      Electronically signed by Santiago Nunez MD       XR CHEST PORTABLE   Final Result   Addendum (preliminary) 1 of 1   Addendum: A chest tube is not definitively identified on this study.      Electronically signed by Rayhsawn Mittal      Final      There is now complete opacification of the left hemithorax, which could be due   to combination of airspace consolidation/pneumonia and moderate to large pleural   effusion. Progression of underlying malignancy/neoplasm in this patient with   history of lung cancer would also be in the differential diagnosis.      There may be a tiny right pleural effusion. Some coarse interstitial   markings/pulmonary vascular congestion of the right lung is nonspecific and   could be due to interstitial edema or atypical pneumonia. Lymphangitic   carcinomatosis would also be a differential possibility. Recommend continued   radiologic follow-up.      Similar position of right [id number].      Electronically signed by Rayshawn Mittal      XR CHEST PORTABLE   Final Result   1. Total opacification of the left hemithorax by pleural fluid.   2. Therapeutic devices are demonstrated as noted above.   3. Prominent right lung vasculature.         Electronically signed by Jaswant Hilliard      XR CHEST PORTABLE   Final Result      Interval placement of a left-sided chest tube which is poorly visualized, and   appears to extend horizontally from the chest wall and terminate near the left   lung zone. Right chest port with tip at the superior caval junction, unchanged.      Similar appearance of the lungs, with opacity throughout the left hemithorax.       Electronically signed by Elie Gold MD      XR CHEST PORTABLE    (Results Pending)       Assessment/Plan:    She is known to us for limited stage small cell lung cancer and essential thrombocytosis.     She is on maintenance immunotherapy and we will hold it until she gets better. Her last treatment was 12/18/24.      Patient having chest tube placed later today.  Pleural fluid cytology

## 2025-01-07 NOTE — PROGRESS NOTES
Infectious Disease Progress Note  2025   Patient Name: Lin Bates : 1955     Assessment  Acute hypoxic respiratory failure secondary to large left pleural malignant effusion due to small cell lung cancer  Possible MRSA infected exudative pleural effusion  Admitted with a 3-day history of shortness of breath and found to have left lung collapse due to large pleural effusion requiring a chest tube.    Pleural fluid total protein is 4.3 g/dL, Procalcitonin 0.65,  Pleural fluid culture has rare growth of MRSA. Source is unknown since MRSA nares was negative. Possibility of contamination remains but due her immunocompromised state and immune-altering effect of durvalumab treatment is warranted.   Treat with antimicrobial agents for 4 weeks  MPL essential thrombocytosis  Comorbid conditions:      Plan  Therapeutic:  Continue IV vancomycin while in the hospital  May discharge on linezolid 600 mg po bid for 4 weeks  Diagnostic:  Weekly CBC to monitor for bone marrow depression.  CRP   F/u:  Other:   ID outpatient follow up in 1 week  Discussed with patient's  Matt at the bedside; that considering hospice.    Reason for visit: F/u acute hypoxic respiratory failure secondary to large left pleural malignant effusion; pleural effusion MRSA infection  History:?Interval history noted  Denies n/v/d/f or untoward effects of antimicrobials  Physical Exam:  Vital Signs: /60   Pulse (!) 122   Temp 98.1 °F (36.7 °C) (Oral)   Resp 22   Ht 1.549 m (5' 0.98\")   Wt 59.6 kg (131 lb 6.3 oz)   SpO2 97%   BMI 24.84 kg/m²     Gen: Somnolent but arousable  Skin: no stigmata of endocarditis  Wounds: C/D/I  HEMT: AT/NC Oropharynx pink, moist, and without lesions or exudates;   Eyes: PERRLA, EOMI, conjunctiva pink, sclera anicteric.   Neck: Supple. Trachea midline. No LAD.  Chest: Reduced air entry in bilateral lung zones  Heart: RRR and no MRG.   Abd: soft, non-distended, no tenderness, no hepatomegaly.  Normoactive bowel sounds.  Ext: no clubbing, cyanosis, or edema  Catheter Site: without erythema or tenderness  LDA:   Neuro: Somnolent     Radiologic / Imaging / TESTING  No results found.     Labs:    Recent Results (from the past 24 hour(s))   Vancomycin,Random    Collection Time: 01/06/25  3:00 PM   Result Value Ref Range    Vancomycin Rm 28.9 (HH) 10 - 20 ug/mL    Vancomycin Random Dose amount Unknown     Vancomycin Random Date last dose UNK^Unknown^L     Vancomycin Random Time last dose UNK^Unknown^L    POCT Glucose    Collection Time: 01/06/25  4:16 PM   Result Value Ref Range    POC Glucose 215 (H) 74 - 99 mg/dL   POCT Glucose    Collection Time: 01/06/25  7:55 PM   Result Value Ref Range    POC Glucose 262 (H) 74 - 99 mg/dL   Vancomycin,Random    Collection Time: 01/07/25  8:00 AM   Result Value Ref Range    Vancomycin Rm 14.7 10 - 20 ug/mL    Vancomycin Random Dose amount Unknown     Vancomycin Random Date last dose UNK^Unknown^L     Vancomycin Random Time last dose UNK^Unknown^L    Basic Metab w/rfx MG    Collection Time: 01/07/25  8:00 AM   Result Value Ref Range    Sodium 143 136 - 145 mmol/L    Potassium 4.2 3.5 - 5.1 mmol/L    Chloride 103 99 - 110 mmol/L    CO2 34 (H) 21 - 32 mmol/L    Anion Gap 6 (L) 9 - 17 mmol/L    Glucose 165 (H) 74 - 99 mg/dL    BUN 11 7 - 20 mg/dL    Creatinine 0.6 0.6 - 1.2 mg/dL    Est, Glom Filt Rate >90 >60 mL/min/1.73m2    Calcium 9.5 8.3 - 10.6 mg/dL   Protime-INR    Collection Time: 01/07/25  8:00 AM   Result Value Ref Range    Protime 14.3 11.7 - 14.5 sec    INR 1.1    APTT    Collection Time: 01/07/25  8:00 AM   Result Value Ref Range    APTT 32.0 25.1 - 37.1 sec   Protein, Total    Collection Time: 01/07/25  8:00 AM   Result Value Ref Range    Total Protein 5.7 (L) 6.4 - 8.2 g/dL   Lactate Dehydrogenase    Collection Time: 01/07/25  8:00 AM   Result Value Ref Range    LD 46 (L) 100 - 190 U/L     CULTURE results: Invalid input(s): \"BLOOD CULTURE\", \"URINE CULTURE\",

## 2025-01-07 NOTE — SIGNIFICANT EVENT
Called to the bedside by primary hospitalist to assess patient for shortness of breath and hypoxia.    Patient tachypneic.  Noted to be lethargic.  On 10 L oxygen satting in the low 90s.  Stat blood gas obtained which reveals respiratory acidosis with a pH of 7.16/85.      On my exam patient is slightly lethargic but reports she is still full code.  Boyfriend at bedside.  Unable to get a hold of daughter with a surrogate decision maker at this time.    Chest x-ray obtained does not show pneumothorax.  Give Solu-Medrol 125 mg.  DuoNeb breathing treatment once.  Started on BiPAP.  Given lack of improvement on chest x-ray with chest tube placement and fluid removal suspicion of also of mucous plug as well.  VBG within 2 hours.  Discussed with ICU NP, nighttime hospitalist.  They will follow-up shortly to reassess the patient.    Discussed with bedside RN.

## 2025-01-07 NOTE — CARE COORDINATION
1435: Met with pt and pt's  at bedside. Per , pt has been minimally responsive today and hasn't eaten anything. Spouse calling pt's name and pt is not responding. Spouse states that Dr. Birch and Dr. Birch's nurse were in to see pt earlier and hospice was recommended for pt. Pt stated that she was tired. Spouse thinks she may be done fighting and says pt had a bad night last night. Pt going to get CT placed today to remove fluid and spouse is wondering if she will feel better after that. Will follow up with pt and spouse tomorrow. Plan is home with home care vs hospice.

## 2025-01-07 NOTE — PROGRESS NOTES
PHARMACY VANCOMYCIN MONITORING SERVICE  Pharmacy consulted by Dr. Desai for monitoring and adjustment.    Indication for treatment: Vancomycin indication: HAP  Goal trough: Trough Goal: 15-20 mcg/mL  AUC/GABO: 400-600    Risk Factors for MRSA Identified:   Hospitalization within the past 90 days    Pertinent Laboratory Values:   Temp Readings from Last 3 Encounters:   01/07/25 97.9 °F (36.6 °C) (Axillary)   12/30/24 97.5 °F (36.4 °C) (Oral)   12/18/24 98 °F (36.7 °C) (Infrared)     Recent Labs     01/05/25  1030 01/06/25  0940   WBC 4.2 4.7     Recent Labs     01/05/25  1030 01/06/25  0940   BUN 10 10   CREATININE 0.6 0.6     Estimated Creatinine Clearance: 73 mL/min (based on SCr of 0.6 mg/dL).    Intake/Output Summary (Last 24 hours) at 1/7/2025 0813  Last data filed at 1/6/2025 0937  Gross per 24 hour   Intake 360 ml   Output --   Net 360 ml     In: 360   Out: -     Last Encounter Weight:  Wt Readings from Last 3 Encounters:   01/05/25 59.6 kg (131 lb 6.3 oz)   12/30/24 59.4 kg (131 lb)   12/18/24 59.3 kg (130 lb 12.8 oz)       Pertinent Cultures:   Date    Source   Results  12/31   Pleural fluid  MRSA rare growth (vanc sensi)  01/02   Sputum/Respiratory Rare GNR, GNC  01/02   MRSA Nasal  Negative       Vancomycin level:   Recent Labs     01/06/25  0940 01/06/25  1500 01/07/25  0800   VANCORANDOM 21.9* 28.9* 14.7       Assessment:  HPI: 69 y.o. female presented to the ED for evaluation of SOB. Patient reported worsening SOB over the last 2 days. Patient was admitted earlier in December and treated for pneumonia with antibiotics, had a pleural effusion that was drained. In [facility] ED she was hemodynamically stable, required 2 L oxygen. Chest x-ray showed left white line, thoracentesis attempted by the ED physician but unsuccessful.  PMHx hypertension and hyperlipidemia, diabetes mellitus, lung carcinoma.  SCr, BUN, and urine output:   Scr 0.6 mg/dL  BUN WNL  UOP documented as 0.5 mL/kg/hr yesterday  Day(s) of  therapy: 4   Vancomycin concentration:   01/06 - 21.9 mg/L, unusable level collected directly after start of infusion  01/06 - 28.9 mg/L, 5.5 hours after last dose of vancomycin 750 mg IV q12hr  01/07 - 14.7 mg/L, 22.5 hours post dose of 750 mg IV x once    Plan:  Renal function remains stable  Will start vancomycin 1000 mg IV q24h  Per ID note, plan to continue vancomycin while in hospital then plan to discharge with 4 weeks of Zyvox  Pharmacy will continue to monitor patient and adjust therapy as indicated    VANCOMYCIN CONCENTRATION SCHEDULED FOR 01/08 @ 0600    Thank you for the consult.  Yuliya Amato RPH  1/7/2025 8:13 AM

## 2025-01-07 NOTE — PROGRESS NOTES
Physician Progress Note      PATIENT:               GIOVANI FLORES  CSN #:                  [id number]  :                       1955  ADMIT DATE:       2024 10:39 PM  DISCH DATE:  RESPONDING  PROVIDER #:        Marcin Desai MD          QUERY TEXT:    Patient admitted with pleural effusion.  Per H&P: left large pleural effusion,   likely malignant.  No further documentation of malignant pleural effusion by   attending following H&P.  Per   Oncology consult: left pleural effusion   could be reactive or malignant.  Per  ID Consult: large left pleural   malignant effusion due to small cell lung cancer.  If possible, please   document in the progress notes and discharge summary if left pleural effusion   is malignant or nonmalignant    The medical record reflects the following:  Risk Factors: 69 y.o. female with hx of HTN, HLD, lung cancer, DM  Clinical Indicators: Per H&P: reported worsening shortness of breath over last   2 days. has hx of lung cancer.  She had a pleural effusion that was drained   in 2024.  Treatment: Oncology consult, ID consult, Imaging  Options provided:  -- Left pleural effusion likely malignant  -- Left pleural effusion likely non-malignant  -- Other - I will add my own diagnosis  -- Disagree - Not applicable / Not valid  -- Disagree - Clinically unable to determine / Unknown  -- Refer to Clinical Documentation Reviewer    PROVIDER RESPONSE TEXT:    Left pleural effusion likely malignant    Query created by: Kodi Alanis on 2025 7:16 PM      Electronically signed by:  Marcin Desai MD 2025 12:23 PM

## 2025-01-07 NOTE — PROGRESS NOTES
Pulmonary and Critical Care  Progress Note      VITALS:  /60   Pulse (!) 122   Temp 98.1 °F (36.7 °C) (Oral)   Resp 22   Ht 1.549 m (5' 0.98\")   Wt 59.6 kg (131 lb 6.3 oz)   SpO2 97%   BMI 24.84 kg/m²     Subjective:   CHIEF COMPLAINT :SOB     HPI:                The patient is a 69 y.o. female is lying in the bed. She is in mild resp distress    Objective:   PHYSICAL EXAM:    LUNGS:Occasional basal crackles  Abd-soft, BS+,NT  Ext- no pedal edema  CVS-s1s2 no murmurs      DATA:    CBC:  Recent Labs     01/05/25  1030 01/06/25  0940   WBC 4.2 4.7   RBC 2.90* 2.97*   HGB 8.9* 9.1*   HCT 29.3* 30.2*    180   .0* 101.7*   MCH 30.7 30.6   MCHC 30.4* 30.1*   RDW 16.3* 16.3*      BMP:  Recent Labs     01/05/25  1030 01/06/25  0940 01/07/25  0800    138 143   K 3.8 4.0 4.2    101 103   CO2 29 31 34*   BUN 10 10 11   CREATININE 0.6 0.6 0.6   CALCIUM 9.1 9.5 9.5   GLUCOSE 185* 209* 165*      ABG:  No results for input(s): \"PH\", \"PO2ART\", \"KPZ6LBW\", \"HCO3\", \"BEART\", \"O2SAT\" in the last 72 hours.  BNP  No results found for: \"BNP\"   D-Dimer:  Lab Results   Component Value Date    DDIMER 4.47 (H) 09/25/2024      Radiology: 1. Interval placement of left-sided chest tube residing inferiorly and laterally   in the left hemithorax.  2. Otherwise similar appearance of the chest with extensive consolidation and   opacification of the left greater than right hemithorax.      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 Metastatic small cell lung cancer.  2. Chronic obstructive pulmonary disease.  3. Methicillin-resistant Staphylococcus aureus empyema, left lung.  Left malignant Pleural effusion s/p left chest tube     Abx  F/u C&S  Inhalers  C/w left chest tube  ICS  OOB  Nutritional optimization  Palliative care eval  Prognosis guarded  C/w present management    Electronically signed by Wilman Chong MD on 1/7/2025 at 1:17 PM

## 2025-01-07 NOTE — PROGRESS NOTES
V2.0  [facility] Hospitalist Progress Note      Name:  Lin Bates /Age/Sex: 1955  (69 y.o. female)   MRN & CSN:  [id number] & [id number] Encounter Date/Time: 2025 12:27 PM EST    [facility] PCP: Dorothy Terrazas PA       Hospital Day: 9      Subjective:     Chief Complaint: SOB    Pt is having worsening of breathing. Now on 4L NC  Had a tough night.   Pt is now contemplating hospice; plan to have discussion w oncology tomorrow.    Assessment and Plan:   Acute hypoxic respiratory failure 2/2  large left pleural effusion and associated complete left lung collapse.    Uncomplicated parapneumonic effusion with MRSA (sensitivities available) in pleural fluid from 2024 but per studies no concern for empyema   s/p chest tube in the ICU 2024 removed 1/3/2025  Hx of limited small lung cancer completed chemo now on immuno therapy but now has progression of disease  CXR 2025 changes suggestive of consolidation but per CT chest WO contrast 2025 just large left pleural effusion, changes suggestive of atypical pneumonia vs  interstitial edema vs lymphangitic carcinomatosis -   - pulm consulted; recommended chest tube placement and repeat fluid studies.   - ID consulted who provided recs for abx while inpatient and on discharge  - f/u fluid studies  - wean O2 as tolerated  - started vancomycin  2025  - pt is considering hospice; may benefit from palliative plurx catheter before discharge if repeat studies negative for infection; discussed plan with oncology    limited stage small cell lung cancer  now extended   Completed chemo now on immunotherapy  CT chest on admission showed Large mediastinal lymph nodes  1.9 x 1.3 cm in anterior pretracheal location, and 2.0 x 1.4 cm in AP location.   Had recent PET scan not resulted yet  - consulted hem/onc  - pt is now considering hospice rather than starting chemo     Hypotension - resolved.   BP has been in the 90's; per chart    Vancomycin,Random    Collection Time: 01/07/25  8:00 AM   Result Value Ref Range    Vancomycin Rm 14.7 10 - 20 ug/mL    Vancomycin Random Dose amount Unknown     Vancomycin Random Date last dose UNK^Unknown^L     Vancomycin Random Time last dose UNK^Unknown^L    Basic Metab w/rfx MG    Collection Time: 01/07/25  8:00 AM   Result Value Ref Range    Sodium 143 136 - 145 mmol/L    Potassium 4.2 3.5 - 5.1 mmol/L    Chloride 103 99 - 110 mmol/L    CO2 34 (H) 21 - 32 mmol/L    Anion Gap 6 (L) 9 - 17 mmol/L    Glucose 165 (H) 74 - 99 mg/dL    BUN 11 7 - 20 mg/dL    Creatinine 0.6 0.6 - 1.2 mg/dL    Est, Glom Filt Rate >90 >60 mL/min/1.73m2    Calcium 9.5 8.3 - 10.6 mg/dL   Protime-INR    Collection Time: 01/07/25  8:00 AM   Result Value Ref Range    Protime 14.3 11.7 - 14.5 sec    INR 1.1    APTT    Collection Time: 01/07/25  8:00 AM   Result Value Ref Range    APTT 32.0 25.1 - 37.1 sec   Protein, Total    Collection Time: 01/07/25  8:00 AM   Result Value Ref Range    Total Protein 5.7 (L) 6.4 - 8.2 g/dL   Lactate Dehydrogenase    Collection Time: 01/07/25  8:00 AM   Result Value Ref Range    LD 46 (L) 100 - 190 U/L        Imaging/Diagnostics Last 24 Hours   XR CHEST PORTABLE    Addendum Date: 1/2/2025    Addendum: A chest tube is not definitively identified on this study. Electronically signed by Rayshawn Mittal    Result Date: 1/2/2025  Portable upright chest radiograph, 1 view. CLINICAL INDICATION: Chest tube evaluation. History of lung cancer. COMPARISON: 12/31/2024. FINDINGS: There is complete opacification of the left hemithorax. The cardiomediastinal silhouette is grossly unchanged, although evaluation is limited. Bones are osteopenic. Moderate to severe degenerative change of the glenohumeral joints. A right [id number] remains in place. No pneumothorax. There may be a tiny right pleural effusion. Interstitial opacities project over the right hemithorax.     There is now complete opacification of the left  hemithorax, which could be due to combination of airspace consolidation/pneumonia and moderate to large pleural effusion. Progression of underlying malignancy/neoplasm in this patient with history of lung cancer would also be in the differential diagnosis. There may be a tiny right pleural effusion. Some coarse interstitial markings/pulmonary vascular congestion of the right lung is nonspecific and could be due to interstitial edema or atypical pneumonia. Lymphangitic carcinomatosis would also be a differential possibility. Recommend continued radiologic follow-up. Similar position of right [id number]. Electronically signed by Rayshawn Mittal      Electronically signed by Marcin Desai MD on 1/7/2025 at 2:49 PM

## 2025-01-07 NOTE — PRE SEDATION
Sedation Pre-Procedure Note    Patient Name: Lin Bates   YOB: 1955  Room/Bed: 3130/3130-A  Medical Record Number: [id number]  Date: 1/7/2025   Time: 2:51 PM       Indication:  CT chest tube    Consent: I have discussed with the patient and/or the patient representative the indication, alternatives, and the possible risks and/or complications of the planned procedure and the anesthesia methods. The patient and/or patient representative appear to understand and agree to proceed.    Vital Signs:   Vitals:    01/07/25 1200   BP: 109/60   Pulse: (!) 122   Resp:    Temp:    SpO2:        Past Medical History:   has a past medical history of Cancer (HCC), History of external beam radiation therapy, Hyperlipidemia, Hypertension, Thickened endometrium, and Type 2 diabetes mellitus without complication (HCC).    Past Surgical History:   has a past surgical history that includes Dental surgery; Dilation and curettage of uterus (N/A, 3/22/2023); CT NEEDLE BIOPSY LUNG PERCUTANEOUS (1/30/2024); Port Surgery (N/A, 4/10/2024); Esophagoscopy (N/A, 5/23/2024); IR CHEST TUBE INSERTION (10/3/2024); and IR BIOPSY THYROID PERC CORE NEEDLE (11/21/2024).    Medications:   Scheduled Meds:    vancomycin  1,000 mg IntraVENous Q24H    tiotropium-olodaterol  2 puff Inhalation Daily    hydroxyurea  500 mg Oral Daily    sodium chloride flush  5-40 mL IntraVENous 2 times per day    atorvastatin  20 mg Oral Daily    aspirin  81 mg Oral Daily    folic acid  1 mg Oral Daily    insulin glargine  7 Units SubCUTAneous Nightly    empagliflozin  25 mg Oral Daily    [Held by provider] lisinopril  2.5 mg Oral Daily    midodrine  10 mg Oral TID WC    OLANZapine  5 mg Oral Nightly    pantoprazole  40 mg Oral QAM AC    sucralfate  1 g Oral 4x Daily AC & HS    sodium chloride flush  5-40 mL IntraVENous 2 times per day    [Held by provider] enoxaparin  40 mg SubCUTAneous Daily    sennosides-docusate sodium  1 tablet Oral BID    insulin lispro  every morning (before breakfast) 5/24/24   Denis Ford MD   lidocaine viscous hcl (XYLOCAINE) 2 % SOLN solution Take 15 mLs by mouth every 3 hours as needed for Irritation 4/26/24   Burt Birch MD   OLANZapine (ZYPREXA) 5 MG tablet Take 1 tablet by mouth nightly Take one tablet at bedtime for four nights starting the night before chemotherapy. 2/12/24   Burt Birch MD   vitamin B-12 (CYANOCOBALAMIN) 1000 MCG tablet Take 1 tablet by mouth daily 1/16/24   Burt Birch MD   JARDIANCE 25 MG tablet  11/27/23   Julieth Roche MD   meclizine (ANTIVERT) 25 MG tablet  7/27/23   Julieth Roche MD   ferrous sulfate (IRON 325) 325 (65 Fe) MG tablet Take 1 tablet by mouth daily (with breakfast)    Julieth Roche MD   aspirin 81 MG EC tablet Take 1 tablet by mouth daily    Julieth Roche MD   metFORMIN (GLUCOPHAGE) 1000 MG tablet Take 1 tablet by mouth 2 times daily (with meals) 9/12/22   Dorothy Terrazas PA   lisinopril (PRINIVIL;ZESTRIL) 2.5 MG tablet Take 1 tablet by mouth daily    Julieth Roche MD   glipiZIDE (GLUCOTROL) 2.5 MG CR tablet Take 1 mg by mouth 2 times daily    Julieth Roche MD   atorvastatin (LIPITOR) 20 MG tablet Take 1 tablet by mouth daily    Julieth Roche MD     Coumadin Use Last 7 Days:  no  Antiplatelet drug therapy use last 7 days: no  Other anticoagulant use last 7 days: no  Additional Medication Information:  none      Pre-Sedation Documentation and Exam:   Vital signs have been reviewed (see flow sheet for vitals).    Mallampati Airway Assessment:  Mallampati Class II - (soft palate, fauces & uvula are visible)    Prior History of Anesthesia Complications:   none    ASA Classification:  Class 4 - A patient with an incapacitating systemic disease that is a constant threat to life    Sedation/ Anesthesia Plan:   intravenous sedation    Medications Planned:   midazolam (Versed) intravenously and fentanyl intravenously    Patient is an

## 2025-01-07 NOTE — BRIEF OP NOTE
Department of Interventional Radiology Post-Procedure Note      Date: 1/7/2025     Interventional Radiologist: Elieser    Procedure Performed:  CT left chest tube    H&P Status: H&P was reviewed, the patient was examined and no change has occurred in patient's condition since H&P was completed.    Pre-procedure Diagnosis:  recurrent left pleural effusion    Post-procedure Diagnosis:  Same    Sedation:  none    Contrast used:  none    Estimated blood loss:  Minimal    Preliminary Findings:  Successful 12 fr    Complications:  none    Full Report to Follow.    Electronically signed by Zoë Mon MD on 1/7/2025 at 3:54 PM

## 2025-01-08 VITALS
DIASTOLIC BLOOD PRESSURE: 53 MMHG | WEIGHT: 145.5 LBS | TEMPERATURE: 100.4 F | OXYGEN SATURATION: 98 % | BODY MASS INDEX: 27.47 KG/M2 | HEIGHT: 61 IN | SYSTOLIC BLOOD PRESSURE: 102 MMHG

## 2025-01-08 PROBLEM — J90 PLEURAL EFFUSION: Status: ACTIVE | Noted: 2025-01-08

## 2025-01-08 PROBLEM — J18.9 PNEUMONIA OF LEFT UPPER LOBE DUE TO INFECTIOUS ORGANISM: Status: ACTIVE | Noted: 2025-01-08

## 2025-01-08 LAB
ANION GAP SERPL CALCULATED.3IONS-SCNC: 14 MMOL/L (ref 9–17)
ARTERIAL PATENCY WRIST A: ABNORMAL
BODY TEMPERATURE: 37
BUN SERPL-MCNC: 18 MG/DL (ref 7–20)
CALCIUM SERPL-MCNC: 10.5 MG/DL (ref 8.3–10.6)
CHLORIDE SERPL-SCNC: 109 MMOL/L (ref 99–110)
CO2 SERPL-SCNC: 27 MMOL/L (ref 21–32)
COHGB MFR BLD: 0.3 % (ref 0.5–1.5)
CREAT SERPL-MCNC: 0.9 MG/DL (ref 0.6–1.2)
DATE LAST DOSE: ABNORMAL
GFR, ESTIMATED: 63 ML/MIN/1.73M2
GLUCOSE BLD-MCNC: 146 MG/DL (ref 74–99)
GLUCOSE BLD-MCNC: 151 MG/DL (ref 74–99)
GLUCOSE BLD-MCNC: 153 MG/DL (ref 74–99)
GLUCOSE BLD-MCNC: 207 MG/DL (ref 74–99)
GLUCOSE BLD-MCNC: 219 MG/DL (ref 74–99)
GLUCOSE SERPL-MCNC: 165 MG/DL (ref 74–99)
HCO3 VENOUS: 28.4 MMOL/L (ref 22–29)
INR PPP: 1.1
METHEMOGLOBIN: 0.2 % (ref 0.5–1.5)
OXYHGB MFR BLD: 60 %
PCO2 VENOUS: 46.4 MM HG (ref 38–54)
PH VENOUS: 7.41 (ref 7.32–7.43)
PO2 VENOUS: 28.6 MM HG (ref 23–48)
POSITIVE BASE EXCESS, VEN: 3.2 MMOL/L (ref 0–3)
POTASSIUM SERPL-SCNC: 4.2 MMOL/L (ref 3.5–5.1)
PROTHROMBIN TIME: 15.1 SEC (ref 11.7–14.5)
SODIUM SERPL-SCNC: 150 MMOL/L (ref 136–145)
TME LAST DOSE: ABNORMAL H
VANCOMYCIN DOSE: ABNORMAL MG
VANCOMYCIN SERPL-MCNC: 20.9 UG/ML (ref 10–20)

## 2025-01-08 PROCEDURE — 6370000000 HC RX 637 (ALT 250 FOR IP): Performed by: INTERNAL MEDICINE

## 2025-01-08 PROCEDURE — 2500000003 HC RX 250 WO HCPCS: Performed by: INTERNAL MEDICINE

## 2025-01-08 PROCEDURE — 94003 VENT MGMT INPAT SUBQ DAY: CPT

## 2025-01-08 PROCEDURE — 6360000002 HC RX W HCPCS: Performed by: INTERNAL MEDICINE

## 2025-01-08 PROCEDURE — 94761 N-INVAS EAR/PLS OXIMETRY MLT: CPT

## 2025-01-08 PROCEDURE — 2580000003 HC RX 258: Performed by: INTERNAL MEDICINE

## 2025-01-08 PROCEDURE — 99232 SBSQ HOSP IP/OBS MODERATE 35: CPT | Performed by: PHYSICIAN ASSISTANT

## 2025-01-08 PROCEDURE — 82962 GLUCOSE BLOOD TEST: CPT

## 2025-01-08 PROCEDURE — 80048 BASIC METABOLIC PNL TOTAL CA: CPT

## 2025-01-08 PROCEDURE — 85610 PROTHROMBIN TIME: CPT

## 2025-01-08 PROCEDURE — 80202 ASSAY OF VANCOMYCIN: CPT

## 2025-01-08 PROCEDURE — 6370000000 HC RX 637 (ALT 250 FOR IP): Performed by: STUDENT IN AN ORGANIZED HEALTH CARE EDUCATION/TRAINING PROGRAM

## 2025-01-08 PROCEDURE — 36415 COLL VENOUS BLD VENIPUNCTURE: CPT

## 2025-01-08 PROCEDURE — 82805 BLOOD GASES W/O2 SATURATION: CPT

## 2025-01-08 PROCEDURE — 2580000003 HC RX 258: Performed by: SPECIALIST

## 2025-01-08 PROCEDURE — 99233 SBSQ HOSP IP/OBS HIGH 50: CPT | Performed by: INTERNAL MEDICINE

## 2025-01-08 PROCEDURE — 6360000002 HC RX W HCPCS: Performed by: SPECIALIST

## 2025-01-08 PROCEDURE — 2700000000 HC OXYGEN THERAPY PER DAY

## 2025-01-08 PROCEDURE — 89220 SPUTUM SPECIMEN COLLECTION: CPT

## 2025-01-08 RX ORDER — INSULIN LISPRO 100 [IU]/ML
0-4 INJECTION, SOLUTION INTRAVENOUS; SUBCUTANEOUS EVERY 4 HOURS
Status: DISCONTINUED | OUTPATIENT
Start: 2025-01-08 | End: 2025-01-08

## 2025-01-08 RX ORDER — GLYCOPYRROLATE 0.2 MG/ML
0.2 INJECTION INTRAMUSCULAR; INTRAVENOUS EVERY 4 HOURS PRN
Status: DISCONTINUED | OUTPATIENT
Start: 2025-01-08 | End: 2025-01-08 | Stop reason: HOSPADM

## 2025-01-08 RX ORDER — LORAZEPAM 2 MG/ML
2 INJECTION INTRAMUSCULAR
Status: DISCONTINUED | OUTPATIENT
Start: 2025-01-08 | End: 2025-01-08 | Stop reason: HOSPADM

## 2025-01-08 RX ORDER — HYDROMORPHONE HYDROCHLORIDE 1 MG/ML
1 INJECTION, SOLUTION INTRAMUSCULAR; INTRAVENOUS; SUBCUTANEOUS
Status: DISCONTINUED | OUTPATIENT
Start: 2025-01-08 | End: 2025-01-08 | Stop reason: HOSPADM

## 2025-01-08 RX ORDER — LEVOFLOXACIN 5 MG/ML
750 INJECTION, SOLUTION INTRAVENOUS EVERY 24 HOURS
Status: DISCONTINUED | OUTPATIENT
Start: 2025-01-08 | End: 2025-01-08

## 2025-01-08 RX ADMIN — VANCOMYCIN HYDROCHLORIDE 750 MG: 750 INJECTION, POWDER, LYOPHILIZED, FOR SOLUTION INTRAVENOUS at 11:43

## 2025-01-08 RX ADMIN — ASPIRIN 81 MG: 81 TABLET, CHEWABLE ORAL at 10:28

## 2025-01-08 RX ADMIN — HYDROXYUREA 500 MG: 100 TABLET, FILM COATED ORAL at 10:29

## 2025-01-08 RX ADMIN — PIPERACILLIN AND TAZOBACTAM 4500 MG: 4; .5 INJECTION, POWDER, FOR SOLUTION INTRAVENOUS at 11:09

## 2025-01-08 RX ADMIN — FOLIC ACID 1 MG: 1 TABLET ORAL at 10:28

## 2025-01-08 RX ADMIN — INSULIN LISPRO 1 UNITS: 100 INJECTION, SOLUTION INTRAVENOUS; SUBCUTANEOUS at 00:39

## 2025-01-08 RX ADMIN — SODIUM CHLORIDE, PRESERVATIVE FREE 10 ML: 5 INJECTION INTRAVENOUS at 00:40

## 2025-01-08 RX ADMIN — ENOXAPARIN SODIUM 40 MG: 100 INJECTION SUBCUTANEOUS at 10:27

## 2025-01-08 RX ADMIN — SUCRALFATE 1 G: 1 TABLET ORAL at 10:28

## 2025-01-08 RX ADMIN — GLYCOPYRROLATE 0.2 MG: 0.2 INJECTION INTRAMUSCULAR; INTRAVENOUS at 15:50

## 2025-01-08 RX ADMIN — PANTOPRAZOLE SODIUM 40 MG: 40 INJECTION, POWDER, FOR SOLUTION INTRAVENOUS at 10:29

## 2025-01-08 RX ADMIN — HYDROMORPHONE HYDROCHLORIDE 1 MG: 1 INJECTION, SOLUTION INTRAMUSCULAR; INTRAVENOUS; SUBCUTANEOUS at 15:51

## 2025-01-08 RX ADMIN — LORAZEPAM 2 MG: 2 INJECTION INTRAMUSCULAR; INTRAVENOUS at 15:51

## 2025-01-08 RX ADMIN — CHLORHEXIDINE GLUCONATE 0.12% ORAL RINSE 15 ML: 1.2 LIQUID ORAL at 10:59

## 2025-01-08 RX ADMIN — ATORVASTATIN CALCIUM 20 MG: 10 TABLET, FILM COATED ORAL at 10:28

## 2025-01-08 RX ADMIN — Medication 100 MCG/HR: at 08:29

## 2025-01-08 RX ADMIN — EMPAGLIFLOZIN 25 MG: 10 TABLET, FILM COATED ORAL at 10:28

## 2025-01-08 RX ADMIN — SODIUM CHLORIDE, PRESERVATIVE FREE 10 ML: 5 INJECTION INTRAVENOUS at 10:28

## 2025-01-08 RX ADMIN — CHLORHEXIDINE GLUCONATE 0.12% ORAL RINSE 15 ML: 1.2 LIQUID ORAL at 00:39

## 2025-01-08 RX ADMIN — MIDODRINE HYDROCHLORIDE 10 MG: 5 TABLET ORAL at 10:28

## 2025-01-08 RX ADMIN — INSULIN GLARGINE 7 UNITS: 100 INJECTION, SOLUTION SUBCUTANEOUS at 00:40

## 2025-01-08 RX ADMIN — PHENYLEPHRINE HYDROCHLORIDE 125 MCG/MIN: 10 INJECTION INTRAVENOUS at 11:34

## 2025-01-08 ASSESSMENT — PULMONARY FUNCTION TESTS
PIF_VALUE: 37
PIF_VALUE: 34
PIF_VALUE: 39
PIF_VALUE: 37
PIF_VALUE: 35
PIF_VALUE: 37
PIF_VALUE: 36
PIF_VALUE: 34
PIF_VALUE: 35
PIF_VALUE: 35
PIF_VALUE: 38
PIF_VALUE: 35
PIF_VALUE: 35
PIF_VALUE: 33
PIF_VALUE: 33
PIF_VALUE: 40

## 2025-01-08 ASSESSMENT — PAIN SCALES - GENERAL: PAINLEVEL_OUTOF10: 5

## 2025-01-08 NOTE — PROGRESS NOTES
End of life orders placed, await arrival of daughter then proceed with withdrawal of support which I reviewed with the other family members at the bedside.    E Cordasco  [phone]

## 2025-01-08 NOTE — PROGRESS NOTES
IV Etomidate 20mg administered @ 2241.  Pt intubated @ 2245 by Dr. Mccauley. Bronch completed, BAL sample obtained.

## 2025-01-08 NOTE — PROGRESS NOTES
Infectious Disease Progress Note  2025   Patient Name: Lin Bates : 1955     Assessment  Acute hypoxic respiratory failure secondary to large left pleural malignant effusion due to small cell lung cancer  Possible MRSA infected exudative pleural effusion  Postobstructive pneumonia  Admitted with a 3-day history of shortness of breath and found to have left lung collapse due to large pleural effusion requiring a chest tube.    Pleural fluid total protein is 4.3 g/dL, Procalcitonin 0.65,  Pleural fluid culture has rare growth of MRSA. Source is unknown since MRSA nares was negative. Possibility of contamination remains but due her immunocompromised state and immune-altering effect of durvalumab treatment is warranted.   Left lower effusion has been recurrent.  2025; CT guided chest tube was placed  2025: Bronchoscopy showed tumor  in the area where the takeoff of the left upper lobe.  Copious purulent secretions were seen in the trachea.  Treat with antimicrobial agents for 4 weeks  MPL essential thrombocytosis  Comorbid conditions:      Plan  Therapeutic:  Continue IV vancomycin   Commence empiric Zosyn and levofloxacin (-)  Diagnostic:  CRP   F/u:  2025 respiratory culture  2025 pleural fluid culture  Other:       Reason for visit: F/u acute hypoxic respiratory failure secondary to large left pleural malignant effusion; pleural effusion MRSA infection  History:?Interval history noted  Overnight the patient was moved to the ICU.  Bronchoscopy was performed on 2025, tumor was in the airway of the left upper lobe takeoff and left lower lobe takeoff.  Copious secretions were seen in the trachea, the right main bronchus and left bronchus intermedius  Physical Exam:  Vital Signs: BP (!) 110/58   Pulse (!) 103   Temp 99.5 °F (37.5 °C) (Rectal)   Resp 15   Ht 1.549 m (5' 0.98\")   Wt 66 kg (145 lb 8.1 oz)   SpO2 99%   BMI 27.51 kg/m²     Gen: sedated on ventilator  Skin: no  24 - 44 %    Monocytes % 17 (H) 0 - 4 %    Eosinophils % 0 0 - 3 %    Basophils % 1 0 - 1 %    Immature Granulocytes % 1 (H) 0 %    Neutrophils Absolute 4.52 k/uL    Lymphocytes Absolute 0.61 k/uL    Monocytes Absolute 1.06 k/uL    Eosinophils Absolute 0.01 k/uL    Basophils Absolute 0.04 k/uL    Immature Granulocytes Absolute 0.05 k/uL   Basic Metabolic Panel    Collection Time: 01/07/25  6:00 PM   Result Value Ref Range    Sodium 141 136 - 145 mmol/L    Potassium 4.3 3.5 - 5.1 mmol/L    Chloride 102 99 - 110 mmol/L    CO2 31 21 - 32 mmol/L    Anion Gap 7 (L) 9 - 17 mmol/L    Glucose 184 (H) 74 - 99 mg/dL    BUN 13 7 - 20 mg/dL    Creatinine 0.6 0.6 - 1.2 mg/dL    Est, Glom Filt Rate >90 >60 mL/min/1.73m2    Calcium 9.6 8.3 - 10.6 mg/dL   Blood Gas, Venous    Collection Time: 01/07/25  6:26 PM   Result Value Ref Range    pH, Camilo 7.167 (L) 7.320 - 7.430    pCO2, Camilo 85.8 (H) 38 - 54 mm Hg    PO2, Camilo 54.5 (H) 23 - 48 mm Hg    HCO3, Venous 30.4 (H) 22 - 29 mmol/L    Positive Base Excess, Camilo 0.2 0 - 3 mmol/L    Oxyhemoglobin 81.6 %    Carboxyhemoglobin 0.4 (L) 0.5 - 1.5 %    Methemoglobin 0.3 (L) 0.5 - 1.5 %    Pt Temp 37.0     Parker Test NOT APPLICABLE     Text for Respiratory Called to PROVIDER on 01/07/2025 at 18:28    POCT Glucose    Collection Time: 01/07/25  7:39 PM   Result Value Ref Range    POC Glucose 219 (H) 74 - 99 mg/dL   Blood Gas, Venous    Collection Time: 01/07/25  8:55 PM   Result Value Ref Range    pH, Camilo 7.150 (L) 7.320 - 7.430    pCO2, Camilo 85.7 (H) 38 - 54 mm Hg    PO2, Camilo 60.5 (H) 23 - 48 mm Hg    HCO3, Venous 29.2 (H) 22 - 29 mmol/L    Negative Base Excess, Camilo 1.1 0 - 3 mmol/L    Oxyhemoglobin 85.1 %    Carboxyhemoglobin 0.2 (L) 0.5 - 1.5 %    Methemoglobin 0.3 (L) 0.5 - 1.5 %    Pt Temp 37.0     Parker Test NOT APPLICABLE     Text for Respiratory Called to RN on 01/07/2025 at 20:59    POCT Glucose    Collection Time: 01/08/25 12:15 AM   Result Value Ref Range    POC Glucose 207 (H) 74 - 99

## 2025-01-08 NOTE — PROGRESS NOTES
PC from [facility] received, pt likely not a candidate due to active cancer treatments, but will be ruled out. Per Estefany, [facility] will notify RN with final answer after further chart review.

## 2025-01-08 NOTE — PROGRESS NOTES
Post chest tube placement, patient breathing was improved and unlabored. Upon reassessment, at 1800 pt had increased work of breathing. She had an episode of nausea and vomiting. Zofran administered.   Increased crackles noted on rt side. O2 requirement rapidly increased form 3 L to 12 L.   Dr. Desai updated regarding pt condition via perfect serve. Chest X ray ordered. Dr. Denis Ford came to bedside to assess, review X ray, and ordered labs and bipap.   Pt educated on importance of bipap compliance and how to remove if she felt nauseous. Pt expressed understanding, but insisted on removing a short time later.  Pt reeducated on importance of bipap and consequences of noncompliance. Family at bedside also expresses understanding.   All needs met at this time.   Continuing with care plan.

## 2025-01-08 NOTE — PROCEDURES
PROCEDURE NOTE  Date: 1/7/2025   Name: Lin Bates  YOB: 1955    Intubation    Date/Time: 1/7/2025 11:23 PM    Performed by: Earlene Mccauley MD  Authorized by: Earlene Mccauley MD  Consent: Verbal consent obtained. Written consent not obtained.  Risks and benefits: risks, benefits and alternatives were discussed (Discussed with patient's daughter Guerline Agudelo)  Patient understanding: patient states understanding of the procedure being performed  Patient consent: the patient's understanding of the procedure matches consent given  Patient identity confirmed: verbally with patient, arm band, provided demographic data and hospital-assigned identification number  Time out: Immediately prior to procedure a \"time out\" was called to verify the correct patient, procedure, equipment, support staff and site/side marked as required.  Indications: respiratory distress, hypercapnia, hypoxemia, airway protection and respiratory failure  Intubation method: video-assisted  Patient status: sedated  Preoxygenation: BiPAP.  Sedatives: etomidate (20 mg IV)  Laryngoscope size: Low pro #3.  Tube size: 7.5 mm  Tube type: cuffed  Number of attempts: 1  Cricoid pressure: no  Cords visualized: yes  Post-procedure assessment: chest rise and CO2 detector  Breath sounds: reduced on left  Cuff inflated: yes  ETT to teeth: 19 (At the gums) cm  Tube secured with: ETT bliss  Chest x-ray interpreted by: Endotracheal tube position visualized via bronchus.  Chest x-ray findings: endotracheal tube in appropriate position  Patient tolerance: patient tolerated the procedure well with no immediate complications

## 2025-01-08 NOTE — PROGRESS NOTES
4 Eyes Skin Assessment     NAME:  Lin Bates  YOB: 1955  MEDICAL RECORD NUMBER:  [id number]    The patient is being assessed for  Transfer to New Unit (3N to ICU)    I agree that at least one RN has performed a thorough Head to Toe Skin Assessment on the patient. ALL assessment sites listed below have been assessed.      Areas assessed by both nurses:    Head, Face, Ears, Shoulders, Back, Chest, Arms, Elbows, Hands, Sacrum. Buttock, Coccyx, Ischium, Legs. Feet and Heels, and Under Medical Devices         Does the Patient have a Wound? Yes wound(s) were present on assessment. LDA wound assessment was Initiated and completed by RN       Giuseppe Prevention initiated by RN: Yes  Wound Care Orders initiated by RN: Yes    Pressure Injury (Stage 3,4, Unstageable, DTI, NWPT, and Complex wounds) if present, place Wound referral order by RN under : No    New Ostomies, if present place, Ostomy referral order under : No     Nurse 1 eSignature: Electronically signed by Farhana Peralta RN on 1/8/25 at 2:52 AM EST    **SHARE this note so that the co-signing nurse can place an eSignature**    Nurse 2 eSignature: Electronically signed by Sarah Hart RN on 1/8/25 at 3:02 AM EST

## 2025-01-08 NOTE — PROGRESS NOTES
Spiritual Health History and Assessment/Progress Note  [facility]    Spiritual/Emotional Needs, Family Care, Anticipatory Grief,      Name: Lin Bates MRN: [id number]    Age: 69 y.o.     Sex: female   Language: English   Christianity: Muslim   Recurrent left pleural effusion     Date: 1/8/2025            Total Time Calculated: 16 min              [facility]        Referral/Consult From: Rounding   Encounter Overview/Reason: Spiritual/Emotional Needs  Service Provided For: Patient and family together    Emelia, Belief, Meaning:   Patient unable to assess at this time  Family/Friends identify as spiritual      Importance and Influence:  Patient unable to assess at this time  Family/Friends have spiritual/personal beliefs that influence decisions regarding the patient's health    Community:  Patient Other:    Family/Friends feel well-supported. Support system includes: Extended family    Assessment and Plan of Care:     Patient Interventions include: Other:    Family/Friends Interventions include: Facilitated expression of thoughts and feelings    Patient Plan of Care: Spiritual Care available upon further referral  Family/Friends Plan of Care: Spiritual Care available upon further referral    Electronically signed by Chaplain Jessica on 1/8/2025 at 12:43 PM

## 2025-01-08 NOTE — PROGRESS NOTES
V2.0  [facility] Hospitalist Progress Note      Name:  Lin Bates /Age/Sex: 1955  (69 y.o. female)   MRN & CSN:  [id number] & [id number] Encounter Date/Time: 2025 12:27 PM EST    Location:  -A PCP: Dorothy Terrazas PA       Hospital Day: 10      Subjective:     Chief Complaint: SOB    Patient seen at bedside, intubated and sedated     Assessment and Plan:   Acute hypoxic respiratory failure 2/2  large left pleural effusion and associated complete left lung collapse.  Chest tube placed, intubated and sedated   Uncomplicated parapneumonic effusion with MRSA (sensitivities available) in pleural fluid from 2024 but per studies no concern for empyema   s/p chest tube in the ICU 2024 removed 1/3/2025  Hx of limited small lung cancer completed chemo now on immuno therapy but now has progression of disease  CXR 2025 changes suggestive of consolidation but per CT chest WO contrast 2025 just large left pleural effusion, changes suggestive of atypical pneumonia vs  interstitial edema vs lymphangitic carcinomatosis -   - pulm consulted; recommended chest tube placement and repeat fluid studies.   - ID consulted who provided recs for abx while inpatient and on discharge  - f/u fluid studies  - wean O2 as tolerated  - started vancomycin  2025  - pt is considering hospice; may benefit from palliative plurx catheter before discharge if repeat studies negative for infection; discussed plan with oncology    limited stage small cell lung cancer  now extended   Completed chemo now on immunotherapy  CT chest on admission showed Large mediastinal lymph nodes  1.9 x 1.3 cm in anterior pretracheal location, and 2.0 x 1.4 cm in AP location.   Had recent PET scan not resulted yet  - consulted hem/onc  - pt is now considering hospice rather than starting chemo     Hypotension - resolved.   BP has been in the 90's; per chart review baseline 100's  Improved   No sign of sepsis, no  Range    Protime 15.1 (H) 11.7 - 14.5 sec    INR 1.1    Blood Gas, Venous    Collection Time: 01/08/25  6:55 AM   Result Value Ref Range    pH, Camilo 7.405 7.320 - 7.430    pCO2, Camilo 46.4 38 - 54 mm Hg    PO2, Camilo 28.6 23 - 48 mm Hg    HCO3, Venous 28.4 22 - 29 mmol/L    Positive Base Excess, Camilo 3.2 (H) 0 - 3 mmol/L    Oxyhemoglobin 60.0 %    Carboxyhemoglobin 0.3 (L) 0.5 - 1.5 %    Methemoglobin 0.2 (L) 0.5 - 1.5 %    Pt Temp 37.0     Parker Test NOT APPLICABLE         Imaging/Diagnostics Last 24 Hours   XR CHEST PORTABLE    Addendum Date: 1/2/2025    Addendum: A chest tube is not definitively identified on this study. Electronically signed by Rayshawn Mittal    Result Date: 1/2/2025  Portable upright chest radiograph, 1 view. CLINICAL INDICATION: Chest tube evaluation. History of lung cancer. COMPARISON: 12/31/2024. FINDINGS: There is complete opacification of the left hemithorax. The cardiomediastinal silhouette is grossly unchanged, although evaluation is limited. Bones are osteopenic. Moderate to severe degenerative change of the glenohumeral joints. A right [id number] remains in place. No pneumothorax. There may be a tiny right pleural effusion. Interstitial opacities project over the right hemithorax.     There is now complete opacification of the left hemithorax, which could be due to combination of airspace consolidation/pneumonia and moderate to large pleural effusion. Progression of underlying malignancy/neoplasm in this patient with history of lung cancer would also be in the differential diagnosis. There may be a tiny right pleural effusion. Some coarse interstitial markings/pulmonary vascular congestion of the right lung is nonspecific and could be due to interstitial edema or atypical pneumonia. Lymphangitic carcinomatosis would also be a differential possibility. Recommend continued radiologic follow-up. Similar position of right [id number]. Electronically signed by Rayshawn

## 2025-01-08 NOTE — DEATH NOTES
Death Note    Called to bedside to pronounce death after patient was noted to be in asystole at 1605 1/8/2025.   NO pupillary, corneal, doll's eye or gag reflex noted.  NO heart sounds or pulse noted.  NO Chest rise or Lung sounds noted.   Time of death declared at 1607 1/8/2025    Dr. Flower notified    Brianna SOLEDAD Chong, ARACELI - NP    1/8/2025  4:58 PM

## 2025-01-08 NOTE — PROGRESS NOTES
Pulmonary and Critical Care  Progress Note      VITALS:  BP (!) 108/59   Pulse 96   Temp (!) 100.6 °F (38.1 °C)   Resp 18   Ht 1.549 m (5' 0.98\")   Wt 66 kg (145 lb 8.1 oz)   SpO2 100%   BMI 27.51 kg/m²     Subjective:   CHIEF COMPLAINT :SOB     HPI:                The patient is a 69 y.o. female is on the vent and sedated. She was intubated overnight for the resp failure    Objective:   PHYSICAL EXAM:    LUNGS:Occasional basal crackles  Abd-soft, BS+,NT  Ext- no pedal edema  CVS-s1s2 no murmurs      DATA:    CBC:  Recent Labs     01/06/25  0940 01/07/25  1800   WBC 4.7 6.3   RBC 2.97* 2.93*   HGB 9.1* 9.2*   HCT 30.2* 31.0*    194   .7* 105.8*   MCH 30.6 31.4*   MCHC 30.1* 29.7*   RDW 16.3* 16.2*      BMP:  Recent Labs     01/07/25  0800 01/07/25  1800 01/08/25  0650    141 150*   K 4.2 4.3 4.2    102 109   CO2 34* 31 27   BUN 11 13 18   CREATININE 0.6 0.6 0.9   CALCIUM 9.5 9.6 10.5   GLUCOSE 165* 184* 165*      ABG:  No results for input(s): \"PH\", \"PO2ART\", \"FXT2ERR\", \"HCO3\", \"BEART\", \"O2SAT\" in the last 72 hours.  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     Priority: Medium    Leucocytosis 12/11/2022     Priority: Medium    Thrombocytosis 11/08/2022     Priority: Medium    Pleural effusion 01/08/2025    MRSA infection 01/07/2025    Small cell lung cancer in adult (HCC) 01/07/2025    Collapse of left lung 12/31/2024    Recurrent left pleural effusion 12/30/2024    Acute respiratory failure with hypoxia 12/09/2024    Drug therapy 06/24/2024    Moderate malnutrition (HCC) 05/21/2024    Severe anemia 04/21/2024    Small cell lung cancer, left lower lobe (HCC) 02/02/2024    Need for hepatitis B screening test 02/01/2024    Lung nodule, solitary 04/30/2023    Thickened

## 2025-01-08 NOTE — DISCHARGE SUMMARY
recommendations, medications, and plan.     Consults this admission:  IP CONSULT TO HOSPITALIST  IP CONSULT TO ONCOLOGY  IP CONSULT TO CARDIOTHORACIC SURGERY  IP CONSULT TO INTERVENTIONAL RADIOLOGY  IP CONSULT TO PHARMACY  IP CONSULT TO PULMONOLOGY  IP CONSULT TO INFECTIOUS DISEASES  IP CONSULT TO INTERVENTIONAL RADIOLOGY  IP CONSULT TO DIETITIAN  IP CONSULT TO DIETITIAN  IP CONSULT TO SPIRITUAL SERVICES  IP CONSULT TO HOSPICE  IP CONSULT TO HOSPICE    Discharge Diagnosis:       Discharge Instruction:       Discharge Medications:        Medication List        CONTINUE taking these medications      glucose monitoring kit  1 kit by Does not apply route daily     [employer] Pen [place] 31G 31G X 8 MM Misc  Generic drug: Insulin Pen Needle  1 each by Does not apply route daily     Lancets Misc  1 each by Does not apply route daily            ASK your doctor about these medications      aspirin 81 MG EC tablet     atorvastatin 20 MG tablet  Commonly known as: LIPITOR     ferrous sulfate 325 (65 Fe) MG tablet  Commonly known as: IRON 325     folic acid 1 MG tablet  Commonly known as: FOLVITE  Take 1 tablet by mouth daily     glipiZIDE 2.5 MG extended release tablet  Commonly known as: GLUCOTROL XL     hydroxyurea 500 MG chemo capsule  Commonly known as: HYDREA  Take 1 capsule by mouth daily     insulin lispro 100 UNIT/ML Soln injection vial  Commonly known as: HUMALOG,ADMELOG  Inject 0-8 Units into the skin 3 times daily (before meals) **Medium Dose Corrective Algorithm** Glucose: Dose:  No Insulin 200-249 2 Units 250-299 4 Units 300-349 6 Units Over 349 8 Units and notify physician     Jardiance 25 MG tablet  Generic drug: empagliflozin     Lantus SoloStar 100 UNIT/ML injection pen  Generic drug: insulin glargine  Inject 7 Units into the skin nightly     lidocaine viscous hcl 2 % Soln solution  Commonly known as: XYLOCAINE  Take 15 mLs by mouth every 3 hours as needed for Irritation     lisinopril 2.5  1.9 x 1.7 cm at the right pretracheal location and 2.0 x 1.4 cm in the AP location. Small to moderate right pleural effusion is noted. Right lung pulmonary vascular congestion/pulmonary edema. Right chest chemotherapy port is again seen. No acute osseous findings. No acute findings in the visualized abdomen.     Interval worsened vascular congestion/pulmonary edema of the right chest. Otherwise examination is not significantly changed. Electronically signed by Santiago Nunez MD    XR CHEST PORTABLE    Addendum Date: 1/2/2025    Addendum: A chest tube is not definitively identified on this study. Electronically signed by Rayshawn Mittal    Result Date: 1/2/2025  Portable upright chest radiograph, 1 view. CLINICAL INDICATION: Chest tube evaluation. History of lung cancer. COMPARISON: 12/31/2024. FINDINGS: There is complete opacification of the left hemithorax. The cardiomediastinal silhouette is grossly unchanged, although evaluation is limited. Bones are osteopenic. Moderate to severe degenerative change of the glenohumeral joints. A right Fsdopp-n-Znkf remains in place. No pneumothorax. There may be a tiny right pleural effusion. Interstitial opacities project over the right hemithorax.     There is now complete opacification of the left hemithorax, which could be due to combination of airspace consolidation/pneumonia and moderate to large pleural effusion. Progression of underlying malignancy/neoplasm in this patient with history of lung cancer would also be in the differential diagnosis. There may be a tiny right pleural effusion. Some coarse interstitial markings/pulmonary vascular congestion of the right lung is nonspecific and could be due to interstitial edema or atypical pneumonia. Lymphangitic carcinomatosis would also be a differential possibility. Recommend continued radiologic follow-up. Similar position of right Jpfhgk-n-Kavt. Electronically signed by Rayshawn Mittal      CBC:   Recent Labs

## 2025-01-08 NOTE — CONSULTS
Comprehensive Nutrition Assessment    Type and Reason for Visit:  Initial, LOS, Consult, Nutrition support (Giuseppe score, new vent)    Nutrition Recommendations/Plan:   Start TF- Nepro (renal formula) @ 35mL/hr continuous w/ 30mL free water flush Q4H provides 1512kcal, 68g PRO, and 791mL total fluids per day   Advance TF rate slowly, as pt at risk for refeeding syndrome   Monitor hemodynamic status, GI status, weights, labs, POC     Malnutrition Assessment:  Malnutrition Status:  Moderate malnutrition (01/08/25 0936)    Context:  Chronic Illness     Findings of the 6 clinical characteristics of malnutrition:  Energy Intake:  Mild decrease in energy intake  Weight Loss:  Unable to assess (likely significant x1 yr)     Body Fat Loss:  Mild body fat loss Orbital, Triceps   Muscle Mass Loss:  Mild muscle mass loss Temples (temporalis), Clavicles (pectoralis & deltoids), Thigh (quadriceps)  Fluid Accumulation:  No fluid accumulation     Strength:  Not Performed    Nutrition Assessment:    Admitted w/ recurrent L pleural effusion,  with medical history significant for hypertension, hyperlipidemia, diabetes mellitus, history of HPV infection, small cell lung cancer. Pt was emergently intubated yesterday, remains on vent, sedated, on 1 pressor, MAP 74. PO intakes during stay were highly varied but averaged only 48% of meals. Mild wt loss noted over the past year although may be significant, as pt has gained >20# during stay per chart review? NFPE performed, did note mild-moderate wasting, meets criteria for malnutrition. Will order appropriate TF regimen and follow at high nutrition risk.    Nutrition Related Findings:    +fanny, versed, fentanyl; glucose 153-219 Wound Type: None       Current Nutrition Intake & Therapies:    Average Meal Intake: NPO  Average Supplements Intake: NPO  Diet NPO    Anthropometric Measures:  Height: 154.9 cm (5' 0.98\")  Ideal Body Weight (IBW): 105 lbs (48 kg)    Admission Body Weight: 56.6  kg (124 lb 12.5 oz) (12/31/24)  Current Body Weight: 66 kg (145 lb 8.1 oz),   IBW. Weight Source: Bed scale  Current BMI (kg/m2): 27.5  Usual Body Weight: 73.3 kg (161 lb 9.6 oz) (1/16/24)     % Weight Change (Calculated): -10  Weight Adjustment For: No Adjustment                 BMI Categories: Overweight (BMI 25.0-29.9)    Estimated Daily Nutrient Needs:  Energy Requirements Based On: Kcal/kg  Weight Used for Energy Requirements: Current  Energy (kcal/day): 1820-5124 (20-25kcal/kg)  Weight Used for Protein Requirements: Current  Protein (g/day):   (1.2-2.0g/kg CBW)  Method Used for Fluid Requirements: 1 ml/kcal  Fluid (ml/day): 1500    Nutrition Diagnosis:   Moderate malnutrition, in context of chronic illness related to catabolic illness, inadequate protein-energy intake as evidenced by muscle loss, loss of subcutaneous fat, criteria as identified in malnutrition assessment  Inadequate oral intake related to acute injury/trauma, impaired respiratory function as evidenced by intubation, NPO or clear liquid status due to medical condition    Nutrition Interventions:   Food and/or Nutrient Delivery: Continue NPO, Start Tube Feeding  Nutrition Education/Counseling: No recommendation at this time  Coordination of Nutrition Care: Continue to monitor while inpatient, Coordination of Care  Plan of Care discussed with: MD    Goals:  Goals: Initiate nutrition support, Maintain adequate nutrition status, Tolerate nutrition support at goal rate, by next RD assessment  Type of Goal: New goal  Previous Goal Met: New Goal    Nutrition Monitoring and Evaluation:   Behavioral-Environmental Outcomes: None Identified  Food/Nutrient Intake Outcomes: Enteral Nutrition Intake/Tolerance  Physical Signs/Symptoms Outcomes: Weight, Biochemical Data, Nutrition Focused Physical Findings, GI Status, Hemodynamic Status    Discharge Planning:    Too soon to determine     Harriet Arango RD  Contact: [phone]

## 2025-01-08 NOTE — PROGRESS NOTES
Inpatient Critical Care Progress Note 1/8/2025        Lin BEGUM Axis  1955  [id number]      Assessment/Plan:    Acute chronic respiratory failure hypoxemia hypercapnia  Chronic obstructive  pulmonary disease  Advanced small cell lung cancer  ?  Pneumonia (note history of SARS-CoV-2 December 2024)  Left pleural effusion presumably malignant (although growth of MRSA identified from specimen 12/31/2024)  Hypotension  Thrombocytosis (reportedly essential although possibly paraneoplastic)  Diabetes mellitus type 2    It is my understanding that once the daughter has arrived, the patient is to undergo extubation transitioning to comfort care measures only      Continue mechanical ventilatory support  Continue with sedation  Hemodynamic support with pressor  DVT ulcer prophylaxis  Glycemic control  Antimicrobial therapy pending culture data      Complex medical decisions required for evaluation management reviewed in detail during critical care rounds      Patient suffers from critical illness including respiratory failure mandating mechanical ventilatory support, hypotension necessitating pressor agent ministration.  Without current level of aggressive supportive medical measures multiorgan dysfunction untoward mortality.  34 minutes critical care time required for today's evaluation and management independent of any time required for the performance of procedures.    E Cordasco  [phone]      Subjective:    Overnight given declining respiratory status, the patient underwent semiemergent intubation.    Currently, moderate level ventilatory support FiO2 PEEP 60%, 5 cm of water pressure respectively although SpO2 100% via pulse oximetry.  Acceptable hemodynamics, moderate dose Andrzej-Synephrine infusion (175 mcg)  Continuous sedation Fentanyl 100 mcg, Versed 3 mg infusions          Review of Systems     Unable to obtain in light of current circumstances    Physical Exam:          BP (!) 108/59   Pulse 96

## 2025-01-08 NOTE — DISCHARGE INSTR - DIET
Good nutrition is important when healing from an illness, injury, or surgery.  Follow any nutrition recommendations given to you during your hospital stay.   If you were given an oral nutrition supplement while in the hospital, continue to take this supplement at home.  You can take it with meals, in-between meals, and/or before bedtime. These supplements can be purchased at most local grocery stores, pharmacies, and chain super-stores.   If you have any questions about your diet or nutrition, call the hospital and ask for the dietitian.  Due to malnutrition diagnosis, after discharge, RD recommends patient to drink high calorie, high protein oral nutrition supplements of greater than 200 calories per serving with at least or greater than 10 gm protein per serving, at least 2-3 times per day, to promote increased po intakes and weight gain. Coupons and High Calorie, High Protein Nutrition Therapy handouts provided.     Suggestions to follow at home to improve appetite:   Aim for small, frequent eating sessions. (I.e. 5-8 times per day of smaller portions)   Schedule eating times (I.e. every 2-4 hours would have a snack)   Enhance the eating environment (I.e. Eating with family and friends, watching favorite movie, or listening to favorite music with meal)   Identify problem smells taste, textures, and temperatures  Choose foods that are easy to chew and swallow   Avoid drinking fluids during eating session. Save drinks and sips between meals.     There is no need to apply all of these strategies at once.     Many patients benefit from adding 1 or 2 suggestions at a time to see how they affect their ability to eat, and then making an informed decision on how to proceed.   Please read Nutrition handout below:     High Calorie, High Protein Nutrition Therapy from Nutrition Care Manual     A high-calorie, high-protein diet has been recommended to you. Your registered dietitian nutritionist (RDN) may have recommended  carrots  1 medium apple  1 cup grape juice   Afternoon Snack 1 whole wheat saad bread  ½ cup hummus  ½ cup orange juice   Evening Meal Burrito made with: 2, 6-inch corn tortilla  ½ cup refried vegetarian beans  ½ cup chopped tomatoes  ½ cup lettuce  2 tablespoons salsa  ½ cup brown rice  ½ tablespoon olive oil for rice  ½ cup cooked zucchini  1 cup whole milk   Evening Snack ½ cup raisins  ¼ cup peanuts   Daily Sum  Nutrient Unit Value   Macronutrients   Energy kcal 3021   Energy kJ 65103   Protein g 105   Total lipid (fat) g 124   Carbohydrate, by difference g 405   Fiber, total dietary g 59   Sugars, total g 174   Minerals   Calcium, Ca mg 1659   Iron, Fe mg 20   Sodium, Na mg 2733   Vitamins   Vitamin C, total ascorbic acid mg 168   Vitamin A, IU IU 36007   Vitamin D    Lipids   Fatty acids, total saturated g 35   Fatty acids, total monounsaturated g 51   Fatty acids, total polyunsaturated g 26   Cholesterol mg 286

## 2025-01-08 NOTE — CARE COORDINATION
Spoke with pt's dtr/FLORENTINO decision maker in room. When this LSW entered room, pt was on the phone with someone crying stating she didn't want her mom to suffer any more. Dtr spoke with this Lsw and confirmed that she is ready for the hospice consult. Wants to wait for family members to come and say their goodbyes. Per [facility] policy of not taking pt's on vents, referral made to [facility] for inpt hospice consult to [facility].     1333 - Pt's dgt informed this CM that hospice called her and arranged hospice meeting for 11am 1/9/25

## 2025-01-08 NOTE — ACP (ADVANCE CARE PLANNING)
Advance Care Planning     General Advance Care Planning (ACP) Conversation    Date of Conversation: 1/7/2025  Conducted with: Patient with Decision Making Capacity and Legal next of kin patient's daughter Guerline Agudelo  Other persons present: Significant other Matt Bhat    Healthcare Decision Maker: Guerline Agudelo  Today we documented Decision Maker(s) consistent with ACP documents on file.  Content/Action Overview:  Has NO ACP documents-Information provided  Reviewed DNR/DNI and patient's daughter elects DNR order - referred to ACP Clinical Specialist & placed order  benefit/burden of treatment options  Long-term goals of care, patient's advanced cancer  Daughter did want patient intubated however did not want CPR or defibrillations.  CODE STATUS changed to DNR  Patient's significant other Matt is updated    Length of Voluntary ACP Conversation in minutes:  20 minutes    Earlene Mccauley MD

## 2025-01-08 NOTE — PROGRESS NOTES
Spiritual Health History and Assessment/Progress Note  [facility]    Grief, Loss, and Adjustments, Family Care, Death, Bereavement Care,      Name: Lin Bates MRN: [id number]    Age: 69 y.o.     Sex: female   Language: English   Adventist: Jainism   Recurrent left pleural effusion     Date: 2025            Total Time Calculated: 17 min              Spiritual Assessment continued in [facility] ICU        Referral/Consult From: Nurse   Encounter Overview/Reason: Grief, Loss, and Adjustments  Service Provided For: Family    Emelia, Belief, Meaning:   Patient     Family/Friends identify as spiritual and have beliefs or practices that help with coping during difficult times      Importance and Influence:  Patient Other:   Family/Friends have spiritual/personal beliefs that influence decisions regarding the patient's health    Community:  Patient Other:    Family/Friends feel well-supported. Support system includes: Extended family    Assessment and Plan of Care:     Patient Interventions include: Other:    Family/Friends Interventions include: Grief Care. Prayer    Patient Plan of Care: Other:    Family/Friends Plan of Care: Spiritual Care available upon further referral    Electronically signed by Chaplain Jessica on 2025 at 4:35 PM

## 2025-01-08 NOTE — PROGRESS NOTES
V2.0  [facility] Critical Care Progress Note      Name:  Lin Bates /Age/Sex: 1955  (69 y.o. female)   MRN & CSN:  [id number] & [id number] Encounter Date/Time: 2025 11:02 PM EST    Location:  -A PCP: Dorothy Terrazas PA       Hospital Day: 9    Assessment and Plan:   Lin Bates is a 69 y.o. female with pmh of small cell lung cancer, hypertension, diabetes mellitus, left pleural effusion who presents with Recurrent left pleural effusion    Transferred back to ICU emergently for altered mental status, hypercarbia on continuous BiPAP  Plan:  Acute on chronic hypoxic and hypercarbic respiratory failure-patient markedly worsened post chest tube placement today.  Poor air movement, tachypneic.  Patient is lethargic.  Long discussion with daughter and significant other.  Daughter asked that her mother be intubated but requests no CPR and no defibrillation.  Patient with complete collapse of left lung likely secondary to tumor involvement in the airway and/or extrinsic compression.  Despite having chest tubes and pleural fluid removal patient did not have improvement in aeration of her left lung throughout her hospital stay  Small cell lung cancer-followed by oncology.  PET scan from  is reviewed.  Hypermetabolic mediastinal lymph nodes and masslike consolidation with hypermetabolic activity in the left lower lobe consistent with malignancy.  Per family's report oncologist did recommend hospice.  Hypotension-this has been ongoing.  Patient has been on home midodrine TID.  Will continue  Essential thrombocytosis-on hydroxyurea.  Followed by oncology/hematology  Malignant pleural effusion-chest tube placement .  Continue to drain.  Waterseal.  Possible MRSA infected exudative pleural effusion-rare growth in culture.  ID following and thinks that this may be possibility of contamination.  Continue vancomycin given immunocompromise state  .  6.  Diabetes-continue Lantus,  for culture and sensitivity. The drain was sutured in place and was connected to a Pleur-evac. The patient tolerated the entire procedure well without immediate complications. RADIATION DOSE: DLP: 635.48 CONTRAST: None FINDINGS: Adequate localization of a left-sided chest tube within a large left pleural effusion.     Successful CT-guided placement of a left-sided 12 Turkish locking pigtail chest tube Electronically signed by Zoë Mon    XR CHEST PORTABLE    Result Date: 1/7/2025  EXAMINATION: XR CHEST PORTABLE DATE OF EXAM:  1/7/2025 14:55 DEMOGRAPHICS: 69 years old Female INDICATION: Hypoxia COMPARISON: No existing relevant imaging study corresponding to the same anatomical region is available. TECHNIQUE: Single view(s) of the chest FINDINGS: A right chest port terminates in the region of the cavoatrial junction. Cardiac silhouette is obscured. There is complete opacification of the  left hemithorax. A small right pleural effusion is noted. Mild reticulonodular changes throughout the right lung. No evidence of pneumothorax. Bony structures demonstrate degenerative changes in the glenohumeral joints. IMPRESSION: 1.  Complete opacification of the left hemithorax. 2.  Small right pleural effusion. 3.  Reticulonodular changes throughout the right lung. This dictation was created with voice recognition software.  While attempts have  been made to review the dictation as it is transcribed, on occasion the spoken word can be misinterpreted by the technology leading to omissions or inappropriate words, phrases or sentences.  Dictated and Electronically Signed By: Michaela Benz 1/7/2025 14:07          Electronically signed by Earlene Mccauley MD on 1/7/2025 at 11:02 PM

## 2025-01-08 NOTE — PROGRESS NOTES
RR-RN asked to evaluate patient with agitation and pulling at BIPAP mask. Upon arrival patient resting with BIPAP in place. Oxygen saturation is 95%. VBG recently drawn and results not improved. Provider notified and conversation had with patient's s/o regarding her wishes r/t code status and intubation. He states that they hadn't talked about this much but that physician had mentioned hospice related to cancer recurrence. Patient's daughter is primary decision maker in absence of competency so physician spoke with her over the phone and full code decided. ICU physician consulted and transfer to ICU completed. After another conversation with patient's daughter code status change and decision to intubate was made. RSI performed and patient intubated without complication. Bronchoscope passed and specimen collected per intensivist.       Javad Torres RN   [facility] [phone]

## 2025-01-08 NOTE — PROCEDURES
Bronchoscopy Procedure Note   Indications: Acute respiratory failure, small cell lung cancer, complete collapse of the left lung  Procedure: Bronchoscopy   Procedure Clinician: Earlene Mccauley MD   Procedure Assistant: None   Anesthesia: Etomidate 20 mg x 1  Procedure Details:   The patient was premedicated with etomidate 20 mg. The patient was pre-oxygenated at 100% FiO2 and underwent fiberoptic bronchoscopy through the endotracheal tube. The airways were closely inspected and secretions were evacuated. The trachea is of normal caliber with normal appearing bronchial mucosa and anatomy. The emelyn is sharp. The tracheobronchial tree was then examined. Upon inspection of the right mainstem, right upper lobe was normal. Inspection of the bronchus intermedius, right middle lobe and right lower lobes were normal in appearance. Upon inspection of left mainstem bronchus, there was heaped up friable mucosa noted at the left upper lobe takeoff & left lower lobe takeoff.  There is tumor within the airway at the takeoff of the left upper lobe. bronchoscope was not able to be passed beyond these lesions.  There were copious purulent secretions seen in the trachea, right mainstem bronchus, left bronchus intermedius. BAL was performed in the left lower lobe with serial lavages of 20cc of saline and 6cc lavage fluid returned and sent to microbiology for analysis. The scope was then slowly withdrawn and removed.   Findings:   Copious purulent secretions  Heaped up mucosa at the left lower lobe and left upper lobe takeoff.  Tumor within the airway at the the left upper lobe, inability to pass bronchoscope into left upper lobe or left lower lobe  A BAL was obtained from the LLL   Complications: None; patient tolerated the procedure well.PROCEDURE NOTE  Date: 1/7/2025   Name: Lin Bates  YOB: 1955    Procedures

## 2025-01-08 NOTE — PROGRESS NOTES
RENAL DOSE ADJUSTMENT MADE PER P/T PROTOCOL    PREVIOUS ORDER:  Zosyn 3.375g IV q8hr    Estimated Creatinine Clearance: 51 mL/min (based on SCr of 0.9 mg/dL).  Recent Labs     01/07/25  0800 01/07/25  1800 01/08/25  0650   BUN 11 13 18   CREATININE 0.6 0.6 0.9   PLT  --  194  --    INR 1.1  --  1.1     NEW RENALLY ADJUSTED ORDER:  Zosyn 4.5g IV over 30 min ONCE followed by 3.375g IV EI q8hr    [facility]  1/8/2025 9:20 AM

## 2025-01-08 NOTE — PROGRESS NOTES
Hematology Oncology Inpatient Consult    Patient Name:  Lin Bates  Patient :  1955  Patient MRN:  [id number]  Room: [address] [place] [address]   Admitted: 2024 10:39 PM   Hospital Attending Provider: Mikhail Covarrubias MD    Primary Oncologist: Dr. Burt Birch MD  PCP:  Dorothy Terrazas PA     Date of Service: 25       Reason for Consult: Small cell lung cancer and essential thrombocytosis.      Chief Complaint:  Shortness of breath    Principal Problem:    Recurrent left pleural effusion  Active Problems:    Small cell lung cancer, left lower lobe (HCC)    Moderate malnutrition (HCC)    Acute respiratory failure with hypoxia    Collapse of left lung    MRSA infection    Small cell lung cancer in adult (HCC)    Pleural effusion  Resolved Problems:    * No resolved hospital problems. *      HPI:   Lin Bates is a 69 y.o. female with medical history significant for hypertension, hyperlipidemia, diabetes mellitus, history of HPV infection who was admitted for SOB and was found to have recurrent left pleural effusion with complete collapse   She is well known to our practice for MPL positive essential thrombocytosis, on hydrea 500 mg daily, and limited stage small cell lung cancer s/p definitive CRT and WBRT, currently on maintenance immunotherapy with durvalumab (last tx on 24). She was recently admitted in 2024 due to LLL CAP with associated large pleural effusion that was drained.      Today, patient is lying in bed sleeping in no acute distress on 4L NC O2.  She awakes easily but states that she is very tired.  Her breathing is a little easier but she still feels short of breath.  She denies fever, chest pain, increased cough, nausea, abdominal pain or dysuria.  She has been ambulating with assistance to the bathroom.  Patient quit smoking in 24.  She denies alcohol or illicit drug use. Patient had benign mammogram 24.      WBC 8.7, Hg 9.5, MCV 99.0, Plt 232    Chest xray,  showed new enlarging LORI lung nodule. It is 1.3 cm and 1.9 cm in size. PET scan done on 1/13/24 showed mild activity in LORI lung nodule, hilar and mediastinal lymph nodes.    She underwent CT guided biopsy of left lower lobe lung mass on 1/30/24 and pathology showed small cell carcinoma. Tumor cells are positive for AE1/AK3, TTF1, synaptomysin, CD56 and Ki67 >90%.      Since she has limited stage small cell lung cancer, chemotherapy with cisplatin and etoposide was started since 2/13/24. She finished chemotherapy on 5/9/24.      MRI brain on 6/14/24 showed no evidence of brain metastatic disease.      CT chest, abdomen, pelvis on 6/14/24 showed interval marked decrease in size of a nodule in the lower lobe of the left lung. Interval apparent complete resolution of the second nodule in the lower lobe of the left lung. There is a stable air cyst with a mural nodule in the upper lobe of the left lung. There is post therapy interstitial thickening and pleural thickening in the lower lobe of the left lung.    She had RT 60Gy/30fx with chemo followed by PCI 25Gy/10fx completed 7/26/24.     Consolidative durvalumab was started on 7/31/24.     On November 20, 2024, she presented to me for follow up. She was referred back to me since she was found to have new onset thrombocytosis. She has stable leukocytosis.      Essential thrombocytosis - Recognized that her platelet count on 11/19/24 was 607. I recommend her to increase hydrea dose to 500 mg daily starting from today (she was on 500 mg every 48 hourly before).         Past Medical History:   Diagnosis Date    Cancer (HCC)     History of external beam radiation therapy 03/2024    LEFT LUNG and LN 6,000 cGy in 30 fractions    Hyperlipidemia     Hypertension     Thickened endometrium     Type 2 diabetes mellitus without complication (HCC)        Past Surgical History:   Procedure Laterality Date    CT GUIDED CHEST TUBE  1/7/2025    CT GUIDED CHEST TUBE 1/7/2025 [facility] CT SCAN  intubated but requests no CPR and no defibrillation.  Patient was intubated and transferred to ICU.     Essential thrombocytosis - her platelet count is normal now. Please continue with current hydrea dose daily.      Anemia - Has been stable lately. Anemia panel on 5/2024 was consistent with AOCD.    She is on maintenance immunotherapy and we will hold it until she gets better. Her last treatment was 12/18/24.      Remainder of care per primary and consulting teams.    I have independently evaluated and examined this patient today.  I have reviewed radiologic and biochemical tests on this patient. Management Plan is developed mutually with Maye Kumar PA-C. I have reviewed above note and agree with assessment and plan. I personally performed a substantive portion of the visit including all aspects of the medical decision making.

## 2025-01-08 NOTE — PROGRESS NOTES
PHARMACY VANCOMYCIN MONITORING SERVICE  Pharmacy consulted by Dr. Desai for monitoring and adjustment.    Indication for treatment: Vancomycin indication: HAP  Goal trough: Trough Goal: 15-20 mcg/mL  AUC/GABO: 400-600    Risk Factors for MRSA Identified:   Hospitalization within the past 90 days    Pertinent Laboratory Values:   Temp Readings from Last 3 Encounters:   01/08/25 99.5 °F (37.5 °C) (Rectal)   12/30/24 97.5 °F (36.4 °C) (Oral)   12/18/24 98 °F (36.7 °C) (Infrared)     Recent Labs     01/05/25  1030 01/06/25  0940 01/07/25  1800   WBC 4.2 4.7 6.3     Recent Labs     01/07/25  0800 01/07/25  1800 01/08/25  0650   BUN 11 13 18   CREATININE 0.6 0.6 0.9     Estimated Creatinine Clearance: 51 mL/min (based on SCr of 0.9 mg/dL).    Intake/Output Summary (Last 24 hours) at 1/8/2025 0916  Last data filed at 1/8/2025 0700  Gross per 24 hour   Intake 736.42 ml   Output 2636 ml   Net -1899.58 ml     In: 736.4   Out: 2636 [Urine:1350]    Last Encounter Weight:  Wt Readings from Last 3 Encounters:   01/08/25 66 kg (145 lb 8.1 oz)   12/30/24 59.4 kg (131 lb)   12/18/24 59.3 kg (130 lb 12.8 oz)       Pertinent Cultures:   Date    Source   Results  12/31   Pleural fluid  MRSA rare growth (vanc sensi)  01/02   Sputum/Respiratory Rare GNR, GNC  01/02   MRSA Nasal  Negative       Vancomycin level:   Recent Labs     01/06/25  1500 01/07/25  0800 01/08/25  0650   VANCORANDOM 28.9* 14.7 20.9*       Assessment:  HPI: 69 y.o. female presented to the ED for evaluation of SOB. Patient reported worsening SOB over the last 2 days. Patient was admitted earlier in December and treated for pneumonia with antibiotics, had a pleural effusion that was drained. In [facility] ED she was hemodynamically stable, required 2 L oxygen. Chest x-ray showed left white line, thoracentesis attempted by the ED physician but unsuccessful.  PMHx hypertension and hyperlipidemia, diabetes mellitus, lung carcinoma.  SCr, BUN, and urine output:   Scr 0.9  mg/dL  BUN WNL  UOP documented as ~0.9 mL/kg/hr in the last 24 hours  Day(s) of therapy: 5  Vancomycin concentration:   01/06 - 21.9 mg/L, unusable level collected directly after start of infusion  01/06 - 28.9 mg/L, 5.5 hours after last dose of vancomycin 750 mg IV q12hr  01/07 - 14.7 mg/L, 22.5 hours post dose of 750 mg IV x once  01/08 - 20.9 ~  18 hours post dose- AUC calculating 561    Plan:  Patient now intubated and sedated. Slight bump in Scr, however UOP in last 24 hours is good- watch closely. Will reduced vanco dsoe back to 750mg IV q24h to target  which is sufficient to treat MRSA and minimize risk for renal injury. Repeat level in a few days to assess clearance.  Per ID note, plan to continue vancomycin while in hospital then plan to discharge with 4 weeks of Zyvox  Pharmacy will continue to monitor patient and adjust therapy as indicated    VANCOMYCIN CONCENTRATION SCHEDULED FOR 01/10 @ 0600    Thank you for the consult.  Jazzy Chang RPH  1/8/2025 9:16 AM

## 2025-01-10 LAB
MICROORGANISM SPEC CULT: ABNORMAL
MICROORGANISM SPEC CULT: NORMAL
MICROORGANISM/AGENT SPEC: ABNORMAL
MICROORGANISM/AGENT SPEC: NORMAL
SERVICE CMNT-IMP: NORMAL
SPECIMEN DESCRIPTION: ABNORMAL
SPECIMEN DESCRIPTION: NORMAL

## (undated) DEVICE — INTENDED FOR TISSUE SEPARATION, AND OTHER PROCEDURES THAT REQUIRE A SHARP SURGICAL BLADE TO PUNCTURE OR CUT.: Brand: BARD-PARKER ® STAINLESS STEEL BLADES

## (undated) DEVICE — TOWEL,OR,DSP,ST,BLUE,STD,6/PK,12PK/CS: Brand: MEDLINE

## (undated) DEVICE — SHEET,DRAPE,UNDERBUTTOCK,GRAD POUCH,PORT: Brand: MEDLINE

## (undated) DEVICE — MARKER SURG SKIN UTIL REGULAR/FINE 2 TIP W/ RUL AND 9 LBL

## (undated) DEVICE — NEEDLE HYPO 25GA L1.5IN BLU POLYPR HUB S STL REG BVL STR

## (undated) DEVICE — SET IRRIG L94IN ID0.281IN W/ 4.5IN DST FLX CONN 2 LD ON OFF

## (undated) DEVICE — LEGGINGS, PAIR, CLEAR, STERILE: Brand: MEDLINE

## (undated) DEVICE — SYRINGE MED 10ML LUERLOCK TIP W/O SFTY DISP

## (undated) DEVICE — SOLUTION IV IRRIG WATER 1000ML POUR BRL 2F7114

## (undated) DEVICE — SOLUTION 1000ML NRML NACL

## (undated) DEVICE — TELFA NON-ADHERENT ABSORBENT DRESSING: Brand: TELFA

## (undated) DEVICE — PACK,BASIC,SIRUS,V: Brand: MEDLINE

## (undated) DEVICE — SUTURE MONOCRYL SZ 4-0 L18IN ABSRB UD L19MM PS-2 3/8 CIR PRIM Y496G

## (undated) DEVICE — GAUZE,SPONGE,4"X4",16PLY,XRAY,STRL,LF: Brand: MEDLINE

## (undated) DEVICE — COUNTER NDL 30 COUNT FOAM STRP SGL MAG

## (undated) DEVICE — PACK,BASIC,IX: Brand: MEDLINE

## (undated) DEVICE — TRAY PREP DRY W/ PREM GLV 2 APPL 6 SPNG 2 UNDPD 1 OVERWRAP

## (undated) DEVICE — DRAPE,T,LAPARO,TRANS,STERILE: Brand: MEDLINE

## (undated) DEVICE — Z INACTIVE USE 2863041 SPONGE GZ W4XL4IN 100% COT 16 PLY RADPQ HIGHLY ABSRB

## (undated) DEVICE — Z DISCONTINUED USE 2220190 SUTURE VICRYL SZ 3-0 L27IN ABSRB UD L26MM SH 1/2 CIR J416H

## (undated) DEVICE — GLOVE ORANGE PI 7 1/2   MSG9075

## (undated) DEVICE — TUBING, SUCTION, 9/32" X 10', STRAIGHT: Brand: MEDLINE

## (undated) DEVICE — DECANTER FLD 9IN ST BG FOR ASEP TRNSF OF FLD

## (undated) DEVICE — SUTURE PROL SZ 3-0 L36IN NONABSORBABLE BLU L26MM SH 1/2 CIR 8522H

## (undated) DEVICE — PENCIL ES CRD L10FT HND SWCHING ROCK SWCH W/ EDGE COAT BLDE

## (undated) DEVICE — Z INACTIVE NO ACTIVE SUPPLIER APPLICATOR MEDICATED 26 CC TINT HI-LITE ORNG STRL CHLORAPREP

## (undated) DEVICE — GLOVE ORANGE PI 7   MSG9070

## (undated) DEVICE — YANKAUER,FLEXIBLE HANDLE,REGLR CAPACITY: Brand: MEDLINE INDUSTRIES, INC.

## (undated) DEVICE — ADHESIVE SKIN CLSR 0.7ML TOP DERMBND ADV

## (undated) DEVICE — MATERNITY PAD,HEAVY: Brand: CURITY

## (undated) DEVICE — ELECTRODE ES AD CRDLSS PT RET REM POLYHESIVE

## (undated) DEVICE — DRAPE SHEET ULTRAGARD: Brand: MEDLINE

## (undated) DEVICE — COVER,C-ARM,41X74: Brand: MEDLINE